# Patient Record
Sex: MALE | Race: WHITE | NOT HISPANIC OR LATINO | Employment: OTHER | URBAN - METROPOLITAN AREA
[De-identification: names, ages, dates, MRNs, and addresses within clinical notes are randomized per-mention and may not be internally consistent; named-entity substitution may affect disease eponyms.]

---

## 2017-01-10 ENCOUNTER — ALLSCRIPTS OFFICE VISIT (OUTPATIENT)
Dept: OTHER | Facility: OTHER | Age: 52
End: 2017-01-10

## 2017-01-25 ENCOUNTER — ALLSCRIPTS OFFICE VISIT (OUTPATIENT)
Dept: OTHER | Facility: OTHER | Age: 52
End: 2017-01-25

## 2017-01-27 ENCOUNTER — APPOINTMENT (OUTPATIENT)
Dept: LAB | Facility: HOSPITAL | Age: 52
End: 2017-01-27
Attending: FAMILY MEDICINE
Payer: MEDICARE

## 2017-01-27 ENCOUNTER — TRANSCRIBE ORDERS (OUTPATIENT)
Dept: ADMINISTRATIVE | Facility: HOSPITAL | Age: 52
End: 2017-01-27

## 2017-01-27 DIAGNOSIS — R51.9 FACIAL PAIN: ICD-10-CM

## 2017-01-27 DIAGNOSIS — R25.1 TREMOR: ICD-10-CM

## 2017-01-27 DIAGNOSIS — E03.9 UNSPECIFIED HYPOTHYROIDISM: ICD-10-CM

## 2017-01-27 DIAGNOSIS — R53.83 OTHER MALAISE AND FATIGUE: Primary | ICD-10-CM

## 2017-01-27 DIAGNOSIS — R53.81 OTHER MALAISE AND FATIGUE: ICD-10-CM

## 2017-01-27 DIAGNOSIS — E03.9 HYPOTHYROIDISM: ICD-10-CM

## 2017-01-27 DIAGNOSIS — E03.9 UNSPECIFIED HYPOTHYROIDISM: Primary | ICD-10-CM

## 2017-01-27 DIAGNOSIS — Q90.9 DOWN'S SYNDROME: ICD-10-CM

## 2017-01-27 DIAGNOSIS — R49.0 DYSPHONIA OF ORGANIC TREMOR: ICD-10-CM

## 2017-01-27 DIAGNOSIS — R53.81 OTHER MALAISE AND FATIGUE: Primary | ICD-10-CM

## 2017-01-27 DIAGNOSIS — R51.9 HEADACHE: ICD-10-CM

## 2017-01-27 DIAGNOSIS — R53.83 OTHER MALAISE AND FATIGUE: ICD-10-CM

## 2017-01-27 DIAGNOSIS — R25.1 DYSPHONIA OF ORGANIC TREMOR: ICD-10-CM

## 2017-01-27 DIAGNOSIS — R53.83 OTHER FATIGUE: ICD-10-CM

## 2017-01-27 LAB
25(OH)D3 SERPL-MCNC: 47 NG/ML (ref 30–100)
ALBUMIN SERPL BCP-MCNC: 2.8 G/DL (ref 3.5–5)
ALP SERPL-CCNC: 55 U/L (ref 46–116)
ALT SERPL W P-5'-P-CCNC: 17 U/L (ref 12–78)
ANION GAP SERPL CALCULATED.3IONS-SCNC: 7 MMOL/L (ref 4–13)
AST SERPL W P-5'-P-CCNC: 17 U/L (ref 5–45)
BACTERIA UR QL AUTO: ABNORMAL /HPF
BASOPHILS # BLD AUTO: 0 THOUSANDS/ΜL (ref 0–0.1)
BASOPHILS NFR BLD AUTO: 1 % (ref 0–1)
BILIRUB SERPL-MCNC: 0.3 MG/DL (ref 0.2–1)
BILIRUB UR QL STRIP: NEGATIVE
BUN SERPL-MCNC: 8 MG/DL (ref 5–25)
CALCIUM SERPL-MCNC: 8.5 MG/DL (ref 8.3–10.1)
CHLORIDE SERPL-SCNC: 98 MMOL/L (ref 100–108)
CHOLEST SERPL-MCNC: 173 MG/DL (ref 50–200)
CLARITY UR: ABNORMAL
CO2 SERPL-SCNC: 31 MMOL/L (ref 21–32)
COLOR UR: ABNORMAL
CREAT SERPL-MCNC: 0.94 MG/DL (ref 0.6–1.3)
EOSINOPHIL # BLD AUTO: 0 THOUSAND/ΜL (ref 0–0.61)
EOSINOPHIL NFR BLD AUTO: 1 % (ref 0–6)
ERYTHROCYTE [DISTWIDTH] IN BLOOD BY AUTOMATED COUNT: 14.6 % (ref 11.6–15.1)
GFR SERPL CREATININE-BSD FRML MDRD: >60 ML/MIN/1.73SQ M
GLUCOSE SERPL-MCNC: 76 MG/DL (ref 65–140)
GLUCOSE UR STRIP-MCNC: NEGATIVE MG/DL
HCT VFR BLD AUTO: 45 % (ref 42–52)
HDLC SERPL-MCNC: 52 MG/DL (ref 40–60)
HGB BLD-MCNC: 14.9 G/DL (ref 14–18)
HGB UR QL STRIP.AUTO: ABNORMAL
KETONES UR STRIP-MCNC: NEGATIVE MG/DL
LDLC SERPL CALC-MCNC: 105 MG/DL (ref 0–100)
LEUKOCYTE ESTERASE UR QL STRIP: ABNORMAL
LYMPHOCYTES # BLD AUTO: 1.2 THOUSANDS/ΜL (ref 0.6–4.47)
LYMPHOCYTES NFR BLD AUTO: 25 % (ref 14–44)
MCH RBC QN AUTO: 33.6 PG (ref 27–31)
MCHC RBC AUTO-ENTMCNC: 33.2 G/DL (ref 31.4–37.4)
MCV RBC AUTO: 101 FL (ref 82–98)
MONOCYTES # BLD AUTO: 0.4 THOUSAND/ΜL (ref 0.17–1.22)
MONOCYTES NFR BLD AUTO: 8 % (ref 4–12)
NEUTROPHILS # BLD AUTO: 3.1 THOUSANDS/ΜL (ref 1.85–7.62)
NEUTS SEG NFR BLD AUTO: 66 % (ref 43–75)
NITRITE UR QL STRIP: NEGATIVE
NON-SQ EPI CELLS URNS QL MICRO: ABNORMAL /HPF
NRBC BLD AUTO-RTO: 0 /100 WBCS
OTHER STN SPEC: ABNORMAL
PH UR STRIP.AUTO: 7 [PH] (ref 5–9)
PLATELET # BLD AUTO: 296 THOUSANDS/UL (ref 130–400)
PMV BLD AUTO: 9.3 FL (ref 8.9–12.7)
POTASSIUM SERPL-SCNC: 3.8 MMOL/L (ref 3.5–5.3)
PROT SERPL-MCNC: 7.2 G/DL (ref 6.4–8.2)
PROT UR STRIP-MCNC: NEGATIVE MG/DL
RBC # BLD AUTO: 4.44 MILLION/UL (ref 4.7–6.1)
RBC #/AREA URNS AUTO: ABNORMAL /HPF
SODIUM SERPL-SCNC: 136 MMOL/L (ref 136–145)
SP GR UR STRIP.AUTO: 1.01 (ref 1–1.03)
T4 FREE SERPL-MCNC: 0.88 NG/DL (ref 0.76–1.46)
TRIGL SERPL-MCNC: 79 MG/DL
TSH SERPL DL<=0.05 MIU/L-ACNC: 3.68 UIU/ML (ref 0.36–3.74)
UROBILINOGEN UR QL STRIP.AUTO: 0.2 E.U./DL
WBC # BLD AUTO: 4.8 THOUSAND/UL (ref 4.8–10.8)
WBC #/AREA URNS AUTO: ABNORMAL /HPF

## 2017-01-27 PROCEDURE — 84443 ASSAY THYROID STIM HORMONE: CPT

## 2017-01-27 PROCEDURE — 80053 COMPREHEN METABOLIC PANEL: CPT

## 2017-01-27 PROCEDURE — 80061 LIPID PANEL: CPT

## 2017-01-27 PROCEDURE — 36415 COLL VENOUS BLD VENIPUNCTURE: CPT

## 2017-01-27 PROCEDURE — 82306 VITAMIN D 25 HYDROXY: CPT

## 2017-01-27 PROCEDURE — 84439 ASSAY OF FREE THYROXINE: CPT

## 2017-01-27 PROCEDURE — 85025 COMPLETE CBC W/AUTO DIFF WBC: CPT

## 2017-01-27 PROCEDURE — 81001 URINALYSIS AUTO W/SCOPE: CPT | Performed by: FAMILY MEDICINE

## 2017-01-30 ENCOUNTER — GENERIC CONVERSION - ENCOUNTER (OUTPATIENT)
Dept: OTHER | Facility: OTHER | Age: 52
End: 2017-01-30

## 2017-02-13 ENCOUNTER — ALLSCRIPTS OFFICE VISIT (OUTPATIENT)
Dept: OTHER | Facility: OTHER | Age: 52
End: 2017-02-13

## 2017-02-14 ENCOUNTER — ALLSCRIPTS OFFICE VISIT (OUTPATIENT)
Dept: OTHER | Facility: OTHER | Age: 52
End: 2017-02-14

## 2017-02-23 ENCOUNTER — ALLSCRIPTS OFFICE VISIT (OUTPATIENT)
Dept: OTHER | Facility: OTHER | Age: 52
End: 2017-02-23

## 2017-02-23 DIAGNOSIS — E29.1 TESTICULAR HYPOFUNCTION: ICD-10-CM

## 2017-02-23 DIAGNOSIS — E03.9 HYPOTHYROIDISM: ICD-10-CM

## 2017-02-23 DIAGNOSIS — R35.0 FREQUENCY OF MICTURITION: ICD-10-CM

## 2017-02-23 LAB
BILIRUB UR QL STRIP: NORMAL
CLARITY UR: NORMAL
COLOR UR: YELLOW
GLUCOSE (HISTORICAL): NORMAL
HGB UR QL STRIP.AUTO: NORMAL
KETONES UR STRIP-MCNC: NORMAL MG/DL
LEUKOCYTE ESTERASE UR QL STRIP: NORMAL
NITRITE UR QL STRIP: NORMAL
PH UR STRIP.AUTO: 7 [PH]
PROT UR STRIP-MCNC: NORMAL MG/DL
SP GR UR STRIP.AUTO: 1
UROBILINOGEN UR QL STRIP.AUTO: NORMAL

## 2017-02-24 ENCOUNTER — APPOINTMENT (OUTPATIENT)
Dept: LAB | Facility: HOSPITAL | Age: 52
End: 2017-02-24
Payer: MEDICARE

## 2017-02-24 DIAGNOSIS — R35.0 FREQUENCY OF MICTURITION: ICD-10-CM

## 2017-02-24 PROCEDURE — 87086 URINE CULTURE/COLONY COUNT: CPT

## 2017-02-25 LAB — BACTERIA UR CULT: NORMAL

## 2017-02-28 ENCOUNTER — HOSPITAL ENCOUNTER (INPATIENT)
Facility: HOSPITAL | Age: 52
LOS: 2 days | Discharge: HOME | DRG: 871 | End: 2017-03-02
Attending: EMERGENCY MEDICINE | Admitting: INTERNAL MEDICINE
Payer: MEDICARE

## 2017-02-28 ENCOUNTER — ALLSCRIPTS OFFICE VISIT (OUTPATIENT)
Dept: OTHER | Facility: OTHER | Age: 52
End: 2017-02-28

## 2017-02-28 ENCOUNTER — APPOINTMENT (EMERGENCY)
Dept: RADIOLOGY | Facility: HOSPITAL | Age: 52
DRG: 871 | End: 2017-02-28
Payer: MEDICARE

## 2017-02-28 DIAGNOSIS — J18.9 MULTIFOCAL PNEUMONIA: Primary | ICD-10-CM

## 2017-02-28 PROBLEM — D64.9 ANEMIA: Status: ACTIVE | Noted: 2017-02-28

## 2017-02-28 PROBLEM — A41.9 SEPSIS (HCC): Status: ACTIVE | Noted: 2017-02-28

## 2017-02-28 LAB
ALBUMIN SERPL BCP-MCNC: 2.6 G/DL (ref 3.5–5)
ALP SERPL-CCNC: 61 U/L (ref 46–116)
ALT SERPL W P-5'-P-CCNC: 31 U/L (ref 12–78)
ANION GAP SERPL CALCULATED.3IONS-SCNC: 7 MMOL/L (ref 4–13)
AST SERPL W P-5'-P-CCNC: 40 U/L (ref 5–45)
BASOPHILS # BLD AUTO: 0 THOUSANDS/ΜL (ref 0–0.1)
BASOPHILS NFR BLD AUTO: 0 % (ref 0–1)
BILIRUB SERPL-MCNC: 0.3 MG/DL (ref 0.2–1)
BUN SERPL-MCNC: 13 MG/DL (ref 5–25)
CALCIUM SERPL-MCNC: 8 MG/DL (ref 8.3–10.1)
CHLORIDE SERPL-SCNC: 105 MMOL/L (ref 100–108)
CO2 SERPL-SCNC: 28 MMOL/L (ref 21–32)
CREAT SERPL-MCNC: 1.17 MG/DL (ref 0.6–1.3)
EOSINOPHIL # BLD AUTO: 0 THOUSAND/ΜL (ref 0–0.61)
EOSINOPHIL NFR BLD AUTO: 0 % (ref 0–6)
ERYTHROCYTE [DISTWIDTH] IN BLOOD BY AUTOMATED COUNT: 15.4 % (ref 11.6–15.1)
FLUAV AG SPEC QL IA: NEGATIVE
GFR SERPL CREATININE-BSD FRML MDRD: >60 ML/MIN/1.73SQ M
GLUCOSE SERPL-MCNC: 78 MG/DL (ref 65–140)
HCT VFR BLD AUTO: 37.5 % (ref 42–52)
HGB BLD-MCNC: 12.6 G/DL (ref 14–18)
INFLUENZA B AG (HISTORICAL): NEGATIVE
LACTATE SERPL-SCNC: 1.7 MMOL/L (ref 0.5–2)
LYMPHOCYTES # BLD AUTO: 0.5 THOUSANDS/ΜL (ref 0.6–4.47)
LYMPHOCYTES NFR BLD AUTO: 7 % (ref 14–44)
MCH RBC QN AUTO: 33.6 PG (ref 27–31)
MCHC RBC AUTO-ENTMCNC: 33.5 G/DL (ref 31.4–37.4)
MCV RBC AUTO: 100 FL (ref 82–98)
MONOCYTES # BLD AUTO: 0.6 THOUSAND/ΜL (ref 0.17–1.22)
MONOCYTES NFR BLD AUTO: 8 % (ref 4–12)
NEUTROPHILS # BLD AUTO: 6.4 THOUSANDS/ΜL (ref 1.85–7.62)
NEUTS SEG NFR BLD AUTO: 85 % (ref 43–75)
NRBC BLD AUTO-RTO: 0 /100 WBCS
NT-PROBNP SERPL-MCNC: 296 PG/ML
PLATELET # BLD AUTO: 219 THOUSANDS/UL (ref 130–400)
PMV BLD AUTO: 8.3 FL (ref 8.9–12.7)
POTASSIUM SERPL-SCNC: 4.4 MMOL/L (ref 3.5–5.3)
PROT SERPL-MCNC: 6.6 G/DL (ref 6.4–8.2)
RBC # BLD AUTO: 3.73 MILLION/UL (ref 4.7–6.1)
SODIUM SERPL-SCNC: 140 MMOL/L (ref 136–145)
WBC # BLD AUTO: 7.5 THOUSAND/UL (ref 4.8–10.8)

## 2017-02-28 PROCEDURE — 85025 COMPLETE CBC W/AUTO DIFF WBC: CPT | Performed by: EMERGENCY MEDICINE

## 2017-02-28 PROCEDURE — 85730 THROMBOPLASTIN TIME PARTIAL: CPT | Performed by: NURSE PRACTITIONER

## 2017-02-28 PROCEDURE — 87798 DETECT AGENT NOS DNA AMP: CPT | Performed by: EMERGENCY MEDICINE

## 2017-02-28 PROCEDURE — 83880 ASSAY OF NATRIURETIC PEPTIDE: CPT | Performed by: NURSE PRACTITIONER

## 2017-02-28 PROCEDURE — 93005 ELECTROCARDIOGRAM TRACING: CPT | Performed by: EMERGENCY MEDICINE

## 2017-02-28 PROCEDURE — 96365 THER/PROPH/DIAG IV INF INIT: CPT

## 2017-02-28 PROCEDURE — 87040 BLOOD CULTURE FOR BACTERIA: CPT | Performed by: EMERGENCY MEDICINE

## 2017-02-28 PROCEDURE — 83605 ASSAY OF LACTIC ACID: CPT | Performed by: EMERGENCY MEDICINE

## 2017-02-28 PROCEDURE — 86140 C-REACTIVE PROTEIN: CPT | Performed by: NURSE PRACTITIONER

## 2017-02-28 PROCEDURE — 96361 HYDRATE IV INFUSION ADD-ON: CPT

## 2017-02-28 PROCEDURE — 85045 AUTOMATED RETICULOCYTE COUNT: CPT | Performed by: NURSE PRACTITIONER

## 2017-02-28 PROCEDURE — 36415 COLL VENOUS BLD VENIPUNCTURE: CPT | Performed by: EMERGENCY MEDICINE

## 2017-02-28 PROCEDURE — 99285 EMERGENCY DEPT VISIT HI MDM: CPT

## 2017-02-28 PROCEDURE — 80053 COMPREHEN METABOLIC PANEL: CPT | Performed by: EMERGENCY MEDICINE

## 2017-02-28 PROCEDURE — 85610 PROTHROMBIN TIME: CPT | Performed by: NURSE PRACTITIONER

## 2017-02-28 PROCEDURE — 71010 HB CHEST X-RAY 1 VIEW FRONTAL (PORTABLE): CPT

## 2017-02-28 RX ORDER — PYRIDOXINE HCL (VITAMIN B6) 100 MG
TABLET ORAL 2 TIMES DAILY
Status: DISCONTINUED | OUTPATIENT
Start: 2017-02-28 | End: 2017-02-28

## 2017-02-28 RX ORDER — PEDIATRIC MULTIPLE VITAMIN LIQ
5 LIQUID ORAL DAILY
Status: DISCONTINUED | OUTPATIENT
Start: 2017-03-01 | End: 2017-03-02 | Stop reason: HOSPADM

## 2017-02-28 RX ORDER — SACCHAROMYCES BOULARDII 250 MG
250 CAPSULE ORAL 2 TIMES DAILY
Status: DISCONTINUED | OUTPATIENT
Start: 2017-02-28 | End: 2017-03-02 | Stop reason: HOSPADM

## 2017-02-28 RX ORDER — OLANZAPINE 2.5 MG/1
2.5 TABLET ORAL
Status: DISCONTINUED | OUTPATIENT
Start: 2017-02-28 | End: 2017-03-02 | Stop reason: HOSPADM

## 2017-02-28 RX ORDER — AZITHROMYCIN 250 MG/1
500 TABLET, FILM COATED ORAL EVERY 24 HOURS
Status: DISCONTINUED | OUTPATIENT
Start: 2017-03-01 | End: 2017-03-02

## 2017-02-28 RX ORDER — MELATONIN
1000 2 TIMES DAILY
Status: DISCONTINUED | OUTPATIENT
Start: 2017-02-28 | End: 2017-03-02 | Stop reason: HOSPADM

## 2017-02-28 RX ORDER — LEVOTHYROXINE SODIUM 0.05 MG/1
50 TABLET ORAL
Status: DISCONTINUED | OUTPATIENT
Start: 2017-03-01 | End: 2017-03-02 | Stop reason: HOSPADM

## 2017-02-28 RX ORDER — SODIUM CHLORIDE 9 MG/ML
100 INJECTION, SOLUTION INTRAVENOUS CONTINUOUS
Status: DISCONTINUED | OUTPATIENT
Start: 2017-02-28 | End: 2017-03-02

## 2017-02-28 RX ORDER — B-COMPLEX WITH VITAMIN C
1 TABLET ORAL 2 TIMES DAILY
Status: DISCONTINUED | OUTPATIENT
Start: 2017-03-01 | End: 2017-03-02 | Stop reason: HOSPADM

## 2017-02-28 RX ORDER — IPRATROPIUM BROMIDE AND ALBUTEROL SULFATE 2.5; .5 MG/3ML; MG/3ML
3 SOLUTION RESPIRATORY (INHALATION) EVERY 4 HOURS PRN
Status: DISCONTINUED | OUTPATIENT
Start: 2017-02-28 | End: 2017-03-02 | Stop reason: HOSPADM

## 2017-02-28 RX ORDER — ATORVASTATIN CALCIUM 20 MG/1
20 TABLET, FILM COATED ORAL
Status: DISCONTINUED | OUTPATIENT
Start: 2017-03-01 | End: 2017-03-02 | Stop reason: HOSPADM

## 2017-02-28 RX ORDER — PANTOPRAZOLE SODIUM 40 MG/1
40 TABLET, DELAYED RELEASE ORAL
Status: DISCONTINUED | OUTPATIENT
Start: 2017-03-01 | End: 2017-03-02 | Stop reason: HOSPADM

## 2017-02-28 RX ORDER — ACETAMINOPHEN 325 MG/1
650 TABLET ORAL EVERY 6 HOURS PRN
Status: DISCONTINUED | OUTPATIENT
Start: 2017-02-28 | End: 2017-03-02 | Stop reason: HOSPADM

## 2017-02-28 RX ORDER — GUAIFENESIN/DEXTROMETHORPHAN 100-10MG/5
10 SYRUP ORAL EVERY 4 HOURS PRN
Status: DISCONTINUED | OUTPATIENT
Start: 2017-02-28 | End: 2017-03-02 | Stop reason: HOSPADM

## 2017-02-28 RX ORDER — MAGNESIUM HYDROXIDE/ALUMINUM HYDROXICE/SIMETHICONE 120; 1200; 1200 MG/30ML; MG/30ML; MG/30ML
30 SUSPENSION ORAL EVERY 6 HOURS PRN
Status: DISCONTINUED | OUTPATIENT
Start: 2017-02-28 | End: 2017-03-02 | Stop reason: HOSPADM

## 2017-02-28 RX ORDER — POLYETHYLENE GLYCOL 3350 17 G/17G
17 POWDER, FOR SOLUTION ORAL DAILY
Status: DISCONTINUED | OUTPATIENT
Start: 2017-03-01 | End: 2017-03-02 | Stop reason: HOSPADM

## 2017-02-28 RX ORDER — ACETAMINOPHEN 325 MG/1
650 TABLET ORAL ONCE
Status: COMPLETED | OUTPATIENT
Start: 2017-02-28 | End: 2017-02-28

## 2017-02-28 RX ORDER — ONDANSETRON 2 MG/ML
4 INJECTION INTRAMUSCULAR; INTRAVENOUS EVERY 6 HOURS PRN
Status: DISCONTINUED | OUTPATIENT
Start: 2017-02-28 | End: 2017-03-02 | Stop reason: HOSPADM

## 2017-02-28 RX ORDER — IPRATROPIUM BROMIDE AND ALBUTEROL SULFATE 2.5; .5 MG/3ML; MG/3ML
3 SOLUTION RESPIRATORY (INHALATION)
Status: DISCONTINUED | OUTPATIENT
Start: 2017-02-28 | End: 2017-03-02 | Stop reason: HOSPADM

## 2017-02-28 RX ORDER — TESTOSTERONE 20.25 MG/1.25G
20.25 GEL TOPICAL DAILY
Status: DISCONTINUED | OUTPATIENT
Start: 2017-03-01 | End: 2017-03-02 | Stop reason: HOSPADM

## 2017-02-28 RX ORDER — TAMSULOSIN HYDROCHLORIDE 0.4 MG/1
0.4 CAPSULE ORAL
Status: DISCONTINUED | OUTPATIENT
Start: 2017-03-01 | End: 2017-03-02 | Stop reason: HOSPADM

## 2017-02-28 RX ORDER — DOCUSATE SODIUM 100 MG/1
100 CAPSULE, LIQUID FILLED ORAL DAILY
Status: DISCONTINUED | OUTPATIENT
Start: 2017-03-01 | End: 2017-03-02 | Stop reason: HOSPADM

## 2017-02-28 RX ORDER — AZITHROMYCIN 250 MG/1
500 TABLET, FILM COATED ORAL ONCE
Status: COMPLETED | OUTPATIENT
Start: 2017-02-28 | End: 2017-02-28

## 2017-02-28 RX ADMIN — VITAMIN D, TAB 1000IU (100/BT) 1000 UNITS: 25 TAB at 23:13

## 2017-02-28 RX ADMIN — SODIUM CHLORIDE 1000 ML: 0.9 INJECTION, SOLUTION INTRAVENOUS at 16:17

## 2017-02-28 RX ADMIN — SODIUM CHLORIDE 100 ML/HR: 0.9 INJECTION, SOLUTION INTRAVENOUS at 21:06

## 2017-02-28 RX ADMIN — Medication 250 MG: at 23:08

## 2017-02-28 RX ADMIN — ACETAMINOPHEN 650 MG: 325 TABLET, FILM COATED ORAL at 16:17

## 2017-02-28 RX ADMIN — OLANZAPINE 2.5 MG: 2.5 TABLET, FILM COATED ORAL at 23:02

## 2017-02-28 RX ADMIN — AZITHROMYCIN 500 MG: 250 TABLET, FILM COATED ORAL at 17:18

## 2017-02-28 RX ADMIN — CEFTRIAXONE 1000 MG: 1 INJECTION, SOLUTION INTRAVENOUS at 17:17

## 2017-03-01 PROBLEM — B33.8 RSV INFECTION: Status: ACTIVE | Noted: 2017-03-01

## 2017-03-01 LAB
ANION GAP SERPL CALCULATED.3IONS-SCNC: 7 MMOL/L (ref 4–13)
APTT PPP: 26 SECONDS (ref 24–36)
ATRIAL RATE: 105 BPM
BASOPHILS # BLD AUTO: 0 THOUSANDS/ΜL (ref 0–0.1)
BASOPHILS NFR BLD AUTO: 1 % (ref 0–1)
BILIRUB UR QL STRIP: NEGATIVE
BUN SERPL-MCNC: 11 MG/DL (ref 5–25)
CALCIUM SERPL-MCNC: 8 MG/DL (ref 8.3–10.1)
CHLORIDE SERPL-SCNC: 104 MMOL/L (ref 100–108)
CLARITY UR: CLEAR
CO2 SERPL-SCNC: 26 MMOL/L (ref 21–32)
COLOR UR: YELLOW
CREAT SERPL-MCNC: 0.91 MG/DL (ref 0.6–1.3)
CRP SERPL QL: 53.7 MG/L
EOSINOPHIL # BLD AUTO: 0 THOUSAND/ΜL (ref 0–0.61)
EOSINOPHIL NFR BLD AUTO: 0 % (ref 0–6)
ERYTHROCYTE [DISTWIDTH] IN BLOOD BY AUTOMATED COUNT: 15.3 % (ref 11.6–15.1)
FERRITIN SERPL-MCNC: 162 NG/ML (ref 8–388)
FLUAV AG SPEC QL: ABNORMAL
FLUBV AG SPEC QL: ABNORMAL
FOLATE SERPL-MCNC: >20 NG/ML (ref 3.1–17.5)
GFR SERPL CREATININE-BSD FRML MDRD: >60 ML/MIN/1.73SQ M
GLUCOSE SERPL-MCNC: 95 MG/DL (ref 65–140)
GLUCOSE UR STRIP-MCNC: NEGATIVE MG/DL
HCT VFR BLD AUTO: 35.7 % (ref 42–52)
HGB BLD-MCNC: 12 G/DL (ref 14–18)
HGB UR QL STRIP.AUTO: NEGATIVE
INR PPP: 1.07 (ref 0.86–1.16)
IRON SATN MFR SERPL: 10 %
IRON SERPL-MCNC: 22 UG/DL (ref 65–175)
KETONES UR STRIP-MCNC: NEGATIVE MG/DL
L PNEUMO1 AG UR QL IA.RAPID: NEGATIVE
LEUKOCYTE ESTERASE UR QL STRIP: NEGATIVE
LYMPHOCYTES # BLD AUTO: 0.9 THOUSANDS/ΜL (ref 0.6–4.47)
LYMPHOCYTES NFR BLD AUTO: 19 % (ref 14–44)
MAGNESIUM SERPL-MCNC: 1.8 MG/DL (ref 1.6–2.6)
MCH RBC QN AUTO: 33.9 PG (ref 27–31)
MCHC RBC AUTO-ENTMCNC: 33.6 G/DL (ref 31.4–37.4)
MCV RBC AUTO: 101 FL (ref 82–98)
MONOCYTES # BLD AUTO: 0.5 THOUSAND/ΜL (ref 0.17–1.22)
MONOCYTES NFR BLD AUTO: 10 % (ref 4–12)
NEUTROPHILS # BLD AUTO: 3.2 THOUSANDS/ΜL (ref 1.85–7.62)
NEUTS SEG NFR BLD AUTO: 70 % (ref 43–75)
NITRITE UR QL STRIP: NEGATIVE
NRBC BLD AUTO-RTO: 0 /100 WBCS
P AXIS: 53 DEGREES
PH UR STRIP.AUTO: 6.5 [PH] (ref 5–9)
PLATELET # BLD AUTO: 213 THOUSANDS/UL (ref 130–400)
PMV BLD AUTO: 8.3 FL (ref 8.9–12.7)
POTASSIUM SERPL-SCNC: 3.7 MMOL/L (ref 3.5–5.3)
PR INTERVAL: 132 MS
PROT UR STRIP-MCNC: NEGATIVE MG/DL
PROTHROMBIN TIME: 11.3 SECONDS (ref 9.4–11.7)
QRS AXIS: -4 DEGREES
QRSD INTERVAL: 84 MS
QT INTERVAL: 320 MS
QTC INTERVAL: 422 MS
RBC # BLD AUTO: 3.54 MILLION/UL (ref 4.7–6.1)
RETICS # AUTO: NORMAL 10*3/UL (ref 14356–105094)
RETICS # CALC: 1.19 % (ref 0.37–1.87)
RSV B RNA SPEC QL NAA+PROBE: DETECTED
S PNEUM AG UR QL: NEGATIVE
SODIUM SERPL-SCNC: 137 MMOL/L (ref 136–145)
SP GR UR STRIP.AUTO: 1.01 (ref 1–1.03)
T WAVE AXIS: 36 DEGREES
TIBC SERPL-MCNC: 215 UG/DL (ref 250–450)
UROBILINOGEN UR QL STRIP.AUTO: 0.2 E.U./DL
VENTRICULAR RATE: 105 BPM
VIT B12 SERPL-MCNC: 464 PG/ML (ref 100–900)
WBC # BLD AUTO: 4.6 THOUSAND/UL (ref 4.8–10.8)

## 2017-03-01 PROCEDURE — 82607 VITAMIN B-12: CPT | Performed by: NURSE PRACTITIONER

## 2017-03-01 PROCEDURE — 94640 AIRWAY INHALATION TREATMENT: CPT

## 2017-03-01 PROCEDURE — 94760 N-INVAS EAR/PLS OXIMETRY 1: CPT

## 2017-03-01 PROCEDURE — 83735 ASSAY OF MAGNESIUM: CPT | Performed by: NURSE PRACTITIONER

## 2017-03-01 PROCEDURE — 94664 DEMO&/EVAL PT USE INHALER: CPT

## 2017-03-01 PROCEDURE — 87449 NOS EACH ORGANISM AG IA: CPT | Performed by: NURSE PRACTITIONER

## 2017-03-01 PROCEDURE — 85025 COMPLETE CBC W/AUTO DIFF WBC: CPT | Performed by: NURSE PRACTITIONER

## 2017-03-01 PROCEDURE — 82728 ASSAY OF FERRITIN: CPT | Performed by: NURSE PRACTITIONER

## 2017-03-01 PROCEDURE — 83540 ASSAY OF IRON: CPT | Performed by: NURSE PRACTITIONER

## 2017-03-01 PROCEDURE — 87081 CULTURE SCREEN ONLY: CPT | Performed by: NURSE PRACTITIONER

## 2017-03-01 PROCEDURE — 81003 URINALYSIS AUTO W/O SCOPE: CPT | Performed by: EMERGENCY MEDICINE

## 2017-03-01 PROCEDURE — 82746 ASSAY OF FOLIC ACID SERUM: CPT | Performed by: NURSE PRACTITIONER

## 2017-03-01 PROCEDURE — 80048 BASIC METABOLIC PNL TOTAL CA: CPT | Performed by: NURSE PRACTITIONER

## 2017-03-01 PROCEDURE — 83550 IRON BINDING TEST: CPT | Performed by: NURSE PRACTITIONER

## 2017-03-01 RX ADMIN — Medication 250 MG: at 17:16

## 2017-03-01 RX ADMIN — IPRATROPIUM BROMIDE AND ALBUTEROL SULFATE 3 ML: .5; 3 SOLUTION RESPIRATORY (INHALATION) at 01:11

## 2017-03-01 RX ADMIN — PANTOPRAZOLE SODIUM 40 MG: 40 TABLET, DELAYED RELEASE ORAL at 06:39

## 2017-03-01 RX ADMIN — AZITHROMYCIN 500 MG: 250 TABLET, FILM COATED ORAL at 17:16

## 2017-03-01 RX ADMIN — IPRATROPIUM BROMIDE AND ALBUTEROL SULFATE 3 ML: .5; 3 SOLUTION RESPIRATORY (INHALATION) at 07:43

## 2017-03-01 RX ADMIN — ENOXAPARIN SODIUM 40 MG: 40 INJECTION SUBCUTANEOUS at 09:09

## 2017-03-01 RX ADMIN — CALCIUM CARBONATE 500 MG (1,250 MG)-VITAMIN D3 200 UNIT TABLET 1 TABLET: at 09:10

## 2017-03-01 RX ADMIN — Medication 5 ML: at 09:09

## 2017-03-01 RX ADMIN — IPRATROPIUM BROMIDE AND ALBUTEROL SULFATE 3 ML: .5; 3 SOLUTION RESPIRATORY (INHALATION) at 13:54

## 2017-03-01 RX ADMIN — IPRATROPIUM BROMIDE AND ALBUTEROL SULFATE 3 ML: .5; 3 SOLUTION RESPIRATORY (INHALATION) at 20:03

## 2017-03-01 RX ADMIN — VITAMIN D, TAB 1000IU (100/BT) 1000 UNITS: 25 TAB at 09:09

## 2017-03-01 RX ADMIN — POLYETHYLENE GLYCOL 3350 17 G: 17 POWDER, FOR SOLUTION ORAL at 09:09

## 2017-03-01 RX ADMIN — DOCUSATE SODIUM 100 MG: 100 CAPSULE, LIQUID FILLED ORAL at 09:10

## 2017-03-01 RX ADMIN — ATORVASTATIN CALCIUM 20 MG: 20 TABLET, FILM COATED ORAL at 15:36

## 2017-03-01 RX ADMIN — LEVOTHYROXINE SODIUM 50 MCG: 50 TABLET ORAL at 06:39

## 2017-03-01 RX ADMIN — OLANZAPINE 2.5 MG: 2.5 TABLET, FILM COATED ORAL at 22:54

## 2017-03-01 RX ADMIN — CALCIUM CARBONATE 500 MG (1,250 MG)-VITAMIN D3 200 UNIT TABLET 1 TABLET: at 17:16

## 2017-03-01 RX ADMIN — TAMSULOSIN HYDROCHLORIDE 0.4 MG: 0.4 CAPSULE ORAL at 15:36

## 2017-03-01 RX ADMIN — VITAMIN D, TAB 1000IU (100/BT) 1000 UNITS: 25 TAB at 17:16

## 2017-03-01 RX ADMIN — Medication 250 MG: at 09:09

## 2017-03-01 RX ADMIN — SODIUM CHLORIDE 100 ML/HR: 0.9 INJECTION, SOLUTION INTRAVENOUS at 06:42

## 2017-03-01 RX ADMIN — CEFTRIAXONE 1000 MG: 1 INJECTION, POWDER, FOR SOLUTION INTRAMUSCULAR; INTRAVENOUS at 17:17

## 2017-03-02 VITALS
TEMPERATURE: 98.4 F | OXYGEN SATURATION: 98 % | HEART RATE: 88 BPM | WEIGHT: 152.38 LBS | DIASTOLIC BLOOD PRESSURE: 50 MMHG | BODY MASS INDEX: 29.92 KG/M2 | RESPIRATION RATE: 16 BRPM | SYSTOLIC BLOOD PRESSURE: 95 MMHG | HEIGHT: 60 IN

## 2017-03-02 PROBLEM — D50.9 IDA (IRON DEFICIENCY ANEMIA): Status: ACTIVE | Noted: 2017-03-02

## 2017-03-02 LAB
ANION GAP SERPL CALCULATED.3IONS-SCNC: 8 MMOL/L (ref 4–13)
BASOPHILS # BLD AUTO: 0 THOUSANDS/ΜL (ref 0–0.1)
BASOPHILS NFR BLD AUTO: 1 % (ref 0–1)
BUN SERPL-MCNC: 9 MG/DL (ref 5–25)
CALCIUM SERPL-MCNC: 7.7 MG/DL (ref 8.3–10.1)
CHLORIDE SERPL-SCNC: 107 MMOL/L (ref 100–108)
CO2 SERPL-SCNC: 26 MMOL/L (ref 21–32)
CREAT SERPL-MCNC: 0.75 MG/DL (ref 0.6–1.3)
EOSINOPHIL # BLD AUTO: 0.1 THOUSAND/ΜL (ref 0–0.61)
EOSINOPHIL NFR BLD AUTO: 2 % (ref 0–6)
ERYTHROCYTE [DISTWIDTH] IN BLOOD BY AUTOMATED COUNT: 15.4 % (ref 11.6–15.1)
GFR SERPL CREATININE-BSD FRML MDRD: >60 ML/MIN/1.73SQ M
GLUCOSE SERPL-MCNC: 101 MG/DL (ref 65–140)
HCT VFR BLD AUTO: 34.9 % (ref 42–52)
HEMOCCULT STL QL: NEGATIVE
HGB BLD-MCNC: 11.7 G/DL (ref 14–18)
LYMPHOCYTES # BLD AUTO: 1.2 THOUSANDS/ΜL (ref 0.6–4.47)
LYMPHOCYTES NFR BLD AUTO: 46 % (ref 14–44)
MCH RBC QN AUTO: 33.7 PG (ref 27–31)
MCHC RBC AUTO-ENTMCNC: 33.5 G/DL (ref 31.4–37.4)
MCV RBC AUTO: 101 FL (ref 82–98)
MONOCYTES # BLD AUTO: 0.3 THOUSAND/ΜL (ref 0.17–1.22)
MONOCYTES NFR BLD AUTO: 12 % (ref 4–12)
MRSA NOSE QL CULT: NORMAL
NEUTROPHILS # BLD AUTO: 1 THOUSANDS/ΜL (ref 1.85–7.62)
NEUTS SEG NFR BLD AUTO: 40 % (ref 43–75)
NRBC BLD AUTO-RTO: 0 /100 WBCS
PLATELET # BLD AUTO: 223 THOUSANDS/UL (ref 130–400)
PMV BLD AUTO: 8.1 FL (ref 8.9–12.7)
POTASSIUM SERPL-SCNC: 3.8 MMOL/L (ref 3.5–5.3)
RBC # BLD AUTO: 3.47 MILLION/UL (ref 4.7–6.1)
SODIUM SERPL-SCNC: 141 MMOL/L (ref 136–145)
WBC # BLD AUTO: 2.6 THOUSAND/UL (ref 4.8–10.8)

## 2017-03-02 PROCEDURE — 82272 OCCULT BLD FECES 1-3 TESTS: CPT | Performed by: INTERNAL MEDICINE

## 2017-03-02 PROCEDURE — 94640 AIRWAY INHALATION TREATMENT: CPT

## 2017-03-02 PROCEDURE — 94760 N-INVAS EAR/PLS OXIMETRY 1: CPT

## 2017-03-02 PROCEDURE — 80048 BASIC METABOLIC PNL TOTAL CA: CPT | Performed by: INTERNAL MEDICINE

## 2017-03-02 PROCEDURE — 85025 COMPLETE CBC W/AUTO DIFF WBC: CPT | Performed by: INTERNAL MEDICINE

## 2017-03-02 RX ORDER — FERROUS SULFATE 325(65) MG
325 TABLET ORAL
Qty: 90 TABLET | Refills: 0 | Status: SHIPPED | OUTPATIENT
Start: 2017-03-02 | End: 2018-01-28

## 2017-03-02 RX ORDER — LEVOFLOXACIN 500 MG/1
500 TABLET, FILM COATED ORAL DAILY
Qty: 4 TABLET | Refills: 0 | Status: SHIPPED | OUTPATIENT
Start: 2017-03-03 | End: 2017-03-07

## 2017-03-02 RX ORDER — ACETAMINOPHEN 325 MG/1
650 TABLET ORAL EVERY 6 HOURS PRN
Qty: 30 TABLET | Refills: 0 | Status: SHIPPED | OUTPATIENT
Start: 2017-03-02 | End: 2017-04-01

## 2017-03-02 RX ORDER — GUAIFENESIN/DEXTROMETHORPHAN 100-10MG/5
10 SYRUP ORAL EVERY 4 HOURS PRN
Qty: 118 ML | Refills: 0 | Status: SHIPPED | OUTPATIENT
Start: 2017-03-02 | End: 2017-04-01

## 2017-03-02 RX ORDER — FERROUS SULFATE 325(65) MG
325 TABLET ORAL
Qty: 30 TABLET | Refills: 0 | Status: SHIPPED | OUTPATIENT
Start: 2017-03-07 | End: 2017-03-02

## 2017-03-02 RX ADMIN — VITAMIN D, TAB 1000IU (100/BT) 1000 UNITS: 25 TAB at 08:01

## 2017-03-02 RX ADMIN — ATORVASTATIN CALCIUM 20 MG: 20 TABLET, FILM COATED ORAL at 15:41

## 2017-03-02 RX ADMIN — Medication 250 MG: at 08:01

## 2017-03-02 RX ADMIN — POLYETHYLENE GLYCOL 3350 17 G: 17 POWDER, FOR SOLUTION ORAL at 08:01

## 2017-03-02 RX ADMIN — SODIUM CHLORIDE 100 ML/HR: 0.9 INJECTION, SOLUTION INTRAVENOUS at 02:56

## 2017-03-02 RX ADMIN — Medication 5 ML: at 08:01

## 2017-03-02 RX ADMIN — IPRATROPIUM BROMIDE AND ALBUTEROL SULFATE 3 ML: .5; 3 SOLUTION RESPIRATORY (INHALATION) at 19:35

## 2017-03-02 RX ADMIN — VITAMIN D, TAB 1000IU (100/BT) 1000 UNITS: 25 TAB at 17:50

## 2017-03-02 RX ADMIN — IPRATROPIUM BROMIDE AND ALBUTEROL SULFATE 3 ML: .5; 3 SOLUTION RESPIRATORY (INHALATION) at 13:24

## 2017-03-02 RX ADMIN — TAMSULOSIN HYDROCHLORIDE 0.4 MG: 0.4 CAPSULE ORAL at 15:42

## 2017-03-02 RX ADMIN — Medication 250 MG: at 17:50

## 2017-03-02 RX ADMIN — DOCUSATE SODIUM 100 MG: 100 CAPSULE, LIQUID FILLED ORAL at 08:01

## 2017-03-02 RX ADMIN — CALCIUM CARBONATE 500 MG (1,250 MG)-VITAMIN D3 200 UNIT TABLET 1 TABLET: at 17:50

## 2017-03-02 RX ADMIN — IPRATROPIUM BROMIDE AND ALBUTEROL SULFATE 3 ML: .5; 3 SOLUTION RESPIRATORY (INHALATION) at 07:33

## 2017-03-02 RX ADMIN — PANTOPRAZOLE SODIUM 40 MG: 40 TABLET, DELAYED RELEASE ORAL at 05:10

## 2017-03-02 RX ADMIN — CEFTRIAXONE 1000 MG: 1 INJECTION, POWDER, FOR SOLUTION INTRAMUSCULAR; INTRAVENOUS at 17:51

## 2017-03-02 RX ADMIN — IPRATROPIUM BROMIDE AND ALBUTEROL SULFATE 3 ML: .5; 3 SOLUTION RESPIRATORY (INHALATION) at 01:38

## 2017-03-02 RX ADMIN — CALCIUM CARBONATE 500 MG (1,250 MG)-VITAMIN D3 200 UNIT TABLET 1 TABLET: at 08:01

## 2017-03-02 RX ADMIN — LEVOTHYROXINE SODIUM 50 MCG: 50 TABLET ORAL at 05:10

## 2017-03-02 RX ADMIN — SODIUM CHLORIDE 500 ML: 0.9 INJECTION, SOLUTION INTRAVENOUS at 14:57

## 2017-03-03 ENCOUNTER — GENERIC CONVERSION - ENCOUNTER (OUTPATIENT)
Dept: OTHER | Facility: OTHER | Age: 52
End: 2017-03-03

## 2017-03-05 LAB
BACTERIA BLD CULT: NORMAL
BACTERIA BLD CULT: NORMAL

## 2017-03-06 ENCOUNTER — ALLSCRIPTS OFFICE VISIT (OUTPATIENT)
Dept: OTHER | Facility: OTHER | Age: 52
End: 2017-03-06

## 2017-03-07 ENCOUNTER — GENERIC CONVERSION - ENCOUNTER (OUTPATIENT)
Dept: OTHER | Facility: OTHER | Age: 52
End: 2017-03-07

## 2017-03-16 ENCOUNTER — ALLSCRIPTS OFFICE VISIT (OUTPATIENT)
Dept: OTHER | Facility: OTHER | Age: 52
End: 2017-03-16

## 2017-03-21 ENCOUNTER — ALLSCRIPTS OFFICE VISIT (OUTPATIENT)
Dept: OTHER | Facility: OTHER | Age: 52
End: 2017-03-21

## 2017-03-27 DIAGNOSIS — J18.9 PNEUMONIA: ICD-10-CM

## 2017-04-05 ENCOUNTER — HOSPITAL ENCOUNTER (OUTPATIENT)
Dept: RADIOLOGY | Facility: HOSPITAL | Age: 52
Discharge: HOME | End: 2017-04-05
Payer: MEDICARE

## 2017-04-05 ENCOUNTER — TRANSCRIBE ORDERS (OUTPATIENT)
Dept: ADMINISTRATIVE | Facility: HOSPITAL | Age: 52
End: 2017-04-05

## 2017-04-05 DIAGNOSIS — J18.9 PNEUMONIA: ICD-10-CM

## 2017-04-05 PROCEDURE — 71020 HB CHEST X-RAY 2VW FRONTAL&LATL: CPT

## 2017-04-14 ENCOUNTER — ALLSCRIPTS OFFICE VISIT (OUTPATIENT)
Dept: OTHER | Facility: OTHER | Age: 52
End: 2017-04-14

## 2017-06-01 ENCOUNTER — ALLSCRIPTS OFFICE VISIT (OUTPATIENT)
Dept: OTHER | Facility: OTHER | Age: 52
End: 2017-06-01

## 2017-07-15 ENCOUNTER — APPOINTMENT (OUTPATIENT)
Dept: LAB | Facility: HOSPITAL | Age: 52
End: 2017-07-15
Attending: FAMILY MEDICINE
Payer: MEDICARE

## 2017-07-15 ENCOUNTER — TRANSCRIBE ORDERS (OUTPATIENT)
Dept: ADMINISTRATIVE | Facility: HOSPITAL | Age: 52
End: 2017-07-15

## 2017-07-15 DIAGNOSIS — E55.9 VITAMIN D DEFICIENCY: ICD-10-CM

## 2017-07-15 DIAGNOSIS — G20 PARKINSON'S DISEASE (HCC): ICD-10-CM

## 2017-07-15 DIAGNOSIS — I95.9 HYPOTENSION: ICD-10-CM

## 2017-07-15 DIAGNOSIS — E29.1 TESTICULAR HYPOFUNCTION: ICD-10-CM

## 2017-07-15 DIAGNOSIS — E78.5 HYPERLIPIDEMIA: ICD-10-CM

## 2017-07-15 DIAGNOSIS — E03.9 HYPOTHYROIDISM: ICD-10-CM

## 2017-07-15 DIAGNOSIS — L85.3 XEROSIS CUTIS: ICD-10-CM

## 2017-07-15 LAB
25(OH)D3 SERPL-MCNC: 50.1 NG/ML (ref 30–100)
ALBUMIN SERPL BCP-MCNC: 3.8 G/DL (ref 3.5–5)
ALP SERPL-CCNC: 72 U/L (ref 46–116)
ALT SERPL W P-5'-P-CCNC: 34 U/L (ref 12–78)
ANION GAP SERPL CALCULATED.3IONS-SCNC: 8 MMOL/L (ref 4–13)
AST SERPL W P-5'-P-CCNC: 23 U/L (ref 5–45)
BASOPHILS # BLD AUTO: 0 THOUSANDS/ΜL (ref 0–0.1)
BASOPHILS NFR BLD AUTO: 0 % (ref 0–1)
BILIRUB SERPL-MCNC: 0.7 MG/DL (ref 0.2–1)
BILIRUB UR QL STRIP: NEGATIVE
BUN SERPL-MCNC: 9 MG/DL (ref 5–25)
CALCIUM SERPL-MCNC: 9 MG/DL (ref 8.3–10.1)
CHLORIDE SERPL-SCNC: 101 MMOL/L (ref 100–108)
CHOLEST SERPL-MCNC: 185 MG/DL (ref 50–200)
CLARITY UR: CLEAR
CO2 SERPL-SCNC: 29 MMOL/L (ref 21–32)
COLOR UR: NORMAL
CREAT SERPL-MCNC: 0.98 MG/DL (ref 0.6–1.3)
EOSINOPHIL # BLD AUTO: 0 THOUSAND/ΜL (ref 0–0.61)
EOSINOPHIL NFR BLD AUTO: 1 % (ref 0–6)
ERYTHROCYTE [DISTWIDTH] IN BLOOD BY AUTOMATED COUNT: 13.9 % (ref 11.6–15.1)
GFR SERPL CREATININE-BSD FRML MDRD: >60 ML/MIN/1.73SQ M
GLUCOSE P FAST SERPL-MCNC: 105 MG/DL (ref 65–99)
GLUCOSE UR STRIP-MCNC: NEGATIVE MG/DL
HCT VFR BLD AUTO: 46.6 % (ref 42–52)
HDLC SERPL-MCNC: 65 MG/DL (ref 40–60)
HGB BLD-MCNC: 15.6 G/DL (ref 14–18)
HGB UR QL STRIP.AUTO: NEGATIVE
KETONES UR STRIP-MCNC: NEGATIVE MG/DL
LDLC SERPL CALC-MCNC: 102 MG/DL (ref 0–100)
LEUKOCYTE ESTERASE UR QL STRIP: NEGATIVE
LYMPHOCYTES # BLD AUTO: 0.8 THOUSANDS/ΜL (ref 0.6–4.47)
LYMPHOCYTES NFR BLD AUTO: 19 % (ref 14–44)
MCH RBC QN AUTO: 32.8 PG (ref 27–31)
MCHC RBC AUTO-ENTMCNC: 33.4 G/DL (ref 31.4–37.4)
MCV RBC AUTO: 98 FL (ref 82–98)
MONOCYTES # BLD AUTO: 0.3 THOUSAND/ΜL (ref 0.17–1.22)
MONOCYTES NFR BLD AUTO: 8 % (ref 4–12)
NEUTROPHILS # BLD AUTO: 2.9 THOUSANDS/ΜL (ref 1.85–7.62)
NEUTS SEG NFR BLD AUTO: 73 % (ref 43–75)
NITRITE UR QL STRIP: NEGATIVE
NRBC BLD AUTO-RTO: 0 /100 WBCS
PH UR STRIP.AUTO: 6.5 [PH] (ref 5–9)
PLATELET # BLD AUTO: 243 THOUSANDS/UL (ref 130–400)
PMV BLD AUTO: 7.9 FL (ref 8.9–12.7)
POTASSIUM SERPL-SCNC: 3.8 MMOL/L (ref 3.5–5.3)
PROT SERPL-MCNC: 7.5 G/DL (ref 6.4–8.2)
PROT UR STRIP-MCNC: NEGATIVE MG/DL
RBC # BLD AUTO: 4.75 MILLION/UL (ref 4.7–6.1)
SODIUM SERPL-SCNC: 138 MMOL/L (ref 136–145)
SP GR UR STRIP.AUTO: 1.01 (ref 1–1.03)
TRIGL SERPL-MCNC: 90 MG/DL
TSH SERPL DL<=0.05 MIU/L-ACNC: 0.97 UIU/ML (ref 0.36–3.74)
UROBILINOGEN UR QL STRIP.AUTO: 0.2 E.U./DL
WBC # BLD AUTO: 4 THOUSAND/UL (ref 4.8–10.8)

## 2017-07-15 PROCEDURE — 84153 ASSAY OF PSA TOTAL: CPT

## 2017-07-15 PROCEDURE — 84154 ASSAY OF PSA FREE: CPT

## 2017-07-15 PROCEDURE — 84443 ASSAY THYROID STIM HORMONE: CPT

## 2017-07-15 PROCEDURE — 82306 VITAMIN D 25 HYDROXY: CPT

## 2017-07-15 PROCEDURE — 80061 LIPID PANEL: CPT

## 2017-07-15 PROCEDURE — 36415 COLL VENOUS BLD VENIPUNCTURE: CPT

## 2017-07-15 PROCEDURE — 81003 URINALYSIS AUTO W/O SCOPE: CPT

## 2017-07-15 PROCEDURE — 85025 COMPLETE CBC W/AUTO DIFF WBC: CPT

## 2017-07-15 PROCEDURE — 80053 COMPREHEN METABOLIC PANEL: CPT

## 2017-07-16 ENCOUNTER — GENERIC CONVERSION - ENCOUNTER (OUTPATIENT)
Dept: OTHER | Facility: OTHER | Age: 52
End: 2017-07-16

## 2017-07-18 LAB
PSA FREE MFR SERPL: NORMAL %
PSA FREE SERPL-MCNC: <0.01 NG/ML
PSA SERPL-MCNC: <0.1 NG/ML (ref 0–4)

## 2017-07-28 ENCOUNTER — APPOINTMENT (EMERGENCY)
Dept: RADIOLOGY | Facility: HOSPITAL | Age: 52
End: 2017-07-28
Payer: MEDICARE

## 2017-07-28 ENCOUNTER — HOSPITAL ENCOUNTER (EMERGENCY)
Facility: HOSPITAL | Age: 52
Discharge: HOME | End: 2017-07-28
Attending: EMERGENCY MEDICINE | Admitting: EMERGENCY MEDICINE
Payer: MEDICARE

## 2017-07-28 VITALS
BODY MASS INDEX: 29.29 KG/M2 | DIASTOLIC BLOOD PRESSURE: 76 MMHG | TEMPERATURE: 98.6 F | OXYGEN SATURATION: 98 % | WEIGHT: 150 LBS | HEART RATE: 84 BPM | SYSTOLIC BLOOD PRESSURE: 130 MMHG | RESPIRATION RATE: 20 BRPM

## 2017-07-28 DIAGNOSIS — R33.9 URINARY RETENTION: Primary | ICD-10-CM

## 2017-07-28 LAB
ALBUMIN SERPL BCP-MCNC: 3.5 G/DL (ref 3.5–5)
ALP SERPL-CCNC: 64 U/L (ref 46–116)
ALT SERPL W P-5'-P-CCNC: 29 U/L (ref 12–78)
ANION GAP SERPL CALCULATED.3IONS-SCNC: 9 MMOL/L (ref 4–13)
AST SERPL W P-5'-P-CCNC: 20 U/L (ref 5–45)
BACTERIA UR QL AUTO: ABNORMAL /HPF
BASOPHILS # BLD AUTO: 0.1 THOUSANDS/ΜL (ref 0–0.1)
BASOPHILS NFR BLD AUTO: 1 % (ref 0–1)
BILIRUB SERPL-MCNC: 0.6 MG/DL (ref 0.2–1)
BILIRUB UR QL STRIP: NEGATIVE
BUN SERPL-MCNC: 8 MG/DL (ref 5–25)
CALCIUM SERPL-MCNC: 9.3 MG/DL (ref 8.3–10.1)
CHLORIDE SERPL-SCNC: 103 MMOL/L (ref 100–108)
CLARITY UR: CLEAR
CO2 SERPL-SCNC: 28 MMOL/L (ref 21–32)
COLOR UR: ABNORMAL
CREAT SERPL-MCNC: 0.95 MG/DL (ref 0.6–1.3)
EOSINOPHIL # BLD AUTO: 0 THOUSAND/ΜL (ref 0–0.61)
EOSINOPHIL NFR BLD AUTO: 0 % (ref 0–6)
ERYTHROCYTE [DISTWIDTH] IN BLOOD BY AUTOMATED COUNT: 14.4 % (ref 11.6–15.1)
GFR SERPL CREATININE-BSD FRML MDRD: 91 ML/MIN/1.73SQ M
GLUCOSE SERPL-MCNC: 101 MG/DL (ref 65–140)
GLUCOSE UR STRIP-MCNC: NEGATIVE MG/DL
HCT VFR BLD AUTO: 44.2 % (ref 42–52)
HGB BLD-MCNC: 15 G/DL (ref 14–18)
HGB UR QL STRIP.AUTO: ABNORMAL
KETONES UR STRIP-MCNC: ABNORMAL MG/DL
LEUKOCYTE ESTERASE UR QL STRIP: NEGATIVE
LIPASE SERPL-CCNC: 125 U/L (ref 73–393)
LYMPHOCYTES # BLD AUTO: 0.9 THOUSANDS/ΜL (ref 0.6–4.47)
LYMPHOCYTES NFR BLD AUTO: 14 % (ref 14–44)
MAGNESIUM SERPL-MCNC: 2 MG/DL (ref 1.6–2.6)
MCH RBC QN AUTO: 33.6 PG (ref 27–31)
MCHC RBC AUTO-ENTMCNC: 34 G/DL (ref 31.4–37.4)
MCV RBC AUTO: 99 FL (ref 82–98)
MONOCYTES # BLD AUTO: 0.4 THOUSAND/ΜL (ref 0.17–1.22)
MONOCYTES NFR BLD AUTO: 6 % (ref 4–12)
NEUTROPHILS # BLD AUTO: 4.9 THOUSANDS/ΜL (ref 1.85–7.62)
NEUTS SEG NFR BLD AUTO: 78 % (ref 43–75)
NITRITE UR QL STRIP: NEGATIVE
NON-SQ EPI CELLS URNS QL MICRO: ABNORMAL /HPF
NRBC BLD AUTO-RTO: 0 /100 WBCS
PH UR STRIP.AUTO: 7.5 [PH] (ref 5–9)
PLATELET # BLD AUTO: 260 THOUSANDS/UL (ref 130–400)
PMV BLD AUTO: 7.4 FL (ref 8.9–12.7)
POTASSIUM SERPL-SCNC: 3.8 MMOL/L (ref 3.5–5.3)
PROT SERPL-MCNC: 7.1 G/DL (ref 6.4–8.2)
PROT UR STRIP-MCNC: NEGATIVE MG/DL
RBC # BLD AUTO: 4.47 MILLION/UL (ref 4.7–6.1)
RBC #/AREA URNS AUTO: ABNORMAL /HPF
SODIUM SERPL-SCNC: 140 MMOL/L (ref 136–145)
SP GR UR STRIP.AUTO: 1.01 (ref 1–1.03)
UROBILINOGEN UR QL STRIP.AUTO: 0.2 E.U./DL
WBC # BLD AUTO: 6.2 THOUSAND/UL (ref 4.8–10.8)
WBC #/AREA URNS AUTO: ABNORMAL /HPF

## 2017-07-28 PROCEDURE — 83735 ASSAY OF MAGNESIUM: CPT | Performed by: EMERGENCY MEDICINE

## 2017-07-28 PROCEDURE — 99284 EMERGENCY DEPT VISIT MOD MDM: CPT

## 2017-07-28 PROCEDURE — 36415 COLL VENOUS BLD VENIPUNCTURE: CPT | Performed by: EMERGENCY MEDICINE

## 2017-07-28 PROCEDURE — 74177 CT ABD & PELVIS W/CONTRAST: CPT

## 2017-07-28 PROCEDURE — 96360 HYDRATION IV INFUSION INIT: CPT

## 2017-07-28 PROCEDURE — 83690 ASSAY OF LIPASE: CPT | Performed by: EMERGENCY MEDICINE

## 2017-07-28 PROCEDURE — 96361 HYDRATE IV INFUSION ADD-ON: CPT

## 2017-07-28 PROCEDURE — 81001 URINALYSIS AUTO W/SCOPE: CPT | Performed by: EMERGENCY MEDICINE

## 2017-07-28 PROCEDURE — 80053 COMPREHEN METABOLIC PANEL: CPT | Performed by: EMERGENCY MEDICINE

## 2017-07-28 PROCEDURE — 85025 COMPLETE CBC W/AUTO DIFF WBC: CPT | Performed by: EMERGENCY MEDICINE

## 2017-07-28 RX ADMIN — IOHEXOL 100 ML: 350 INJECTION, SOLUTION INTRAVENOUS at 18:15

## 2017-07-28 RX ADMIN — SODIUM CHLORIDE 1000 ML: 0.9 INJECTION, SOLUTION INTRAVENOUS at 18:30

## 2017-07-31 ENCOUNTER — GENERIC CONVERSION - ENCOUNTER (OUTPATIENT)
Dept: OTHER | Facility: OTHER | Age: 52
End: 2017-07-31

## 2017-08-10 ENCOUNTER — ALLSCRIPTS OFFICE VISIT (OUTPATIENT)
Dept: OTHER | Facility: OTHER | Age: 52
End: 2017-08-10

## 2017-08-22 ENCOUNTER — ALLSCRIPTS OFFICE VISIT (OUTPATIENT)
Dept: OTHER | Facility: OTHER | Age: 52
End: 2017-08-22

## 2017-08-26 ENCOUNTER — TRANSCRIBE ORDERS (OUTPATIENT)
Dept: ADMINISTRATIVE | Facility: HOSPITAL | Age: 52
End: 2017-08-26

## 2017-08-26 ENCOUNTER — APPOINTMENT (OUTPATIENT)
Dept: LAB | Facility: HOSPITAL | Age: 52
End: 2017-08-26
Payer: MEDICARE

## 2017-08-26 DIAGNOSIS — E29.1 3-OXO-5 ALPHA-STEROID DELTA 4-DEHYDROGENASE DEFICIENCY: ICD-10-CM

## 2017-08-26 DIAGNOSIS — E03.9 UNSPECIFIED HYPOTHYROIDISM: Primary | ICD-10-CM

## 2017-08-26 DIAGNOSIS — E03.9 UNSPECIFIED HYPOTHYROIDISM: ICD-10-CM

## 2017-08-26 LAB
T4 FREE SERPL-MCNC: 1.04 NG/DL (ref 0.76–1.46)
TSH SERPL DL<=0.05 MIU/L-ACNC: 2.36 UIU/ML (ref 0.36–3.74)

## 2017-08-26 PROCEDURE — 36415 COLL VENOUS BLD VENIPUNCTURE: CPT

## 2017-08-26 PROCEDURE — 84439 ASSAY OF FREE THYROXINE: CPT

## 2017-08-26 PROCEDURE — 84402 ASSAY OF FREE TESTOSTERONE: CPT

## 2017-08-26 PROCEDURE — 84443 ASSAY THYROID STIM HORMONE: CPT

## 2017-08-26 PROCEDURE — 84403 ASSAY OF TOTAL TESTOSTERONE: CPT

## 2017-08-28 ENCOUNTER — GENERIC CONVERSION - ENCOUNTER (OUTPATIENT)
Dept: OTHER | Facility: OTHER | Age: 52
End: 2017-08-28

## 2017-08-30 LAB
TESTOST FREE SERPL-MCNC: 16.5 PG/ML (ref 7.2–24)
TESTOST SERPL-MCNC: 1269 NG/DL (ref 264–916)

## 2017-08-31 ENCOUNTER — ALLSCRIPTS OFFICE VISIT (OUTPATIENT)
Dept: OTHER | Facility: OTHER | Age: 52
End: 2017-08-31

## 2017-08-31 ENCOUNTER — GENERIC CONVERSION - ENCOUNTER (OUTPATIENT)
Dept: OTHER | Facility: OTHER | Age: 52
End: 2017-08-31

## 2017-09-13 ENCOUNTER — GENERIC CONVERSION - ENCOUNTER (OUTPATIENT)
Dept: OTHER | Facility: OTHER | Age: 52
End: 2017-09-13

## 2017-09-13 DIAGNOSIS — M81.8 OTHER OSTEOPOROSIS WITHOUT CURRENT PATHOLOGICAL FRACTURE: ICD-10-CM

## 2017-10-02 ENCOUNTER — ALLSCRIPTS OFFICE VISIT (OUTPATIENT)
Dept: OTHER | Facility: OTHER | Age: 52
End: 2017-10-02

## 2017-10-19 DIAGNOSIS — E29.1 TESTICULAR HYPOFUNCTION: ICD-10-CM

## 2017-11-04 ENCOUNTER — APPOINTMENT (OUTPATIENT)
Dept: LAB | Facility: HOSPITAL | Age: 52
End: 2017-11-04
Payer: MEDICARE

## 2017-11-04 ENCOUNTER — TRANSCRIBE ORDERS (OUTPATIENT)
Dept: ADMINISTRATIVE | Facility: HOSPITAL | Age: 52
End: 2017-11-04

## 2017-11-04 DIAGNOSIS — Z79.899 HIGH RISK MEDICATION USE: Primary | ICD-10-CM

## 2017-11-04 DIAGNOSIS — Z79.899 HIGH RISK MEDICATION USE: ICD-10-CM

## 2017-11-04 DIAGNOSIS — E29.1 TESTICULAR HYPOFUNCTION: ICD-10-CM

## 2017-11-04 LAB
ALBUMIN SERPL BCP-MCNC: 3.4 G/DL (ref 3.5–5)
ALP SERPL-CCNC: 57 U/L (ref 46–116)
ALT SERPL W P-5'-P-CCNC: 27 U/L (ref 12–78)
AST SERPL W P-5'-P-CCNC: 22 U/L (ref 5–45)
BASOPHILS # BLD AUTO: 0 THOUSANDS/ΜL (ref 0–0.1)
BASOPHILS NFR BLD AUTO: 1 % (ref 0–1)
BILIRUB DIRECT SERPL-MCNC: 0.1 MG/DL (ref 0–0.2)
BILIRUB SERPL-MCNC: 0.5 MG/DL (ref 0.2–1)
EOSINOPHIL # BLD AUTO: 0 THOUSAND/ΜL (ref 0–0.61)
EOSINOPHIL NFR BLD AUTO: 1 % (ref 0–6)
ERYTHROCYTE [DISTWIDTH] IN BLOOD BY AUTOMATED COUNT: 14.9 % (ref 11.6–15.1)
HCT VFR BLD AUTO: 45.9 % (ref 42–52)
HGB BLD-MCNC: 15.4 G/DL (ref 14–18)
LYMPHOCYTES # BLD AUTO: 0.9 THOUSANDS/ΜL (ref 0.6–4.47)
LYMPHOCYTES NFR BLD AUTO: 25 % (ref 14–44)
MCH RBC QN AUTO: 33.4 PG (ref 27–31)
MCHC RBC AUTO-ENTMCNC: 33.5 G/DL (ref 31.4–37.4)
MCV RBC AUTO: 100 FL (ref 82–98)
MONOCYTES # BLD AUTO: 0.4 THOUSAND/ΜL (ref 0.17–1.22)
MONOCYTES NFR BLD AUTO: 10 % (ref 4–12)
NEUTROPHILS # BLD AUTO: 2.3 THOUSANDS/ΜL (ref 1.85–7.62)
NEUTS SEG NFR BLD AUTO: 64 % (ref 43–75)
NRBC BLD AUTO-RTO: 0 /100 WBCS
PLATELET # BLD AUTO: 223 THOUSANDS/UL (ref 130–400)
PMV BLD AUTO: 7.8 FL (ref 8.9–12.7)
PROT SERPL-MCNC: 7.2 G/DL (ref 6.4–8.2)
RBC # BLD AUTO: 4.61 MILLION/UL (ref 4.7–6.1)
VALPROATE SERPL-MCNC: 33 UG/ML (ref 50–100)
WBC # BLD AUTO: 3.6 THOUSAND/UL (ref 4.8–10.8)

## 2017-11-04 PROCEDURE — 85025 COMPLETE CBC W/AUTO DIFF WBC: CPT | Performed by: PSYCHIATRY & NEUROLOGY

## 2017-11-04 PROCEDURE — 84402 ASSAY OF FREE TESTOSTERONE: CPT

## 2017-11-04 PROCEDURE — 80164 ASSAY DIPROPYLACETIC ACD TOT: CPT

## 2017-11-04 PROCEDURE — 36415 COLL VENOUS BLD VENIPUNCTURE: CPT | Performed by: PSYCHIATRY & NEUROLOGY

## 2017-11-04 PROCEDURE — 80076 HEPATIC FUNCTION PANEL: CPT

## 2017-11-04 PROCEDURE — 84403 ASSAY OF TOTAL TESTOSTERONE: CPT

## 2017-11-07 LAB
TESTOST FREE SERPL-MCNC: 4.8 PG/ML (ref 7.2–24)
TESTOST SERPL-MCNC: 393 NG/DL (ref 264–916)

## 2017-11-08 ENCOUNTER — GENERIC CONVERSION - ENCOUNTER (OUTPATIENT)
Dept: OTHER | Facility: OTHER | Age: 52
End: 2017-11-08

## 2017-11-21 ENCOUNTER — ALLSCRIPTS OFFICE VISIT (OUTPATIENT)
Dept: OTHER | Facility: OTHER | Age: 52
End: 2017-11-21

## 2017-11-22 NOTE — PROGRESS NOTES
Assessment  1  Onychomycosis (110 1) (B35 1)   2  Ingrown nail (703 0) (L60 0)   3  Pain in both feet (729 5) (M79 671,M79 672)    Plan     · Follow-up visit in 2 months Evaluation and Treatment  Follow-up  Status: Hold For -Scheduling  Requested for: 70EYR3979   · You can treat and prevent ingrown toenails ; Status:Complete;   Done: 09Rjm176304:41PM    Follow-up visit in 1 month Evaluation and Treatment  Follow-up  Status: Hold For - Scheduling  Requested for: 14Apr2017 Ordered; For: Acute UTI, Adult BMI 29 0-29 9 kg/sq m, Allergic rhinitis, Anterior tibialis tendinitis of left lower extremity, Arthralgia of left foot, Pain in both feet;  Ordered By: Zachariah Hernandez  Performed:   Due: 97JJU9862   Discussion/Summary    Suggest moisturizer use daily  The patient, patient's caretaker was counseled regarding instructions for management,-- patient and family education,-- importance of compliance with treatment  The treatment plan was reviewed with the patient/guardian  The patient/guardian understands and agrees with the treatment plan      Chief Complaint  Patient gets recurrent ingrown nails bilateral big toes Aids have not noticed any redness or signs of infection      History of Present Illness  HPI: moderate bunion bilateral, rarely painful      Active Problems  1  Abdominal pain (789 00) (R10 9)   2  Adult BMI 29 0-29 9 kg/sq m (V85 25) (Z68 29)   3  Allergic rhinitis (477 9) (J30 9)   4  Anterior tibialis tendinitis of left lower extremity (726 72) (M76 812)   5  Arthralgia of left foot (719 47) (M25 572)   6  Celiac disease (579 0) (K90 0)   7  Change in behavior (312 9) (R46 89)   8  Chronic constipation (564 00) (K59 09)   9  Deformity of ankle and foot, acquired (736 70) (M21 969)   10  Degenerative arthritis of foot (715 97) (M19 079)   11  Depression (311) (F32 9)   12  Difficulty in walking involving ankle and foot joint (719 7) (R26 2)   13  Esophageal reflux (530 81) (K21 9)   14   Extensor tendinitis of foot (727 06) (M77 50)   15  Gout (274 9) (M10 9)   16  Hallux rigidus (735 2) (M20 20)   17  Hyperlipidemia (272 4) (E78 5)   18  Hypogonadism, testicular (257 2) (E29 1)   19  Hypotension (458 9) (I95 9)   20  Hypothyroidism (244 9) (E03 9)   21  Idiopathic osteoporosis (733 02) (M81 8)   22  Idiopathic pericardial effusion (423 9) (I31 3)   23  Ingrown nail (703 0) (L60 0)   24  Insomnia (780 52) (G47 00)   25  Metatarsalgia (726 70) (M77 40)   26  Mixed incontinence (788 33) (N39 46)   27  Need for influenza vaccination (V04 81) (Z23)   28  Onychomycosis (110 1) (B35 1)   29  Pain in both feet (729 5) (M79 671,M79 672)   30  Parkinson's disease (332 0) (G20)   31  Serositis (569 89) (K65 8)   32  Tremor (781 0) (R25 1)   33  Trisomy 21, Down syndrome (758 0) (Q90 9)   34  Urinary retention (788 20) (R33 9)   35  Vitamin D deficiency (268 9) (E55 9)   36   Xerosis cutis (706 8) (L85 3)    Past Medical History     · History of Abdominal pain, epigastric (789 06) (R10 13)   · History of Abdominal pain, lower (789 09) (R10 30)   · History of Abdominal pain, RUQ (789 01) (R10 11)   · History of Abnormal weight gain (783 1) (R63 5)   · History of Acute congestive heart failure, unspecified congestive heart failure type (428 0)(I50 9)   · History of Acute cystitis with hematuria (595 0) (N30 01)   · History of Acute UTI (599 0) (N39 0)   · History of Acute viral pericarditis (420 91) (I30 1)   · History of Adult BMI 29 0-29 9 kg/sq m (V85 25) (Z68 29)   · History of Annual physical exam (V70 0) (Z00 00)   · History of Behavioral Problems (V40 9)   · History of BMI 26 0-26 9,adult (V85 22) (Z68 26)   · History of Callus (700) (L84)   · History of Capsulitis (726 90) (M77 9)   · History of Cough (786 2) (R05)   · History of Counseling For Supervised Exercise Program   · History of Difficulty in walking (719 7) (R26 2)   · History of Dry skin (701 1) (L85 3)   · History of Encounter for tuberculin skin test (V74 1) (Z11 1)   · History of Generalized abdominal pain (789 07) (R10 84)   · History of Head Injury (959 01)   · History of Head Injury (959 01)   · History of abdominal pain (V13 89) (L38 437)   · History of athlete's foot (V12 09) (Z86 19)   · History of diarrhea (V12 79) (Q35 826)   · History of dizziness (V13 89) (F24 096)   · History of fatigue (V13 89) (Q49 205)   · History of fatigue (V13 89) (O15 270)   · History of fever (V13 89) (X82 198)   · History of headache (V13 89) (X92 952)   · History of herpes simplex infection (V12 09) (Z86 19)   · History of ingrown nail (V13 3) (Z87 2)   · History of leukocytosis (V12 3) (Z86 2)   · History of low back pain (V13 59) (Z87 39)   · History of nausea and vomiting (V12 79) (Z87 898)   · History of nausea and vomiting (V12 79) (Z87 898)   · History of Parkinson's disease (V12 49) (Z86 69)   · History of respiratory syncytial virus (RSV) infection (V12 09) (Z86 19)   · History of sinus tachycardia (V12 59) (Z86 79)   · History of tendinitis (V13 59) (Z87 39)   · History of urinary frequency (V13 09) (A64 888)   · History of viral gastroenteritis (V12 09) (Z86 19)   · History of viral infection (V12 09) (Z86 19)   · History of Hyponatremia (276 1) (E87 1)   · History of Hyponatremia (276 1) (E87 1)   · History of Multifocal pneumonia (486) (J18 9)   · History of Muscle weakness (728 87) (M62 81)   · History of Need for influenza vaccination (V04 81) (Z23)   · History of Need for influenza vaccination (V04 81) (Z23)   · History of Need for vaccination with diphtheria, tetanus, pertussis (DTP), andHaemophilus influenzae type B vaccine (V06 8) (Z23)   · History of Nonspecific abnormal results of function study of thyroid (794 5) (R94 6)   · History of Paronychia (681 9)   · History of Pleural effusion, bilateral (511 9) (J90)   · History of Screening for colorectal cancer (V76 51) (Z12 11,Z12 12)   · History of Screening for diabetes mellitus (V77 1) (Z13 1)   · History of Screening for thyroid disorder (V77 0) (Z13 29)   · History of Screening PSA (prostate specific antigen) (V76 44) (Z12 5)   · History of Tuberculosis screening (V74 1) (Z11 1)   · URI, acute (465 9) (J06 9)   · History of Urinary Tract Infection (V13 02)   · History of Vitamin D deficiency (268 9) (E55 9)    The active problems and past medical history were reviewed and updated today  Surgical History   · History of Complete Colonoscopy   · History of Esophagogastroduodenoscopy With Biopsy    Family History  Mother    · Family history unknown (V49 89) (Z73 8)  Father    · Family history unknown (V49 89) (Z78 9)   · Family history of Parkinson disease, symptomatic  Family History    · Family history of No Significant Family History    Social History     · Denied: History of Alcohol Use (History)   · Caffeine use (V49 89) (F15 90)   · Never A Smoker   · No drug use  The social history was reviewed and updated today  Current Meds   1  AndroGel Pump 20 25 MG/ACT (1 62%) Transdermal Gel; 1 squirt daily on left shoulder and 2 squirts daily on right shoulder daily at breakfast time; Therapy: 12MDV8496 to (Evaluate:12Mar2018); Last Rx:25Ekm8907 Ordered   2  Atorvastatin Calcium 20 MG Oral Tablet; TAKE ONE TABLET BY MOUTH EVERY DAY AT 8PM (8PM); Therapy: 95Kzb7480 to (Evaluate:68Qzy1329)  Requested for: 48LKO9611; Last Rx:02Oct2017 Ordered   3  Calcium Carbonate-Vitamin D 600-400 MG-UNIT Oral Tablet; TAKE 1 TABLET at 8 am and 8 pm; Therapy: 61XMZ3697 to (Evaluate:98Hhe4092); Last Rx:90Dnf2031 Ordered   4  Centrum Oral Tablet; TAKE 1 TABLET BY MOUTH DAILY @ 8AM; Last Rx:02Oct2017 Ordered   5  Claritin 10 MG Oral Tablet; TAKE 1 TABLET DAILY AS NEEDED in 24 hour period; Therapy: 98EXT2646 to (Evaluate:60Keh1568)  Requested for: 21RSD6204; Last Rx:02Oct2017 Ordered   6  Colace 100 MG Oral Capsule; TAKE 1 CAPSULE DAILY at breakfast; Therapy: 57Pmy3078 to (Evaluate:66Ryc2298); Last Rx:02Oct2017 Ordered   7   Divalproex Sodium 500 MG Oral Tablet Delayed Release; Therapy: 61Zwm3221 to Recorded   8  Esomeprazole Magnesium 40 MG Oral Capsule Delayed Release; 1 tab po daily at 8 am; Last Rx:02Oct2017 Ordered   9  Eyelid Cleansers LIQD; USE AS DIRECTED  Use on wednesday, friday, and monday; Therapy: (Recorded:96Izf7899) to Recorded   10  Levothyroxine Sodium 50 MCG Oral Tablet; TAKE 1 TABLET BY MOUTH EVERY DAY 5  TIMES A WEEK (MON-FRI) & TAKE 2 TABLETS (100MCG) BY MOUTH TWICE A WEEK  (SAT&SUN) AT 8AM;  Therapy: 21UKP0030 to (Evaluate:35Aac0234)  Requested for: 66WNM9417; Last  RZ:78EIU1567 Ordered   11  OLANZapine 2 5 MG Oral Tablet; Therapy: (Recorded:98Kjt1572) to Recorded   12  Polyethylene Glycol 3350 Oral Powder; TAKE 17 GRAMS IN 8OZ WATER BY MOUTH  EVERY DAY (8PM); Therapy: 75Lnq1939 to (Evaluate:56Bkx1443)  Requested for: 74ZTF3656; Last  Rx:02Oct2017 Ordered   13  Tamsulosin HCl - 0 4 MG Oral Capsule; TAKE ONE CAPSULE BY MOUTH IN THE  EVENING (8PM); Therapy: 20NCN6477 to (Evaluate:70Vhg7989)  Requested for: 05BSI6363; Last  Rx:02Oct2017 Ordered   15  Vitamin D 1000 UNIT Oral Tablet; TAKE ONE TABLET BY MOUTH TWO TIMES A  DAY(8am  & AT 8PM); Therapy: 95Gxs1692 to (Evaluate:36Mkl1886)  Requested for: 03PXI5697; Last  Rx:02Oct2017 Ordered    The medication list was reviewed and updated today  Allergies  1  Advil TABS  2  Gluten    Vitals   Recorded: 58GJW9949 03:22PM   Height 4 ft 11 in   Weight 144 lb    BMI Calculated 29 08   BSA Calculated 1 6       Physical Exam   Constitutional: no acute distress, well appearing and well nourished  Orthopedic/Biomechanical: abnormal foot type,-- hammertoe(s),-- bunion(s),-- abnormal MPJ ROM,-- discolored nails,-- subungual debris-- and-- nail tenderness  Skin: keratoses  Left Foot: Appearance: a deformity  Great toe deformities include a bunion  Evaluation of the great toe nail demonstrates an ingrown nail, but-- no paronychia   Decreased range of motion first MPJ bilateral positive pain on palpation and range of motion right worse than left  Ingrown bilateral big toe  No signs of infection no erythema no exudate  Special Tests: significant bunion bilateral decreased range of motion first MPJ no pain on range of motion,no sign of infection  Right Foot: Appearance: Normal except as noted: a deformity  Great toe deformities include a bunion  Evaluation of the great toe nail demonstrates paronychia-- and-- an ingrown nail  Mild ingrown tibial border  Rigid hammertoes, 2 through 5, bilateral, moderate xerosis, fissures, bilateral heels, midfoot, bilateral, negative erythema  Negative exudate  Negative ulceration  Special Tests: Mild xerosis, no signs of infection  Neurological Exam: Light touch was decreased bilaterally  Vibratory sensation was decreased in both first metatarsophalangeal joints  Response to monofilament test was intact bilaterally  Vascular Exam: performed Dorsalis pedis pulses were 1/4 bilaterally  Posterior tibial pulses were 1/4 bilaterally  Capillary refill time was between 1-3 seconds bilaterally  Toenails: All of the toenails were elongated,-- hypertrophied,-- discolored,-- shown to have subungual debris-- and-- tender  Both first toenails were ingrown  Hyperkeratosis: present on both first sub metatarsals-- and-- present on both fifth sub metatarsals  Procedure  Aseptic debridement and planing of nails Ã10, with nail nipper and nail , no complicationsWedge resection of ingrown border bilateral hallux nail      Future Appointments    Date/Time Provider Specialty Site   03/14/2018 10:10 AM SAMUEL Wells  Endocrinology [facility] ENDOCRINOLOGY   03/07/2018 04:15 PM SAMUEL Thurston  Neurology [address] [place] [address]       Signatures   Electronically signed by :  Leif Ace DPM; Nov 21 2017  3:42PM EST                       (Author)

## 2018-01-10 NOTE — RESULT NOTES
Message   please call caretaker  sodium is mildly low  recommend repeat in 1 week  RL     Verified Results  (1) NT- BNP (PRO BRAIN NATRIURETIC PEPTIDE) 85IEQ6718 10:32AM Lena Rowe     Test Name Result Flag Reference   NT-PRO  pg/mL H <125     (1) CBC/PLT/DIFF 98SKQ6610 09:16AM [facility] Order Number: [id number]     Test Name Result Flag Reference   WBC COUNT 6 10 Thousand/uL  4 80-10 80   WBC ADJUSTED 6 10 Thousand/uL  4 80-10 80   RBC COUNT 3 98 Million/uL L 4 70-6 10   HEMOGLOBIN 13 6 g/dL L 14 0-18 0   HEMATOCRIT 41 3 % L 42 0-52 0    fL H 82-98   MCH 34 2 pg H 27 0-31 0   MCHC 32 9 g/dL  31 4-37 4   RDW 18 0 % H 11 6-15 1   MPV 8 0 fL L 8 9-12 7   PLATELET COUNT 710 Thousands/uL  130-400   nRBC AUTOMATED 0 /100 WBCs     NEUTROPHILS RELATIVE PERCENT 72 %  43-75   LYMPHOCYTES RELATIVE PERCENT 13 % L 14-44   MONOCYTES RELATIVE PERCENT 14 % H 4-12   EOSINOPHILS RELATIVE PERCENT 0 %  0-6   BASOPHILS RELATIVE PERCENT 0 %  0-1   NEUTROPHILS ABSOLUTE COUNT 4 40 Thousands/?L  1 85-7 62   LYMPHOCYTES ABSOLUTE COUNT 0 80 Thousands/?L  0 60-4 47   MONOCYTES ABSOLUTE COUNT 0 90 Thousand/?L  0 17-1 22   EOSINOPHILS ABSOLUTE COUNT 0 00 Thousand/?L  0 00-0 61   BASOPHILS ABSOLUTE COUNT 0 00 Thousands/?L  0 00-0 10     (1) COMPREHENSIVE METABOLIC PANEL 80JYS9654 94:09ND Lena Rowe    Order Number: [id number]  TW Order Number: [id number] Order Number: [id number] Order Number: [id number]     Test Name Result Flag Reference   GLUCOSE,RANDM 72 mg/dL     If the patient is fasting, the ADA then defines impaired fasting glucose as > 100 mg/dL and diabetes as > or equal to 123 mg/dL     SODIUM 132 mmol/L L 136-145   POTASSIUM 4 2 mmol/L  3 5-5 3   CHLORIDE 95 mmol/L L 100-108   CARBON DIOXIDE 33 mmol/L H 21-32   ANION GAP (CALC) 4 mmol/L  4-13   BLOOD UREA NITROGEN 6 mg/dL  5-25   CREATININE 0 70 mg/dL  0 60-1 30   Standardized to IDMS reference method   CALCIUM 8 5 mg/dL  8 3-10 1   BILI, TOTAL 0 70 mg/dL  0 20-1 00   ALK PHOSPHATAS 51 U/L     ALT (SGPT) 21 U/L  12-78   AST(SGOT) 26 U/L  5-45   ALBUMIN 2 7 g/dL L 3 5-5 0   TOTAL PROTEIN 7 2 g/dL  6 4-8 2   eGFR Non-African American      >60 0 ml/min/1 73sq m   [facility] Disease Education Program recommendations are as follows:  GFR calculation is accurate only with a steady state creatinine  Chronic Kidney disease less than 60 ml/min/1 73 sq  meters  Kidney failure less than 15 ml/min/1 73 sq  meters  (1) LIPID PANEL FASTING W DIRECT LDL REFLEX 20JZV8193 09:16AM Neli Fitzgerald    Order Number: [id number]  TW Order Number: [id number] Order Number: [id number] Order Number: [id number]     Test Name Result Flag Reference   CHOLESTEROL 166 mg/dL     LDL CHOLESTEROL CALCULATED 90 mg/dL  0-100   Triglyceride:         Normal              <150 mg/dl       Borderline High    150-199 mg/dl       High               200-499 mg/dl       Very High          >499 mg/dl  Cholesterol:         Desirable        <200 mg/dl      Borderline High  200-239 mg/dl      High             >239 mg/dl  HDL Cholesterol:        High    >59 mg/dL      Low     <41 mg/dL  LDL Cholesterol:        Optimal          <100 mg/dl        Near Optimal     100-129 mg/dl        Above Optimal          Borderline High   130-159 mg/dl          High              160-189 mg/dl          Very High        >189 mg/dl  LDL CALCULATED:    This screening LDL is a calculated result  It does not have the accuracy of the Direct Measured LDL in the monitoring of patients with hyperlipidemia and/or statin therapy  Direct Measure LDL (PPT628) must be ordered separately in these patients  TRIGLYCERIDES 53 mg/dL  <=150   Specimen collection should occur prior to N-Acetylcysteine or Metamizole administration due to the potential for falsely depressed results     HDL,DIRECT 65 mg/dL H 40-60 Specimen collection should occur prior to Metamizole administration due to the potential for falsely depressed results       (1) HEPATIC FUNCTION PANEL 11Jun2016 09:16AM Matthew Prince    Order Number: [id number]  TW Order Number: [id number] Order Number: [id number] Order Number: [id number]     Test Name Result Flag Reference   BILI, DIRECT 0 20 mg/dL  0 00-0 20       Signatures   Electronically signed by : Josafat Monae DO; Jun 13 2016  1:02PM EST                       (Author)

## 2018-01-10 NOTE — RESULT NOTES
Message   thyroid labs normal     Verified Results  (1) TSH 27Jan2017 07:39AM Rhea Luis     Test Name Result Flag Reference   TSH 3 681 uIU/mL  0 358-3 740   - Patient Instructions: This is a fasting blood test  Water, black tea or black coffee only after 9:00pm the night before test Drink 2 glasses of water the morning of test   Patients undergoing fluorescein dye angiography may retain small amounts of fluorescein in the body for 48-72 hours post procedure  Samples containing fluorescein can produce falsely depressed TSH values  If the patient had this procedure,a specimen should be resubmitted post fluorescein clearance       (1) T4, FREE 41JFS8010 07:39AM Rhea Luis     Test Name Result Flag Reference   T4,FREE 0 88 ng/dL  0 76-1 46

## 2018-01-10 NOTE — MISCELLANEOUS
Provider Comments  Provider Comments:   No show for 7/21 appointment  Second no show, letter will be sent        Signatures   Electronically signed by : Rosmery Piña OM; Jul 25 2016 10:12AM EST                       (Author)    Electronically signed by : Toshia Oakley [facility]; Jul 25 2016 10:41AM EST                       (Author)    Electronically signed by : SAMUEL Reed ; Jul 26 2016  9:05AM EST

## 2018-01-11 ENCOUNTER — GENERIC CONVERSION - ENCOUNTER (OUTPATIENT)
Dept: OTHER | Facility: OTHER | Age: 53
End: 2018-01-11

## 2018-01-11 NOTE — RESULT NOTES
Message   please call group home  cxr shows some some effusions b/l that may represent pneumonia  continue antibiotics and daily weights   again, if pt has greater than 2 lbs weight gain or worsening of symptoms-  call office  otherwise, follow up at appointment on 4/25  Verified Results  * XR CHEST PA & LATERAL 18Apr2016 01:24PM Adriana Henriquez Order Number: [id number]     Test Name Result Flag Reference   XR CHEST PA & LATERAL (Report)     CHEST      INDICATION: Cough     COMPARISON: May 16, 2014     VIEWS: Frontal and lateral projections; 2 images     FINDINGS:        There is continued prominence of the cardiac pericardial silhouette  There are new small bilateral pleural effusions with bibasilar regions of airspace opacity which may reflect atelectasis or infiltrate  Pulmonary vascular congestion noted  No    significant interstitial edema     The lungs are clear  No pneumothorax or pleural effusion  Visualized osseous structures appear within normal limits for the patient's age  IMPRESSION:     Bilateral pleural effusions and bibasal regions of airspace opacity may reflect atelectasis or infiltrate  Stable enlargement of the cardiac pericardial silhouette         Workstation performed: [id number]     Signed by:   Osmar Montalvo MD   4/19/16

## 2018-01-11 NOTE — RESULT NOTES
Discussion/Summary   Testosterone level is very elevated  Would decrease testosterone to one squirt every other day and 2 squirts on alternate days  Check total free testosterone level in 6 weeks  Verified Results  (1) TESTOSTERONE, FREE (DIRECT) AND TOTAL 94Afb0154 07:43AM Mirtha Lu     Test Name Result Flag Reference   FREE TESTOSTERONE, DIRECT 16 5 pg/mL  7 2 - 24 0   TESTOSTERONE (TOTAL) 1269 ng/dL H 36 - 65   Adult male reference interval is based on a population of  healthy nonobese males (BMI <30) between 23and 44years old  [address] [place] [address], [address] [place] [address] 1985,247;9285-5707  PMID: [id number]  This is a patient instruction: Fasting preferred  Collections for men not undergoing treatment must be completed between 7am-9am ONLY  Collection time restrictions are not applicable to women or men already undergoing treatment        Performed at:  [address]  [id number]  : Lexi Olvera MD, Phone:  [phone]

## 2018-01-11 NOTE — MISCELLANEOUS
Provider Comments  Provider Comments:   lmtrc      Signatures   Electronically signed by : OLYA Isbell; Mar 16 2017 10:56AM EST                       (Author)

## 2018-01-11 NOTE — PROCEDURES
Procedures by MALA Jimenez at 2016 12:02 PM      Author:  MALA Jimenez Service:  (none) Author Type:  Speech and Language Pathologist     Filed:  2016 12:23 PM Date of Service:  2016 12:02 PM Status:  Signed     :  MALA Jimenez (Speech and Language Pathologist)                                               Video Fluoroscopic Swallow Study      Patient Name: Sandi Almonte's Date: 2016    Past Medical History  Past Medical History     Diagnosis  Date    Anxiety     Dementia     Depression     Down's syndrome     GERD (gastroesophageal reflux disease)     Hernia of abdominal cavity     Hyperlipidemia      Past Surgical History  History reviewed  No pertinent past surgical history  General Information:  Ordering Physician: Dr Manuela Apgar  Radiologist: Dr Rivas Chung  Date of Order: 16  Date of Evaluation: 16  Type of Study: Initial MBS  Diet Prior to this Study: Regular diet and Thin liquids  Additional History: Patient with coughing and regurgitation of foods/ liquids  Unable to tolerate PO diet  Positionin degrees in the lateral plane  Materials Administered: Puree, soft chewables, solid, and thin liquid    Oral Stage:  Within functional limits for all boluses  Pharyngeal Stage:  Puree: Delayed swallow initiation  Mechanical Soft: Pharyngeal residue in valleculae  Regular: Pharyngeal residue in valleculae  Pharyngeal Stage Performance: [facility]  Swallow Mechanics  Swallow Initiation was:  Mildly delayed  Hyolaryngeal Excursion was: Adequate  Epiglottic Inversion was:  Present  Tongue Drive was: Adequate  Pharyngeal Constriction was: Adequate  Cricopharyngeal Opening/ Relaxation was: Adequate  Pharyngeal Stage Summary:  No laryngeal penetration or aspiration of all foods or thin liquids evident during or following all swallows      Esophageal Stage:  Impaired  Delayed Clearance: Cervical esophagus stasis of foods, cleared with a liquid wash  Reflux: Suspected  Concerns: Dismolity present in lower esophagus  Esophageal Stage Summary: Suspect distal esophageal issues    Impressions:  Oral pharyngeal swallow skills are within functional limits for solids and thin/ all liquids  There is no laryngeal penetration or aspiration of all POs during or post all swallows  Esophageal phase dysphagia is present, warranting further objective  assessment by GI  Diagnosis/ Prognosis:  Patient with esophageal phase dysphagia  Prognosis for Safe Diet Advancement: Good  Individuals Consulted:  GI  Results/ Recommendations reviewed with: Patient, caregiver, and RN  Recommendations:  NPO for now  Give medication in puree bolus as tolerated  Suggested Positioning: Upright/ 90 degrees, Reflux precautions  Further Evaluation by: GI - Consider Esophagram study to further assess esophageal function  Patient can resume a regular diet and thin liquids as per GI findings/ recommendations                             Received for:Provider  EPIC   Apr 28 2016 12:23PM [facility] Standard Time

## 2018-01-11 NOTE — MISCELLANEOUS
Provider Comments  Provider Comments:   No show for 7/5 appointment        Signatures   Electronically signed by : Kisha Rivers OM; Jul 11 2016 11:03AM EST                       (Author)    Electronically signed by : Jonny Grubbs [facility]; Jul 11 2016 11:07AM EST                       (Author)    Electronically signed by : SAMUEL Packer ; Jul 11 2016 12:07PM EST

## 2018-01-12 VITALS
DIASTOLIC BLOOD PRESSURE: 62 MMHG | RESPIRATION RATE: 16 BRPM | OXYGEN SATURATION: 97 % | SYSTOLIC BLOOD PRESSURE: 102 MMHG | HEIGHT: 60 IN | HEART RATE: 74 BPM | BODY MASS INDEX: 28 KG/M2 | WEIGHT: 142.6 LBS | TEMPERATURE: 98.3 F

## 2018-01-12 VITALS
HEART RATE: 78 BPM | TEMPERATURE: 98.7 F | BODY MASS INDEX: 25.72 KG/M2 | RESPIRATION RATE: 16 BRPM | DIASTOLIC BLOOD PRESSURE: 80 MMHG | SYSTOLIC BLOOD PRESSURE: 130 MMHG | HEIGHT: 60 IN | WEIGHT: 131 LBS

## 2018-01-12 VITALS
SYSTOLIC BLOOD PRESSURE: 126 MMHG | HEART RATE: 90 BPM | WEIGHT: 146.56 LBS | BODY MASS INDEX: 28.62 KG/M2 | DIASTOLIC BLOOD PRESSURE: 70 MMHG

## 2018-01-12 VITALS
DIASTOLIC BLOOD PRESSURE: 65 MMHG | HEIGHT: 60 IN | WEIGHT: 145 LBS | BODY MASS INDEX: 28.47 KG/M2 | SYSTOLIC BLOOD PRESSURE: 112 MMHG

## 2018-01-12 NOTE — MISCELLANEOUS
Date: 07/22/2016   To whom it may concern: This is to certify that: Kay Enamorado has been under my care for the following diagnosis:      I feel that he is unable to serve on [address] [facility] at this time for the above mentioned medical reasons       Sincerely,   Ld Randolph DO       Electronically signed by : Ld Randolph DO; Jul 22 2016  4:53PM EST                       (Author)

## 2018-01-12 NOTE — RESULT NOTES
Discussion/Summary   Sugar is elevated  Please make sure to eat a heart healthy diet with low white carbs and low refined sugar  Dr Mahnaz Raman, DO      Verified Results  (1) CBC/PLT/DIFF [id number] 08:01AM Mliotaylor Jones   TW Order Number: [id number]     Test Name Result Flag Reference   WBC COUNT 4 00 Thousand/uL L 4 80-10 80   RBC COUNT 4 75 Million/uL  4 70-6 10   HEMOGLOBIN 15 6 g/dL  14 0-18 0   HEMATOCRIT 46 6 %  42 0-52 0   MCV 98 fL  82-98   MCH 32 8 pg H 27 0-31 0   MCHC 33 4 g/dL  31 4-37 4   RDW 13 9 %  11 6-15 1   MPV 7 9 fL L 8 9-12 7   PLATELET COUNT 316 Thousands/uL  130-400   nRBC AUTOMATED 0 /100 WBCs     NEUTROPHILS RELATIVE PERCENT 73 %  43-75   LYMPHOCYTES RELATIVE PERCENT 19 %  14-44   MONOCYTES RELATIVE PERCENT 8 %  4-12   EOSINOPHILS RELATIVE PERCENT 1 %  0-6   BASOPHILS RELATIVE PERCENT 0 %  0-1   NEUTROPHILS ABSOLUTE COUNT 2 90 Thousands/? ??L  1 85-7 62   LYMPHOCYTES ABSOLUTE COUNT 0 80 Thousands/? ??L  0 60-4 47   MONOCYTES ABSOLUTE COUNT 0 30 Thousand/? ??L  0 17-1 22   EOSINOPHILS ABSOLUTE COUNT 0 00 Thousand/? ??L  0 00-0 61   BASOPHILS ABSOLUTE COUNT 0 00 Thousands/? ??L  0 00-0 10     (1) COMPREHENSIVE METABOLIC PANEL [id number] 48:66BK Chivo Jones    Order Number: [id number]     Test Name Result Flag Reference   SODIUM 138 mmol/L  136-145   POTASSIUM 3 8 mmol/L  3 5-5 3   CHLORIDE 101 mmol/L  100-108   CARBON DIOXIDE 29 mmol/L  21-32   ANION GAP (CALC) 8 mmol/L  4-13   BLOOD UREA NITROGEN 9 mg/dL  5-25   CREATININE 0 98 mg/dL  0 60-1 30   Standardized to IDMS reference method   CALCIUM 9 0 mg/dL  8 3-10 1   BILI, TOTAL 0 70 mg/dL  0 20-1 00   ALK PHOSPHATAS 72 U/L     ALT (SGPT) 34 U/L  12-78   AST(SGOT) 23 U/L  5-45   ALBUMIN 3 8 g/dL  3 5-5 0   TOTAL PROTEIN 7 5 g/dL  6 4-8 2   eGFR Non-African American      >60 0 ml/min/1 73sq m   [employer] Disease Education Program recommendations are as follows:  GFR calculation is accurate only with a steady state creatinine  Chronic Kidney disease less than 60 ml/min/1 73 sq  meters  Kidney failure less than 15 ml/min/1 73 sq  meters  GLUCOSE FASTING 105 mg/dL H 65-99     (1) LIPID PANEL FASTING W DIRECT LDL REFLEX [id number] 08:01AM Xavier Castro Order Number: [id number]     Test Name Result Flag Reference   CHOLESTEROL 185 mg/dL     LDL CHOLESTEROL CALCULATED 102 mg/dL H 0-100   Triglyceride:         Normal              <150 mg/dl       Borderline High    150-199 mg/dl       High               200-499 mg/dl       Very High          >499 mg/dl  Cholesterol:         Desirable        <200 mg/dl      Borderline High  200-239 mg/dl      High             >239 mg/dl  HDL Cholesterol:        High    >59 mg/dL      Low     <41 mg/dL  LDL Cholesterol:        Optimal          <100 mg/dl        Near Optimal     100-129 mg/dl        Above Optimal          Borderline High   130-159 mg/dl          High              160-189 mg/dl          Very High        >189 mg/dl  LDL CALCULATED:    This screening LDL is a calculated result  It does not have the accuracy of the Direct Measured LDL in the monitoring of patients with hyperlipidemia and/or statin therapy  Direct Measure LDL (YYT355) must be ordered separately in these patients  TRIGLYCERIDES 90 mg/dL  <=150   Specimen collection should occur prior to N-Acetylcysteine or Metamizole administration due to the potential for falsely depressed results  HDL,DIRECT 65 mg/dL H 40-60   Specimen collection should occur prior to Metamizole administration due to the potential for falsely depressed results  (1) TSH [id number] 08:01AM Sabrina Jiang    Order Number: [id number]     Test Name Result Flag Reference   TSH 0 966 uIU/mL  0 358-3 740   Patients undergoing fluorescein dye angiography may retain small amounts of fluorescein in the body for 48-72 hours post procedure   Samples containing fluorescein can produce falsely depressed TSH values  If the patient had this procedure,a specimen should be resubmitted post fluorescein clearance  (1) URINALYSIS (will reflex a microscopy if leukocytes, occult blood, protein or nitrites are not within normal limits) [id number] 08:01AM [employer] [facility] Order Number: [id number]     Test Name Result Flag Reference   COLOR Light Yellow     CLARITY Clear     SPECIFIC GRAVITY UA 1 010  1 000-1 030   PH UA 6 5  5 0-9 0   LEUKOCYTE ESTERASE UA Negative  Negative   NITRITE UA Negative  Negative   PROTEIN UA Negative mg/dl  Negative   GLUCOSE UA Negative mg/dl  Negative   KETONES UA Negative mg/dl  Negative   UROBILINOGEN UA 0 2 E U /dl  0 2, 1 0 E U /dl   BILIRUBIN UA Negative  Negative   BLOOD UA Negative  Negative     (1) VITAMIN D 25-HYDROXY [id number] 08:01AM [employer] [facility] Order Number: [id number]     Test Name Result Flag Reference   VIT D 25-HYDROX 50 1 ng/mL  30 0-100 0   This assay is a certified procedure of the CDC Vitamin D Standardization Certification Program (VDSCP)     Deficiency <20ng/ml   Insufficiency 20-30ng/ml   Sufficient  ng/ml     *Patients undergoing fluorescein dye angiography may retain small amounts of fluorescein in the body for 48-72 hours post procedure  Samples containing fluorescein can produce falsely elevated Vitamin D values  If the patient had this procedure, a specimen should be resubmitted post fluorescein clearance

## 2018-01-12 NOTE — RESULT NOTES
Message   Thyroid labs improving, but still need slight increase in thyroid medication  Increase Levothyroxine to 1 tab Mon-Friday, 2 tabs on saturday/sunday  Check TSH/Free T4 in 6 weeks  Verified Results  (1) TSH [id number] 09:02AM Kellen Varela     Test Name Result Flag Reference   TSH 4 651 uIU/mL H 0 358-3 740   Patients undergoing fluorescein dye angiography may retain small amounts of fluorescein in the body for 48-72 hours post procedure  Samples containing fluorescein can produce falsely depressed TSH values  If the patient had this procedure,a specimen should be resubmitted post fluorescein clearance       (1) T4, FREE [id number] 09:02AM Kellen Varela     Test Name Result Flag Reference   T4,FREE 0 91 ng/dL  0 76-1 46

## 2018-01-12 NOTE — MISCELLANEOUS
Message   Recorded as Task   Date: 08/29/2016 02:53 PM, Created By: Jordyn Adams   Task Name: Follow Up   Assigned To: Bela Cao   Regarding Patient: Perfecto Wilson, Status: In Progress   Comment:    Nicola Wei - 29 Aug 2016 2:53 PM     TASK CREATED  can you please check with [place] infusion center Number/dates of total reclast infusions this patient has had? thanks   [facility] 30 Aug 2016 11:31 AM     TASK REASSIGNED: Previously Assigned To ctr dm & endo clinical,team   Darryl Villalobos - 31 Aug 2016 10:03 AM     TASK EDITED                 spoke with the infusion center and they are looking into it  Darryl Villalobos - 31 Aug 2016 10:03 AM     TASK IN PROGRESS   Bela Cao - 31 Aug 2016 2:11 PM     TASK EDITED  Artis Alpers from the [facility] infusion center returned call to [employer] and stated that their accessible records only go back to Jan 2016  I returned her call and informed her that he had an infusion in Nov of 2015 and Nov of 2014  She stated that her coworker that would have access to those documents would be in tomorrow  Informed her that [facility] would like the dates of all infusions and the total   States she will call back ASAP with that info  Darryl Villalobos - 31 Aug 2016 2:49 PM     TASK REASSIGNED: Previously Assigned To Tatiana Alvarez - 01 Sep 2016 3:40 PM     TASK REASSIGNED: Previously Assigned To Nicola Wei      haven't heard back-- please confirm reclast dates  Thanks! Bela Cao - 04 Sep 2016 8:58 AM     TASK REASSIGNED: Previously Assigned To ctr dm & endo clinical,team  5/18/11  6/11/12  7/15/13  11/20/14  12/8/15  per Artis Alpers at [address]  Nicola Wei - 06 Sep 2016 9:29 AM     TASK REPLIED TO: Previously Assigned To [employer]!  Can you let the group home know since he has had a total of 5 reclast injections and that his Bone Density is stable we will not need to do any additional osteoporosis medication at this time and will jsut monitor with DEXA Scan but he is not due until 2018 for that  Thank You     **please copy to Sasha Azar - 06 Sep 2016 11:34 AM     TASK EDITED  Lance Barnes at group home notified  Active Problems    1  Abnormal weight gain (783 1) (R63 5)   2  Acute congestive heart failure, unspecified congestive heart failure type (428 0) (I50 9)   3  Acute viral pericarditis (420 91) (I30 1)   4  Allergic rhinitis (477 9) (J30 9)   5  Behavioral Problems (V40 9)   6  BMI 26 0-26 9,adult (V85 22) (Z68 26)   7  Celiac disease (579 0) (K90 0)   8  Chronic constipation (564 00) (K59 09)   9  Deformity of ankle and foot, acquired (736 70) (M21 969)   10  Degenerative arthritis of foot (715 97) (M19 079)   11  Depression (311) (F32 9)   12  Dizziness (780 4) (R42)   13  Esophageal reflux (530 81) (K21 9)   14  Hallux rigidus (735 2) (M20 20)   15  Head Injury (959 01)   16  Hyperlipidemia (272 4) (E78 5)   17  Hypogonadism, testicular (257 2) (E29 1)   18  Hypotension (458 9) (I95 9)   19  Hypothyroidism (244 9) (E03 9)   20  Idiopathic osteoporosis (733 02) (M81 8)   21  Ingrown nail (703 0) (L60 0)   22  Insomnia (780 52) (G47 00)   23  Metatarsalgia (726 70) (M77 40)   24  Muscle weakness (728 87) (M62 81)   25  Onychomycosis (110 1) (B35 1)   26  Pain in both feet (729 5) (M79 671,M79 672)   27  Pleural effusion, bilateral (511 9) (J90)   28  Serositis (569 89) (K65 8)   29  Tinea pedis (110 4) (B35 3)   30  Trisomy 21, Down syndrome (758 0) (Q90 9)   31  Vitamin D deficiency (268 9) (E55 9)   32  Xerosis cutis (706 8) (L85 3)    Current Meds   1  AndroGel Pump 20 25 MG/ACT (1 62%) Transdermal Gel; 2 pumps daily to each   shoulder as directed; Therapy: 44GTZ6292 to (Evaluate:75Ipg2239); Last Rx:09Jun2016 Ordered   2  Ativan 1 MG Oral Tablet (LORazepam); TAKE 1 TABLET DAILY AS NEEDED; Therapy: (Recorded:18Apr2016) to Recorded   3   Atorvastatin Calcium 20 MG Oral Tablet; TAKE  1  TABLET Daily As Directed take at 8   pm;   Therapy: 81PZK9394 to (Evaluate:17Jun2016)  Requested for: 66Xkr6411; Last   Rx:35Lei7901 Ordered   4  Calcium Carbonate-Vitamin D 600-400 MG-UNIT Oral Tablet; Take 1 tablet twice daily   with breakfast and 8pm;   Therapy: 68Vku4714 to (Evaluate:17Sxq6728); Last Rx:85Ygt5467 Ordered   5  Centrum Oral Tablet; Take 1 tablet daily with breakfast; Last Rx:25Yem7289 Ordered   6  Cetaphil External Liquid; Therapy: (Recorded:55Dlc5658) to Recorded   7  Colace 100 MG Oral Capsule; TAKE 1 CAPSULE DAILY at breakfast;   Therapy: 04Fyz9818 to (Evaluate:88Quo7084); Last Rx:26Kdt1855 Ordered   8  Colchicine 0 5 MG TABS; TAKE 1 TABLET DAILY at 9am;   Therapy: (Recorded:34Ssy8786) to Recorded   9  Divalproex Sodium 500 MG Oral Tablet Delayed Release; Take 500mg in the am and   1000mg in the evening; Therapy: (Recorded:87Bcy2368) to Recorded   10  Esomeprazole Magnesium 40 MG Oral Capsule Delayed Release (NexIUM); TAKE ONE    CAPSULE BY MOUTH EVERY DAY WITH BREAKFAST -AVERSA/MYERS; Therapy: 88YRH1444 to (Last Medora Eulalio)  Requested for: 28Jun2016 Ordered   11  Eyelid Cleansers LIQD; USE AS DIRECTED  Use on wednesday, friday, and monday; Therapy: (Kevin Harrell) to Recorded   12  Flomax 0 4 MG Oral Capsule (Tamsulosin HCl); take 1 capsule daily; Therapy: (Recorded:46Plx5622) to Recorded   13  Levothyroxine Sodium 25 MCG Oral Tablet; Take 1 tablet daily at 8AM (30 minutes    eating); Therapy: 80ABT7737 to (Tara Brizuela)  Requested for: 74HUP2348; Last    Rx:41Emo8449 Ordered   14  OLANZapine 5 MG Oral Tablet; TAKE 1 TABLET AT 8PM DAILY; Therapy: (Recorded:53Pgv1228) to Recorded   15  Polyethylene Glycol 3350 Oral Powder; TAKE 17 GM Once At Bedtime mix in 4 oz of    liquid at 8 pm;    Therapy: 36Fnk3065 to (Maxim Mccarthy)  Requested for: 37YST1659; Last    Rx:20Qnv3944 Ordered   16  PredniSONE 10 MG Oral Tablet; TAKE 1 TABLET DAILY;     Therapy: (Recorded:66Bga0556) to Recorded   17  Reclast 5 MG/100ML Intravenous Solution (Zoledronic Acid); USE AS DIRECTED  Once    yearly; Therapy: (Recorded:87Xfa3121) to Recorded   18  Vitamin D 1000 UNIT Oral Tablet; TAKE ONE TABLET BY MOUTH TWO TIMES A DAY(    BREAKFAST & AT 8PM) -AVERSA; Therapy: 43Lyz2546 to (Last Rx:80Yvk0883)  Requested for: 26Tdt7219 Ordered    Allergies    1   Advil TABS    2  Gluten    Signatures   Electronically signed by : Zulema Adams, ; Sep  6 2016 11:36AM EST                       (Author)

## 2018-01-12 NOTE — PROGRESS NOTES
Assessment    1  Encounter for preventive health examination (V70 0) (Z00 00)   2   Adult BMI 29 0-29 9 kg/sq m (V85 25) (Z68 29)    Plan  Allergic rhinitis    · From  Claritin 10 MG Oral Tablet TAKE 1 TABLET DAILY AS NEEDED To  Claritin 10 MG Oral Tablet TAKE 1 TABLET DAILY AS NEEDED in 24 hour period  Chronic constipation    · From  Polyethylene Glycol 3350 Oral Powder TAKE 17 GRAMS IN 8OZ  WATER BY MOUTH EVERY DAY (8PM) -ISSA/YANNA To Polyethylene Glycol 3350  Oral Powder TAKE 17 GRAMS IN 8OZ WATER BY MOUTH EVERY DAY  (8PM)   · Colace 100 MG Oral Capsule; TAKE 1 CAPSULE DAILY at breakfast  Esophageal reflux    · From  NexIUM 40 MG Oral Capsule Delayed Release  To Esomeprazole  Magnesium 40 MG Oral Capsule Delayed Release (NexIUM) 1 tab po daily at 8 am  Health Maintenance    · From  Centrum Oral Tablet Take 1 tablet daily with breakfast To Centrum Oral  Tablet TAKE 1 TABLET BY MOUTH DAILY @ 8AM  Hyperlipidemia    · From  Atorvastatin Calcium 20 MG Oral Tablet TAKE ONE TABLET BY MOUTH  EVERY DAY AT 8PM (8PM) -YANNA To Atorvastatin Calcium 20 MG Oral Tablet TAKE  ONE TABLET BY MOUTH EVERY DAY AT 8PM (8PM)  Mixed incontinence    · From  Tamsulosin HCl - 0 4 MG Oral Capsule TAKE ONE CAPSULE BY  MOUTH IN THE EVENING (8PM) -YANNA To Tamsulosin HCl - 0 4 MG Oral Capsule  (Flomax) TAKE ONE CAPSULE BY MOUTH IN THE EVENING (8PM)  Need for influenza vaccination    · Fluzone Quadrivalent Intramuscular Suspension  PMH: Vitamin D deficiency    · From  Calcium Carbonate-Vitamin D 600-400 MG-UNIT Oral Tablet Take 1  tablet twice daily with breakfast and 8pm To Calcium Carbonate-Vitamin D 600-400  MG-UNIT Oral Tablet TAKE 1 TABLET at 8 am and 8 pm   · From  Vitamin D 1000 UNIT Oral Tablet TAKE ONE TABLET BY MOUTH TWO  TIMES A DAY( BREAKFAST & AT 8PM) -AVERSA To Vitamin D 1000 UNIT Oral Tablet  TAKE ONE TABLET BY MOUTH TWO TIMES A DAY(8am  & AT 8PM)    Discussion/Summary  health maintenance visit Currently, he eats a poor diet and has an inadequate exercise regimen  Patient discussion: discussed with the patient  Paperwork completed and updated scripts given  The patient, patient's caretaker was counseled regarding impressions  The treatment plan was reviewed with the patient/guardian  The patient/guardian understands and agrees with the treatment plan      Chief Complaint  Annual ARC PE  Influenza vaccine today  nil/lpn      History of Present Illness  HM, Adult Male: The patient is being seen for a health maintenance evaluation  The last health maintenance visit was 1 year(s) ago  Social History: Household members include lives in group home  General Health: The patient's health since the last visit is described as fair  He has regular dental visits  Immunizations status: up to date  Lifestyle:  He does not have a healthy diet  He has weight concerns  He does not exercise regularly  He does not use tobacco  He denies alcohol use  He denies drug use  Reproductive health:  the patient is not sexually active  Screening: cancer screening reviewed and updated  metabolic screening reviewed and updated  risk screening reviewed and updated  Additional History:  here with caretaker  recently saw endocrine, cardiology,   sees podiatry  sees psychiatry  sees Dr Rashaun Shelley next week  no issues with urination   pt has not complained of any stomach pain since back on depakote and off abilify  Review of Systems    Constitutional: not feeling poorly and not feeling tired  Eyes: no new symptoms  ENT: no nosebleeds  Cardiovascular: No complaints of slow heart rate, no fast heart rate, no chest pain, no palpitations, no leg claudication, no lower extremity  Respiratory: No complaints of shortness of breath, no wheezing, no cough, no SOB on exertion, no orthopnea or PND  Gastrointestinal: no change in bowel or bladder habits  Musculoskeletal: no arthralgias and no myalgias  Integumentary: no rashes  Neurological: as noted in HPI  Active Problems    1  Abdominal pain (789 00) (R10 9)   2  Allergic rhinitis (477 9) (J30 9)   3  Anterior tibialis tendinitis of left lower extremity (726 72) (M76 812)   4  Arthralgia of left foot (719 47) (M25 572)   5  Celiac disease (579 0) (K90 0)   6  Change in behavior (312 9) (R46 89)   7  Chronic constipation (564 00) (K59 09)   8  Deformity of ankle and foot, acquired (736 70) (M21 969)   9  Degenerative arthritis of foot (715 97) (M19 079)   10  Depression (311) (F32 9)   11  Difficulty in walking involving ankle and foot joint (719 7) (R26 2)   12  Esophageal reflux (530 81) (K21 9)   13  Extensor tendinitis of foot (727 06) (M77 50)   14  Gout (274 9) (M10 9)   15  Hallux rigidus (735 2) (M20 20)   16  Hyperlipidemia (272 4) (E78 5)   17  Hypogonadism, testicular (257 2) (E29 1)   18  Hypotension (458 9) (I95 9)   19  Hypothyroidism (244 9) (E03 9)   20  Idiopathic osteoporosis (733 02) (M81 8)   21  Idiopathic pericardial effusion (423 9) (I31 3)   22  Ingrown nail (703 0) (L60 0)   23  Insomnia (780 52) (G47 00)   24  Metatarsalgia (726 70) (M77 40)   25  Mixed incontinence (788 33) (N39 46)   26  Onychomycosis (110 1) (B35 1)   27  Pain in both feet (729 5) (M79 671,M79 672)   28  Parkinson's disease (332 0) (Kari Wu)   29  Serositis (569 89) (K65 8)   30  Tremor (781 0) (R25 1)   31  Trisomy 21, Down syndrome (758 0) (Q90 9)   32  Urinary retention (788 20) (R33 9)   33  Vitamin D deficiency (268 9) (E55 9)   34   Xerosis cutis (706 8) (L85 3)    Past Medical History    · History of Abdominal pain, epigastric (789 06) (R10 13)   · History of Abdominal pain, lower (789 09) (R10 30)   · History of Abdominal pain, RUQ (789 01) (R10 11)   · History of Abnormal weight gain (783 1) (R63 5)   · History of Acute congestive heart failure, unspecified congestive heart failure type (428 0)  (I50 9)   · History of Acute cystitis with hematuria (595 0) (N30 01)   · History of Acute UTI (599 0) (N39 0)   · History of Acute viral pericarditis (420 91) (I30 1)   · History of Adult BMI 29 0-29 9 kg/sq m (V85 25) (Z68 29)   · History of Annual physical exam (V70 0) (Z00 00)   · History of Behavioral Problems (V40 9)   · History of BMI 26 0-26 9,adult (V85 22) (Z68 26)   · History of Callus (700) (L84)   · History of Capsulitis (726 90) (M77 9)   · History of Cough (786 2) (R05)   · History of Counseling For Supervised Exercise Program   · History of Difficulty in walking (719 7) (R26 2)   · History of Dry skin (701 1) (L85 3)   · History of Encounter for tuberculin skin test (V74 1) (Z11 1)   · History of Generalized abdominal pain (789 07) (R10 84)   · History of Head Injury (959 01)   · History of Head Injury (959 01)   · History of abdominal pain (V13 89) (D47 153)   · History of athlete's foot (V12 09) (Z86 19)   · History of diarrhea (V12 79) (H70 203)   · History of dizziness (V13 89) (M14 299)   · History of fatigue (V13 89) (Z87 898)   · History of fatigue (V13 89) (Z87 898)   · History of fever (V13 89) (R93 738)   · History of headache (V13 89) (Z32 998)   · History of herpes simplex infection (V12 09) (Z86 19)   · History of ingrown nail (V13 3) (Z87 2)   · History of leukocytosis (V12 3) (Z86 2)   · History of low back pain (V13 59) (Z87 39)   · History of nausea and vomiting (V12 79) (Z87 898)   · History of nausea and vomiting (V12 79) (Z87 898)   · History of Parkinson's disease (V12 49) (Z86 69)   · History of respiratory syncytial virus (RSV) infection (V12 09) (Z86 19)   · History of sinus tachycardia (V12 59) (Z86 79)   · History of tendinitis (V13 59) (Z87 39)   · History of urinary frequency (V13 09) (Q50 438)   · History of viral gastroenteritis (V12 09) (Z86 19)   · History of viral infection (V12 09) (Z86 19)   · History of Hyponatremia (276 1) (E87 1)   · History of Hyponatremia (276 1) (E87 1)   · History of Multifocal pneumonia (486) (J18 9)   · History of Muscle weakness (728 87) (M62 81)   · History of Need for influenza vaccination (V04 81) (Z23)   · History of Need for influenza vaccination (V04 81) (Z23)   · History of Need for vaccination with diphtheria, tetanus, pertussis (DTP), and  Haemophilus influenzae type B vaccine (V06 8) (Z23)   · History of Nonspecific abnormal results of function study of thyroid (794 5) (R94 6)   · History of Paronychia (681 9)   · History of Pleural effusion, bilateral (511 9) (J90)   · History of Screening for colorectal cancer (V76 51) (Z12 11,Z12 12)   · History of Screening for diabetes mellitus (V77 1) (Z13 1)   · History of Screening for thyroid disorder (V77 0) (Z13 29)   · History of Screening PSA (prostate specific antigen) (V76 44) (Z12 5)   · History of Tuberculosis screening (V74 1) (Z11 1)   · URI, acute (465 9) (J06 9)   · History of Urinary Tract Infection (V13 02)   · History of Vitamin D deficiency (268 9) (E55 9)    Surgical History    · History of Complete Colonoscopy   · History of Esophagogastroduodenoscopy With Biopsy    Family History  Mother    · Family history unknown (V49 89) (Z73 8)  Father    · Family history unknown (V49 89) (Z78 9)   · Family history of Parkinson disease, symptomatic  Family History    · Family history of No Significant Family History    Social History    · Denied: History of Alcohol Use (History)   · Caffeine use (V49 89) (F15 90)   · Never A Smoker   · No drug use    Current Meds   1  AndroGel Pump 20 25 MG/ACT (1 62%) Transdermal Gel; 1 squirt daily on left shoulder   and 2 squirts daily on right shoulder daily at breakfast time; Therapy: 09NDE3038 to (Evaluate:12Mar2018); Last Rx:04Qvr2084 Ordered   2  Atorvastatin Calcium 20 MG Oral Tablet; TAKE ONE TABLET BY MOUTH EVERY DAY AT   8PM (8PM) -YANNA; Therapy: 85Ioa0744 to (Evaluate:22Jun2018)  Requested for: 27Jun2017; Last   DS:33VHS9467 Ordered   3  Calcium Carbonate-Vitamin D 600-400 MG-UNIT Oral Tablet;  Take 1 tablet twice daily with breakfast and 8pm;   Therapy: 83Tro2242 to (Evaluate:23Sep2017); Last Rx:74Asm8297 Ordered   4  Centrum Oral Tablet; Take 1 tablet daily with breakfast; Last Rx:31Oct2016 Ordered   5  Claritin 10 MG Oral Tablet; TAKE 1 TABLET DAILY AS NEEDED; Therapy: 70XTT6217 to (Evaluate:27Mar2017)  Requested for: 46BJD2177; Last   Rx:88Pce5885 Ordered   6  Colace 100 MG Oral Capsule; TAKE 1 CAPSULE DAILY at breakfast;   Therapy: 81Yhe6942 to (Evaluate:23Sep2017); Last Rx:38Kgj6093 Ordered   7  Divalproex Sodium 500 MG Oral Tablet Delayed Release; Therapy: 33Wjt6446 to Recorded   8  Eyelid Cleansers LIQD; USE AS DIRECTED  Use on wednesday, friday, and monday; Therapy: (Jovana Reach) to Recorded   9  Levothyroxine Sodium 50 MCG Oral Tablet; TAKE 1 TABLET BY MOUTH EVERY DAY 5   TIMES A WEEK (MON-FRI) & TAKE 2 TABLETS (100MCG) BY MOUTH TWICE A WEEK   (SAT&SUN) AT 8AM;   Therapy: 89EEU9560 to (Evaluate:24Nov2017)  Requested for: 50TEW6838; Last   DL:66EDO4495 Ordered   10  NexIUM 40 MG Oral Capsule Delayed Release; Therapy: (Recorded:10Aug2017) to Recorded   11  OLANZapine 2 5 MG Oral Tablet; Therapy: (Recorded:46Cgb9964) to Recorded   12  Polyethylene Glycol 3350 Oral Powder; TAKE 17 GRAMS IN 8OZ WATER BY MOUTH    EVERY DAY (8PM) -ISSA/YANNA; Therapy: 24Apr2014 to (Evaluate:29Jun2017)  Requested for: 75FEB7584; Last    Rx:01Mar2017 Ordered   13  Tamsulosin HCl - 0 4 MG Oral Capsule; TAKE ONE CAPSULE BY MOUTH IN THE    EVENING (8PM) -YANNA; Therapy: 04VNX6662 to (CYJDWJED:85OPL3005)  Requested for: 27Jun2017; Last    HM:33TFI3910; Status: ACTIVE - Renewal Denied Ordered   14  Vitamin D 1000 UNIT Oral Tablet; TAKE ONE TABLET BY MOUTH TWO TIMES A DAY(    BREAKFAST & AT 8PM) -AVERSA; Therapy: 22Apr2015 to (Evaluate:26Dak6535)  Requested for: 73RME8662; Last    Rx:10Eia9678 Ordered    Allergies    1   Advil TABS    2  Gluten    Vitals   Recorded: 08FAY5047 02:50PM   Temperature 98 F   Heart Rate 64 Respiration 16   Systolic 751   Diastolic 70   Height 4 ft 11 in   Weight 144 lb    BMI Calculated 29 08   BSA Calculated 1 6     Physical Exam    Constitutional   General appearance: No acute distress, well appearing and well nourished  Head and Face   Head and face: Normal     Eyes   Conjunctiva and lids: No erythema, swelling or discharge  Pupils and irises: Equal, round, reactive to light  Ears, Nose, Mouth, and Throat   External inspection of ears and nose: Normal     Otoscopic examination: Tympanic membranes translucent with normal light reflex  Canals patent without erythema  Oropharynx: Normal with no erythema, edema, exudate or lesions  Neck   Neck: Supple, symmetric, trachea midline, no masses  Thyroid: Normal, no thyromegaly  Pulmonary   Respiratory effort: No increased work of breathing or signs of respiratory distress  Auscultation of lungs: Clear to auscultation  Cardiovascular   Auscultation of heart: Normal rate and rhythm, normal S1 and S2, no murmurs  Carotid pulses: 2+ bilaterally  Peripheral vascular exam: Normal     Examination of extremities for edema and/or varicosities: Normal     Abdomen   Abdomen: Non-tender, no masses  Liver and spleen: No hepatomegaly or splenomegaly  Lymphatic   Palpation of lymph nodes in neck: No lymphadenopathy  Musculoskeletal   Gait and station: Normal     Psychiatric   Judgment and insight: Normal     Mood and affect: Normal        Health Management  History of Screening for colorectal cancer   COLONOSCOPY; every 5 years; Last 08YLS0786; Next Due: 98PAW3429; Overdue    Future Appointments    Date/Time Provider Specialty Site   11/21/2017 03:30 PM Rohan Zacarias DPM Podiatry [facility]   03/14/2018 10:10 AM SAMUEL Manzo  Endocrinology [facility] ENDOCRINOLOGY   10/11/2017 04:00 PM SAMUEL Cobb   Neurology NEUROLOGY [facility]   Electronically signed by : Franklin Gordon DO; Oct  3 2017  4:44PM EST                       (Author)

## 2018-01-13 VITALS
WEIGHT: 145 LBS | HEIGHT: 60 IN | SYSTOLIC BLOOD PRESSURE: 120 MMHG | BODY MASS INDEX: 28.47 KG/M2 | DIASTOLIC BLOOD PRESSURE: 72 MMHG

## 2018-01-13 VITALS
HEIGHT: 60 IN | SYSTOLIC BLOOD PRESSURE: 118 MMHG | WEIGHT: 135 LBS | OXYGEN SATURATION: 97 % | HEART RATE: 73 BPM | BODY MASS INDEX: 26.5 KG/M2 | DIASTOLIC BLOOD PRESSURE: 76 MMHG

## 2018-01-13 VITALS
DIASTOLIC BLOOD PRESSURE: 80 MMHG | SYSTOLIC BLOOD PRESSURE: 132 MMHG | HEIGHT: 60 IN | WEIGHT: 131 LBS | BODY MASS INDEX: 25.72 KG/M2

## 2018-01-13 VITALS
DIASTOLIC BLOOD PRESSURE: 64 MMHG | SYSTOLIC BLOOD PRESSURE: 99 MMHG | WEIGHT: 148 LBS | BODY MASS INDEX: 29.06 KG/M2 | HEIGHT: 60 IN

## 2018-01-13 VITALS — BODY MASS INDEX: 28.27 KG/M2 | WEIGHT: 144 LBS | HEIGHT: 60 IN

## 2018-01-13 NOTE — RESULT NOTES
Message   DEXA CONTINUES TO show osteoporosis, with slight improvement  Continue calcium, vitamin D, reclast     Verified Results  * DXA BONE DENSITY SPINE HIP AND PELVIS 30BUS3780 03:32PM Enrike Ards     Test Name Result Flag Reference   DXA BONE DENSITY SPINE HIP AND PELVIS (Report)     CENTRAL DXA SCAN     CLINICAL HISTORY:  45year old  male risk factors include history of ulcers or reflux  TECHNIQUE: Bone densitometry was performed using a Seratis bone densitometer  Regions of interest appear properly placed  There are no obvious fractures or other confounding variables which could limit the study  COMPARISON: January 30, 2014     RESULTS:    LUMBAR SPINE: L1-L3:   BMD 1 008 gm/cm2   T-score -1 8   Z-score -1 6     LEFT TOTAL HIP:   BMD 0 812 gm/cm2   T-score -2 0   Z-score -1 7     LEFT FEMORAL NECK:   BMD 0 753 gm/cm2   T-score -2 4   Z-score -1 8     TOTAL RIGHT HIP:        BMD 0 808 gm/sq-cm,      T-score is -2 0      Z-score is -1 7                FEMORAL NECK RIGHT HIP :     BMD 0 751 gm/sq-cm,     T-score is -2 5     Z-score is -1 8           ASSESSMENT:   1  Based on the WHO classification, the T-score of -2 5 in the right hip is consistent with osteoporosis  2  When compared to the prior examination, there has been a 5 % INCREASE in the total bone mineral density of the lumbar spine and a 3 % INCREASE in the total bone mineral density of the left hip  3  According to the [address] [place] [address], prescription therapy is recommended with a T-score of -2 5 or less in the spine or hip after appropriate evaluation to exclude secondary causes  4  A daily intake of at least 1200 mg Calcium and 800 to 1000 IU of Vitamin D, as well as weight bearing and muscle strengthening exercise, fall prevention and avoidance of tobacco and excessive alcohol intake as basic preventive measures are    suggested  5  Repeat DXA scan in 18-24 months as clinically indicated  WHO CLASSIFICATION:   Normal (a T-score of -1 0 or higher)   Low bone mineral density (a T-score of less than -1 0 but higher than -2 5)   Osteoporosis (a T-score of -2 5 or less)   Severe osteoporosis (a T-score of -2 5 or less with a fragility fracture)       Signatures   Electronically signed by : DEXTER Fields; Feb 7 2016 10:11AM EST                       (Author)

## 2018-01-14 VITALS
SYSTOLIC BLOOD PRESSURE: 122 MMHG | OXYGEN SATURATION: 95 % | DIASTOLIC BLOOD PRESSURE: 70 MMHG | RESPIRATION RATE: 16 BRPM | HEART RATE: 120 BPM | TEMPERATURE: 100.9 F | BODY MASS INDEX: 28.27 KG/M2 | WEIGHT: 144 LBS | HEIGHT: 60 IN

## 2018-01-14 VITALS
HEIGHT: 60 IN | BODY MASS INDEX: 29.84 KG/M2 | DIASTOLIC BLOOD PRESSURE: 68 MMHG | SYSTOLIC BLOOD PRESSURE: 124 MMHG | HEART RATE: 71 BPM | WEIGHT: 152 LBS | OXYGEN SATURATION: 99 %

## 2018-01-14 VITALS
HEIGHT: 60 IN | BODY MASS INDEX: 28.27 KG/M2 | HEART RATE: 64 BPM | DIASTOLIC BLOOD PRESSURE: 70 MMHG | TEMPERATURE: 98 F | RESPIRATION RATE: 16 BRPM | WEIGHT: 144 LBS | SYSTOLIC BLOOD PRESSURE: 110 MMHG

## 2018-01-14 VITALS
SYSTOLIC BLOOD PRESSURE: 118 MMHG | DIASTOLIC BLOOD PRESSURE: 72 MMHG | RESPIRATION RATE: 16 BRPM | TEMPERATURE: 97.3 F | HEIGHT: 60 IN | HEART RATE: 70 BPM | BODY MASS INDEX: 28.47 KG/M2 | WEIGHT: 145 LBS

## 2018-01-14 VITALS
HEART RATE: 71 BPM | TEMPERATURE: 98.7 F | BODY MASS INDEX: 29.84 KG/M2 | HEIGHT: 60 IN | DIASTOLIC BLOOD PRESSURE: 68 MMHG | SYSTOLIC BLOOD PRESSURE: 120 MMHG | WEIGHT: 152 LBS | RESPIRATION RATE: 16 BRPM

## 2018-01-14 NOTE — RESULT NOTES
Discussion/Summary   Total Testosterone level normal- continue current dose of Androgel     Verified Results  (1) TESTOSTERONE, FREE (DIRECT) AND TOTAL [id number] 08:27AM Chad Jin Order Number: [id number]     Test Name Result Flag Reference   FREE TESTOSTERONE, DIRECT 4 8 pg/mL L 7 2 - 24 0   TESTOSTERONE (TOTAL) 393 ng/dL  264 - 65   Adult male reference interval is based on a population of  healthy nonobese males (BMI <30) between 23and 44years old  [address] [place] [address], [address] [place] [address] [phone]  PMID: [id number]      Performed at:  [facility] [address]  [id number]  : Nicole Pantoja MD, Phone:  [phone]

## 2018-01-15 NOTE — PROGRESS NOTES
Assessment    1  Encounter for preventive health examination (V70 0) (Z00 00)   2  Trisomy 21, Down syndrome (758 0) (Q90 9)   3  Gout (274 9) (M10 9)   4  Adult BMI 29 0-29 9 kg/sq m (V85 25) (Z68 29)    Plan  Allergic rhinitis    · Claritin 10 MG Oral Tablet; TAKE 1 TABLET DAILY AS NEEDED  Chronic constipation    · Colace 100 MG Oral Capsule; TAKE 1 CAPSULE DAILY at breakfast   · Polyethylene Glycol 3350 Oral Powder; TAKE 17 GM Once At Bedtime mix in 4  oz of liquid at 8 pm  Esophageal reflux    · Esomeprazole Magnesium 40 MG Oral Capsule Delayed Release (NexIUM);  TAKE ONE CAPSULE BY MOUTH EVERY DAY WITH BREAKFAST -AVERSA/MYERS  Gout    · From  Colchicine 0 5 MG TABS TAKE 1 TABLET DAILY at 9am To Colcrys 0 6  MG Oral Tablet TAKE 1 TABLET TWICE DAILY AS DIRECTED   · (1) URIC ACID; Status:Active; Requested for:28Sep2016; Health Maintenance    · Centrum Oral Tablet; Take 1 tablet daily with breakfast   · Follow-up visit in 6 months Evaluation and Treatment  Follow-up  Status: Hold For -  Scheduling  Requested for: 28Sep2016  Hyperlipidemia    · Atorvastatin Calcium 20 MG Oral Tablet; TAKE  1  TABLET Daily As Directed take  at 8 pm  Urinary frequency    · From  Flomax 0 4 MG Oral Capsule take 1 capsule daily To Tamsulosin HCl -  0 4 MG Oral Capsule (Flomax) take 1 capsule daily  Vitamin D deficiency    · Calcium Carbonate-Vitamin D 600-400 MG-UNIT Oral Tablet; Take 1 tablet twice  daily with breakfast and 8pm   · Vitamin D 1000 UNIT Oral Tablet; TAKE ONE TABLET BY MOUTH TWO TIMES A  DAY( BREAKFAST & AT 8PM) -AVERSA    Discussion/Summary  Impression: health maintenance visit  Patient discussion: discussed with the patient  Pt here for [facility] physical  script completed  bw has been ordered by specialists  EKG done within 6 months  instead of daily weights-may start to weigh weekly    having gouty flair so will give colchicine and obtain uric acid level  forms completed     f/u in 6 months    flu shot given today    Possible side effects of new medications were reviewed with the patient/guardian today  The patient, patient's caretaker was counseled regarding diagnostic results, instructions for management, risk factor reductions, prognosis, patient and family education, impressions, risks and benefits of treatment options, importance of compliance with treatment  Chief Complaint  Patient presents for annual PE and influenza vaccination  nil/lpn      History of Present Illness  HM, Adult Male: The patient is being seen for a health maintenance evaluation  General Health: The patient's health since the last visit is described as good  Lifestyle:  He does not have a healthy diet  He does not exercise regularly  He does not use tobacco  He denies alcohol use  He denies drug use  Screening: cancer screening reviewed and current  metabolic screening reviewed and current  risk screening reviewed and current  HPI: Pt seen and examined  Here with caretaker from the [facility]  Sees Dr Keshav Cintron and Dr Keshav Cintron ( cardiology and psych)  Next cardiology appt is in October  Sees Dr Dodie Morse-- has bw ordered through endo  Needs refills on medications  Pt was seen in ED for left handed swelling and pain  xray was done  It did get better but now painful and swollen again  Denied trauma  no numbness / tingling  reviewed ED report  Review of Systems    Constitutional: not feeling poorly and not feeling tired  Eyes: no eyesight problems  ENT: no hearing loss  Cardiovascular: No complaints of slow heart rate, no fast heart rate, no chest pain, no palpitations, no leg claudication, no lower extremity  Respiratory: No complaints of shortness of breath, no wheezing, no cough, no SOB on exertion, no orthopnea or PND  Gastrointestinal: constipation, but no abdominal pain, no nausea, no vomiting and no diarrhea  Genitourinary: no change  Musculoskeletal: as noted in HPI  Integumentary: as noted in HPI  Neurological: no numbness and no tingling  Psychiatric: sees psych but acute issues  Endocrine: no muscle weakness  Hematologic/Lymphatic: no tendency for easy bleeding and no tendency for easy bruising  Active Problems    1  Abnormal weight gain (783 1) (R63 5)   2  Allergic rhinitis (477 9) (J30 9)   3  Behavioral Problems (V40 9)   4  Celiac disease (579 0) (K90 0)   5  Chronic constipation (564 00) (K59 09)   6  Deformity of ankle and foot, acquired (736 70) (M21 969)   7  Degenerative arthritis of foot (715 97) (M19 079)   8  Depression (311) (F32 9)   9  Esophageal reflux (530 81) (K21 9)   10  Hallux rigidus (735 2) (M20 20)   11  Hyperlipidemia (272 4) (E78 5)   12  Hypogonadism, testicular (257 2) (E29 1)   13  Hypotension (458 9) (I95 9)   14  Hypothyroidism (244 9) (E03 9)   15  Idiopathic osteoporosis (733 02) (M81 8)   16  Insomnia (780 52) (G47 00)   17  Metatarsalgia (726 70) (M77 40)   18  Muscle weakness (728 87) (M62 81)   19  Onychomycosis (110 1) (B35 1)   20  Pain in both feet (729 5) (M79 671,M79 672)   21  Serositis (569 89) (K65 8)   22  Tinea pedis (110 4) (B35 3)   23  Trisomy 21, Down syndrome (758 0) (Q90 9)   24  Vitamin D deficiency (268 9) (E55 9)   25   Xerosis cutis (706 8) (L85 3)    Past Medical History    · History of Abdominal pain, epigastric (789 06) (R10 13)   · History of Abdominal pain, lower (789 09) (R10 30)   · History of Abdominal pain, RUQ (789 01) (R10 11)   · History of Acute congestive heart failure, unspecified congestive heart failure type (428 0)  (I50 9)   · History of Acute viral pericarditis (420 91) (I30 1)   · History of Annual physical exam (V70 0) (Z00 00)   · History of Arthralgia of left foot (719 47) (M25 572)   · History of BMI 26 0-26 9,adult (V85 22) (Z68 26)   · History of Callus (700) (L84)   · History of Capsulitis (726 90) (M77 9)   · History of Counseling For Supervised Exercise Program   · History of Difficulty in walking involving ankle and foot joint (719 7) (R26 2)   · History of Dry skin (701 1) (L85 3)   · History of Encounter for tuberculin skin test (V74 1) (Z11 1)   · History of Head Injury (959 01)   · History of Head Injury (959 01)   · History of diarrhea (V12 79) (M17 648)   · History of dizziness (V13 89) (M99 338)   · History of fatigue (V13 89) (Z30 073)   · History of herpes simplex infection (V12 09) (Z86 19)   · History of ingrown nail (V13 3) (Z87 2)   · History of low back pain (V13 59) (Z87 39)   · History of nausea and vomiting (V12 79) (Z87 898)   · History of tendinitis (V13 59) (Z87 39)   · History of viral gastroenteritis (V12 09) (Z86 19)   · History of Hyponatremia (276 1) (E87 1)   · History of Hyponatremia (276 1) (E87 1)   · History of Need for influenza vaccination (V04 81) (Z23)   · History of Nonspecific abnormal results of function study of thyroid (794 5) (R94 6)   · History of Paronychia (681 9)   · History of Pleural effusion, bilateral (511 9) (J90)   · History of Screening for colorectal cancer (V76 51) (Z12 11,Z12 12)   · History of Screening for diabetes mellitus (V77 1) (Z13 1)   · History of Screening for thyroid disorder (V77 0) (Z13 29)   · History of Screening PSA (prostate specific antigen) (V76 44) (Z12 5)   · History of Tuberculosis screening (V74 1) (Z11 1)   · URI, acute (465 9) (J06 9)   · History of Urinary Tract Infection (V13 02)    Surgical History    · History of Complete Colonoscopy   · History of Esophagogastroduodenoscopy With Biopsy    Family History  Mother    · Family history unknown (V49 89) (Z73 8)  Father    · Family history unknown (V49 89) (Z78 9)  Family History    · Family history of No Significant Family History    Social History    · Denied: History of Alcohol Use (History)   · Caffeine use (V49 89) (F15 90)   · Never A Smoker   · No drug use    Current Meds   1  AndroGel Pump 20 25 MG/ACT (1 62%) Transdermal Gel; 2 pumps daily to each   shoulder as directed;    Therapy: 59BLZ8818 to (Evaluate:55Iiz7381); Last Rx:09Jun2016 Ordered   2  Ativan 1 MG Oral Tablet; TAKE 1 TABLET DAILY AS NEEDED; Therapy: (Recorded:81Ktf4753) to Recorded   3  Atorvastatin Calcium 20 MG Oral Tablet; TAKE  1  TABLET Daily As Directed take at 8   pm;   Therapy: 75RPA8121 to (Evaluate:17Jun2016)  Requested for: 08Mck6549; Last   Rx:19Whl0876 Ordered   4  Calcium Carbonate-Vitamin D 600-400 MG-UNIT Oral Tablet; Take 1 tablet twice daily   with breakfast and 8pm;   Therapy: 42Ycx1667 to (Evaluate:47Suo1302); Last Rx:98Dse7288 Ordered   5  Centrum Oral Tablet; Take 1 tablet daily with breakfast; Last Rx:39Hoh8978 Ordered   6  Cetaphil External Liquid; Therapy: (Recorded:60Mbu6350) to Recorded   7  Colace 100 MG Oral Capsule; TAKE 1 CAPSULE DAILY at breakfast;   Therapy: 24Ssw6045 to (Evaluate:22Sxq6186); Last Rx:04Owc9736 Ordered   8  Colchicine 0 5 MG TABS; TAKE 1 TABLET DAILY at 9am;   Therapy: (Recorded:54Tcm4243) to Recorded   9  Divalproex Sodium 500 MG Oral Tablet Delayed Release; Take 500mg in the am and   1000mg in the evening; Therapy: (Recorded:41Muk4188) to Recorded   10  Esomeprazole Magnesium 40 MG Oral Capsule Delayed Release; TAKE ONE    CAPSULE BY MOUTH EVERY DAY WITH BREAKFAST -AVERSA/MYERS; Therapy: 84MYF7146 to (Last Iwona June)  Requested for: 28Jun2016 Ordered   11  Eyelid Cleansers LIQD; USE AS DIRECTED  Use on wednesday, friday, and monday; Therapy: (Yasir Davalos) to Recorded   12  Flomax 0 4 MG Oral Capsule; take 1 capsule daily; Therapy: (Recorded:09Djv6488) to Recorded   13  Levothyroxine Sodium 25 MCG Oral Tablet; Take 1 tablet daily at 8AM (30 minutes    eating); Therapy: 92NNP1925 to (Iwona June)  Requested for: 86TTG5430; Last    Rx:18Ggg4288 Ordered   14  OLANZapine 5 MG Oral Tablet; TAKE 1 TABLET AT 8PM DAILY; Therapy: (Recorded:09Hnu3601) to Recorded   15   Polyethylene Glycol 3350 Oral Powder; TAKE 17 GM Once At Bedtime mix in 4 oz of liquid at 8 pm;    Therapy: 62Pda4561 to (Indigo Dessert)  Requested for: 22XQE3648; Last    Rx:81Ane4771 Ordered   16  PredniSONE 10 MG Oral Tablet; TAKE 1 TABLET DAILY; Therapy: (Recorded:54Gbv0689) to Recorded   17  Reclast 5 MG/100ML Intravenous Solution; USE AS DIRECTED  Once yearly; Therapy: (Recorded:05Tkg3839) to Recorded   18  Vitamin D 1000 UNIT Oral Tablet; TAKE ONE TABLET BY MOUTH TWO TIMES A DAY(    BREAKFAST & AT 8PM) -AVERSA; Therapy: 83Twz0905 to (Last Rx:08Dcb4430)  Requested for: 89Vjc1011 Ordered    Allergies    1  Advil TABS    2  Gluten    Vitals   Recorded: 21XXN1296 72:66SN   Systolic 002   Diastolic 64   Heart Rate 68   Pulse Quality Normal   Respiration Quality Normal   Respiration 16   Temperature 98 1 F   Height 4 ft 11 in   Weight 144 lb    BMI Calculated 29 08   BSA Calculated 1 6     Physical Exam    Constitutional   General appearance: No acute distress, well appearing and well nourished  Head and Face   Head and face: Normal     Eyes   Conjunctiva and lids: No erythema, swelling or discharge  Pupils and irises: Equal, round, reactive to light  Ears, Nose, Mouth, and Throat   External inspection of ears and nose: Normal     Otoscopic examination: Tympanic membranes translucent with normal light reflex  Canals patent without erythema  Nasal mucosa, septum, and turbinates: Normal without edema or erythema  Lips, teeth, and gums: Normal, good dentition  Oropharynx: Normal with no erythema, edema, exudate or lesions  Neck   Neck: Supple, symmetric, trachea midline, no masses  Thyroid: Normal, no thyromegaly  Pulmonary   Respiratory effort: No increased work of breathing or signs of respiratory distress  Auscultation of lungs: Clear to auscultation  Cardiovascular   Auscultation of heart: Normal rate and rhythm, normal S1 and S2, no murmurs  Carotid pulses: 2+ bilaterally      Abdominal aorta: Normal     Peripheral vascular exam: Normal  Examination of extremities for edema and/or varicosities: Normal     Abdomen   Abdomen: Non-tender, no masses  Lymphatic   Palpation of lymph nodes in neck: No lymphadenopathy  Musculoskeletal   Gait and station: Normal   mild swelling of left dorsal aspect of hand  pain with palpation 1st and 2nd MCP  Range of motion: Normal     Stability: Normal     Muscle strength/tone: Normal     Neurologic   Cranial nerves: Cranial nerves 2-12 intact  Sensation: No sensory loss  Psychiatric   Judgment and insight: Normal     Orientation to person, place and time: Normal     Recent and remote memory: Intact  Mood and affect: Normal        Health Management  History of Screening for colorectal cancer   COLONOSCOPY; every 5 years; Last 79PFG0411; Next Due: 15PKN5841; Overdue    Future Appointments    Date/Time Provider Specialty Site   11/29/2016 03:30 PM Gracie Hunter DPM Podiatry [facility]   03/06/2017 10:50 AM SAMUEL Reynaga   Endocrinology [address] [facility] ENDOCRINOLOGY     Signatures   Electronically signed by : Berta Warner DO; Sep 28 2016 10:20AM EST                       (Author)

## 2018-01-15 NOTE — RESULT NOTES
Message   please call pt's caregiver  urine consistent with UTI  recommend treatment with abx and repeat ua 2 weeks after treatment  please verify pharmacy   rl      Verified Results  (1) URINALYSIS (will reflex a microscopy if leukocytes, occult blood, protein or nitrites are not within normal limits) 27Jan2017 01:30PM [facility]     Test Name Result Flag Reference   BACTERIA Moderate /hpf A None Seen, Occasional   EPITHELIAL CELLS None Seen /hpf  None Seen, Occasional   OTHER OBSERVATIONS      Renal Epithelial Cells Present   RBC UA 1-2 /hpf A None Seen   WBC UA Innumerable /hpf A None Seen     (1) URINALYSIS (will reflex a microscopy if leukocytes, occult blood, protein or nitrites are not within normal limits) 01QCG6003 01:30PM [facility]     Test Name Result Flag Reference   COLOR Light Yellow     CLARITY Slightly Cloudy     SPECIFIC GRAVITY UA 1 010  1 000-1 030   PH UA 7 0  5 0-9 0   LEUKOCYTE ESTERASE UA Moderate A Negative   NITRITE UA Negative  Negative   PROTEIN UA Negative mg/dl  Negative   GLUCOSE UA Negative mg/dl  Negative   KETONES UA Negative mg/dl  Negative   UROBILINOGEN UA 0 2 E U /dl  0 2, 1 0 E U /dl   BILIRUBIN UA Negative  Negative   BLOOD UA Trace-Intact A Negative     (1) CBC/PLT/DIFF 27Jan2017 07:39AM [facility]     Test Name Result Flag Reference   WBC COUNT 4 80 Thousand/uL  4 80-10 80   RBC COUNT 4 44 Million/uL L 4 70-6 10   HEMOGLOBIN 14 9 g/dL  14 0-18 0   HEMATOCRIT 45 0 %  42 0-52 0    fL H 82-98   MCH 33 6 pg H 27 0-31 0   MCHC 33 2 g/dL  31 4-37 4   RDW 14 6 %  11 6-15 1   MPV 9 3 fL  8 9-12 7   PLATELET COUNT 933 Thousands/uL  130-400   nRBC AUTOMATED 0 /100 WBCs     NEUTROPHILS RELATIVE PERCENT 66 %  43-75   LYMPHOCYTES RELATIVE PERCENT 25 %  14-44   MONOCYTES RELATIVE PERCENT 8 %  4-12   EOSINOPHILS RELATIVE PERCENT 1 %  0-6   BASOPHILS RELATIVE PERCENT 1 %  0-1   NEUTROPHILS ABSOLUTE COUNT 3 10 Thousands/?L  1 85-7 62   LYMPHOCYTES ABSOLUTE COUNT 1 20 Thousands/?L  0 60-4 47   MONOCYTES ABSOLUTE COUNT 0 40 Thousand/?L  0 17-1 22   EOSINOPHILS ABSOLUTE COUNT 0 00 Thousand/?L  0 00-0 61   BASOPHILS ABSOLUTE COUNT 0 00 Thousands/?L  0 00-0 10   - Patient Instructions: This bloodwork is non-fasting  Please drink two glasses of water morning of bloodwork  (1) COMPREHENSIVE METABOLIC PANEL 56IPO7287 00:84IV [facility]     Test Name Result Flag Reference   GLUCOSE,RANDM 76 mg/dL     If the patient is fasting, the ADA then defines impaired fasting glucose as > 100 mg/dL and diabetes as > or equal to 123 mg/dL  SODIUM 136 mmol/L  136-145   POTASSIUM 3 8 mmol/L  3 5-5 3   CHLORIDE 98 mmol/L L 100-108   CARBON DIOXIDE 31 mmol/L  21-32   ANION GAP (CALC) 7 mmol/L  4-13   BLOOD UREA NITROGEN 8 mg/dL  5-25   CREATININE 0 94 mg/dL  0 60-1 30   Standardized to IDMS reference method   CALCIUM 8 5 mg/dL  8 3-10 1   BILI, TOTAL 0 30 mg/dL  0 20-1 00   ALK PHOSPHATAS 55 U/L     ALT (SGPT) 17 U/L  12-78   AST(SGOT) 17 U/L  5-45   ALBUMIN 2 8 g/dL L 3 5-5 0   TOTAL PROTEIN 7 2 g/dL  6 4-8 2   eGFR Non-African American      >60 0 ml/min/1 73sq m   - Patient Instructions: This is a fasting blood test  Water, black tea or black coffee only after 9:00pm the night before test Drink 2 glasses of water the morning of test   National Kidney Disease Education Program recommendations are as follows:  GFR calculation is accurate only with a steady state creatinine  Chronic Kidney disease less than 60 ml/min/1 73 sq  meters  Kidney failure less than 15 ml/min/1 73 sq  meters  (1) LIPID PANEL FASTING W DIRECT LDL REFLEX [id number] 07:39AM [facility]     Test Name Result Flag Reference   CHOLESTEROL 173 mg/dL     LDL CHOLESTEROL CALCULATED 105 mg/dL H 0-100   - Patient Instructions:  This is a fasting blood test  Water, black tea or black coffee only after 9:00pm the night before test   Drink 2 glasses of water the morning of test     - Patient Instructions: This is a fasting blood test  Water, black tea or black coffee only after 9:00pm the night before test Drink 2 glasses of water the morning of test   Triglyceride:         Normal              <150 mg/dl       Borderline High    150-199 mg/dl       High               200-499 mg/dl       Very High          >499 mg/dl  Cholesterol:         Desirable        <200 mg/dl      Borderline High  200-239 mg/dl      High             >239 mg/dl  HDL Cholesterol:        High    >59 mg/dL      Low     <41 mg/dL  LDL Cholesterol:        Optimal          <100 mg/dl        Near Optimal     100-129 mg/dl        Above Optimal          Borderline High   130-159 mg/dl          High              160-189 mg/dl          Very High        >189 mg/dl  LDL CALCULATED:    This screening LDL is a calculated result  It does not have the accuracy of the Direct Measured LDL in the monitoring of patients with hyperlipidemia and/or statin therapy  Direct Measure LDL (CBD252) must be ordered separately in these patients  TRIGLYCERIDES 79 mg/dL  <=150   Specimen collection should occur prior to N-Acetylcysteine or Metamizole administration due to the potential for falsely depressed results  HDL,DIRECT 52 mg/dL  40-60   Specimen collection should occur prior to Metamizole administration due to the potential for falsely depressed results  (1) VITAMIN D 25-HYDROXY 65Ydh6917 07:39AM Francisco Pedraza     Test Name Result Flag Reference   VIT D 25-HYDROX 47 0 ng/mL  30 0-100 0   This assay is a certified procedure of the CDC Vitamin D Standardization Certification Program (VDSCP)     Deficiency <20ng/ml   Insufficiency 20-30ng/ml   Sufficient  ng/ml     *Patients undergoing fluorescein dye angiography may retain small amounts of fluorescein in the body for 48-72 hours post procedure  Samples containing fluorescein can produce falsely elevated Vitamin D values   If the patient had this procedure, a specimen should be resubmitted post fluorescein clearance

## 2018-01-16 NOTE — PROCEDURES
Procedures by Magdiel Gauthier MD  at 4/29/2016  1:14 PM      Author:  Magdiel Gauthier MD Service:  Cardiology Author Type:  Physician     Filed:  4/29/2016  1:24 PM Date of Service:  4/29/2016  1:14 PM Status:  Signed     :  Magdiel Gauthier MD (Physician)         Pre-procedure Diagnoses:       1  Pericarditis [I31 9]                Post-procedure Diagnoses:       1  Pericarditis [I31 9]                Procedures:       1  PERICARDIOCENTESIS [GYJ3889 (Custom)]                   Pericardiocentesis    Alexander Krish  [id number]  4/29/2016  BJ Davidson    Surgeon: Magdiel Gauthier MD        Procedure(s): Pericardiocentesis    Indications: Large pericardial effusion    Procedure Details  The risks, benefits, complications, treatment options, and expected outcomes were discussed with the patient and his family  The patient and/or family concurred with the proposed plan, giving  informed consent  Patient was prepped and draped in the usual strict sterile fashion  Patient was given adequate amount of local anesthesia   After the antibiotic was completely infused, the patient was given local anesthesia and subconscious sedation  was provided by anesthesia  A 7 cm needle was used to access pericardial cavity from subcostal approach  And a 5 Arabic sheath was placed  Under strict sterile conditions a pigtail catheter was placed in the pericardial cavity over the guidewire  370  cc serosanguineous but bloody effusion was drained  A stat bedside echo at that time showed significant decrease in pericardial effusion and patient's blood pressure improved from 85 to 100  Patient followed the procedure well  As no more effusion was  coming out pigtail catheter was removed over the guidewire and sheath was also removed  No complication patient tolerated procedure well    Complications:  None           Disposition: To his room   And various cultures and other Gram stain and body fluid analysis were sent Condition: Stable    Portions of the record may have been created with voice recognition software   Occasional wrong word or sound a like substitutions may have occurred due to the inherent  limitations of voice recognition software   Read the chart carefully and recognize, using context, where substitutions have occurred             Received for:Provider  EPIC   May  2 2016 12:56PM [facility] Standard Time

## 2018-01-16 NOTE — PROGRESS NOTES
Assessment    1  Multifocal pneumonia (486) (J18 9)   2  RSV (respiratory syncytial virus infection) (079 6) (B97 4)    Plan  Multifocal pneumonia    · * XR CHEST PA & LATERAL; Status:Active; Requested for:27Mar2017;    Perform:[facility]; [phone]; Ordered; For:Multifocal pneumonia; Ordered By:Ronnie Welsh;    Discussion/Summary  Discussion Summary:   Lo Powell is here today for follow up  He was admitted for sepsis and multifocal pneumonia  He had RSV infection and droplet precautions were observed  He had chest Xray done 2/28/17 and multifocal pneumonia was noted  Repeat Chest Xray will be done week of March 28, 2017  Lymphadenopathy was noted on chest Xray; this was likely reactive  He was treated with Zithromax and Rocephin and given levofloxacin at discharge  He completed all his medications  Lo Powell has macrocytic anemia; he will need outpatient follow up for the same  He is stable today  It was difficult for me to auscultate lungs as he has Down's syndrome but there was no obvious wheezing nor objective sx of respiratory distress  Counseling Documentation With Imm: The patient, patient's caretaker was counseled regarding  Goals and Barriers: The patient has the current Goals: Lo Powell will have repeat Chest Xray next week  Patient's Capacity to Self-Care: Patient is able to Self-Care  Active Problems    1  Acute UTI (599 0) (N39 0)   2  Adult BMI 29 0-29 9 kg/sq m (V85 25) (Z68 29)   3  Allergic rhinitis (477 9) (J30 9)   4  Anterior tibialis tendinitis of left lower extremity (726 72) (M76 812)   5  Arthralgia of left foot (719 47) (M25 572)   6  Behavioral Problems (V40 9)   7  Celiac disease (579 0) (K90 0)   8  Chronic constipation (564 00) (K59 09)   9  Cough (786 2) (R05)   10  Deformity of ankle and foot, acquired (736 70) (M21 969)   11  Degenerative arthritis of foot (715 97) (M19 079)   12  Depression (311) (F32 9)   13  Difficulty in walking (719 7) (R26 2)   14  Difficulty in walking involving ankle and foot joint (719 7) (R26 2)   15  Elevated WBCs (288 60) (D72 829)   16  Esophageal reflux (530 81) (K21 9)   17  Extensor tendinitis of foot (727 06) (M77 50)   18  Fatigue (780 79) (R53 83)   19  Fever (780 60) (R50 9)   20  Generalized abdominal pain (789 07) (R10 84)   21  Gout (274 9) (M10 9)   22  Hallux rigidus (735 2) (M20 20)   23  Headache (784 0) (R51)   24  Hyperlipidemia (272 4) (E78 5)   25  Hypogonadism, testicular (257 2) (E29 1)   26  Hypotension (458 9) (I95 9)   27  Hypothyroidism (244 9) (E03 9)   28  Idiopathic osteoporosis (733 02) (M81 8)   29  Idiopathic pericardial effusion (423 9) (I31 3)   30  Insomnia (780 52) (G47 00)   31  Metatarsalgia (726 70) (M77 40)   32  Mixed incontinence (788 33) (N39 46)   33  Muscle weakness (728 87) (M62 81)   34  Nausea and vomiting (787 01) (R11 2)   35  Need for influenza vaccination (V04 81) (Z23)   36  Need for vaccination with diphtheria, tetanus, pertussis (DTP), and Haemophilus    influenzae type B vaccine (V06 8) (Z23)   37  Onychomycosis (110 1) (B35 1)   38  Pain in both feet (729 5) (M79 671,M79 672)   39  Parkinson's disease (332 0) (G20)   36  Serositis (569 89) (K65 8)   41  Sinus tachycardia (427 89) (R00 0)   42  Tendinitis (726 90) (M77 9)   43  Tinea pedis (110 4) (B35 3)   44  Tremor (781 0) (R25 1)   45  Trisomy 21, Down syndrome (758 0) (Q90 9)   46  Urinary frequency (788 41) (R35 0)   47  Viral infection (079 99) (B34 9)   48  Vitamin D deficiency (268 9) (E55 9)   49  Xerosis cutis (706 8) (L85 3)    Chief Complaint  Chief Complaint Free Text Note Form: Patient presents for hospital follow up- Pneumonia  LB,CMA   Chief Complaint Chronic Condition [facility]: Patient is here today for follow up of chronic conditions described in HPI  History of Present Illness  HPI: Severo Castro is a 45year old male with recent hospitalization for sepsis and multifocal pneumonia   He was hospitalized from 2/28 -3/2/17  He had chest Xray on 2/28/17 that showed multifocal infiltrates bilaterally with lymphadenopathy  He was positive for RSV and droplet precautions were ordered  He is here for hospital follow up  Michelle Gzuman was seen in 5/16 for hospital follow up for viral induced pleural effusions  Review of Systems  Complete-Male Pulm:   Respiratory: no shortness of breath and no cough  ROS Reviewed:   ROS reviewed  Past Medical History    1  History of Abdominal pain, epigastric (789 06) (R10 13)   2  History of Abdominal pain, lower (789 09) (R10 30)   3  History of Abdominal pain, RUQ (789 01) (R10 11)   4  History of Abnormal weight gain (783 1) (R63 5)   5  History of Acute congestive heart failure, unspecified congestive heart failure type (428 0)   (I50 9)   6  History of Acute cystitis with hematuria (595 0) (N30 01)   7  History of Acute viral pericarditis (420 91) (I30 1)   8  History of Annual physical exam (V70 0) (Z00 00)   9  History of BMI 26 0-26 9,adult (V85 22) (Z68 26)   10  History of Callus (700) (L84)   11  History of Capsulitis (726 90) (M77 9)   12  History of Counseling For Supervised Exercise Program   13  History of Dry skin (701 1) (L85 3)   14  History of Encounter for tuberculin skin test (V74 1) (Z11 1)   15  History of Head Injury (959 01)   16  History of Head Injury (959 01)   17  History of abdominal pain (V13 89) (Z87 898)   18  History of diarrhea (V12 79) (Z87 898)   19  History of dizziness (V13 89) (Z87 898)   20  History of fatigue (V13 89) (Z87 898)   21  History of herpes simplex infection (V12 09) (Z86 19)   22  History of ingrown nail (V13 3) (Z87 2)   23  History of low back pain (V13 59) (Z87 39)   24  History of nausea and vomiting (V12 79) (Z87 898)   25  History of Parkinson's disease (V12 49) (Z86 69)   26  History of viral gastroenteritis (V12 09) (Z86 19)   27  History of Hyponatremia (276 1) (E87 1)   28  History of Hyponatremia (276 1) (E87 1)   29   History of Need for influenza vaccination (V04 81) (Z23)   30  History of Nonspecific abnormal results of function study of thyroid (794 5) (R94 6)   31  History of Paronychia (681 9)   32  History of Pleural effusion, bilateral (511 9) (J90)   33  History of Screening for colorectal cancer (V76 51) (Z12 11,Z12 12)   34  History of Screening for diabetes mellitus (V77 1) (Z13 1)   35  History of Screening for thyroid disorder (V77 0) (Z13 29)   36  History of Screening PSA (prostate specific antigen) (V76 44) (Z12 5)   37  History of Tuberculosis screening (V74 1) (Z11 1)   38  URI, acute (465 9) (J06 9)   39  History of Urinary Tract Infection (V13 02)    Surgical History    1  History of Complete Colonoscopy   2  History of Esophagogastroduodenoscopy With Biopsy  Surgical History Reviewed: The surgical history was reviewed and updated today  Family History  Mother    1  Family history unknown (V49 89) (Z78 9)  Father    2  Family history unknown (V49 89) (Z78 9)   3  Family history of Parkinson disease, symptomatic  Family History    4  Family history of No Significant Family History  Family History Reviewed: The family history was reviewed and updated today  Social History    · Denied: History of Alcohol Use (History)   · Caffeine use (V49 89) (F15 90)   · Never A Smoker   · No drug use  Social History Reviewed: The social history was reviewed and updated today  Current Meds   1  AndroGel Pump 20 25 MG/ACT (1 62%) Transdermal Gel; 2 pumps daily to each   shoulder as directed; Therapy: 53NLZ4277 to (Evaluate:34Xpc1709); Last Rx:31Qqz3464 Ordered   2  Atorvastatin Calcium 20 MG Oral Tablet; TAKE  1  TABLET Daily As Directed take at 8 pm;   Therapy: 58XQS5969 to (Evaluate:27Mar2017)  Requested for: 17MTU3083; Last   Rx:85Vfh0631 Ordered   3  Calcium Carbonate-Vitamin D 600-400 MG-UNIT Oral Tablet; Take 1 tablet twice daily   with breakfast and 8pm;   Therapy: 32Xbm7663 to (Evaluate:41Lvf7358);  Last Rx: 17VNX0299 Ordered   4  Centrum Oral Tablet; Take 1 tablet daily with breakfast; Last Rx:67Dzi0346 Ordered   5  Claritin 10 MG Oral Tablet; TAKE 1 TABLET DAILY AS NEEDED; Therapy: 00DWF5475 to (Evaluate:27Mar2017)  Requested for: 19KKO6079; Last   Rx:42Jqw1507 Ordered   6  Colace 100 MG Oral Capsule; TAKE 1 CAPSULE DAILY at breakfast;   Therapy: 30Pqj4046 to (Evaluate:23Sep2017); Last Rx:28Sep2016 Ordered   7  Eyelid Cleansers LIQD; USE AS DIRECTED  Use on wednesday, friday, and monday; Therapy: (Eulogio Muhammad) to Recorded   8  Ferrous Sulfate 325 (65 Fe) MG Oral Tablet; Therapy: (Bob Muniz) to Recorded   9  Levaquin 750 MG Oral Tablet; Therapy: (Recorded:03Mar2017) to Recorded   10  Levothyroxine Sodium 50 MCG Oral Tablet; Change to levothyroxine 50mcg  1 tablet    monday -friday  Two tablets Sat/Sun  Daily at 8AM (30 mins before     eating); Therapy: 87CEY7147 to (Evaluate:05Jun2017)  Requested for: 08FNO4037; Last    Rx:48Fyt8909 Ordered   11  OLANZapine 2 5 MG Oral Tablet; Therapy: (Recorded:38Moj7353) to Recorded   12  Polyethylene Glycol 3350 Oral Powder; TAKE 17 GRAMS IN 8OZ WATER BY MOUTH    EVERY DAY (8PM) -ISSA/YANNA; Therapy: 24Apr2014 to (Evaluate:29Jun2017)  Requested for: 92HFM3709; Last    Rx:01Mar2017 Ordered   13  Tamsulosin HCl - 0 4 MG Oral Capsule; take 1 capsule daily  Requested for: 43UAD5255;    Last Rx:01Nov2016 Ordered   14  Vitamin D 1000 UNIT Oral Tablet; TAKE ONE TABLET BY MOUTH TWO TIMES A DAY(    BREAKFAST & AT 8PM) -AVERSA; Therapy: 42INC5041 to (Evaluate:23Sep2017)  Requested for: 57DTV8875; Last    Rx:99Wuf6243 Ordered  Medication List Reviewed: The medication list was reviewed and updated today  Allergies    1   Advil TABS    2  Gluten    Vitals  Vital Signs    Recorded: 21Mar2017 03:58PM   Temperature 98 F   Heart Rate 88   Systolic 98   Diastolic 62   Height 4 ft 11 in   Weight 148 lb    BMI Calculated 29 89   BSA Calculated 1 62 O2 Saturation 99     Physical Exam    Constitutional   General appearance: No acute distress, well appearing and well nourished  Ears, Nose, Mouth, and Throat   Nasal mucosa, septum, and turbinates: Normal without edema or erythema  Lips, teeth, and gums: Normal, good dentition  Oropharynx: Normal with no erythema, edema, exudate or lesions  Mallampati Classification: 3  Neck   Neck: Supple, symmetric, trachea midline, no masses  Jugular veins: Normal     Pulmonary   Chest: Normal     Auscultation of lungs: Clear to auscultation, no rales, no crackles, no wheezing  Cardiovascular   Auscultation of heart: Normal rate and rhythm, normal S1 and S2, no murmurs  Examination of extremities for edema and/or varicosities: Normal     Abdomen   Abdomen: Soft, non-tender  Lymphatic   Palpation of lymph nodes in neck: No lymphadenopathy  Musculoskeletal   Gait and station: Normal     Digits and nails: Normal without clubbing or cyanosis  Neurologic   Mental Status: Normal  Not confused, no evidence of dementia, good comprehension, good concentration  Motor Exam: Gross motor function normal     Skin   Skin and subcutaneous tissue: Limited exam shows no rash  Psychiatric   Orientation to person, place and time: Normal     Mood and affect: Normal        Health Management  History of Screening for colorectal cancer   COLONOSCOPY; every 5 years; Last 37RZA1125; Next Due: 38EWF9594; Overdue    Future Appointments    Date/Time Provider Specialty Site   04/14/2017 03:45 PM Denice Goodwin DPM Podiatry [address] [facility]   04/10/2017 02:30 PM SAMUEL Villalba   Neurology NEUROLOGY [place]   09/06/2017 09:30 AM Jennifer Minor, [facility] Endocrinology [address] [facility] ENDOCRINOLOGY     Signatures   Electronically signed by : OLYA Oviedo; Mar 21 2017  4:53PM EST                       (Author)

## 2018-01-17 NOTE — RESULT NOTES
Message   Labs normal except for the thyroid labs-- need to increase Levothyroxine to 50mcg daily  Check TSH/Free T4 in 6 weeks  Please let group home caretaker know  They may need RX or written orders, i'm not sure  Verified Results  (1) TSH 03Oct2016 07:57AM Eufemia Kosovan     Test Name Result Flag Reference   TSH 7 384 uIU/mL H 0 358-3 740   Patients undergoing fluorescein dye angiography may retain small amounts of fluorescein in the body for 48-72 hours post procedure  Samples containing fluorescein can produce falsely depressed TSH values  If the patient had this procedure,a specimen should be resubmitted post fluorescein clearance  (1) COMPREHENSIVE METABOLIC PANEL 61IYX9130 55:64TB Eufemia Kosovan     Test Name Result Flag Reference   GLUCOSE,RANDM 78 mg/dL     If the patient is fasting, the ADA then defines impaired fasting glucose as > 100 mg/dL and diabetes as > or equal to 123 mg/dL  SODIUM 136 mmol/L  136-145   POTASSIUM 4 2 mmol/L  3 5-5 3   CHLORIDE 99 mmol/L L 100-108   CARBON DIOXIDE 32 mmol/L  21-32   ANION GAP (CALC) 5 mmol/L  4-13   BLOOD UREA NITROGEN 9 mg/dL  5-25   CREATININE 0 77 mg/dL  0 60-1 30   Standardized to IDMS reference method   CALCIUM 8 2 mg/dL L 8 3-10 1   BILI, TOTAL 0 20 mg/dL  0 20-1 00   ALK PHOSPHATAS 55 U/L     ALT (SGPT) 28 U/L  12-78   AST(SGOT) 31 U/L  5-45   ALBUMIN 2 8 g/dL L 3 5-5 0   TOTAL PROTEIN 6 9 g/dL  6 4-8 2   eGFR Non-African American      >60 0 ml/min/1 [facility] [employer] Disease Education Program recommendations are as follows:  GFR calculation is accurate only with a steady state creatinine  Chronic Kidney disease less than 60 ml/min/1 73 sq  meters  Kidney failure less than 15 ml/min/1 73 sq  meters       (1) T4, FREE 03Oct2016 07:57AM Eufemia Kosovan     Test Name Result Flag Reference   T4,FREE 0 78 ng/dL  0 76-1 46     (1) VITAMIN D 25-HYDROXY 03Oct2016 07:57AM Eufemia Kosovan     Test Name Result Flag Reference   VIT D 25-HYDROX 48 5 ng/mL  30 0-100 0   This assay is a certified procedure of the CDC Vitamin D Standardization Certification Program (VDSCP)     Deficiency <20ng/ml   Insufficiency 20-30ng/ml   Sufficient  ng/ml     *Patients undergoing fluorescein dye angiography may retain small amounts of fluorescein in the body for 48-72 hours post procedure  Samples containing fluorescein can produce falsely elevated Vitamin D values  If the patient had this procedure, a specimen should be resubmitted post fluorescein clearance  (1) PSA, DIAGNOSTIC (FOLLOW-UP) 03Oct2016 07:57AM Emmanuel Abed     Test Name Result Flag Reference   PSA <0 1 ng/mL  0 0-4 0     (1) TESTOSTERONE, FREE (DIRECT) AND TOTAL 03Oct2016 07:57AM Emmanuel Abed     Test Name Result Flag Reference   FREE TESTOSTERONE, DIRECT 10 8 pg/mL  7 2 - 24 0   COMMENT Comment     Adult male reference interval is based on a population of lean males  up to 36years old     TESTOSTERONE (TOTAL) 839 ng/dL  348 - 1197   Performed at:  [address] [place] [address]  [id number]  : Giles Macias MD, Phone:  [phone]

## 2018-01-17 NOTE — RESULT NOTES
Discussion/Summary   Normal thyroid testing  Verified Results  (1) TSH 69Dhy6081 07:43AM Mirtha Lu     Test Name Result Flag Reference   TSH 2 360 uIU/mL  0 358-3 740   This is a patient instruction: This test is non-fasting  Please drink two glasses of water morning of bloodwork  Patients undergoing fluorescein dye angiography may retain small amounts of fluorescein in the body for 48-72 hours post procedure  Samples containing fluorescein can produce falsely depressed TSH values  If the patient had this procedure,a specimen should be resubmitted post fluorescein clearance  (1) T4, FREE 57Vep7343 07:43AM Mirtha Lu     Test Name Result Flag Reference   T4,FREE 1 04 ng/dL  0 76-1 46   Specimen collection should occur prior to Sulfasalazine administration due to the potential for falsely elevated results

## 2018-01-17 NOTE — MISCELLANEOUS
History of Present Illness  TCM Communication [facility]: The patient is being contacted for follow-up after hospitalization and 3/3/17 JAMES Barahona LPN  He was hospitalized at [place] [address]  The date of admission: 2/28/17, date of discharge: 3/2/17  Diagnosis: Pneumonia  He was discharged [facility]  Medications reviewed and updated today  He scheduled a follow up appointment  The patient is currently asymptomatic  Counseling was provided to patient's caretaker  Communication performed and completed by JAMES Barahona LPN [birth year]      Active Problems    1  Acute UTI (599 0) (N39 0)   2  Adult BMI 29 0-29 9 kg/sq m (V85 25) (Z68 29)   3  Allergic rhinitis (477 9) (J30 9)   4  Anterior tibialis tendinitis of left lower extremity (726 72) (M76 812)   5  Arthralgia of left foot (719 47) (M25 572)   6  Behavioral Problems (V40 9)   7  Celiac disease (579 0) (K90 0)   8  Chronic constipation (564 00) (K59 09)   9  Cough (786 2) (R05)   10  Deformity of ankle and foot, acquired (736 70) (M21 969)   11  Degenerative arthritis of foot (715 97) (M19 079)   12  Depression (311) (F32 9)   13  Difficulty in walking (719 7) (R26 2)   14  Difficulty in walking involving ankle and foot joint (719 7) (R26 2)   15  Elevated WBCs (288 60) (D72 829)   16  Esophageal reflux (530 81) (K21 9)   17  Extensor tendinitis of foot (727 06) (M77 50)   18  Fatigue (780 79) (R53 83)   19  Fever (780 60) (R50 9)   20  Generalized abdominal pain (789 07) (R10 84)   21  Gout (274 9) (M10 9)   22  Hallux rigidus (735 2) (M20 20)   23  Headache (784 0) (R51)   24  Hyperlipidemia (272 4) (E78 5)   25  Hypogonadism, testicular (257 2) (E29 1)   26  Hypotension (458 9) (I95 9)   27  Hypothyroidism (244 9) (E03 9)   28  Idiopathic osteoporosis (733 02) (M81 8)   29  Idiopathic pericardial effusion (423 9) (I31 3)   30  Insomnia (780 52) (G47 00)   31  Metatarsalgia (726 70) (M77 40)   32  Mixed incontinence (788 33) (N39 46)   33   Muscle weakness (728 87) (M62 81)   34  Nausea and vomiting (787 01) (R11 2)   35  Need for influenza vaccination (V04 81) (Z23)   36  Need for vaccination with diphtheria, tetanus, pertussis (DTP), and Haemophilus    influenzae type B vaccine (V06 8) (Z23)   37  Onychomycosis (110 1) (B35 1)   38  Pain in both feet (729 5) (M79 671,M79 672)   39  Parkinson's disease (332 0) (G20)   36  Serositis (569 89) (K65 8)   41  Sinus tachycardia (427 89) (R00 0)   42  Tendinitis (726 90) (M77 9)   43  Tinea pedis (110 4) (B35 3)   44  Tremor (781 0) (R25 1)   45  Trisomy 21, Down syndrome (758 0) (Q90 9)   46  Urinary frequency (788 41) (R35 0)   47  Viral infection (079 99) (B34 9)   48  Vitamin D deficiency (268 9) (E55 9)   49  Xerosis cutis (706 8) (L85 3)    Past Medical History    1  History of Abdominal pain, epigastric (789 06) (R10 13)   2  History of Abdominal pain, lower (789 09) (R10 30)   3  History of Abdominal pain, RUQ (789 01) (R10 11)   4  History of Abnormal weight gain (783 1) (R63 5)   5  History of Acute congestive heart failure, unspecified congestive heart failure type (428 0)   (I50 9)   6  History of Acute cystitis with hematuria (595 0) (N30 01)   7  History of Acute viral pericarditis (420 91) (I30 1)   8  History of Annual physical exam (V70 0) (Z00 00)   9  History of BMI 26 0-26 9,adult (V85 22) (Z68 26)   10  History of Callus (700) (L84)   11  History of Capsulitis (726 90) (M77 9)   12  History of Counseling For Supervised Exercise Program   13  History of Dry skin (701 1) (L85 3)   14  History of Encounter for tuberculin skin test (V74 1) (Z11 1)   15  History of Head Injury (959 01)   16  History of Head Injury (959 01)   17  History of abdominal pain (V13 89) (Z87 898)   18  History of diarrhea (V12 79) (Z87 898)   19  History of dizziness (V13 89) (Z87 898)   20  History of fatigue (V13 89) (Z87 898)   21  History of herpes simplex infection (V12 09) (Z86 19)   22   History of ingrown nail (V13 3) (Z87 2)   23  History of low back pain (V13 59) (Z87 39)   24  History of nausea and vomiting (V12 79) (Z87 898)   25  History of Parkinson's disease (V12 49) (Z86 69)   26  History of viral gastroenteritis (V12 09) (Z86 19)   27  History of Hyponatremia (276 1) (E87 1)   28  History of Hyponatremia (276 1) (E87 1)   29  History of Need for influenza vaccination (V04 81) (Z23)   30  History of Nonspecific abnormal results of function study of thyroid (794 5) (R94 6)   31  History of Paronychia (681 9)   32  History of Pleural effusion, bilateral (511 9) (J90)   33  History of Screening for colorectal cancer (V76 51) (Z12 11,Z12 12)   34  History of Screening for diabetes mellitus (V77 1) (Z13 1)   35  History of Screening for thyroid disorder (V77 0) (Z13 29)   36  History of Screening PSA (prostate specific antigen) (V76 44) (Z12 5)   37  History of Tuberculosis screening (V74 1) (Z11 1)   38  URI, acute (465 9) (J06 9)   39  History of Urinary Tract Infection (V13 02)    Surgical History    1  History of Complete Colonoscopy   2  History of Esophagogastroduodenoscopy With Biopsy    Family History  Mother    1  Family history unknown (V49 89) (Z78 9)  Father    2  Family history unknown (V49 89) (Z78 9)   3  Family history of Parkinson disease, symptomatic  Family History    4  Family history of No Significant Family History    Social History    · Denied: History of Alcohol Use (History)   · Caffeine use (V49 89) (F15 90)   · Never A Smoker   · No drug use    Current Meds   1  AndroGel Pump 20 25 MG/ACT (1 62%) Transdermal Gel; 2 pumps daily to each   shoulder as directed; Therapy: 60BGB2295 to (Evaluate:93Ewp1813); Last Rx:14Rvc6066 Ordered   2  Ativan 1 MG Oral Tablet; TAKE 1 TABLET DAILY AS NEEDED; Therapy: (Recorded:18Apr2016) to Recorded   3   Atorvastatin Calcium 20 MG Oral Tablet; TAKE  1  TABLET Daily As Directed take at 8 pm;   Therapy: 01YTN3996 to (Evaluate:27Mar2017)  Requested for: 88FJQ6412; Last Rx: 83DUJ2438 Ordered   4  Calcium Carbonate-Vitamin D 600-400 MG-UNIT Oral Tablet; Take 1 tablet twice daily   with breakfast and 8pm;   Therapy: 81Wje5553 to (Evaluate:23Sep2017); Last Rx:30Rqk5265 Ordered   5  Centrum Oral Tablet; Take 1 tablet daily with breakfast; Last Rx:12Gkn6603 Ordered   6  Cetaphil External Liquid; Therapy: (Recorded:06Oct2015) to Recorded   7  Claritin 10 MG Oral Tablet; TAKE 1 TABLET DAILY AS NEEDED; Therapy: 85ZPS2534 to (Evaluate:27Mar2017)  Requested for: 21NYE5640; Last   Rx:71Obx3866 Ordered   8  Colace 100 MG Oral Capsule; TAKE 1 CAPSULE DAILY at breakfast;   Therapy: 38Pil7787 to (Evaluate:23Sep2017); Last Rx:28Sep2016 Ordered   9  Colcrys 0 6 MG Oral Tablet; TAKE 1 TABLET TWICE DAILY AS DIRECTED; Last   Rx:96Irr9097 Ordered   10  Esomeprazole Magnesium 40 MG Oral Capsule Delayed Release; TAKE ONE CAPSULE    BY MOUTH EVERY DAY WITH BREAKFAST -AVERSA/MYERS; Therapy: 58EVB6147 to (Evaluate:23Sep2017)  Requested for: 49QTM6622; Last    Rx:91For6639 Ordered   11  Eyelid Cleansers LIQD; USE AS DIRECTED  Use on wednesday, friday, and monday; Therapy: (Alver Mood) to Recorded   12  Ferrous Sulfate 325 (65 Fe) MG Oral Tablet; Therapy: (Harlan Pew) to Recorded   13  Levaquin 750 MG Oral Tablet; Therapy: (Harlan Pew) to Recorded   14  Levothyroxine Sodium 50 MCG Oral Tablet; Change to levothyroxine 50mcg  1 tablet    monday -friday  Two tablets Sat/Sun  Daily at 8AM (30 mins before     eating); Therapy: 95BPE6639 to (Evaluate:05Jun2017)  Requested for: 74BGR9115; Last    Rx:29Ceh0859 Ordered   15  OLANZapine 2 5 MG Oral Tablet; Therapy: (Recorded:84Ymn9194) to Recorded   16  Polyethylene Glycol 3350 Oral Powder; TAKE 17 GRAMS IN 8OZ WATER BY MOUTH    EVERY DAY (8PM) -AVERSA/YANNA; Therapy: 61Cta5210 to (Evaluate:29Jun2017)  Requested for: 61BVL8229; Last    Rx:01Mar2017 Ordered   17  Reclast 5 MG/100ML Intravenous Solution; USE AS DIRECTED  Once yearly; Therapy: (Recorded:73Xue5507) to Recorded   18  Sulfamethoxazole-Trimethoprim 800-160 MG Oral Tablet; take 1 tablet every twelve    hours; Therapy: 27MPN1455 to (Evaluate:61Yej2440); Last Rx:31Jan2017 Ordered   19  Tamsulosin HCl - 0 4 MG Oral Capsule; take 1 capsule daily  Requested for: 49LZL2804;    Last Rx:01Nov2016 Ordered   20  Vitamin D 1000 UNIT Oral Tablet; TAKE ONE TABLET BY MOUTH TWO TIMES A DAY(    BREAKFAST & AT 8PM) -AVERSA; Therapy: 35Ata7081 to (Evaluate:50Nza8547)  Requested for: 33LVU6207; Last    Rx:93Bbh1802 Ordered    Allergies    1  Advil TABS    2  Gluten    Health Management  History of Screening for colorectal cancer   COLONOSCOPY; every 5 years; Last 11QDO2798; Next Due: 47ADZ0059; Overdue    Future Appointments    Date/Time Provider Specialty Site   04/14/2017 03:45 PM Elidia Erickson DPM Podiatry [facility]   03/06/2017 10:50 AM SAMUEL Wells  Endocrinology [facility]CR[facility]   03/09/2017 09:45 AM Bi Loyola DO Family Medicine [facility] [place]   05/25/2017 03:30 PM Bi Loyola [address] [place] [address]   03/21/2017 04:00 PM OLYA Varela Pulmonary Medicine [facility] [place] [facility]   04/10/2017 02:30 PM SAMUEL Thurston   Neurology NEUROLOGY [facility]   Electronically signed by : Joann Covarrubias DO; Mar  3 2017 10:51AM EST                       (Author)

## 2018-01-22 VITALS
SYSTOLIC BLOOD PRESSURE: 100 MMHG | WEIGHT: 143.03 LBS | BODY MASS INDEX: 28.08 KG/M2 | DIASTOLIC BLOOD PRESSURE: 60 MMHG | HEIGHT: 60 IN | HEART RATE: 76 BPM

## 2018-01-22 VITALS
HEART RATE: 88 BPM | BODY MASS INDEX: 29.06 KG/M2 | TEMPERATURE: 98 F | DIASTOLIC BLOOD PRESSURE: 62 MMHG | HEIGHT: 60 IN | WEIGHT: 148 LBS | SYSTOLIC BLOOD PRESSURE: 98 MMHG | OXYGEN SATURATION: 99 %

## 2018-01-24 VITALS
DIASTOLIC BLOOD PRESSURE: 64 MMHG | HEART RATE: 82 BPM | BODY MASS INDEX: 27.61 KG/M2 | TEMPERATURE: 98.8 F | SYSTOLIC BLOOD PRESSURE: 120 MMHG | OXYGEN SATURATION: 99 % | RESPIRATION RATE: 16 BRPM | HEIGHT: 60 IN | WEIGHT: 140.6 LBS

## 2018-01-28 ENCOUNTER — HOSPITAL ENCOUNTER (EMERGENCY)
Facility: HOSPITAL | Age: 53
Discharge: HOME | End: 2018-01-28
Attending: EMERGENCY MEDICINE
Payer: MEDICARE

## 2018-01-28 ENCOUNTER — APPOINTMENT (EMERGENCY)
Dept: RADIOLOGY | Facility: HOSPITAL | Age: 53
End: 2018-01-28
Payer: MEDICARE

## 2018-01-28 VITALS
SYSTOLIC BLOOD PRESSURE: 121 MMHG | RESPIRATION RATE: 18 BRPM | HEART RATE: 76 BPM | DIASTOLIC BLOOD PRESSURE: 58 MMHG | OXYGEN SATURATION: 100 % | TEMPERATURE: 97.3 F

## 2018-01-28 DIAGNOSIS — R10.9 ABDOMINAL PAIN: ICD-10-CM

## 2018-01-28 DIAGNOSIS — R55 SYNCOPE: Primary | ICD-10-CM

## 2018-01-28 LAB
ALBUMIN SERPL BCP-MCNC: 3.5 G/DL (ref 3.5–5)
ALP SERPL-CCNC: 61 U/L (ref 46–116)
ALT SERPL W P-5'-P-CCNC: 24 U/L (ref 12–78)
ANION GAP SERPL CALCULATED.3IONS-SCNC: 2 MMOL/L (ref 4–13)
AST SERPL W P-5'-P-CCNC: 23 U/L (ref 5–45)
BASOPHILS # BLD AUTO: 0 THOUSANDS/ΜL (ref 0–0.1)
BASOPHILS NFR BLD AUTO: 0 % (ref 0–1)
BILIRUB SERPL-MCNC: 0.5 MG/DL (ref 0.2–1)
BUN SERPL-MCNC: 12 MG/DL (ref 5–25)
CALCIUM SERPL-MCNC: 9.1 MG/DL (ref 8.3–10.1)
CHLORIDE SERPL-SCNC: 96 MMOL/L (ref 100–108)
CO2 SERPL-SCNC: 36 MMOL/L (ref 21–32)
CREAT SERPL-MCNC: 0.92 MG/DL (ref 0.6–1.3)
EOSINOPHIL # BLD AUTO: 0 THOUSAND/ΜL (ref 0–0.61)
EOSINOPHIL NFR BLD AUTO: 0 % (ref 0–6)
ERYTHROCYTE [DISTWIDTH] IN BLOOD BY AUTOMATED COUNT: 14.2 % (ref 11.6–15.1)
GFR SERPL CREATININE-BSD FRML MDRD: 95 ML/MIN/1.73SQ M
GLUCOSE SERPL-MCNC: 102 MG/DL (ref 65–140)
HCT VFR BLD AUTO: 44.9 % (ref 42–52)
HGB BLD-MCNC: 15.1 G/DL (ref 14–18)
LIPASE SERPL-CCNC: 105 U/L (ref 73–393)
LYMPHOCYTES # BLD AUTO: 0.7 THOUSANDS/ΜL (ref 0.6–4.47)
LYMPHOCYTES NFR BLD AUTO: 12 % (ref 14–44)
MCH RBC QN AUTO: 34.6 PG (ref 27–31)
MCHC RBC AUTO-ENTMCNC: 33.7 G/DL (ref 31.4–37.4)
MCV RBC AUTO: 103 FL (ref 82–98)
MONOCYTES # BLD AUTO: 0.4 THOUSAND/ΜL (ref 0.17–1.22)
MONOCYTES NFR BLD AUTO: 6 % (ref 4–12)
NEUTROPHILS # BLD AUTO: 4.6 THOUSANDS/ΜL (ref 1.85–7.62)
NEUTS SEG NFR BLD AUTO: 81 % (ref 43–75)
NRBC BLD AUTO-RTO: 0 /100 WBCS
PLATELET # BLD AUTO: 227 THOUSANDS/UL (ref 130–400)
PMV BLD AUTO: 8.2 FL (ref 8.9–12.7)
POTASSIUM SERPL-SCNC: 4.2 MMOL/L (ref 3.5–5.3)
PROT SERPL-MCNC: 7.5 G/DL (ref 6.4–8.2)
RBC # BLD AUTO: 4.36 MILLION/UL (ref 4.7–6.1)
SODIUM SERPL-SCNC: 134 MMOL/L (ref 136–145)
TROPONIN I SERPL-MCNC: <0.02 NG/ML
WBC # BLD AUTO: 5.7 THOUSAND/UL (ref 4.8–10.8)

## 2018-01-28 PROCEDURE — 99284 EMERGENCY DEPT VISIT MOD MDM: CPT

## 2018-01-28 PROCEDURE — 93005 ELECTROCARDIOGRAM TRACING: CPT

## 2018-01-28 PROCEDURE — 84484 ASSAY OF TROPONIN QUANT: CPT | Performed by: EMERGENCY MEDICINE

## 2018-01-28 PROCEDURE — 74177 CT ABD & PELVIS W/CONTRAST: CPT

## 2018-01-28 PROCEDURE — 36415 COLL VENOUS BLD VENIPUNCTURE: CPT | Performed by: EMERGENCY MEDICINE

## 2018-01-28 PROCEDURE — 83690 ASSAY OF LIPASE: CPT | Performed by: EMERGENCY MEDICINE

## 2018-01-28 PROCEDURE — 96360 HYDRATION IV INFUSION INIT: CPT

## 2018-01-28 PROCEDURE — 80053 COMPREHEN METABOLIC PANEL: CPT | Performed by: EMERGENCY MEDICINE

## 2018-01-28 PROCEDURE — 96361 HYDRATE IV INFUSION ADD-ON: CPT

## 2018-01-28 PROCEDURE — 85025 COMPLETE CBC W/AUTO DIFF WBC: CPT | Performed by: EMERGENCY MEDICINE

## 2018-01-28 RX ORDER — DIVALPROEX SODIUM 500 MG/1
500 TABLET, DELAYED RELEASE ORAL
COMMUNITY
End: 2021-01-01 | Stop reason: SDUPTHER

## 2018-01-28 RX ADMIN — SODIUM CHLORIDE 1000 ML: 0.9 INJECTION, SOLUTION INTRAVENOUS at 11:57

## 2018-01-28 RX ADMIN — IOHEXOL 100 ML: 350 INJECTION, SOLUTION INTRAVENOUS at 13:25

## 2018-01-28 NOTE — ED PROVIDER NOTES
History  Chief Complaint   Patient presents with    Abdominal Pain     from group home, nonspecific complaints  EMS states patient has abdominal pain and a headache  Patient states he has abdominal pain and that he might have to go to the bathroom, but denies a headache  History provided by:  Patient, EMS personnel and caregiver  Abdominal Pain   Pain location:  LLQ  Pain quality: aching and fullness    Pain radiates to:  Does not radiate  Pain severity:  Moderate  Onset quality:  Gradual  Timing:  Constant  Progression:  Worsening  Chronicity:  New  Relieved by:  Nothing  Worsened by:  Nothing  Ineffective treatments:  None tried  Associated symptoms comment:  Patient was a [employer] today and per  Prior to Admission Medications   Prescriptions Last Dose Informant Patient Reported? Taking? Calcium Carb-Cholecalciferol (CALCIUM 600 + D) 600-200 MG-UNIT TABS  Other (Specify) Yes Yes   Sig: Take by mouth 2 (two) times a day     Levothyroxine Sodium 25 MCG CAPS  Other (Specify) Yes Yes   Sig: Take 100 mcg by mouth daily before breakfast     Multiple Vitamins-Minerals (CENTRUM PO)   Yes Yes   Sig: Take by mouth  OLANZapine (ZyPREXA) 5 mg tablet  Other (Specify) Yes Yes   Sig: Take 2 5 mg by mouth daily at bedtime     Vitamin D, Cholecalciferol, 1000 UNITS CAPS   Yes Yes   Sig: Take 1 capsule by mouth 2 (two) times a day     atorvastatin (LIPITOR) 20 mg tablet   Yes Yes   Sig: Take 20 mg by mouth daily  divalproex sodium (DEPAKOTE) 500 mg EC tablet   Yes Yes   Sig: Take 500 mg by mouth daily at bedtime   docusate sodium (COLACE) 100 mg capsule   Yes Yes   Sig: Take 100 mg by mouth daily     esomeprazole (NexIUM) 40 MG capsule   Yes Yes   Sig: Take 40 mg by mouth every morning before breakfast    polyethylene glycol (MIRALAX) 17 g packet   Yes Yes   Sig: Take 17 g by mouth daily  tamsulosin (FLOMAX) 0 4 mg   Yes Yes   Sig: Take 0 4 mg by mouth daily with dinner     testosterone (ANDROGEL) 1 62 %   Yes Yes   Sig: Apply 20 25 mg topically daily 2 pumps on each shoulder at breakfast time and let dry for 15 minutes       Facility-Administered Medications: None       Past Medical History:   Diagnosis Date    Anxiety     Dementia     Depression     Disease of thyroid gland     Down's syndrome     GERD (gastroesophageal reflux disease)     Gluten free diet     Hernia of abdominal cavity     Hyperlipidemia     Hypogonadism in male     Impulse control disorder     OCD (obsessive compulsive disorder)     Parkinson disease ([facility] [address] )        Past Surgical History:   Procedure Laterality Date    PERICARDIOCENTESIS  4/29/2016            History reviewed  No pertinent family history  I have reviewed and agree with the history as documented  Social History   Substance Use Topics    Smoking status: Never Smoker    Smokeless tobacco: Never Used    Alcohol use No        Review of Systems   Unable to perform ROS: Patient nonverbal   Gastrointestinal: Positive for abdominal pain  Physical Exam  ED Triage Vitals [01/28/18 1108]   Temperature Pulse Respirations Blood Pressure SpO2   (!) 97 3 °F (36 3 °C) 76 18 121/58 100 %      Temp Source Heart Rate Source Patient Position - Orthostatic VS BP Location FiO2 (%)   Tympanic Monitor Lying Left arm --      Pain Score       --           Orthostatic Vital Signs  Vitals:    01/28/18 1108   BP: 121/58   Pulse: 76   Patient Position - Orthostatic VS: Lying       Physical Exam   Constitutional: He is oriented to person, place, and time  He appears well-developed and well-nourished  HENT:   Head: Normocephalic and atraumatic  Eyes: EOM are normal  Pupils are equal, round, and reactive to light  Neck: Normal range of motion  Neck supple  Cardiovascular: Normal rate and regular rhythm  Exam reveals no friction rub  No murmur heard  Pulmonary/Chest: Breath sounds normal  No respiratory distress  He has no wheezes  He has no rales  Abdominal: Soft   Bowel sounds are normal  He exhibits no distension  There is tenderness in the left lower quadrant  Musculoskeletal: Normal range of motion  He exhibits no edema or tenderness  Neurological: He is alert and oriented to person, place, and time  Skin: Skin is warm  No rash noted  Psychiatric: He has a normal mood and affect  Nursing note and vitals reviewed  ED Medications  Medications   sodium chloride 0 9 % bolus 1,000 mL (1,000 mL Intravenous New Bag 1/28/18 1157)   iohexol (OMNIPAQUE) 350 MG/ML injection (MULTI-DOSE) 100 mL (100 mL Intravenous Given 1/28/18 1325)       Diagnostic Studies  Results Reviewed     Procedure Component Value Units Date/Time    Troponin I [[id number]]  (Normal) Collected:  01/28/18 1156    Lab Status:  Final result Specimen:  Blood from Arm, Left Updated:  01/28/18 1227     Troponin I <0 02 ng/mL     Narrative:         [employer] Chemistry analyzer 99% cutoff is > 0 04 ng/mL in network labs    o cTnI 99% cutoff is useful only when applied to patients in the clinical setting of myocardial ischemia  o cTnI 99% cutoff should be interpreted in the context of clinical history, ECG findings and possibly cardiac imaging to establish correct diagnosis  o cTnI 99% cutoff may be suggestive but clearly not indicative of a coronary event without the clinical setting of myocardial ischemia      CBC and differential [1964]  (Abnormal) Collected:  01/28/18 1156    Lab Status:  Final result Specimen:  Blood from Arm, Left Updated:  01/28/18 1225     WBC 5 70 Thousand/uL      RBC 4 36 (L) Million/uL      Hemoglobin 15 1 g/dL      Hematocrit 44 9 %       (H) fL      MCH 34 6 (H) pg      MCHC 33 7 g/dL      RDW 14 2 %      MPV 8 2 (L) fL      Platelets 220 Thousands/uL      nRBC 0 /100 WBCs      Neutrophils Relative 81 (H) %      Lymphocytes Relative 12 (L) %      Monocytes Relative 6 %      Eosinophils Relative 0 %      Basophils Relative 0 %      Neutrophils Absolute 4 60 Thousands/µL Lymphocytes Absolute 0 70 Thousands/µL      Monocytes Absolute 0 40 Thousand/µL      Eosinophils Absolute 0 00 Thousand/µL      Basophils Absolute 0 00 Thousands/µL     Comprehensive metabolic panel [[id number]]  (Abnormal) Collected:  01/28/18 1156    Lab Status:  Final result Specimen:  Blood from Arm, Left Updated:  01/28/18 1223     Sodium 134 (L) mmol/L      Potassium 4 2 mmol/L      Chloride 96 (L) mmol/L      CO2 36 (H) mmol/L      Anion Gap 2 (L) mmol/L      BUN 12 mg/dL      Creatinine 0 92 mg/dL      Glucose 102 mg/dL      Calcium 9 1 mg/dL      AST 23 U/L      ALT 24 U/L      Alkaline Phosphatase 61 U/L      Total Protein 7 5 g/dL      Albumin 3 5 g/dL      Total Bilirubin 0 50 mg/dL      eGFR 95 ml/min/1 73sq m     Narrative:         National Kidney Disease Education Program recommendations are as follows:  GFR calculation is accurate only with a steady state creatinine  Chronic Kidney disease less than 60 ml/min/1 73 sq  meters  Kidney failure less than 15 ml/min/1 73 sq  meters  Lipase [[id number]]  (Normal) Collected:  01/28/18 1156    Lab Status:  Final result Specimen:  Blood from Arm, Left Updated:  01/28/18 1223     Lipase 105 u/L                  CT abdomen pelvis with contrast   Final Result by Ehsan Schilling MD (01/28 1349)      1  Marked bladder distention with associated bilateral hydronephrosis and ureterectasis, unchanged from a CT dated 7/28/2017    2   Symmetric distended inguinal canals containing fluid or soft tissue, possibly superior extension of hydroceles or hypermobile testes in the inguinal canals  3   No evidence of acute abnormality in the abdomen or pelvis           Workstation performed: [id number]                    Procedures  Procedures       Phone Contacts  ED Phone Contact    ED Course  ED Course                                MDM  Number of Diagnoses or Management Options  Abdominal pain: new and requires workup  Syncope: new and requires workup  Diagnosis management comments: 76-year-old male with a history of Down syndrome presents emergency department left lower quadrant abdominal pain  At times headache as well he had a moment of unresponsiveness and syncope earlier at [employer] today  Did not strike his head did not have a seizure activity  Workup here in the emergency department unremarkable patient feels much improved at this point type  Plan outpatient management followup given strict instructions when to return back to the emergency department    Pt re-examined and evaluated after testing and treatment  Spoke with the patient and feeling improved and sxs have resolved  Will discharge home with close f/u with pcp and instructed to return to the ED if sxs worsen or continue  Pt agrees with the plan for discharge and feels comfortable to go home with proper f/u  Advised to return for worsening or additional problems  Diagnostic tests were reviewed and questions answered  Diagnosis, care plan and treatment options were discussed  The patient understand instructions and will follow up as directed  Amount and/or Complexity of Data Reviewed  Clinical lab tests: ordered and reviewed  Tests in the radiology section of CPT®: ordered and reviewed  Review and summarize past medical records: yes      CritCare Time    Disposition  Final diagnoses:   Syncope   Abdominal pain     Time reflects when diagnosis was documented in both MDM as applicable and the Disposition within this note     Time User Action Codes Description Comment    1/28/2018  2:07 PM Yoli Valera Add [R55] Syncope     1/28/2018  2:07 PM Yoli Valera Add [R10 9] Abdominal pain       ED Disposition     ED Disposition Condition Comment    Discharge  [place] discharge to home/self care      Condition at discharge: Stable        Follow-up Information     Follow up With Specialties Details Why Contact Guanako Devi, DO Family Medicine Call in 3 days If symptoms worsen [address] [facility] [address]  [phone]          Patient's Medications   Discharge Prescriptions    No medications on file     No discharge procedures on file      ED Provider  Electronically Signed by           Lorene Lynn DO  01/28/18 4535

## 2018-01-28 NOTE — DISCHARGE INSTRUCTIONS

## 2018-01-29 LAB
ATRIAL RATE: 74 BPM
P AXIS: 62 DEGREES
PR INTERVAL: 148 MS
QRS AXIS: -11 DEGREES
QRSD INTERVAL: 96 MS
QT INTERVAL: 412 MS
QTC INTERVAL: 457 MS
T WAVE AXIS: 34 DEGREES
VENTRICULAR RATE: 74 BPM

## 2018-01-29 PROCEDURE — 93010 ELECTROCARDIOGRAM REPORT: CPT | Performed by: INTERNAL MEDICINE

## 2018-01-31 PROBLEM — G20.A1 PARKINSON'S DISEASE: Status: ACTIVE | Noted: 2017-02-23

## 2018-01-31 PROBLEM — G20 PARKINSON'S DISEASE (HCC): Status: ACTIVE | Noted: 2017-02-23

## 2018-01-31 RX ORDER — DOCUSATE SODIUM 100 MG/1
CAPSULE, LIQUID FILLED ORAL
COMMUNITY
Start: 2014-09-18 | End: 2018-02-01

## 2018-01-31 RX ORDER — LORATADINE 10 MG/1
TABLET ORAL
COMMUNITY
Start: 2015-09-23 | End: 2018-10-04 | Stop reason: SDUPTHER

## 2018-01-31 RX ORDER — LEVOTHYROXINE SODIUM 0.05 MG/1
TABLET ORAL
COMMUNITY
Start: 2015-11-17 | End: 2018-03-14

## 2018-01-31 RX ORDER — ESOMEPRAZOLE MAGNESIUM 40 MG/1
CAPSULE, DELAYED RELEASE ORAL
COMMUNITY
Start: 2017-11-27 | End: 2018-02-01

## 2018-01-31 RX ORDER — POLYETHYLENE GLYCOL 3350
GRANULES (GRAM) MISCELLANEOUS
COMMUNITY
Start: 2014-04-24 | End: 2018-02-01

## 2018-02-01 ENCOUNTER — OFFICE VISIT (OUTPATIENT)
Dept: FAMILY MEDICINE CLINIC | Facility: CLINIC | Age: 53
End: 2018-02-01
Payer: MEDICARE

## 2018-02-01 VITALS
HEART RATE: 74 BPM | RESPIRATION RATE: 16 BRPM | BODY MASS INDEX: 27 KG/M2 | WEIGHT: 143 LBS | HEIGHT: 61 IN | SYSTOLIC BLOOD PRESSURE: 120 MMHG | DIASTOLIC BLOOD PRESSURE: 70 MMHG

## 2018-02-01 DIAGNOSIS — N13.30 HYDRONEPHROSIS, UNSPECIFIED HYDRONEPHROSIS TYPE: ICD-10-CM

## 2018-02-01 DIAGNOSIS — E29.1 TESTICULAR HYPOFUNCTION: ICD-10-CM

## 2018-02-01 DIAGNOSIS — N32.89 BLADDER DISTENTION: Primary | ICD-10-CM

## 2018-02-01 DIAGNOSIS — R10.9 ABDOMINAL PAIN, UNSPECIFIED ABDOMINAL LOCATION: ICD-10-CM

## 2018-02-01 DIAGNOSIS — E03.9 HYPOTHYROIDISM: ICD-10-CM

## 2018-02-01 PROBLEM — J18.9 MULTIFOCAL PNEUMONIA: Status: RESOLVED | Noted: 2017-02-28 | Resolved: 2018-02-01

## 2018-02-01 PROBLEM — A41.9 SEPSIS (HCC): Status: RESOLVED | Noted: 2017-02-28 | Resolved: 2018-02-01

## 2018-02-01 PROBLEM — B33.8 RSV INFECTION: Status: RESOLVED | Noted: 2017-03-01 | Resolved: 2018-02-01

## 2018-02-01 PROCEDURE — 99214 OFFICE O/P EST MOD 30 MIN: CPT | Performed by: FAMILY MEDICINE

## 2018-02-01 NOTE — PROGRESS NOTES
Chief Complaint   Patient presents with    Abdominal Pain     ER SL[facility] , patient feeling better     Follow-up        Patient ID: Susan Lombardo is a 47 y o  male  Patient seen and examined  Pt is here for recheck from [facility]  Here with caretaker  Pt was in [place] with his brother and had eyes closed  His brother tried to arouse him and had difficulty  Pt was given water and returned to baseline  His group home was called and told his brother to take him to the ER    At the ER he stated that his stomach hurt so CT was done  IMPRESSION:     1  Marked bladder distention with associated bilateral hydronephrosis and ureterectasis, unchanged from a CT dated 7/28/2017   2   Symmetric distended inguinal canals containing fluid or soft tissue, possibly superior extension of hydroceles or hypermobile testes in the inguinal canals  3   No evidence of acute abnormality in the abdomen or pelvis    IMPRESSION:     1  Marked bladder distention with associated bilateral hydronephrosis and ureterectasis, unchanged from a CT dated 7/28/2017   2   Symmetric distended inguinal canals containing fluid or soft tissue, possibly superior extension of hydroceles or hypermobile testes in the inguinal canals  3   No evidence of acute abnormality in the abdomen or pelvis  Pt was seeing urology but now unable to see current urologist due to insurance  Abdominal pain has resolved            Abdominal Pain   Pertinent negatives include no anorexia, arthralgias, belching, constipation, diarrhea, dysuria, fever, flatus, frequency, headaches, myalgias, nausea, vomiting or weight loss           The following portions of the patient's history were reviewed and updated as appropriate: allergies, current medications, past family history, past medical history, past social history, past surgical history and problem list     Review of Systems   Constitutional: Negative for activity change, appetite change, fatigue, fever and weight loss  Respiratory: Negative for cough and chest tightness  Cardiovascular: Negative for chest pain, palpitations and leg swelling  Gastrointestinal: Positive for abdominal pain  Negative for anorexia, constipation, diarrhea, flatus, nausea and vomiting  Genitourinary: Negative for difficulty urinating, dysuria and frequency  Musculoskeletal: Negative for arthralgias and myalgias  Neurological: Negative for dizziness, weakness and headaches  Hematological: Negative for adenopathy  Psychiatric/Behavioral: Negative for behavioral problems  The patient is not nervous/anxious  Current Outpatient Prescriptions   Medication Sig Dispense Refill    atorvastatin (LIPITOR) 20 mg tablet Take 20 mg by mouth daily   Calcium Carb-Cholecalciferol (CALCIUM 600 + D) 600-200 MG-UNIT TABS Take by mouth 2 (two) times a day        cholecalciferol (VITAMIN D3) 1,000 units tablet Take by mouth      divalproex sodium (DEPAKOTE) 500 mg EC tablet Take 500 mg by mouth daily at bedtime      docusate sodium (COLACE) 100 mg capsule Take 100 mg by mouth daily        esomeprazole (NexIUM) 40 MG capsule Take 40 mg by mouth every morning before breakfast       levothyroxine 50 mcg tablet Take by mouth      loratadine (CLARITIN) 10 mg tablet Take by mouth      Multiple Vitamins-Minerals (CENTRUM ADULTS PO) Take by mouth      OLANZapine (ZyPREXA) 5 mg tablet Take 2 5 mg by mouth daily at bedtime        polyethylene glycol (MIRALAX) 17 g packet Take 17 g by mouth daily   tamsulosin (FLOMAX) 0 4 mg Take 0 4 mg by mouth daily with dinner   testosterone (ANDROGEL) 1 62 % Apply 20 25 mg topically daily 2 pumps on each shoulder at breakfast time and let dry for 15 minutes        No current facility-administered medications for this visit        Facility-Administered Medications Ordered in Other Visits   Medication Dose Route Frequency Provider Last Rate Last Dose    midazolam (VERSED) injection Intravenous PRN Reyes Seitz, MD   2 mg at 04/29/16 1218    propofol (DIPRIVAN) 10 mg/mL bolus injection   Intravenous PRN Reyes Seitz, MD   20 mg at 04/29/16 1240    sodium chloride 0 9 % infusion    Continuous PRN Reyes Seitz, MD           Objective:    /70 (BP Location: Left arm, Patient Position: Sitting, Cuff Size: Adult)   Pulse 74   Resp 16   Ht 5' 1" (1 549 m)   Wt 64 9 kg (143 lb)   BMI 27 02 kg/m²        Physical Exam   Constitutional: He is oriented to person, place, and time  He appears well-developed and well-nourished  Eyes: Conjunctivae are normal    Neck: Normal range of motion  No thyromegaly present  Cardiovascular: Normal rate, regular rhythm, normal heart sounds and intact distal pulses  Pulmonary/Chest: Effort normal and breath sounds normal    Abdominal: Soft  Bowel sounds are normal  There is no tenderness  There is no guarding  Musculoskeletal: He exhibits no edema  Neurological: He is alert and oriented to person, place, and time  Psychiatric: He has a normal mood and affect  Labs in chart were reviewed  Assessment/Plan:        Diagnoses and all orders for this visit:    Bladder distention  Comments:  will find new urologist that takes patient's insurance    Hydronephrosis, unspecified hydronephrosis type  Comments:  again  follows with urology    Abdominal pain, unspecified abdominal location  Comments:  resolved  Other orders  -     Discontinue: Calcium Carbonate-Vitamin D 600-400 MG-UNIT per tablet; Take by mouth  -     Multiple Vitamins-Minerals (CENTRUM ADULTS PO); Take by mouth  -     loratadine (CLARITIN) 10 mg tablet; Take by mouth  -     Discontinue: Multiple Vitamins-Minerals (CEROVITE ADVANCED FORMULA PO); Take by mouth  -     Discontinue: esomeprazole (NexIUM) 40 MG capsule; Take by mouth  -     Discontinue: Polyethylene Glycol 3350 GRAN; Take by mouth  -     Discontinue: docusate sodium (COLACE) 100 mg capsule;  Take by mouth  -     levothyroxine 50 mcg tablet; Take by mouth  -     cholecalciferol (VITAMIN D3) 1,000 units tablet; Take by mouth                  Return if symptoms worsen or fail to improve            Tamela Casas DO

## 2018-02-03 ENCOUNTER — APPOINTMENT (OUTPATIENT)
Dept: LAB | Facility: HOSPITAL | Age: 53
End: 2018-02-03
Payer: MEDICARE

## 2018-02-03 ENCOUNTER — TRANSCRIBE ORDERS (OUTPATIENT)
Dept: ADMINISTRATIVE | Facility: HOSPITAL | Age: 53
End: 2018-02-03

## 2018-02-03 DIAGNOSIS — I51.9 MYXEDEMA HEART DISEASE: ICD-10-CM

## 2018-02-03 DIAGNOSIS — Z79.899 DRUG THERAPY: ICD-10-CM

## 2018-02-03 DIAGNOSIS — E29.1 MALE HYPOGONADISM: ICD-10-CM

## 2018-02-03 DIAGNOSIS — I51.9 MYXEDEMA HEART DISEASE: Primary | ICD-10-CM

## 2018-02-03 DIAGNOSIS — Z79.899 DRUG THERAPY: Primary | ICD-10-CM

## 2018-02-03 DIAGNOSIS — E03.9 MYXEDEMA HEART DISEASE: Primary | ICD-10-CM

## 2018-02-03 DIAGNOSIS — E03.9 MYXEDEMA HEART DISEASE: ICD-10-CM

## 2018-02-03 LAB
ALBUMIN SERPL BCP-MCNC: 3.6 G/DL (ref 3.5–5)
ALP SERPL-CCNC: 63 U/L (ref 46–116)
ALT SERPL W P-5'-P-CCNC: 27 U/L (ref 12–78)
ANION GAP SERPL CALCULATED.3IONS-SCNC: 1 MMOL/L (ref 4–13)
AST SERPL W P-5'-P-CCNC: 23 U/L (ref 5–45)
BASOPHILS # BLD AUTO: 0.1 THOUSANDS/ΜL (ref 0–0.1)
BASOPHILS NFR BLD AUTO: 2 % (ref 0–1)
BILIRUB SERPL-MCNC: 0.6 MG/DL (ref 0.2–1)
BUN SERPL-MCNC: 8 MG/DL (ref 5–25)
CALCIUM SERPL-MCNC: 9.5 MG/DL (ref 8.3–10.1)
CHLORIDE SERPL-SCNC: 100 MMOL/L (ref 100–108)
CO2 SERPL-SCNC: 37 MMOL/L (ref 21–32)
CREAT SERPL-MCNC: 0.86 MG/DL (ref 0.6–1.3)
EOSINOPHIL # BLD AUTO: 0 THOUSAND/ΜL (ref 0–0.61)
EOSINOPHIL NFR BLD AUTO: 1 % (ref 0–6)
ERYTHROCYTE [DISTWIDTH] IN BLOOD BY AUTOMATED COUNT: 13.6 % (ref 11.6–15.1)
GFR SERPL CREATININE-BSD FRML MDRD: 99 ML/MIN/1.73SQ M
GLUCOSE P FAST SERPL-MCNC: 87 MG/DL (ref 65–99)
HCT VFR BLD AUTO: 47.6 % (ref 42–52)
HGB BLD-MCNC: 15.8 G/DL (ref 14–18)
LYMPHOCYTES # BLD AUTO: 0.9 THOUSANDS/ΜL (ref 0.6–4.47)
LYMPHOCYTES NFR BLD AUTO: 34 % (ref 14–44)
MCH RBC QN AUTO: 33.4 PG (ref 27–31)
MCHC RBC AUTO-ENTMCNC: 33.1 G/DL (ref 31.4–37.4)
MCV RBC AUTO: 101 FL (ref 82–98)
MONOCYTES # BLD AUTO: 0.3 THOUSAND/ΜL (ref 0.17–1.22)
MONOCYTES NFR BLD AUTO: 10 % (ref 4–12)
NEUTROPHILS # BLD AUTO: 1.4 THOUSANDS/ΜL (ref 1.85–7.62)
NEUTS SEG NFR BLD AUTO: 53 % (ref 43–75)
PLATELET # BLD AUTO: 292 THOUSANDS/UL (ref 130–400)
PMV BLD AUTO: 8.5 FL (ref 8.9–12.7)
POTASSIUM SERPL-SCNC: 4 MMOL/L (ref 3.5–5.3)
PROT SERPL-MCNC: 7.5 G/DL (ref 6.4–8.2)
RBC # BLD AUTO: 4.72 MILLION/UL (ref 4.7–6.1)
SODIUM SERPL-SCNC: 138 MMOL/L (ref 136–145)
T4 FREE SERPL-MCNC: 0.86 NG/DL (ref 0.76–1.46)
TSH SERPL DL<=0.05 MIU/L-ACNC: 4.02 UIU/ML (ref 0.36–3.74)
VALPROATE SERPL-MCNC: 42 UG/ML (ref 50–100)
WBC # BLD AUTO: 2.7 THOUSAND/UL (ref 4.8–10.8)

## 2018-02-03 PROCEDURE — 84403 ASSAY OF TOTAL TESTOSTERONE: CPT

## 2018-02-03 PROCEDURE — 80053 COMPREHEN METABOLIC PANEL: CPT

## 2018-02-03 PROCEDURE — 84443 ASSAY THYROID STIM HORMONE: CPT

## 2018-02-03 PROCEDURE — 84402 ASSAY OF FREE TESTOSTERONE: CPT

## 2018-02-03 PROCEDURE — 36415 COLL VENOUS BLD VENIPUNCTURE: CPT

## 2018-02-03 PROCEDURE — 80164 ASSAY DIPROPYLACETIC ACD TOT: CPT

## 2018-02-03 PROCEDURE — 84439 ASSAY OF FREE THYROXINE: CPT

## 2018-02-03 PROCEDURE — 85025 COMPLETE CBC W/AUTO DIFF WBC: CPT

## 2018-02-06 LAB
TESTOST FREE SERPL-MCNC: 4.3 PG/ML (ref 7.2–24)
TESTOST SERPL-MCNC: 430 NG/DL (ref 264–916)

## 2018-02-08 ENCOUNTER — HOSPITAL ENCOUNTER (OUTPATIENT)
Dept: RADIOLOGY | Facility: HOSPITAL | Age: 53
Discharge: HOME | End: 2018-02-08
Payer: MEDICARE

## 2018-02-08 ENCOUNTER — OFFICE VISIT (OUTPATIENT)
Dept: CARDIOLOGY CLINIC | Facility: CLINIC | Age: 53
End: 2018-02-08
Payer: MEDICARE

## 2018-02-08 VITALS
BODY MASS INDEX: 28.16 KG/M2 | SYSTOLIC BLOOD PRESSURE: 118 MMHG | HEIGHT: 60 IN | DIASTOLIC BLOOD PRESSURE: 80 MMHG | WEIGHT: 143.4 LBS | HEART RATE: 61 BPM | OXYGEN SATURATION: 99 %

## 2018-02-08 DIAGNOSIS — E03.4 HYPOTHYROIDISM DUE TO ACQUIRED ATROPHY OF THYROID: ICD-10-CM

## 2018-02-08 DIAGNOSIS — Q90.9 DOWN'S SYNDROME: ICD-10-CM

## 2018-02-08 DIAGNOSIS — M81.8 OTHER OSTEOPOROSIS WITHOUT CURRENT PATHOLOGICAL FRACTURE: ICD-10-CM

## 2018-02-08 DIAGNOSIS — E78.5 HYPERLIPIDEMIA, UNSPECIFIED HYPERLIPIDEMIA TYPE: Primary | ICD-10-CM

## 2018-02-08 DIAGNOSIS — K21.9 GASTROESOPHAGEAL REFLUX DISEASE WITHOUT ESOPHAGITIS: ICD-10-CM

## 2018-02-08 DIAGNOSIS — I10 ESSENTIAL HYPERTENSION: ICD-10-CM

## 2018-02-08 DIAGNOSIS — I31.3 IDIOPATHIC PERICARDIAL EFFUSION: ICD-10-CM

## 2018-02-08 DIAGNOSIS — R01.1 CARDIAC MURMUR: ICD-10-CM

## 2018-02-08 PROBLEM — I31.39 IDIOPATHIC PERICARDIAL EFFUSION: Status: ACTIVE | Noted: 2018-02-08

## 2018-02-08 PROCEDURE — 99214 OFFICE O/P EST MOD 30 MIN: CPT | Performed by: INTERNAL MEDICINE

## 2018-02-08 PROCEDURE — 93000 ELECTROCARDIOGRAM COMPLETE: CPT | Performed by: INTERNAL MEDICINE

## 2018-02-08 PROCEDURE — 77080 DXA BONE DENSITY AXIAL: CPT

## 2018-02-08 NOTE — PROGRESS NOTES
Progress Note - Cardiology Office  Katalina Suggs 47 y o  male MRN: [id number]   Encounter: [id number]    Assessment/Plan     1  Idiopathic pericardial effusion  Patient status post drainage and has done well since then he was treated with anti-inflammatory and colchicine  Clinically there is no evidence of any deep cannulation  2  Down syndrome with mild intellectual disability and behavior problems  Patient lives in a group home  3  Dyslipidemia  Continue statins  Tolerating well  Cholesterol reviewed and acceptable  4  Hypothyroidism on levothyroxine   5  Tremors  Patient diagnosed to have Parkinson disease  Management as per neurology  6  Cardiac murmur  Patient was recently went to ED found to have elevated BNP  He does have a cardiac murmur  He had history of down syndrome previously known to have mild AI, mild MR and large idiopathic pericardial effusion which was drained  Will repeat echo Doppler  Patient's conditions, other symptoms were reviewed with patient's caretaker at length  HPI :     Katalina Suggs is a 47y o  year old male who came for follow up  Patient has past medical history significant for Down syndrome, recently diagnosed Parkinson disease, dyslipidemia, behavior problems, hypothyroidism, idiopathic pulmonary had who came for follow-up  Patient was admitted to [facility] about year ago  Large pericardial effusion  Etiology of pericardial effusion was never known and it was drained and he was close to tamponade  Since then 2 echoes were done and no significant pericardial effusion is seen  He is doing well he did gain weight of about 10-15 pounds  He came with his caretaker  Denies any chest pain or any shortness of breath  He has no fever no chills no nausea no vomiting no other complaint  02/08/2018     Patient came with caretaker  He was recently in hospital for abdominal pain  Found to have hydronephrosis and follows up with urology  Denies any chest pain    He has gained some weight  Some exertional fatigue and shortness of breath but not much changed  No fever no chills no nausea no vomiting  Patient has history of pericardial effusion which was drained and had last echo in 2016  Review of Systems   Constitutional: Negative for activity change, chills, diaphoresis, fever and unexpected weight change  HENT: Negative for congestion  Eyes: Negative for discharge and redness  Respiratory: Positive for shortness of breath  Negative for cough, chest tightness and wheezing  With some exertion   Cardiovascular: Negative  Negative for chest pain, palpitations and leg swelling  Gastrointestinal: Negative for abdominal pain, diarrhea and nausea  Endocrine: Negative  Genitourinary: Negative for decreased urine volume and urgency  Musculoskeletal: Negative  Negative for arthralgias, back pain and gait problem  Skin: Negative for rash and wound  Allergic/Immunologic: Negative  Neurological: Negative for dizziness, seizures, syncope, weakness, light-headedness and headaches  Hematological: Negative  Psychiatric/Behavioral: Negative for agitation and confusion  The patient is not nervous/anxious          Historical Information   Past Medical History:   Diagnosis Date    Acute CHF (congestive heart failure) ([address] )     Anxiety     Dementia     Depression     Disease of thyroid gland     Down's syndrome     GERD (gastroesophageal reflux disease)     Gluten free diet     Hernia of abdominal cavity     Hyperlipidemia     Hypogonadism in male     Impulse control disorder     OCD (obsessive compulsive disorder)     Parkinson disease (HCC)     Pleural effusion     Sinus tachycardia      Past Surgical History:   Procedure Laterality Date    COLONOSCOPY      ESOPHAGOGASTRODUODENOSCOPY      PERICARDIOCENTESIS  4/29/2016          History   Alcohol Use No     History   Drug Use No     History   Smoking Status    Never Smoker   Smokeless Tobacco    Never Used     Family History: History reviewed  No pertinent family history  Meds/Allergies     Allergies   Allergen Reactions    Advil [Ibuprofen]     Gluten Meal        Current Outpatient Prescriptions:     atorvastatin (LIPITOR) 20 mg tablet, Take 20 mg by mouth daily  , Disp: , Rfl:     Calcium Carb-Cholecalciferol (CALCIUM 600 + D) 600-200 MG-UNIT TABS, Take by mouth 2 (two) times a day  , Disp: , Rfl:     cholecalciferol (VITAMIN D3) 1,000 units tablet, Take by mouth, Disp: , Rfl:     divalproex sodium (DEPAKOTE) 500 mg EC tablet, Take 600 mg by mouth daily at bedtime  , Disp: , Rfl:     docusate sodium (COLACE) 100 mg capsule, Take 100 mg by mouth daily  , Disp: , Rfl:     esomeprazole (NexIUM) 40 MG capsule, Take 40 mg by mouth every morning before breakfast , Disp: , Rfl:     levothyroxine 50 mcg tablet, Take by mouth, Disp: , Rfl:     loratadine (CLARITIN) 10 mg tablet, Take by mouth, Disp: , Rfl:     Multiple Vitamins-Minerals (CENTRUM ADULTS PO), Take by mouth, Disp: , Rfl:     OLANZapine (ZyPREXA) 5 mg tablet, Take 2 5 mg by mouth daily at bedtime  , Disp: , Rfl:     polyethylene glycol (MIRALAX) 17 g packet, Take 17 g by mouth daily  , Disp: , Rfl:     tamsulosin (FLOMAX) 0 4 mg, Take 0 4 mg by mouth daily with dinner , Disp: , Rfl:     testosterone (ANDROGEL) 1 62 %, Apply 20 25 mg topically daily 2 pumps on each shoulder at breakfast time and let dry for 15 minutes , Disp: , Rfl:   No current facility-administered medications for this visit       Facility-Administered Medications Ordered in Other Visits:     midazolam (VERSED) injection, , Intravenous, PRN, Elenita Mccartney MD, 2 mg at 04/29/16 1218    propofol (DIPRIVAN) 10 mg/mL bolus injection, , Intravenous, PRN, Elenita Mccartney MD, 20 mg at 04/29/16 1240    sodium chloride 0 9 % infusion, , , Continuous PRN, Elenita Mccartney MD    Vitals: Blood pressure 118/80, pulse 61, height 5' (1 524 m), weight 65 kg (143 lb 6 4 oz), SpO2 99 %  Body mass index is 28 01 kg/m²  BP Readings from Last 3 Encounters:   18 118/80   18 120/70   18 121/58       Physical Exam:  Physical Exam    Neurologic:  Alert & oriented x 3, no new focal deficits, Not in any acute distress,  Constitutional:  Well developed, well nourished, non-toxic appearance   Eyes:  Pupil equal and reacting to light, conjunctiva normal, No JVP, No LNP   HENT:  Atraumatic, oropharynx moist, Neck- normal range of motion, no tenderness,  Neck supple   Respiratory:  Bilateral air entry, mostly clear to auscultation  Cardiovascular: S1-S2 regular with a 3/6 ejection systolic murmur   GI:  Soft, nondistended, normal bowel sounds, nontender, no hepatosplenomegaly appreciated  Musculoskeletal:  No edema, no tenderness, no deformities  Skin:  Well hydrated, no rash   Lymphatic:  No lymphadenopathy noted   Extremities:  No edema and distal pulses are present      Patient Active Problem List    Diagnosis Date Noted    Idiopathic pericardial effusion 2018    Cardiac murmur 2018    Bladder distention 2018    Hydronephrosis 2018    Abdominal pain 2018    CHELSIE (iron deficiency anemia) 2017    Anemia 2017    Parkinson's disease ([facility] [address] ) 2017    Mixed incontinence 10/05/2016    Hypotension 2016    Serositis ([facility] [address] ) 2016    Acute exacerbation of CHF (congestive heart failure) ([facility] ) 2016    Essential hypertension 2016    Hyperlipidemia 2016    Hypothyroidism 2016    GERD (gastroesophageal reflux disease) 2016    Down's syndrome 2016    Hypogonadism, testicular 2014    Chronic constipation 2014    Idiopathic osteoporosis 2013       EK2018  Twelve lead EKG shows normal sinus rhythm heart rate 57 beats per minute  Baseline artifact  No other significant changes      Diagnostic Studies Review Cardio:    Stress Test: Nuclear stress test on 10/21/2016 shows no evidence of ischemia  Echocardiogram/PRADEEP: His echo Doppler done in May 2016 and 2016 shows no evidence of any pericardial effusion  EF 65-70%, mild aortic regurgitation, mild MR, mild TR PA pressure 30 mmHg  ECG Report:   Comparison to prior ECGs: no interval change  EKG done 2017 shows normal sinus, heart rate 73 bpm  No significant ST changes  Some baseline artifact noted to changed from old EKG     Cardiac testing:   Results for orders placed during the hospital encounter of 16   Echo complete with contrast if indicated    [address]  [address] [facility]  [phone]    Transthoracic Echocardiogram  2D, M-mode, and Doppler    Study date:  2016    Patient: Candie Armstrong  MR number: [id number]  Account number: [de-identified]  : 1965  Age: 45 years  Gender: Male  Status: Inpatient  Location: Echo lab  Height: 60 in  Weight: 145 lb  BP: 120/ 63 mmHg    Indications: Pericardial effusion    Diagnoses: 420 99 - ACUTE PERICARDITIS NEC    Sonographer:  ADIEL Tipton  Primary Physician:  Kamila Ortega  Referring Physician:  Gillian Abebe MD  Group:  [facility]  Interpreting Physician:  Gillian Abebe MD    SUMMARY    LEFT VENTRICLE:  Systolic function was normal by visual assessment  Ejection fraction was  estimated in the range of 55 % to 65 %  There were no regional wall motion abnormalities  Doppler parameters were consistent with abnormal left ventricular relaxation  (grade 1 diastolic dysfunction)  AORTIC VALVE:  There was mild regurgitation  TRICUSPID VALVE:  There was mild regurgitation  PERICARDIUM:  A moderate to large pericardial effusion was identified circumferential to the  heart  The fluid exhibited a fibrinous appearance seems to be same quantity  PROCEDURE: The procedure was performed in the echo lab  This was a routine  study  The transthoracic approach was used  The study included complete 2D  imaging, M-mode, and complete spectral Doppler  The heart rate was 100 bpm, at  the start of the study  Echocardiographic views were limited due to poor  patient compliance  Image quality was adequate  LEFT VENTRICLE: Size was normal  Systolic function was normal by visual  assessment  Ejection fraction was estimated in the range of 55 % to 65 %  There  were no regional wall motion abnormalities  Wall thickness was normal  DOPPLER:  Doppler parameters were consistent with abnormal left ventricular relaxation  (grade 1 diastolic dysfunction)  RIGHT VENTRICLE: The size was normal  Systolic function was normal  There was  no evidence of a mass  LEFT ATRIUM: Size was normal     RIGHT ATRIUM: Size was normal     AORTIC VALVE: The valve was trileaflet  DOPPLER: There was mild regurgitation  TRICUSPID VALVE: DOPPLER: There was mild regurgitation  PULMONIC VALVE: Not well visualized  PERICARDIUM: A moderate to large pericardial effusion was identified  circumferential to the heart  The fluid exhibited a fibrinous appearance seems  to be same quantity  Features were not consistent with tamponade physiology  AORTA: The root exhibited normal size  SYSTEMIC VEINS: IVC: The inferior vena cava was normal in size  Respirophasic  changes were normal     PULMONARY VEINS: Not well visualized      [address] Accredited Echocardiography [facility]    Prepared and electronically signed by    Arianna Gutierrez MD  Signed 29-Apr-2016 10:29:17       Lab Review   Lab Results   Component Value Date    WBC 2 70 (L) 02/03/2018    HGB 15 8 02/03/2018    HCT 47 6 02/03/2018     (H) 02/03/2018     02/03/2018     Lab Results   Component Value Date     02/03/2018    K 4 0 02/03/2018     02/03/2018    CO2 37 (H) 02/03/2018    ANIONGAP 1 (L) 02/03/2018    BUN 8 02/03/2018    CREATININE 0 86 02/03/2018    GLUCOSE 102 01/28/2018    GLUF 87 02/03/2018    CALCIUM 9 5 02/03/2018    AST 23 02/03/2018    ALT 27 02/03/2018    ALKPHOS 63 02/03/2018    PROT 7 5 02/03/2018    BILITOT 0 60 02/03/2018    EGFR 99 02/03/2018     Lab Results   Component Value Date    GLUCOSE 102 01/28/2018    CALCIUM 9 5 02/03/2018     02/03/2018    K 4 0 02/03/2018    CO2 37 (H) 02/03/2018     02/03/2018    BUN 8 02/03/2018    CREATININE 0 86 02/03/2018     Lipid Profile:   Lab Results   Component Value Date    CHOL 185 07/15/2017    HDL 65 (H) 07/15/2017    LDLCALC 102 (H) 07/15/2017    TRIG 90 07/15/2017     Lab Results   Component Value Date    CHOL 185 07/15/2017    CHOL 173 01/27/2017     Lab Results   Component Value Date    TROPONINI <0 02 01/28/2018       Dr Carlos Haney MD [facility]      "This note has been constructed using a voice recognition system  Therefore there may be syntax, spelling, and/or grammatical errors   Please call if you have any questions  "

## 2018-02-20 ENCOUNTER — OFFICE VISIT (OUTPATIENT)
Dept: PODIATRY | Facility: CLINIC | Age: 53
End: 2018-02-20
Payer: MEDICARE

## 2018-02-20 VITALS — WEIGHT: 143 LBS | HEIGHT: 61 IN | BODY MASS INDEX: 27 KG/M2

## 2018-02-20 DIAGNOSIS — L60.0 INGROWING NAIL: Primary | ICD-10-CM

## 2018-02-20 DIAGNOSIS — M21.969 ACQUIRED DEFORMITY OF ANKLE AND FOOT, UNSPECIFIED LATERALITY: ICD-10-CM

## 2018-02-20 DIAGNOSIS — B35.1 ONYCHOMYCOSIS: ICD-10-CM

## 2018-02-20 PROCEDURE — 99213 OFFICE O/P EST LOW 20 MIN: CPT | Performed by: PODIATRIST

## 2018-02-20 NOTE — PROGRESS NOTES
Assessment/Plan:  Aseptic debridement and planning of nails x10 and manually and mechanically  Discussed possible for permanent removal of ingrown  Daily foot checks monitor for signs of infection  Suggest moisturizer use daily  Follow-up 3 months       Diagnoses and all orders for this visit:    Ingrowing nail    Onychomycosis    Acquired deformity of ankle and foot, unspecified laterality        Subjective:      Patient ID: Sara Roy is a 47 y o  male  Patient gets recurrent nails bilateral great toes  Is thick painful nails and painful calluses that her shoes  Aids have not noticed any redness or signs of infection  The following portions of the patient's history were reviewed and updated as appropriate: allergies, current medications, past family history, past social history, past surgical history and problem list     Review of Systems   Constitutional: Negative  HENT: Negative  Eyes: Negative  Respiratory: Negative  Cardiovascular: Negative  Gastrointestinal: Negative  Endocrine: Negative  Genitourinary: Negative  Musculoskeletal: Negative  Skin: Negative  Allergic/Immunologic: Negative  Neurological: Negative  Hematological: Negative  Psychiatric/Behavioral: Negative  Objective:     Physical ExamFoot Exam      Constitutional: no acute distress, well appearing and well nourished  Orthopedic/Biomechanical: abnormal foot type,-- hammertoe(s),-- bunion(s),-- abnormal MPJ ROM,-- discolored nails,-- subungual debris-- and-- nail tenderness  Skin: keratoses  Left Foot: Appearance: a deformity  Great toe deformities include a bunion  Evaluation of the great toe nail demonstrates an ingrown nail, but-- no paronychia  Decreased range of motion first MPJ bilateral positive pain on palpation and range of motion right worse than left  Ingrown bilateral big toe  No signs of infection no erythema no exudate   Special Tests: significant bunion bilateral decreased range of motion first MPJ no pain on range of motion,no sign of infection  Right Foot: Appearance: Normal except as noted: a deformity  Great toe deformities include a bunion  Evaluation of the great toe nail demonstrates paronychia-- and-- an ingrown nail  Mild ingrown tibial border  Rigid hammertoes, 2 through 5, bilateral, moderate xerosis, fissures, bilateral heels, midfoot, bilateral, negative erythema  Negative exudate  Negative ulceration  Special Tests: Mild xerosis, no signs of infection  Neurological Exam: Light touch was decreased bilaterally  Vibratory sensation was decreased in both first metatarsophalangeal joints  Response to monofilament test was intact bilaterally  Vascular Exam: performed Dorsalis pedis pulses were 1/4 bilaterally  Posterior tibial pulses were 1/4 bilaterally  Capillary refill time was between 1-3 seconds bilaterally  Toenails: All of the toenails were elongated,-- hypertrophied,-- discolored,-- shown to have subungual debris-- and-- tender  Both first toenails were ingrown  Hyperkeratosis: present on both first sub metatarsals-- and-- present on both fifth sub metatarsals  Ingrown bilateral great toes  No signs of infection    Moderate bunion bilateral

## 2018-02-21 ENCOUNTER — HOSPITAL ENCOUNTER (OUTPATIENT)
Dept: NON INVASIVE DIAGNOSTICS | Facility: HOSPITAL | Age: 53
Discharge: HOME | End: 2018-02-21
Attending: INTERNAL MEDICINE
Payer: MEDICARE

## 2018-02-21 DIAGNOSIS — Q90.9 DOWN'S SYNDROME: ICD-10-CM

## 2018-02-21 DIAGNOSIS — I31.3 IDIOPATHIC PERICARDIAL EFFUSION: ICD-10-CM

## 2018-02-21 DIAGNOSIS — R01.1 CARDIAC MURMUR: ICD-10-CM

## 2018-02-21 PROCEDURE — 93306 TTE W/DOPPLER COMPLETE: CPT

## 2018-02-22 PROCEDURE — 93306 TTE W/DOPPLER COMPLETE: CPT | Performed by: INTERNAL MEDICINE

## 2018-03-06 ENCOUNTER — OFFICE VISIT (OUTPATIENT)
Dept: NEUROLOGY | Facility: CLINIC | Age: 53
End: 2018-03-06
Payer: MEDICARE

## 2018-03-06 VITALS
SYSTOLIC BLOOD PRESSURE: 128 MMHG | HEIGHT: 61 IN | WEIGHT: 139 LBS | DIASTOLIC BLOOD PRESSURE: 75 MMHG | BODY MASS INDEX: 26.24 KG/M2 | HEART RATE: 85 BPM

## 2018-03-06 DIAGNOSIS — G20 PARKINSON'S DISEASE (HCC): Primary | ICD-10-CM

## 2018-03-06 PROCEDURE — 99214 OFFICE O/P EST MOD 30 MIN: CPT | Performed by: PSYCHIATRY & NEUROLOGY

## 2018-03-06 RX ORDER — OLANZAPINE 2.5 MG/1
1 TABLET ORAL DAILY
COMMUNITY
Start: 2018-02-28 | End: 2021-01-01 | Stop reason: SDUPTHER

## 2018-03-06 RX ORDER — TESTOSTERONE 16.2 MG/G
1 GEL TRANSDERMAL DAILY
COMMUNITY
Start: 2017-11-27 | End: 2018-05-03 | Stop reason: SDUPTHER

## 2018-03-06 RX ORDER — POLYETHYLENE GLYCOL 3350 17 G/17G
17 POWDER, FOR SOLUTION ORAL DAILY
COMMUNITY
Start: 2018-01-26 | End: 2018-03-06 | Stop reason: CLARIF

## 2018-03-06 NOTE — PROGRESS NOTES
Patient ID: Fatemeh Pemberton is a 47 y o  male  Assessment/Plan:    Mild early Parkinson's symptoms mainly hand tremor and slowness in walking  Plan at the present time patient does not need any medication  Return to office visit in 5 months  Subjective:    74-year-old male with mild Parkinson disease  As per Emily Davies in the group home patient does ever tremor only when he is very nervous  He denies patient having any tremor at the rest at the group home  Patient is still able to carry out his daily activity by himself such as showering, dressing going to the bathroom  Patient is still able to do is laundry and cleaning his room  He is not incontinent of the bladder and bowels  I was told by did do said that he walks very slow compared to 6 months ago  His daily activity he still able to finish in timely fashion  Patient denies any stiffness in the arms or heaviness in the legs while he is walking  Pt denies double vision, blurred vision, transient monocular blindness, vertigo, tinnitus, hearing loss, slurred speech, difficulty expressing and understanding, dysphasia, and focal weakness, numbness, imbalance and incoordination  Pt denies bladder and bowel urgency, frequency and incontinence  Pt denies double vision, blurred vision, transient monocular blindness, vertigo, tinnitus, hearing loss, slurred speech, difficulty expressing and understanding, dysphasia, and focal weakness, numbness, imbalance and incoordination  Pt denies bladder and bowel urgency, frequency and incontinence         Past Medical History:   Diagnosis Date    Acute CHF (congestive heart failure) (HCC)     Anxiety     Dementia     Depression     Disease of thyroid gland     Down's syndrome     GERD (gastroesophageal reflux disease)     Gluten free diet     Hernia of abdominal cavity     Hyperlipidemia     Hypogonadism in male     Impulse control disorder     OCD (obsessive compulsive disorder)     Parkinson disease ([facility] [address] )     Pleural effusion     Sinus tachycardia        Family History   Problem Relation Age of Onset    Family history unknown: Yes   ,    Social History     Social History    Marital status: Single     Spouse name: N/A    Number of children: N/A    Years of education: N/A     Occupational History    Not on file  Social History Main Topics    Smoking status: Never Smoker    Smokeless tobacco: Never Used    Alcohol use No    Drug use: No    Sexual activity: Not Currently     Other Topics Concern    Not on file     Social History Narrative    No narrative on file       Current Outpatient Prescriptions on File Prior to Visit   Medication Sig Dispense Refill    atorvastatin (LIPITOR) 20 mg tablet Take 20 mg by mouth daily   Calcium Carb-Cholecalciferol (CALCIUM 600 + D) 600-200 MG-UNIT TABS Take by mouth 2 (two) times a day        cholecalciferol (VITAMIN D3) 1,000 units tablet Take by mouth      divalproex sodium (DEPAKOTE) 500 mg EC tablet Take 600 mg by mouth daily at bedtime        docusate sodium (COLACE) 100 mg capsule Take 100 mg by mouth daily        esomeprazole (NexIUM) 40 MG capsule Take 40 mg by mouth every morning before breakfast       levothyroxine 50 mcg tablet Take by mouth      loratadine (CLARITIN) 10 mg tablet Take by mouth      Multiple Vitamins-Minerals (CENTRUM ADULTS PO) Take by mouth      polyethylene glycol (MIRALAX) 17 g packet Take 17 g by mouth daily   tamsulosin (FLOMAX) 0 4 mg Take 0 4 mg by mouth daily with dinner        testosterone (ANDROGEL) 1 62 % Apply 20 25 mg topically daily 2 pumps on each shoulder at breakfast time and let dry for 15 minutes       [DISCONTINUED] OLANZapine (ZyPREXA) 5 mg tablet Take 2 5 mg by mouth daily at bedtime         Current Facility-Administered Medications on File Prior to Visit   Medication Dose Route Frequency Provider Last Rate Last Dose    midazolam (VERSED) injection   Intravenous PRN Anne Bahena Jus Mccartney MD   2 mg at 04/29/16 1218    propofol (DIPRIVAN) 10 mg/mL bolus injection   Intravenous PRN Jenna Oviedo MD   20 mg at 04/29/16 1240    sodium chloride 0 9 % infusion    Continuous PRN Jenna Oviedo MD                      Objective:    Blood pressure 128/75, pulse 85, height 5' 1" (1 549 m), weight 63 kg (139 lb)  Physical Exam   Eyes: EOM are normal  Pupils are equal, round, and reactive to light  Neck: Normal carotid pulses present  Carotid bruit is not present  Neurological: He has normal reflexes  Neurological Exam    Mental Status  He has dysarthria of mild severity  He is unable to read, repeat and write  He is able to name object  He follows one step commands  Patient is oriented to his name age date of birth month and the year  Patient knows that he is at [address] [place] [address] office  Patient is also oriented to present month date day and the year  Patient can follow only 2 step commands  Attention span is pretty good  Cranial Nerves    CN II: The patient's visual acuity and visual fields are normal   CN III, IV, VI: The patient's pupils are equally round and reactive to light and ocular movements are normal   CN V: The patient has normal facial sensation  CN VII:  The patient has symmetric facial movement  CN VIII:  The patient's hearing is normal   CN IX, X: The patient has symmetric palate movement and normal gag reflex  CN XI: The patient's shoulder shrug strength is normal   CN XII: The patient's tongue is midline without atrophy or fasciculations  Motor  The patient has normal muscle bulk throughout  His overall muscle tone is normal throughout  His strength is 5/5 in all four extremities except as noted  No resting tremor seen in the office  Rapid movement and rapid alternating movement is slow in both hands     Sensory  The patient's sensation is normal in all four extremities to light touch, pinprick and proprioception   He has normal cortical sensation    Reflexes  Deep tendon reflexes are 2+ and symmetric in all four extremities with downgoing toes bilaterally  Gait and Coordination   He has a wide stance  Patient gait is a wide-based and he does not swing his arms while walking  ROS:    Review of Systems   Constitutional: Negative  HENT: Negative  Eyes: Negative  Respiratory: Negative  Cardiovascular: Negative  Gastrointestinal: Negative  Endocrine: Negative  Genitourinary: Negative  Musculoskeletal: Negative  Skin: Negative  Allergic/Immunologic: Negative  Neurological: Negative  Hematological: Negative  Psychiatric/Behavioral: Negative

## 2018-03-07 NOTE — PROGRESS NOTES
Dear Pravin Mares,    My name is Julia and  I am a Registered Nurse and Care Coordinator for [address]  I am contacting you because you were recently in the hospital   If you are interested, I am here to assist you as part of a Medicare program called [address]  I have enclosed information about this program for your review and my contact information  One of the benefits of the program is that you can have a nurse contact you regularly to answer any questions or concerns  you might have  This is free of charge to you, as part of the program   Please feel free to call me at the number listed below      Sincerely,    Yuliet Bonner RN  Care Coordinator   [address]  [phone]        Electronically signed by:Audrey Resendiz RN  Mar  7 2017 10:20AM EST

## 2018-03-14 ENCOUNTER — OFFICE VISIT (OUTPATIENT)
Dept: ENDOCRINOLOGY | Facility: CLINIC | Age: 53
End: 2018-03-14
Payer: MEDICARE

## 2018-03-14 VITALS
DIASTOLIC BLOOD PRESSURE: 60 MMHG | HEIGHT: 61 IN | HEART RATE: 82 BPM | WEIGHT: 146.2 LBS | BODY MASS INDEX: 27.6 KG/M2 | SYSTOLIC BLOOD PRESSURE: 122 MMHG

## 2018-03-14 DIAGNOSIS — M81.8 IDIOPATHIC OSTEOPOROSIS: ICD-10-CM

## 2018-03-14 DIAGNOSIS — E29.1 HYPOGONADISM, TESTICULAR: ICD-10-CM

## 2018-03-14 DIAGNOSIS — E78.5 HYPERLIPIDEMIA, UNSPECIFIED HYPERLIPIDEMIA TYPE: ICD-10-CM

## 2018-03-14 DIAGNOSIS — I10 ESSENTIAL HYPERTENSION: ICD-10-CM

## 2018-03-14 DIAGNOSIS — E03.4 HYPOTHYROIDISM DUE TO ACQUIRED ATROPHY OF THYROID: Primary | ICD-10-CM

## 2018-03-14 PROCEDURE — 99214 OFFICE O/P EST MOD 30 MIN: CPT | Performed by: INTERNAL MEDICINE

## 2018-03-14 RX ORDER — LEVOTHYROXINE SODIUM 0.07 MG/1
75 TABLET ORAL DAILY
Qty: 30 TABLET | Refills: 11 | Status: SHIPPED | OUTPATIENT
Start: 2018-03-14 | End: 2018-09-14 | Stop reason: SDUPTHER

## 2018-03-14 NOTE — PROGRESS NOTES
Shena Mood 47 y o  male MRN: [id number]    Encounter: [id number]      Assessment/Plan     Assessment: This is a 47y o -year-old male with hypothyroidism, hypogonadism and osteoporosis  Plan:  1  For the hypothyroidism, I have increased his levothyroxine to 75 mcg per day  Check TSH and free T4 in six weeks  2   Hypogonadism-continue current replacement  3   Osteoporosis is stable based on DEXA scan  CC:   Hypothyroidism    History of Present Illness     HPI:  50-year-old male with Down syndrome presents for follow-up of hypothyroidism, hypogonadism and osteoporosis  His last laboratory testing shows that the TSH is slightly elevated in the free T4 is low normal   He denies any symptoms of hypo or hyperthyroidism  For the hypogonadism, he is on testosterone replacement  He denies any difficulty with urination  He did have a DEXA scan for osteoporosis which is stable  Review of Systems   Constitutional: Negative for chills and fever  Respiratory: Negative for shortness of breath  Cardiovascular: Negative for chest pain  Gastrointestinal: Negative for constipation, diarrhea, nausea and vomiting  All other systems reviewed and are negative        Historical Information   Past Medical History:   Diagnosis Date    Acute CHF ([facility]estive heart failure) ([facility] [address] )     Anxiety     Dementia     Depression     Disease of thyroid gland     Down's syndrome     GERD (gastroesophageal reflux disease)     Gluten free diet     Hernia of abdominal cavity     Hyperlipidemia     Hypogonadism in male     Impulse control disorder     OCD (obsessive compulsive disorder)     Parkinson disease (HCC)     Pleural effusion     Sinus tachycardia      Past Surgical History:   Procedure Laterality Date    COLONOSCOPY      ESOPHAGOGASTRODUODENOSCOPY      PERICARDIOCENTESIS  4/29/2016          Social History   History   Alcohol Use No     History   Drug Use No     History   Smoking Status    Never Smoker   Smokeless Tobacco    Never Used     Family History:   Family History   Problem Relation Age of Onset    Family history unknown: Yes       Meds/Allergies   Current Outpatient Prescriptions   Medication Sig Dispense Refill    ANDROGEL PUMP 20 25 MG/ACT (1 62%) TD gel pump Apply 1 Squirt topically daily      atorvastatin (LIPITOR) 20 mg tablet Take 20 mg by mouth daily   Calcium Carb-Cholecalciferol (CALCIUM 600 + D) 600-200 MG-UNIT TABS Take by mouth 2 (two) times a day        cholecalciferol (VITAMIN D3) 1,000 units tablet Take by mouth      divalproex sodium (DEPAKOTE) 500 mg EC tablet Take 600 mg by mouth daily at bedtime        docusate sodium (COLACE) 100 mg capsule Take 100 mg by mouth daily        esomeprazole (NexIUM) 40 MG capsule Take 40 mg by mouth every morning before breakfast       loratadine (CLARITIN) 10 mg tablet Take by mouth      Multiple Vitamins-Minerals (CENTRUM ADULTS PO) Take by mouth      OLANZapine (ZyPREXA) 2 5 mg tablet Take 1 tablet by mouth daily      polyethylene glycol (MIRALAX) 17 g packet Take 17 g by mouth daily   tamsulosin (FLOMAX) 0 4 mg Take 0 4 mg by mouth daily with dinner   testosterone (ANDROGEL) 1 62 % Apply 20 25 mg topically daily 2 pumps on each shoulder at breakfast time and let dry for 15 minutes       levothyroxine 75 mcg tablet Take 1 tablet (75 mcg total) by mouth daily 30 tablet 11     No current facility-administered medications for this visit        Facility-Administered Medications Ordered in Other Visits   Medication Dose Route Frequency Provider Last Rate Last Dose    midazolam (VERSED) injection   Intravenous PRN Claude Mallow, MD   2 mg at 04/29/16 1218    propofol (DIPRIVAN) 10 mg/mL bolus injection   Intravenous PRN Claude Mallow, MD   20 mg at 04/29/16 1240    sodium chloride 0 9 % infusion    Continuous PRN Claude Mallow, MD         Allergies   Allergen Reactions    Advil [Ibuprofen]     Gluten Meal Objective   Vitals: Blood pressure 122/60, pulse 82, height 5' 1" (1 549 m), weight 66 3 kg (146 lb 3 2 oz)  Physical Exam   Constitutional: He is oriented to person, place, and time  He appears well-developed and well-nourished  No distress  HENT:   Head: Normocephalic and atraumatic  Mouth/Throat: Oropharynx is clear and moist and mucous membranes are normal  No oropharyngeal exudate  Eyes: Conjunctivae, EOM and lids are normal  Right eye exhibits no discharge  Left eye exhibits no discharge  No scleral icterus  Neck: Neck supple  No thyromegaly present  Cardiovascular: Normal rate, regular rhythm and normal heart sounds  Exam reveals no gallop and no friction rub  No murmur heard  Pulmonary/Chest: Effort normal and breath sounds normal  No respiratory distress  He has no wheezes  Abdominal: Soft  Bowel sounds are normal  He exhibits no distension  There is no tenderness  Musculoskeletal: Normal range of motion  He exhibits no edema, tenderness or deformity  Lymphadenopathy:        Head (right side): No occipital adenopathy present  Head (left side): No occipital adenopathy present  Right: No supraclavicular adenopathy present  Left: No supraclavicular adenopathy present  Neurological: He is alert and oriented to person, place, and time  No cranial nerve deficit  Coordination normal    Skin: Skin is warm and intact  No rash noted  He is not diaphoretic  No erythema  Psychiatric: He has a normal mood and affect  Vitals reviewed  The history was obtained from the review of the chart, patient and caretaker  Lab Results:   Lab Results   Component Value Date/Time    TSH 3RD GENERATON 4 019 (H) 02/03/2018 08:02 AM    TSH 3RD GENERATON 2 360 08/26/2017 07:43 AM    TSH 3RD GENERATON 0 966 07/15/2017 08:01 AM    Free T4 0 86 02/03/2018 08:02 AM    Free T4 1 04 08/26/2017 07:43 AM       Imaging Studies:      DEXA scan showed stable bone density      I have personally reviewed pertinent reports  Portions of the record may have been created with voice recognition software  Occasional wrong word or "sound a like" substitutions may have occurred due to the inherent limitations of voice recognition software  Read the chart carefully and recognize, using context, where substitutions have occurred

## 2018-03-15 ENCOUNTER — TELEPHONE (OUTPATIENT)
Dept: ENDOCRINOLOGY | Facility: CLINIC | Age: 53
End: 2018-03-15

## 2018-03-19 ENCOUNTER — TELEPHONE (OUTPATIENT)
Dept: ENDOCRINOLOGY | Facility: CLINIC | Age: 53
End: 2018-03-19

## 2018-03-19 NOTE — TELEPHONE ENCOUNTER
----- Message from Alonzo Bo PA-C sent at 2/5/2018 11:32 AM EST -----  Please confirm thyroid medication dose and how he is taking may need slight med increase

## 2018-04-27 ENCOUNTER — TRANSCRIBE ORDERS (OUTPATIENT)
Dept: ADMINISTRATIVE | Facility: HOSPITAL | Age: 53
End: 2018-04-27

## 2018-04-27 ENCOUNTER — LAB (OUTPATIENT)
Dept: LAB | Facility: HOSPITAL | Age: 53
End: 2018-04-27
Payer: MEDICARE

## 2018-04-27 DIAGNOSIS — Z79.899 ENCOUNTER FOR LONG-TERM (CURRENT) USE OF HIGH-RISK MEDICATION: Primary | ICD-10-CM

## 2018-04-27 DIAGNOSIS — E03.4 HYPOTHYROIDISM DUE TO ACQUIRED ATROPHY OF THYROID: ICD-10-CM

## 2018-04-27 DIAGNOSIS — Z79.899 ENCOUNTER FOR LONG-TERM (CURRENT) USE OF HIGH-RISK MEDICATION: ICD-10-CM

## 2018-04-27 LAB
BASOPHILS # BLD AUTO: 0 THOUSANDS/ΜL (ref 0–0.1)
BASOPHILS NFR BLD AUTO: 1 % (ref 0–1)
EOSINOPHIL # BLD AUTO: 0 THOUSAND/ΜL (ref 0–0.61)
EOSINOPHIL NFR BLD AUTO: 1 % (ref 0–6)
ERYTHROCYTE [DISTWIDTH] IN BLOOD BY AUTOMATED COUNT: 14.6 % (ref 11.6–15.1)
HCT VFR BLD AUTO: 43.9 % (ref 42–52)
HGB BLD-MCNC: 14.9 G/DL (ref 14–18)
LYMPHOCYTES # BLD AUTO: 1 THOUSANDS/ΜL (ref 0.6–4.47)
LYMPHOCYTES NFR BLD AUTO: 27 % (ref 14–44)
MCH RBC QN AUTO: 34.2 PG (ref 27–31)
MCHC RBC AUTO-ENTMCNC: 34 G/DL (ref 31.4–37.4)
MCV RBC AUTO: 101 FL (ref 82–98)
MONOCYTES # BLD AUTO: 0.4 THOUSAND/ΜL (ref 0.17–1.22)
MONOCYTES NFR BLD AUTO: 10 % (ref 4–12)
NEUTROPHILS # BLD AUTO: 2.2 THOUSANDS/ΜL (ref 1.85–7.62)
NEUTS SEG NFR BLD AUTO: 61 % (ref 43–75)
NRBC BLD AUTO-RTO: 0 /100 WBCS
PLATELET # BLD AUTO: 245 THOUSANDS/UL (ref 130–400)
PMV BLD AUTO: 7.6 FL (ref 8.9–12.7)
RBC # BLD AUTO: 4.37 MILLION/UL (ref 4.7–6.1)
T4 FREE SERPL-MCNC: 0.89 NG/DL (ref 0.76–1.46)
TSH SERPL DL<=0.05 MIU/L-ACNC: 2.72 UIU/ML (ref 0.36–3.74)
VALPROATE SERPL-MCNC: 37 UG/ML (ref 50–100)
WBC # BLD AUTO: 3.7 THOUSAND/UL (ref 4.8–10.8)

## 2018-04-27 PROCEDURE — 80164 ASSAY DIPROPYLACETIC ACD TOT: CPT

## 2018-04-27 PROCEDURE — 84443 ASSAY THYROID STIM HORMONE: CPT

## 2018-04-27 PROCEDURE — 84439 ASSAY OF FREE THYROXINE: CPT

## 2018-04-27 PROCEDURE — 85025 COMPLETE CBC W/AUTO DIFF WBC: CPT | Performed by: PSYCHIATRY & NEUROLOGY

## 2018-04-27 PROCEDURE — 36415 COLL VENOUS BLD VENIPUNCTURE: CPT

## 2018-05-01 ENCOUNTER — TELEPHONE (OUTPATIENT)
Dept: ENDOCRINOLOGY | Facility: CLINIC | Age: 53
End: 2018-05-01

## 2018-05-03 ENCOUNTER — TELEPHONE (OUTPATIENT)
Dept: ENDOCRINOLOGY | Facility: CLINIC | Age: 53
End: 2018-05-03

## 2018-05-03 DIAGNOSIS — E29.1 HYPOGONADISM, TESTICULAR: Primary | ICD-10-CM

## 2018-05-03 RX ORDER — TESTOSTERONE 16.2 MG/G
GEL TRANSDERMAL
Refills: 0
Start: 2018-05-03 | End: 2018-09-14 | Stop reason: SDUPTHER

## 2018-05-17 ENCOUNTER — TELEPHONE (OUTPATIENT)
Dept: FAMILY MEDICINE CLINIC | Facility: CLINIC | Age: 53
End: 2018-05-17

## 2018-05-17 NOTE — TELEPHONE ENCOUNTER
Dr Bazan Rape,     Patient has had increased incontinence so Eddi Lantigua just wants to know if they can come  a cup for him to do a urine sample at home in and then they can bring it back in? Please call to let them know if this is okay to do  The number to reach them at is [phone]  Thanks

## 2018-05-17 NOTE — TELEPHONE ENCOUNTER
Called back and the woman who answered said that he will not do it here  He's has had to do it before and it doesn't work out so she wanted him to do it at home and then be seen for an appointment if necessary

## 2018-05-18 ENCOUNTER — OFFICE VISIT (OUTPATIENT)
Dept: FAMILY MEDICINE CLINIC | Facility: CLINIC | Age: 53
End: 2018-05-18
Payer: MEDICARE

## 2018-05-18 VITALS
SYSTOLIC BLOOD PRESSURE: 100 MMHG | RESPIRATION RATE: 14 BRPM | WEIGHT: 142 LBS | HEART RATE: 66 BPM | DIASTOLIC BLOOD PRESSURE: 60 MMHG | TEMPERATURE: 97.9 F | BODY MASS INDEX: 26.83 KG/M2

## 2018-05-18 DIAGNOSIS — R35.0 FREQUENT URINATION: Primary | ICD-10-CM

## 2018-05-18 DIAGNOSIS — N39.0 URINARY TRACT INFECTION WITH HEMATURIA, SITE UNSPECIFIED: ICD-10-CM

## 2018-05-18 DIAGNOSIS — Z87.898 HISTORY OF URINARY RETENTION: ICD-10-CM

## 2018-05-18 DIAGNOSIS — R31.9 URINARY TRACT INFECTION WITH HEMATURIA, SITE UNSPECIFIED: ICD-10-CM

## 2018-05-18 LAB
SL AMB  POCT GLUCOSE, UA: ABNORMAL
SL AMB LEUKOCYTE ESTERASE,UA: 500
SL AMB POCT BILIRUBIN,UA: ABNORMAL
SL AMB POCT BLOOD,UA: 250
SL AMB POCT CLARITY,UA: ABNORMAL
SL AMB POCT COLOR,UA: YELLOW
SL AMB POCT KETONES,UA: ABNORMAL
SL AMB POCT NITRITE,UA: ABNORMAL
SL AMB POCT PH,UA: 7
SL AMB POCT SPECIFIC GRAVITY,UA: 1
SL AMB POCT URINE PROTEIN: 30
SL AMB POCT UROBILINOGEN: ABNORMAL

## 2018-05-18 PROCEDURE — 81003 URINALYSIS AUTO W/O SCOPE: CPT | Performed by: NURSE PRACTITIONER

## 2018-05-18 PROCEDURE — 99213 OFFICE O/P EST LOW 20 MIN: CPT | Performed by: NURSE PRACTITIONER

## 2018-05-18 RX ORDER — LEVOFLOXACIN 500 MG/1
TABLET, FILM COATED ORAL
Qty: 7 TABLET | Refills: 0 | Status: SHIPPED | OUTPATIENT
Start: 2018-05-18 | End: 2018-05-24

## 2018-05-18 RX ORDER — POLYETHYLENE GLYCOL 3350 17 G/17G
17 POWDER, FOR SOLUTION ORAL DAILY
COMMUNITY
Start: 2018-04-24 | End: 2018-10-04 | Stop reason: SDUPTHER

## 2018-05-18 NOTE — PROGRESS NOTES
Assessment/Plan  2  Urinary tract infection with hematuria, site unspecified  - POCT urine dip auto non-scope  - Urine culture  - levofloxacin (LEVAQUIN) 500 mg tablet; One tablet daily at 8pm for 7 days  Dispense: 7 tablet; Refill: 0  Push fluids  Return to office if symptoms worsen or persist    3  History of urinary retention       Subjective:      Patient ID: Baron Aceves is a 47 y o  male who presents for possible uti    From group home  Accompanied by caregiver  Pt with hx of urinary retention  Intermittent urinary incontinence last week which is unusual as per caregiver  Last 2 days, could not hold urine and urinated on floor  Denies abdominal pain  Denies burning with urination  No back pain  No fever/chills  No n/v/d  The following portions of the patient's history were reviewed and updated as appropriate: allergies, current medications, past family history, past medical history, past social history, past surgical history and problem list     Review of Systems   Constitutional: Negative for chills and fever  Gastrointestinal: Negative for abdominal distention, abdominal pain, diarrhea, nausea and vomiting  Genitourinary: Negative for difficulty urinating, dysuria, frequency, hematuria and urgency  Incontinence last 2 days, unusual as per caregiver   Musculoskeletal: Negative for back pain  Skin: Negative for rash  Hematological: Negative for adenopathy  Objective:    UA (+) leuk, (+) blood, (+) nitrites    /60 (BP Location: Left arm, Patient Position: Sitting, Cuff Size: Standard)   Pulse 66   Temp 97 9 °F (36 6 °C) (Temporal)   Resp 14   Wt 64 4 kg (142 lb)   BMI 26 83 kg/m²          Physical Exam   Constitutional: He appears well-developed and well-nourished  No distress  Cardiovascular: Normal rate and regular rhythm  Pulmonary/Chest: Effort normal and breath sounds normal  No respiratory distress  Abdominal: Soft   Bowel sounds are normal  He exhibits no distension  There is no tenderness  No suprapubic pain with palpation  No cva tenderness   Neurological: He is alert  Skin: Skin is warm and dry  Psychiatric: He has a normal mood and affect

## 2018-05-18 NOTE — PATIENT INSTRUCTIONS
Levaquin 500mg daily for 7 days  Encourage fluids  Return to office if symptoms persist or worsen  Urinary Tract Infection in Men   WHAT YOU NEED TO KNOW:   A urinary tract infection (UTI) is caused by bacteria that get inside your urinary tract  Most bacteria that enter your urinary tract come out when you urinate  If the bacteria stay in your urinary tract, you may get an infection  Your urinary tract includes your kidneys, ureters, bladder, and urethra  Urine is made in your kidneys, and it flows from the ureters to the bladder  Urine leaves the bladder through the urethra  A UTI is more common in your lower urinary tract, which includes your bladder and urethra  DISCHARGE INSTRUCTIONS:   Return to the emergency department if:   · You are urinating very little or not at all  · You have a high fever with shaking chills  · You have side or back pain that gets worse  Contact your healthcare provider if:   · You have a mild fever  · You do not feel better after 2 days of taking antibiotics  · You are vomiting  · You have new symptoms, such as blood or pus in your urine  · You have questions or concerns about your condition or care  Medicines:   · Antibiotics  help fight a bacterial infection  · Medicines  may be given to decrease pain and burning when you urinate  They will also help decrease the feeling that you need to urinate often  These medicines will make your urine orange or red  · Take your medicine as directed  Contact your healthcare provider if you think your medicine is not helping or if you have side effects  Tell him of her if you are allergic to any medicine  Keep a list of the medicines, vitamins, and herbs you take  Include the amounts, and when and why you take them  Bring the list or the pill bottles to follow-up visits  Carry your medicine list with you in case of an emergency  Prevent another UTI:   · Empty your bladder often    Urinate and empty your bladder as soon as you feel the need  Do not hold your urine for long periods of time  · Drink liquids as directed  Ask how much liquid to drink each day and which liquids are best for you  You may need to drink more liquids than usual to help flush out the bacteria  Do not drink alcohol, caffeine, or citrus juices  These can irritate your bladder and increase your symptoms  Your healthcare provider may recommend cranberry juice to help prevent a UTI  · Urinate after you have sex  This can help flush out bacteria passed during sex  · Do pelvic muscle exercises often  Pelvic muscle exercises may help you start and stop urinating  Strong pelvic muscles may help you empty your bladder easier  Squeeze these muscles tightly for 5 seconds like you are trying to hold back urine  Then relax for 5 seconds  Gradually work up to squeezing for 10 seconds  Do 3 sets of 15 repetitions a day, or as directed  Follow up with your healthcare provider as directed:  Write down your questions so you remember to ask them during your visits  © 2017 [address]  Information is for End User's use only and may not be sold, redistributed or otherwise used for commercial purposes  All illustrations and images included in CareNotes® are the copyrighted property of [employer]  or [employer]  The above information is an  only  It is not intended as medical advice for individual conditions or treatments  Talk to your doctor, nurse or pharmacist before following any medical regimen to see if it is safe and effective for you

## 2018-05-22 ENCOUNTER — OFFICE VISIT (OUTPATIENT)
Dept: PODIATRY | Facility: CLINIC | Age: 53
End: 2018-05-22
Payer: MEDICARE

## 2018-05-22 VITALS — HEIGHT: 61 IN | WEIGHT: 142 LBS | BODY MASS INDEX: 26.81 KG/M2

## 2018-05-22 DIAGNOSIS — B35.1 ONYCHOMYCOSIS: ICD-10-CM

## 2018-05-22 DIAGNOSIS — M21.969 ACQUIRED DEFORMITY OF ANKLE AND FOOT, UNSPECIFIED LATERALITY: ICD-10-CM

## 2018-05-22 DIAGNOSIS — M20.12 VALGUS DEFORMITY OF BOTH GREAT TOES: ICD-10-CM

## 2018-05-22 DIAGNOSIS — L60.0 INGROWING NAIL: Primary | ICD-10-CM

## 2018-05-22 DIAGNOSIS — M20.11 VALGUS DEFORMITY OF BOTH GREAT TOES: ICD-10-CM

## 2018-05-22 PROCEDURE — 99213 OFFICE O/P EST LOW 20 MIN: CPT | Performed by: PODIATRIST

## 2018-05-22 NOTE — PROGRESS NOTES
Assessment/Plan:  Aseptic debridement and planning of nails x10 and manually and mechanically  Discussed likely need for permanent removal of ingrown  Debrided hyperkeratotic lesions bilateral  Discussed orthotics and proper shoe fit  Follow-up 2 months       Diagnoses and all orders for this visit:    Ingrowing nail    Onychomycosis    Acquired deformity of ankle and foot, unspecified laterality          Subjective:      Patient ID: Chaz Edward is a 47 y o  male  Patient gets recurrent ingrown nails bilateral great toes  Has painful calluses  Has significant bunion hammertoes bilateral   Significant flatfoot some pain when walking and standing  Past Medical History:   Diagnosis Date    [facility]congest[facility]) [facility]     Anxiety     Dementia     Depression     Disease of thyroid gland     Down's syndrome     GERD (gastroesophageal reflux disease)     Gluten free diet     Hernia of abdominal cavity     Hyperlipidemia     Hypogonadism in male     Impulse control disorder     OCD (obsessive compulsive disorder)     Parkinson disease (HCC)     Pleural effusion     Sinus tachycardia          Current Outpatient Prescriptions:     ANDROGEL PUMP 20 25 MG/ACT (1 62%) TD gel pump, 3 pumps daily total , Disp: , Rfl: 0    atorvastatin (LIPITOR) 20 mg tablet, Take 20 mg by mouth daily  , Disp: , Rfl:     Calcium Carb-Cholecalciferol (CALCIUM 600 + D) 600-200 MG-UNIT TABS, Take by mouth 2 (two) times a day  , Disp: , Rfl:     cholecalciferol (VITAMIN D3) 1,000 units tablet, Take by mouth, Disp: , Rfl:     divalproex sodium (DEPAKOTE) 500 mg EC tablet, Take 500 mg by mouth daily at bedtime  , Disp: , Rfl:     docusate sodium (COLACE) 100 mg capsule, Take 100 mg by mouth daily  , Disp: , Rfl:     esomeprazole (NexIUM) 40 MG capsule, Take 40 mg by mouth every morning before breakfast , Disp: , Rfl:     levofloxacin (LEVAQUIN) 500 mg tablet, One tablet daily at 8pm for 7 days  , Disp: 7 tablet, Rfl: 0    levothyroxine 75 mcg tablet, Take 1 tablet (75 mcg total) by mouth daily, Disp: 30 tablet, Rfl: 11    loratadine (CLARITIN) 10 mg tablet, Take by mouth, Disp: , Rfl:     Multiple Vitamins-Minerals (CENTRUM ADULTS PO), Take by mouth, Disp: , Rfl:     OLANZapine (ZyPREXA) 2 5 mg tablet, Take 1 tablet by mouth daily, Disp: , Rfl:     polyethylene glycol (GLYCOLAX) powder, , Disp: , Rfl:     polyethylene glycol (MIRALAX) 17 g packet, Take 17 g by mouth daily  , Disp: , Rfl:     tamsulosin (FLOMAX) 0 4 mg, Take 0 4 mg by mouth daily with dinner , Disp: , Rfl:   No current facility-administered medications for this visit  Facility-Administered Medications Ordered in Other Visits:     midazolam (VERSED) injection, , Intravenous, PRN, Yasmin Essex, MD, 2 mg at 04/29/16 1218    propofol (DIPRIVAN) 10 mg/mL bolus injection, , Intravenous, PRN, Yasmin Essex, MD, 20 mg at 04/29/16 1240    sodium chloride 0 9 % infusion, , , Continuous PRN, Yasmin Essex, MD    Past Surgical History:   Procedure Laterality Date    COLONOSCOPY      ESOPHAGOGASTRODUODENOSCOPY      PERICARDIOCENTESIS  4/29/2016            Allergies   Allergen Reactions    Advil [Ibuprofen]     Gluten Meal        Patient Active Problem List   Diagnosis    Acute exacerbation of CHF (congestive heart failure) ([facility] [place] [address] [facility]    Essential hypertension    Hyperlipidemia    Hypothyroidism    GERD (gastroesophageal reflux disease)    Down's syndrome    Anemia    CHELSIE (iron deficiency anemia)    Chronic constipation    Hypogonadism, testicular    Hypotension    Idiopathic osteoporosis    Mixed incontinence    Parkinson's disease (HCC)    Serositis ([address] )    Bladder distention    Hydronephrosis    Abdominal pain    Idiopathic pericardial effusion    Cardiac murmur       Review of Systems   Constitutional: Negative  HENT: Negative  Eyes: Negative  Respiratory: Negative  Cardiovascular: Negative  Gastrointestinal: Negative  Endocrine: Negative  Genitourinary: Negative  Musculoskeletal: Negative  Skin: Negative  Allergic/Immunologic: Negative  Neurological: Negative  Hematological: Negative  Psychiatric/Behavioral: Negative  Objective:      Ht 5' 1" (1 549 m)   Wt 64 4 kg (142 lb)   BMI 26 83 kg/m²          Physical Exam    Constitutional: no acute distress, well appearing and well nourished   Orthopedic/Biomechanical: abnormal foot type,-- hammertoe(s),-- bunion(s),-- abnormal MPJ ROM,-- discolored nails,-- subungual debris-- and-- nail tenderness   Skin: keratoses  Left Foot: Appearance: a deformity  Great toe deformities include a bunion  Evaluation of the great toe nail demonstrates an ingrown nail, but-- no paronychia  Decreased range of motion first MPJ bilateral positive pain on palpation and range of motion right worse than left  Ingrown bilateral big toe  No signs of infection no erythema no exudate  Special Tests: significant bunion bilateral decreased range of motion first MPJ no pain on range of motion,no sign of infection  Right Foot: Appearance: Normal except as noted: a deformity  Great toe deformities include a bunion  Evaluation of the great toe nail demonstrates paronychia-- and-- an ingrown nail  Mild ingrown tibial border  Rigid hammertoes, 2 through 5, bilateral, moderate xerosis, fissures, bilateral heels, midfoot, bilateral, negative erythema  Negative exudate  Negative ulceration  Special Tests: Mild xerosis, no signs of infection  Neurological Exam: Light touch was decreased bilaterally  Vibratory sensation was decreased in both first metatarsophalangeal joints  Response to monofilament test was intact bilaterally  Vascular Exam: performed Dorsalis pedis pulses were 1/4 bilaterally  Posterior tibial pulses were 1/4 bilaterally  Capillary refill time was between 1-3 seconds bilaterally  Toenails:  All of the toenails were elongated,-- hypertrophied,-- discolored,-- shown to have subungual debris-- and-- tender  Both first toenails were ingrown  Hyperkeratosis: present on both first sub metatarsals-- and-- present on both fifth sub metatarsals  Ingrown bilateral great toes  No signs of infection  Moderate bunion bilateral   Rigid hammertoes 2 through 5 bilateral   Significant equinus bilateral   Negative erythema no signs of infection

## 2018-05-24 ENCOUNTER — TELEPHONE (OUTPATIENT)
Dept: FAMILY MEDICINE CLINIC | Facility: CLINIC | Age: 53
End: 2018-05-24

## 2018-05-24 NOTE — TELEPHONE ENCOUNTER
[facility],     Patient was seen by you last week for a UTI and she said that you prescribed him levofloxacin 500 MG for 7 days and she said that today is the 7th day but there are no pills left  She said that she doesn't know if the pharmacy messed up and only gave them 6 or if he accidentally took 2 one day? She didn't count them when she picked them up from the pharmacy  She was wondering if you can discontinue the medication from yesterday? Please call Rosmery Mondragon back to advise about the situation  Thank you!

## 2018-06-08 ENCOUNTER — TRANSCRIBE ORDERS (OUTPATIENT)
Dept: ADMINISTRATIVE | Facility: HOSPITAL | Age: 53
End: 2018-06-08

## 2018-06-08 ENCOUNTER — APPOINTMENT (OUTPATIENT)
Dept: LAB | Facility: HOSPITAL | Age: 53
End: 2018-06-08
Payer: MEDICARE

## 2018-06-08 DIAGNOSIS — Z12.5 SPECIAL SCREENING FOR MALIGNANT NEOPLASM OF PROSTATE: Primary | ICD-10-CM

## 2018-06-08 PROCEDURE — G0103 PSA SCREENING: HCPCS

## 2018-06-08 PROCEDURE — 36415 COLL VENOUS BLD VENIPUNCTURE: CPT | Performed by: DENTIST

## 2018-06-08 PROCEDURE — 84154 ASSAY OF PSA FREE: CPT | Performed by: DENTIST

## 2018-06-09 LAB
PSA FREE MFR SERPL: NORMAL %
PSA FREE SERPL-MCNC: <0.01 NG/ML
PSA SERPL-MCNC: <0.1 NG/ML (ref 0–4)

## 2018-08-09 ENCOUNTER — OFFICE VISIT (OUTPATIENT)
Dept: NEUROLOGY | Facility: CLINIC | Age: 53
End: 2018-08-09
Payer: MEDICARE

## 2018-08-09 VITALS
DIASTOLIC BLOOD PRESSURE: 66 MMHG | SYSTOLIC BLOOD PRESSURE: 104 MMHG | WEIGHT: 150 LBS | BODY MASS INDEX: 28.32 KG/M2 | HEIGHT: 61 IN

## 2018-08-09 DIAGNOSIS — G20 PARKINSON'S DISEASE (HCC): Primary | ICD-10-CM

## 2018-08-09 PROCEDURE — 99214 OFFICE O/P EST MOD 30 MIN: CPT | Performed by: PSYCHIATRY & NEUROLOGY

## 2018-08-09 NOTE — PROGRESS NOTES
Patient ID: Wanda Younger is a 46 y o  male  Assessment/Plan:  Mild Parkinson disease    Plan as per clinical symptoms and very minimal objective signs in my medical judgment I do not be you patient need any antiparkinson medication at this time    I will re-evaluate the patient in the 6 months  Subjective:    34-year-old male followed in the office for early symptoms of Parkinson disease  Patient does have very minor resting tremor as per caretaker  Patient is able to care for himself and does is daily activity of living independently such as a showering dressing and walking independently  Patient denies any tremors while eating, drinking or when he is doing activity  Upon asking caretaker denies any slowness in daily activity or in the walking compared to few years ago  Patient has not regressed mentally from his daily activity  Patient uses bathroom facility independently and has no incontinence of bladder or bowels  To patient based epilepsy he has no other neurological symptoms such as headache, visual disturbances hearing loss, dizziness trouble chewing and swallowing or trouble understanding and speaking  Patient is a no paralysis type of weakness  Patient walks without any unsteady gait and there is no history of falls  Pt denies double vision, blurred vision, transient monocular blindness, vertigo, tinnitus, hearing loss, slurred speech, difficulty expressing and understanding, dysphasia, and focal weakness, numbness, imbalance and incoordination  Pt denies bladder and bowel urgency, frequency and incontinence         Past Medical History:   Diagnosis Date    Abnormal weight gain     last assessed 4/18/16; resolved 1/25/17    Acute CHF [facility]congestive heart failure) ([facility] [address]     Anxiety     Dementia     Depression     Disease of thyroid gland     Down's syndrome     Fatigue     last assessed 2/23/17; resolved 6/1/17    GERD (gastroesophageal reflux disease)     Gluten free diet     Hernia of abdominal cavity     Hyperlipidemia     Hypogonadism in male     Impulse control disorder     OCD (obsessive compulsive disorder)     [facility]     Parkinson disease ([facility] [address] [facility]     resolved 2/23/17    Pleural effusion     Sinus tachycardia     Vitamin D deficiency     last assessed 9/1/16; resolved 6/1/17       Family History   Problem Relation Age of Onset    No Known Problems Mother     Parkinsonism Father         symtomatic   ,    Social History     Social History    Marital status: Single     Spouse name: N/A    Number of children: N/A    Years of education: N/A     Occupational History    Not on file  Social History Main Topics    Smoking status: Never Smoker    Smokeless tobacco: Never Used    Alcohol use No    Drug use: No    Sexual activity: Not Currently     Other Topics Concern    Not on file     Social History Narrative    Caffeine use        Current Outpatient Prescriptions on File Prior to Visit   Medication Sig Dispense Refill    ANDROGEL PUMP 20 25 MG/ACT (1 62%) TD gel pump 3 pumps daily total   0    atorvastatin (LIPITOR) 20 mg tablet Take 20 mg by mouth daily   Calcium Carb-Cholecalciferol (CALCIUM 600 + D) 600-200 MG-UNIT TABS Take by mouth 2 (two) times a day        cholecalciferol (VITAMIN D3) 1,000 units tablet Take by mouth      divalproex sodium (DEPAKOTE) 500 mg EC tablet Take 500 mg by mouth daily at bedtime        docusate sodium (COLACE) 100 mg capsule Take 100 mg by mouth daily        esomeprazole (NexIUM) 40 MG capsule Take 40 mg by mouth every morning before breakfast       levothyroxine 75 mcg tablet Take 1 tablet (75 mcg total) by mouth daily 30 tablet 11    loratadine (CLARITIN) 10 mg tablet Take by mouth      Multiple Vitamins-Minerals (CENTRUM ADULTS PO) Take by mouth      OLANZapine (ZyPREXA) 2 5 mg tablet Take 1 tablet by mouth daily      polyethylene glycol (MIRALAX) 17 g packet Take 17 g by mouth daily   tamsulosin (FLOMAX) 0 4 mg Take 0 4 mg by mouth daily with dinner   polyethylene glycol (GLYCOLAX) powder        Current Facility-Administered Medications on File Prior to Visit   Medication Dose Route Frequency Provider Last Rate Last Dose    midazolam (VERSED) injection   Intravenous PRN Slava Rodriguez MD   2 mg at 04/29/16 1218    propofol (DIPRIVAN) 10 mg/mL bolus injection   Intravenous PRN Slava Rodriguez MD   20 mg at 04/29/16 1240    sodium chloride 0 9 % infusion    Continuous PRN Slava Rodriguez MD                    Objective:    Blood pressure 104/66, height 5' 1" (1 549 m), weight 68 kg (150 lb)  Physical Exam   Eyes: EOM are normal  Pupils are equal, round, and reactive to light  Neck: Normal carotid pulses present  Carotid bruit is not present  Neurological: He has normal reflexes  Gait normal    Psychiatric: His speech is normal        Neurological Exam    Mental Status  The patient is and oriented to person, place, time, and situation  His recent and remote memory are normal  He has no visuospatial neglect  His speech is normal  His language is fluent with no aphasia  He follows multi-step commands  He has a normal fund of knowledge  Cranial Nerves    CN II: The patient's visual acuity and visual fields are normal   CN III, IV, VI: The patient's pupils are equally round and reactive to light and ocular movements are normal   CN V: The patient has normal facial sensation  CN VII:  The patient has symmetric facial movement  CN VIII:  The patient's hearing is normal   CN IX, X: The patient has symmetric palate movement and normal gag reflex  CN XI: The patient's shoulder shrug strength is normal   CN XII: The patient's tongue is midline without atrophy or fasciculations  Motor    Patient has mild masklike faces  He has a mild resting tremor rapid movement is slow there is a cogwheel rigidity on the left upper extremity but not on the right upper extremity    Strength is normal in both upper and lower extremities  Sensory  The patient's sensation is normal in all four extremities to light touch, pinprick and proprioception  He has normal cortical sensation    Reflexes  Deep tendon reflexes are 2+ and symmetric in all four extremities with downgoing toes bilaterally  Gait and Coordination  The patient has normal gait and station and normal casual, toe, heel, and tandem gait  He has normal right heel to shin and normal left heel to shin coordination  He has normal right finger to nose and normal left finger to nose coordination  ROS:    Review of Systems   Constitutional: Negative  HENT: Negative  Eyes: Negative  Respiratory: Negative  Cardiovascular: Negative  Gastrointestinal: Negative  Endocrine: Negative  Genitourinary: Negative  Musculoskeletal: Negative  Skin: Negative  Allergic/Immunologic: Negative  Neurological: Negative  Hematological: Negative  Psychiatric/Behavioral: Negative

## 2018-08-13 NOTE — PROGRESS NOTES
Progress Note - Cardiology Office  Deanne Traore 46 y o  male MRN: [id number]   Encounter: [id number]   1  Idiopathic pericardial effusion    2  Essential hypertension    3  Parkinson's disease ([address] )    4  Down's syndrome    5  Cardiac murmur    6  Hyperlipidemia, unspecified hyperlipidemia type    7  Hypothyroidism due to acquired atrophy of thyroid        Assessment/Plan     1  Idiopathic pericardial effusion  Patient status post drainage and has done well since then he was treated with anti-inflammatory and colchicine  Repeat echo Doppler shows no effusion  Most likely related to wire pericarditis and since then has resolved  2  Down syndrome with mild intellectual disability and behavior problems  Patient lives in a group home   He is stable from cardiac point of view    3  Dyslipidemia  Continue statins  Tolerating well  Cholesterol reviewed and acceptable  Patient on Lipitor 20 mg    4  Hypothyroidism on levothyroxine  Monitor TSH if not done     5  Tremors  Patient diagnosed to have Parkinson disease  Management as per neurology    6  Cardiac murmur  Patient's echo reviewed  Most likely etiology is thickened and calcified aortic valve without any stenosis  There is a trace AI  He has normal LV systolic function  Will monitor at this time  7  Multiple other problems like history of hydronephrosis, hypogonadism, hypothyroidism and chronic allergies  Management as per medical team     Follow-up in 6 months  Patient's conditions, other symptoms were reviewed with patient's caretaker at length  HPI :     Deanne Traore is a 46y o  year old male who came for follow up  Patient has past medical history significant for Down syndrome, recently diagnosed Parkinson disease, dyslipidemia, behavior problems, hypothyroidism, idiopathic pulmonary had who came for follow-up  Patient was admitted to [facility] about year ago  Large pericardial effusion   Etiology of pericardial effusion was never known and it was drained and he was close to tamponade  Since then 2 echoes were done and no significant pericardial effusion is seen  He is doing well he did gain weight of about 10-15 pounds  He came with his caretaker  Denies any chest pain or any shortness of breath  He has no fever no chills no nausea no vomiting no other complaint  02/08/2018     Patient came with caretaker  He was recently in hospital for abdominal pain  Found to have hydronephrosis and follows up with urology  Denies any chest pain  He has gained some weight  Some exertional fatigue and shortness of breath but not much changed  No fever no chills no nausea no vomiting  Patient has history of pericardial effusion which was drained and had last echo in 2016     08/15/2018  ABOVE REVIEWED  Patient came with caretaker  He is doing well from cardiac point of view  No more accumulation of pericardial effusion  No chest pain  He regularly follows up with urology  He has recently diagnosed to have mild Parkinson's disease  He he regularly follows up with them  No nausea no vomiting no chest pain no other significant cardiovascular complaint  Review of Systems   Constitutional: Negative for activity change, chills, diaphoresis, fever and unexpected weight change  HENT: Positive for hearing loss  Negative for congestion  He is using now hearing aids   Eyes: Negative for discharge and redness  Respiratory: Negative for cough, chest tightness and wheezing  Cardiovascular: Negative  Negative for chest pain, palpitations and leg swelling  Gastrointestinal: Negative for abdominal pain, diarrhea and nausea  Endocrine: Negative  Genitourinary: Positive for difficulty urinating  Negative for decreased urine volume and urgency  Musculoskeletal: Negative  Negative for arthralgias, back pain and gait problem  Skin: Negative for rash and wound  Allergic/Immunologic: Negative      Neurological: Negative for dizziness, seizures, syncope, weakness, light-headedness and headaches  Recently diagnosed Parkinson's disease   Hematological: Negative  Psychiatric/Behavioral: Negative for agitation and confusion  The patient is not nervous/anxious  Historical Information   Past Medical History:   Diagnosis Date    Abnormal weight gain     last assessed 4/18/16; resolved 1/25/17    [facility]) [facility]     Anxiety     Dementia     Depression     Disease of thyroid gland     Down's syndrome     Fatigue     last assessed 2/23/17; resolved 6/1/17    GERD (gastroesophageal reflux disease)     Gluten free diet     Hernia of abdominal cavity     Hyperlipidemia     Hypogonadism in male     Impulse control disorder     OCD (obsessive compulsive disorder)     [facility] [facility]     Parkinson disease ([facility] )     resolved 2/23/17    Pleural effusion     Sinus tachycardia     Vitamin D deficiency     last assessed 9/1/16; resolved 6/1/17     Past Surgical History:   Procedure Laterality Date    COLONOSCOPY      ESOPHAGOGASTRODUODENOSCOPY      with biopsy    PERICARDIOCENTESIS  4/29/2016          History   Alcohol Use No     History   Drug Use No     History   Smoking Status    Never Smoker   Smokeless Tobacco    Never Used     Family History:   Family History   Problem Relation Age of Onset    No Known Problems Mother     Parkinsonism Father         symtomatic       Meds/Allergies     Allergies   Allergen Reactions    Advil [Ibuprofen]     Gluten Meal        Current Outpatient Prescriptions:     ANDROGEL PUMP 20 25 MG/ACT (1 62%) TD gel pump, 3 pumps daily total , Disp: , Rfl: 0    atorvastatin (LIPITOR) 20 mg tablet, Take 20 mg by mouth daily  , Disp: , Rfl:     Calcium Carb-Cholecalciferol (CALCIUM 600 + D) 600-200 MG-UNIT TABS, Take by mouth 2 (two) times a day  , Disp: , Rfl:     cholecalciferol (VITAMIN D3) 1,000 units tablet, Take by mouth, Disp: , Rfl:    divalproex sodium (DEPAKOTE) 500 mg EC tablet, Take 500 mg by mouth daily at bedtime  , Disp: , Rfl:     docusate sodium (COLACE) 100 mg capsule, Take 100 mg by mouth daily  , Disp: , Rfl:     esomeprazole (NexIUM) 40 MG capsule, Take 40 mg by mouth every morning before breakfast , Disp: , Rfl:     levothyroxine 75 mcg tablet, Take 1 tablet (75 mcg total) by mouth daily, Disp: 30 tablet, Rfl: 11    Multiple Vitamins-Minerals (CENTRUM ADULTS PO), Take by mouth, Disp: , Rfl:     OLANZapine (ZyPREXA) 2 5 mg tablet, Take 1 tablet by mouth daily, Disp: , Rfl:     polyethylene glycol (GLYCOLAX) powder, , Disp: , Rfl:     tamsulosin (FLOMAX) 0 4 mg, Take 0 4 mg by mouth daily with dinner , Disp: , Rfl:     loratadine (CLARITIN) 10 mg tablet, Take by mouth, Disp: , Rfl:     polyethylene glycol (MIRALAX) 17 g packet, Take 17 g by mouth daily  , Disp: , Rfl:   No current facility-administered medications for this visit  Facility-Administered Medications Ordered in Other Visits:     midazolam (VERSED) injection, , Intravenous, PRN, Zack Connell MD, 2 mg at 04/29/16 1218    propofol (DIPRIVAN) 10 mg/mL bolus injection, , Intravenous, PRN, Zack Connell MD, 20 mg at 04/29/16 1240    sodium chloride 0 9 % infusion, , , Continuous PRN, Zack Connell MD    Vitals: Blood pressure 110/74, pulse 82, weight 66 3 kg (146 lb 3 2 oz)  Body mass index is 27 62 kg/m²  BP Readings from Last 3 Encounters:   08/15/18 110/74   08/09/18 104/66   05/18/18 100/60       Physical Exam:  Physical Exam   Constitutional: He is oriented to person, place, and time  He appears well-developed  No distress  HENT:   Head: Normocephalic and atraumatic  Eyes: Pupils are equal, round, and reactive to light  Neck: Normal range of motion  Neck supple  No JVD present  No thyromegaly present  Cardiovascular: Normal rate  S1-S2 regular with 2/6 ejection systolic murmur, S4 present     Pulmonary/Chest: No respiratory distress  He has no wheezes  He has no rales  Bilateral air entry clear to auscultation   Abdominal: Soft  Bowel sounds are normal  He exhibits no distension  There is no tenderness  There is no rebound  Musculoskeletal: Normal range of motion  He exhibits no edema or tenderness  Neurological: He is alert and oriented to person, place, and time  Skin: Skin is warm and dry  He is not diaphoretic  Psychiatric: He has a normal mood and affect  His behavior is normal    He has history of down syndrome       [id number]  Twelve lead EKG shows normal sinus rhythm heart rate 57 beats per minute  Baseline artifact  No other significant changes  Diagnostic Studies Review Cardio:    Stress Test: Nuclear stress test on 10/21/2016 shows no evidence of ischemia  Echocardiogram/PRADEEP: His echo Doppler done in May 2016 and 2016 shows no evidence of any pericardial effusion  EF 65-70%, mild aortic regurgitation, mild MR, mild TR PA pressure 30 mmHg  Repeat echo Doppler done in 2018 shows normal LV systolic function EF 55 75%, left atrium mildly dilated, aortic valve was mildly thickened with mild calcification but no significant stenosis trace AI, no significant pericardial effusion was noted  ECG Report:   Comparison to prior ECGs: no interval change  EKG done 2017 shows normal sinus, heart rate 73 bpm  No significant ST changes   Some baseline artifact noted to changed from old EKG     Cardiac testing:   Results for orders placed during the hospital encounter of 16   Echo complete with contrast if indicated    [address]  [address] [address]  [phone]    Transthoracic Echocardiogram  2D, M-mode, and Doppler    Study date:  2016    Patient: Hari Pham  MR number: [id number]  Account number: [de-identified]  : 1965  Age: 45 years  Gender: Male  Status: Inpatient  Location: Echo lab  Height: 60 in  Weight: 145 lb  BP: 120/ 63 mmHg    Indications: Pericardial effusion    Diagnoses: 420 99 - ACUTE PERICARDITIS NEC    Sonographer:  ADIEL Kim  Primary Physician:  Ciarra Olvera  Referring Physician:  Javid Fierro MD  Group:  [facility]  Interpreting Physician:  Javid Fierro MD    SUMMARY    LEFT VENTRICLE:  Systolic function was normal by visual assessment  Ejection fraction was  estimated in the range of 55 % to 65 %  There were no regional wall motion abnormalities  Doppler parameters were consistent with abnormal left ventricular relaxation  (grade 1 diastolic dysfunction)  AORTIC VALVE:  There was mild regurgitation  TRICUSPID VALVE:  There was mild regurgitation  PERICARDIUM:  A moderate to large pericardial effusion was identified circumferential to the  heart  The fluid exhibited a fibrinous appearance seems to be same quantity  PROCEDURE: The procedure was performed in the echo lab  This was a routine  study  The transthoracic approach was used  The study included complete 2D  imaging, M-mode, and complete spectral Doppler  The heart rate was 100 bpm, at  the start of the study  Echocardiographic views were limited due to poor  patient compliance  Image quality was adequate  LEFT VENTRICLE: Size was normal  Systolic function was normal by visual  assessment  Ejection fraction was estimated in the range of 55 % to 65 %  There  were no regional wall motion abnormalities  Wall thickness was normal  DOPPLER:  Doppler parameters were consistent with abnormal left ventricular relaxation  (grade 1 diastolic dysfunction)  RIGHT VENTRICLE: The size was normal  Systolic function was normal  There was  no evidence of a mass  LEFT ATRIUM: Size was normal     RIGHT ATRIUM: Size was normal     AORTIC VALVE: The valve was trileaflet  DOPPLER: There was mild regurgitation  TRICUSPID VALVE: DOPPLER: There was mild regurgitation      PULMONIC VALVE: Not well visualized  PERICARDIUM: A moderate to large pericardial effusion was identified  circumferential to the heart  The fluid exhibited a fibrinous appearance seems  to be same quantity  Features were not consistent with tamponade physiology  AORTA: The root exhibited normal size  SYSTEMIC VEINS: IVC: The inferior vena cava was normal in size  Respirophasic  changes were normal     PULMONARY VEINS: Not well visualized  [address] Accredited Echocardiography Laboratory    Prepared and electronically signed by    Saskia Oconnor MD  Signed 29-Apr-2016 10:29:17       Lab Review   Lab Results   Component Value Date    WBC 3 70 (L) 04/27/2018    HGB 14 9 04/27/2018    HCT 43 9 04/27/2018     (H) 04/27/2018     04/27/2018     Lab Results   Component Value Date     02/03/2018    K 4 0 02/03/2018     02/03/2018    CO2 37 (H) 02/03/2018    ANIONGAP 1 (L) 02/03/2018    BUN 8 02/03/2018    CREATININE 0 86 02/03/2018    GLUCOSE 102 01/28/2018    GLUF 87 02/03/2018    CALCIUM 9 5 02/03/2018    AST 23 02/03/2018    ALT 27 02/03/2018    ALKPHOS 63 02/03/2018    PROT 7 5 02/03/2018    BILITOT 0 60 02/03/2018    EGFR 99 02/03/2018     Lab Results   Component Value Date    GLUCOSE 102 01/28/2018    CALCIUM 9 5 02/03/2018     02/03/2018    K 4 0 02/03/2018    CO2 37 (H) 02/03/2018     02/03/2018    BUN 8 02/03/2018    CREATININE 0 86 02/03/2018     Lipid Profile:   Lab Results   Component Value Date    CHOL 185 07/15/2017    HDL 65 (H) 07/15/2017    LDLCALC 102 (H) 07/15/2017    TRIG 90 07/15/2017     Lab Results   Component Value Date    CHOL 185 07/15/2017    CHOL 173 01/27/2017     Lab Results   Component Value Date    TROPONINI <0 02 01/28/2018       Dr Saskia Oconnor MD [facility]      "This note has been constructed using a voice recognition system  Therefore there may be syntax, spelling, and/or grammatical errors   Please call if you have any questions  "

## 2018-08-15 ENCOUNTER — OFFICE VISIT (OUTPATIENT)
Dept: CARDIOLOGY CLINIC | Facility: CLINIC | Age: 53
End: 2018-08-15
Payer: MEDICARE

## 2018-08-15 VITALS
BODY MASS INDEX: 27.62 KG/M2 | DIASTOLIC BLOOD PRESSURE: 74 MMHG | HEART RATE: 82 BPM | WEIGHT: 146.2 LBS | SYSTOLIC BLOOD PRESSURE: 110 MMHG

## 2018-08-15 DIAGNOSIS — R01.1 CARDIAC MURMUR: ICD-10-CM

## 2018-08-15 DIAGNOSIS — E03.4 HYPOTHYROIDISM DUE TO ACQUIRED ATROPHY OF THYROID: ICD-10-CM

## 2018-08-15 DIAGNOSIS — E78.5 HYPERLIPIDEMIA, UNSPECIFIED HYPERLIPIDEMIA TYPE: ICD-10-CM

## 2018-08-15 DIAGNOSIS — I10 ESSENTIAL HYPERTENSION: ICD-10-CM

## 2018-08-15 DIAGNOSIS — I31.3 IDIOPATHIC PERICARDIAL EFFUSION: ICD-10-CM

## 2018-08-15 DIAGNOSIS — G20 PARKINSON'S DISEASE (HCC): ICD-10-CM

## 2018-08-15 DIAGNOSIS — Q90.9 DOWN'S SYNDROME: ICD-10-CM

## 2018-08-15 PROCEDURE — 99214 OFFICE O/P EST MOD 30 MIN: CPT | Performed by: INTERNAL MEDICINE

## 2018-08-21 ENCOUNTER — OFFICE VISIT (OUTPATIENT)
Dept: PODIATRY | Facility: CLINIC | Age: 53
End: 2018-08-21
Payer: MEDICARE

## 2018-08-21 VITALS
WEIGHT: 146 LBS | HEIGHT: 61 IN | BODY MASS INDEX: 27.56 KG/M2 | DIASTOLIC BLOOD PRESSURE: 74 MMHG | SYSTOLIC BLOOD PRESSURE: 112 MMHG

## 2018-08-21 DIAGNOSIS — L60.0 INGROWING NAIL: Primary | ICD-10-CM

## 2018-08-21 DIAGNOSIS — M20.11 VALGUS DEFORMITY OF BOTH GREAT TOES: ICD-10-CM

## 2018-08-21 DIAGNOSIS — M20.12 VALGUS DEFORMITY OF BOTH GREAT TOES: ICD-10-CM

## 2018-08-21 DIAGNOSIS — M21.962 DEFORMITY OF BOTH FEET: ICD-10-CM

## 2018-08-21 DIAGNOSIS — M21.961 DEFORMITY OF BOTH FEET: ICD-10-CM

## 2018-08-21 DIAGNOSIS — M25.571 JOINT PAIN OF ANKLE AND FOOT, RIGHT: ICD-10-CM

## 2018-08-21 PROCEDURE — 99213 OFFICE O/P EST LOW 20 MIN: CPT | Performed by: PODIATRIST

## 2018-08-21 NOTE — PROGRESS NOTES
Assessment/Plan:  Aseptic debridement and planning of nails x10 and manually and mechanically    Discussed wider shoes discussed orthotics  Discussed injection if pain not resolving  Follow-up 2 months     Diagnoses and all orders for this visit:    Ingrowing nail    Valgus deformity of both great toes    Joint pain of ankle and foot, right    Deformity of both feet          Subjective:      Patient ID: Amy Mcallister is a 46 y o  male  Patient patient has recurrent ingrown nails bilateral great toes  Patient does have bunion bilateral right worse than left  Has been getting some pain in right 1st MPJ on off  Rates pain 4/10 pain scale  There is no redness or signs of infection  Past Medical History:   Diagnosis Date    Abnormal weight gain     last assessed 4/18/16; resolved 1/25/17    Acute [facility]estive [facility]) ([facility] [address] [facility]     Anxiety     Dementia     Depression     Disease of thyroid gland     Down's syndrome     Fatigue     last assessed 2/23/17; resolved 6/1/17    GERD (gastroesophageal reflux disease)     Gluten free diet     Hernia of abdominal cavity     Hyperlipidemia     Hypogonadism in male     Impulse control disorder     OCD (obsessive compulsive disorder)     Parkinson disease ([facility]     Parkinson disease ([facility] )     resolved 2/23/17    Pleural effusion     Sinus tachycardia     Vitamin D deficiency     last assessed 9/1/16; resolved 6/1/17         Current Outpatient Prescriptions:     ANDROGEL PUMP 20 25 MG/ACT (1 62%) TD gel pump, 3 pumps daily total , Disp: , Rfl: 0    atorvastatin (LIPITOR) 20 mg tablet, Take 20 mg by mouth daily  , Disp: , Rfl:     Calcium Carb-Cholecalciferol (CALCIUM 600 + D) 600-200 MG-UNIT TABS, Take by mouth 2 (two) times a day  , Disp: , Rfl:     cholecalciferol (VITAMIN D3) 1,000 units tablet, Take by mouth, Disp: , Rfl:     divalproex sodium (DEPAKOTE) 500 mg EC tablet, Take 500 mg by mouth daily at bedtime  , Disp: , Rfl:    docusate sodium (COLACE) 100 mg capsule, Take 100 mg by mouth daily  , Disp: , Rfl:     esomeprazole (NexIUM) 40 MG capsule, Take 40 mg by mouth every morning before breakfast , Disp: , Rfl:     levothyroxine 75 mcg tablet, Take 1 tablet (75 mcg total) by mouth daily, Disp: 30 tablet, Rfl: 11    loratadine (CLARITIN) 10 mg tablet, Take by mouth, Disp: , Rfl:     Multiple Vitamins-Minerals (CENTRUM ADULTS PO), Take by mouth, Disp: , Rfl:     OLANZapine (ZyPREXA) 2 5 mg tablet, Take 1 tablet by mouth daily, Disp: , Rfl:     polyethylene glycol (GLYCOLAX) powder, , Disp: , Rfl:     polyethylene glycol (MIRALAX) 17 g packet, Take 17 g by mouth daily  , Disp: , Rfl:     tamsulosin (FLOMAX) 0 4 mg, Take 0 4 mg by mouth daily with dinner , Disp: , Rfl:   No current facility-administered medications for this visit       Facility-Administered Medications Ordered in Other Visits:     midazolam (VERSED) injection, , Intravenous, PRN, Akil Mi MD, 2 mg at 04/29/16 1218    propofol (DIPRIVAN) 10 mg/mL bolus injection, , Intravenous, PRN, Akil Mi MD, 20 mg at 04/29/16 1240    sodium chloride 0 9 % infusion, , , Continuous PRN, Akil Mi MD    Past Surgical History:   Procedure Laterality Date    COLONOSCOPY      ESOPHAGOGASTRODUODENOSCOPY      with biopsy    PERICARDIOCENTESIS  4/29/2016            Allergies   Allergen Reactions    Advil [Ibuprofen]     Gluten Meal        Patient Active Problem List   Diagnosis    Acute exacerbation of [facility]F (congestive heart failure) ([facility] [address] [facility]    Essential hypertension    Hyperlipidemia    Hypothyroidism    GERD (gastroesophageal reflux disease)    Down's syndrome    Anemia    CHELSIE (iron deficiency anemia)    Chronic constipation    Hypogonadism, testicular    Hypotension    Idiopathic osteoporosis    Mixed incontinence    Parkinson's disease ([facility]    Serositis ([facility] [facility]    Bladder distention    Hydronephrosis    Abdominal pain    Idiopathic pericardial effusion    Cardiac murmur       Review of Systems   Constitutional: Negative  HENT: Negative  Eyes: Negative  Respiratory: Negative  Cardiovascular: Negative  Gastrointestinal: Negative  Endocrine: Negative  Genitourinary: Negative  Musculoskeletal: Negative  Skin: Negative  Allergic/Immunologic: Negative  Neurological: Negative  Hematological: Negative  Psychiatric/Behavioral: Negative  Objective:      /74   Ht 5' 1" (1 549 m)   Wt 66 2 kg (146 lb)   BMI 27 59 kg/m²          Physical Exam    Constitutional: no acute distress, well appearing and well nourished   Orthopedic/Biomechanical: abnormal foot type,-- hammertoe(s),-- bunion(s),-- abnormal MPJ ROM,-- discolored nails,-- subungual debris-- and-- nail tenderness   Skin: keratoses  Left Foot: Appearance: a deformity  Great toe deformities include a bunion  Evaluation of the great toe nail demonstrates an ingrown nail, but-- no paronychia  Decreased range of motion first MPJ bilateral positive pain on palpation and range of motion right worse than left  Ingrown bilateral big toe  No signs of infection no erythema no exudate  Special Tests: significant bunion bilateral decreased range of motion first MPJ no pain on range of motion,no sign of infection  Right Foot: Appearance: Normal except as noted: a deformity  Great toe deformities include a bunion  Evaluation of the great toe nail demonstrates paronychia-- and-- an ingrown nail  Mild ingrown tibial border  Rigid hammertoes, 2 through 5, bilateral, moderate xerosis, fissures, bilateral heels, midfoot, bilateral, negative erythema  Negative exudate  Negative ulceration  Special Tests: Mild xerosis, no signs of infection  Neurological Exam: Light touch was decreased bilaterally  Vibratory sensation was decreased in both first metatarsophalangeal joints  Response to monofilament test was intact bilaterally     Vascular Exam: performed Dorsalis pedis pulses were 1/4 bilaterally  Posterior tibial pulses were 1/4 bilaterally  Capillary refill time was between 1-3 seconds bilaterally  Toenails: All of the toenails were elongated,-- hypertrophied,-- discolored,-- shown to have subungual debris-- and-- tender  Both first toenails were ingrown  Hyperkeratosis: present on both first sub metatarsals-- and-- present on both fifth sub metatarsals  Ingrown bilateral great toes  No signs of infection  Moderate bunion bilateral   Rigid hammertoes 2 through 5 bilateral   Significant equinus bilateral   Negative erythema no signs of infection  Large bunion right worse than left  Decreased range of motion 1st MPJ  Right 1st MPJ positive pain on end range of motion no crepitus no hypermobility of the 1st ray

## 2018-09-14 ENCOUNTER — OFFICE VISIT (OUTPATIENT)
Dept: ENDOCRINOLOGY | Facility: CLINIC | Age: 53
End: 2018-09-14
Payer: MEDICARE

## 2018-09-14 ENCOUNTER — OFFICE VISIT (OUTPATIENT)
Dept: FAMILY MEDICINE CLINIC | Facility: CLINIC | Age: 53
End: 2018-09-14
Payer: MEDICARE

## 2018-09-14 VITALS
HEART RATE: 72 BPM | BODY MASS INDEX: 27.73 KG/M2 | HEIGHT: 61 IN | DIASTOLIC BLOOD PRESSURE: 72 MMHG | SYSTOLIC BLOOD PRESSURE: 120 MMHG | WEIGHT: 146.9 LBS

## 2018-09-14 VITALS
WEIGHT: 147 LBS | DIASTOLIC BLOOD PRESSURE: 60 MMHG | TEMPERATURE: 98 F | SYSTOLIC BLOOD PRESSURE: 110 MMHG | HEART RATE: 80 BPM | RESPIRATION RATE: 14 BRPM | BODY MASS INDEX: 27.75 KG/M2 | HEIGHT: 61 IN

## 2018-09-14 DIAGNOSIS — Z00.00 HEALTHCARE MAINTENANCE: ICD-10-CM

## 2018-09-14 DIAGNOSIS — M81.8 IDIOPATHIC OSTEOPOROSIS: ICD-10-CM

## 2018-09-14 DIAGNOSIS — Z12.5 ENCOUNTER FOR SCREENING FOR MALIGNANT NEOPLASM OF PROSTATE: ICD-10-CM

## 2018-09-14 DIAGNOSIS — E29.1 HYPOGONADISM, TESTICULAR: ICD-10-CM

## 2018-09-14 DIAGNOSIS — I10 ESSENTIAL HYPERTENSION: ICD-10-CM

## 2018-09-14 DIAGNOSIS — E78.5 HYPERLIPIDEMIA, UNSPECIFIED HYPERLIPIDEMIA TYPE: ICD-10-CM

## 2018-09-14 DIAGNOSIS — H10.31 ACUTE BACTERIAL CONJUNCTIVITIS OF RIGHT EYE: Primary | ICD-10-CM

## 2018-09-14 DIAGNOSIS — E03.4 HYPOTHYROIDISM DUE TO ACQUIRED ATROPHY OF THYROID: Primary | ICD-10-CM

## 2018-09-14 PROCEDURE — 99214 OFFICE O/P EST MOD 30 MIN: CPT | Performed by: INTERNAL MEDICINE

## 2018-09-14 PROCEDURE — 99213 OFFICE O/P EST LOW 20 MIN: CPT | Performed by: FAMILY MEDICINE

## 2018-09-14 RX ORDER — LEVOTHYROXINE SODIUM 0.07 MG/1
75 TABLET ORAL DAILY
Qty: 30 TABLET | Refills: 0 | Status: SHIPPED | OUTPATIENT
Start: 2018-09-14 | End: 2018-09-14 | Stop reason: SDUPTHER

## 2018-09-14 RX ORDER — TOBRAMYCIN 3 MG/ML
SOLUTION/ DROPS OPHTHALMIC
Qty: 5 ML | Refills: 1 | Status: SHIPPED | OUTPATIENT
Start: 2018-09-14 | End: 2019-02-25

## 2018-09-14 RX ORDER — LEVOTHYROXINE SODIUM 0.07 MG/1
75 TABLET ORAL DAILY
Qty: 30 TABLET | Refills: 11 | Status: SHIPPED | OUTPATIENT
Start: 2018-09-14 | End: 2018-09-14 | Stop reason: SDUPTHER

## 2018-09-14 RX ORDER — TESTOSTERONE 16.2 MG/G
GEL TRANSDERMAL
Qty: 75 G | Refills: 5
Start: 2018-09-14 | End: 2018-09-14 | Stop reason: SDUPTHER

## 2018-09-14 RX ORDER — TESTOSTERONE 16.2 MG/G
GEL TRANSDERMAL
Qty: 75 G | Refills: 5 | Status: SHIPPED | OUTPATIENT
Start: 2018-09-14 | End: 2019-01-15 | Stop reason: SDUPTHER

## 2018-09-14 RX ORDER — LEVOTHYROXINE SODIUM 0.07 MG/1
75 TABLET ORAL DAILY
Qty: 30 TABLET | Refills: 11 | Status: SHIPPED | OUTPATIENT
Start: 2018-09-14 | End: 2019-03-26 | Stop reason: SDUPTHER

## 2018-09-14 RX ORDER — TESTOSTERONE 16.2 MG/G
GEL TRANSDERMAL
Qty: 75 G | Refills: 0
Start: 2018-09-14 | End: 2018-09-14 | Stop reason: SDUPTHER

## 2018-09-14 RX ORDER — PYRIDOXINE HCL (VITAMIN B6) 100 MG
TABLET ORAL
Qty: 60 EACH | Refills: 11 | Status: SHIPPED | OUTPATIENT
Start: 2018-09-14 | End: 2018-12-06 | Stop reason: SDUPTHER

## 2018-09-14 NOTE — PROGRESS NOTES
Jacquelyn Groves 46 y o  male MRN: [id number]    Encounter: [id number]      Assessment/Plan     Assessment: This is a 46y o -year-old male with hypothyroidism, hypogonadism and idiopathic osteoporosis, hypertension and hyperlipidemia  He also has irritation of the right eye  Plan:  1  Hypothyroidism-we changed the administration time to 8:00 p m  which is not close to his calcium intake  Hopefully this improves his free T4  Have asked him to have laboratory testing done six weeks after the changes made  2   Hypogonadism-continue testosterone replacement  Check H&H, PSA, total and free testosterone  3   Idiopathic osteoporosis-he will be due for a DEXA scan in 20/20     4  Hyperlipidemia-continue current regimen  5   Hypertension-the blood pressure is well controlled  6   Irritation of the right eye-I have asked him to see his primary care physician  CC:   Hypothyroidism and hypogonadism    History of Present Illness     HPI:  28-year-old male with hypothyroidism for which she is on levothyroxine  He denies any symptoms of hypo or hyperthyroidism  He is taking thyroid hormone replacement with calcium  This decreases the absorption of levothyroxine  For the hypogonadism, he is on testosterone replacement  He denies any difficulty with urination  Hypertension is being managed by lifestyle modification  For the hyperlipidemia, he is on a statin  He denies any myalgia  He had a DEXA scan for osteoporosis in February of this year  Review of Systems   Constitutional: Negative for chills and fever  Respiratory: Negative for shortness of breath  Cardiovascular: Negative for chest pain  Gastrointestinal: Negative for constipation, diarrhea, nausea and vomiting  All other systems reviewed and are negative        Historical Information   Past Medical History:   Diagnosis Date    Abnormal weight gain     last assessed 4/18/16; resolved 1/25/17    Acute CHF [facility]congestive heart failure) ([facility] )     Anxiety     Dementia     Depression     Disease of thyroid gland     Down's syndrome     Fatigue     last assessed 2/23/17; resolved 6/1/17    GERD (gastroesophageal reflux disease)     Gluten free diet     Hernia of abdominal cavity     Hyperlipidemia     Hypogonadism in male     Impulse control disorder     OCD (obsessive compulsive disorder)     Parkinson disease ([facility] )     Parkinson disease ([facility] )     resolved 2/23/17    Pleural effusion     Sinus tachycardia     Vitamin D deficiency     last assessed 9/1/16; resolved 6/1/17     Past Surgical History:   Procedure Laterality Date    COLONOSCOPY      ESOPHAGOGASTRODUODENOSCOPY      with biopsy    PERICARDIOCENTESIS  4/29/2016          Social History   History   Alcohol Use No     History   Drug Use No     History   Smoking Status    Never Smoker   Smokeless Tobacco    Never Used     Family History:   Family History   Problem Relation Age of Onset    No Known Problems Mother     Parkinsonism Father         symtomatic       Meds/Allergies   Current Outpatient Prescriptions   Medication Sig Dispense Refill    ANDROGEL PUMP 20 25 MG/ACT (1 62%) TD gel pump 3 pumps daily total   0    atorvastatin (LIPITOR) 20 mg tablet Take 20 mg by mouth daily        Calcium Carb-Cholecalciferol (CALCIUM 600 + D) 600-200 MG-UNIT TABS Take by mouth 2 (two) times a day        cholecalciferol (VITAMIN D3) 1,000 units tablet Take by mouth      divalproex sodium (DEPAKOTE) 500 mg EC tablet Take 500 mg by mouth daily at bedtime        docusate sodium (COLACE) 100 mg capsule Take 100 mg by mouth daily        esomeprazole (NexIUM) 40 MG capsule Take 40 mg by mouth every morning before breakfast       levothyroxine 75 mcg tablet Take 1 tablet (75 mcg total) by mouth daily 30 tablet 11    loratadine (CLARITIN) 10 mg tablet Take by mouth      Multiple Vitamins-Minerals (CENTRUM ADULTS PO) Take by mouth      OLANZapine (ZyPREXA) 2 5 mg tablet Take 1 tablet by mouth daily      polyethylene glycol (GLYCOLAX) powder       polyethylene glycol (MIRALAX) 17 g packet Take 17 g by mouth daily   tamsulosin (FLOMAX) 0 4 mg Take 0 4 mg by mouth daily with dinner  No current facility-administered medications for this visit  Facility-Administered Medications Ordered in Other Visits   Medication Dose Route Frequency Provider Last Rate Last Dose    midazolam (VERSED) injection   Intravenous PRN Angelica Carmichael MD   2 mg at 04/29/16 1218    propofol (DIPRIVAN) 10 mg/mL bolus injection   Intravenous PRN Angelica Carmichael MD   20 mg at 04/29/16 1240    sodium chloride 0 9 % infusion    Continuous PRN Angelica Carmichael MD         Allergies   Allergen Reactions    Advil [Ibuprofen]     Gluten Meal        Objective   Vitals: Blood pressure 120/72, pulse 72, height 5' 1" (1 549 m), weight 66 6 kg (146 lb 14 4 oz)  Physical Exam   Constitutional: He is oriented to person, place, and time  He appears well-developed and well-nourished  No distress  HENT:   Head: Normocephalic and atraumatic  Mouth/Throat: Oropharynx is clear and moist and mucous membranes are normal  No oropharyngeal exudate  Eyes: Conjunctivae, EOM and lids are normal  Right eye exhibits no discharge  Left eye exhibits no discharge  No scleral icterus  He has irritation and erythema around the right eye  Neck: Neck supple  No thyromegaly present  Cardiovascular: Normal rate, regular rhythm and normal heart sounds  Exam reveals no gallop and no friction rub  No murmur heard  Pulmonary/Chest: Effort normal and breath sounds normal  No respiratory distress  He has no wheezes  Abdominal: Soft  Bowel sounds are normal  He exhibits no distension  There is no tenderness  Musculoskeletal: Normal range of motion  He exhibits no edema, tenderness or deformity  Lymphadenopathy:        Head (right side): No occipital adenopathy present  Head (left side):  No occipital adenopathy present  Right: No supraclavicular adenopathy present  Left: No supraclavicular adenopathy present  Neurological: He is alert and oriented to person, place, and time  No cranial nerve deficit  Coordination normal    Skin: Skin is warm and intact  No rash noted  He is not diaphoretic  No erythema  Psychiatric: He has a normal mood and affect  Vitals reviewed  The history was obtained from the review of the chart, patient and caregiver  Lab Results:   Lab Results   Component Value Date/Time    TSH 3RD GENERATON 2 721 04/27/2018 07:35 AM    TSH 3RD GENERATON 4 019 (H) 02/03/2018 08:02 AM    Free T4 0 89 04/27/2018 07:35 AM    Free T4 0 86 02/03/2018 08:02 AM       I have personally reviewed pertinent reports  Portions of the record may have been created with voice recognition software  Occasional wrong word or "sound a like" substitutions may have occurred due to the inherent limitations of voice recognition software  Read the chart carefully and recognize, using context, where substitutions have occurred

## 2018-09-14 NOTE — PATIENT INSTRUCTIONS
Conjunctivitis   AMBULATORY CARE:   Conjunctivitis,  or pink eye, is inflammation of your conjunctiva  The conjunctiva is a thin tissue that covers the front of your eye and the back of your eyelids  The conjunctiva helps protect your eye and keep it moist  Conjunctivitis may be caused by bacteria, allergies, or a virus  If your conjunctivitis is caused by bacteria, it may get better on its own in about 7 days  Viral conjunctivitis can last up to 3 weeks  Common symptoms may include any of the following: You will usually have symptoms in both eyes if your conjunctivitis is caused by allergies  You may also have other allergic symptoms, such as a rash or runny nose  Symptoms will usually start in 1 eye if your conjunctivitis is caused by a virus or bacteria  · Redness in the whites of your eye    · Itching in your eye or around your eye    · Feeling like there is something in your eye    · Watery or thick, sticky discharge    · Crusty eyelids when you wake up in the morning    · Burning, stinging, or swelling in your eye    · Pain when you see bright light  Seek care immediately if:   · You have worsening eye pain  · The swelling in your eye gets worse, even after treatment  · Your vision suddenly becomes worse or you cannot see at all  Contact your healthcare provider if:   · You develop a fever and ear pain  · You have tiny bumps or spots of blood on your eye  · You have questions or concerns about your condition or care  Treatment  will depend on the cause of your conjunctivitis  You may need antibiotics or allergy medicine as a pill, eye drop, or eye ointment  Manage your symptoms:   · Apply a cool compress  Wet a washcloth with cold water and place it on your eye  This will help decrease itching and irritation  · Do not wear contact lenses  They can irritate your eye  Throw away the pair you are using and ask when you can wear them again   Use a new pair of lenses when your healthcare provider says it is okay  · Avoid irritants  Stay away from smoke filled areas  Shield your eyes from wind and sun  · Flush your eye  You may need to flush your eye with saline to help decrease your symptoms  Ask for more information on how to flush your eye  Medicines:  Treatment depends on what is causing your conjunctivitis  You may be given any of the following:  · Allergy medicine  helps decrease itchy, red, swollen eyes caused by allergies  It may be given as a pill, eye drops, or nasal spray  · Antibiotics  may be needed if your conjunctivitis is caused by bacteria  This medicine may be given as a pill, eye drops, or eye ointment  · Take your medicine as directed  Contact your healthcare provider if you think your medicine is not helping or if you have side effects  Tell him or her if you are allergic to any medicine  Keep a list of the medicines, vitamins, and herbs you take  Include the amounts, and when and why you take them  Bring the list or the pill bottles to follow-up visits  Carry your medicine list with you in case of an emergency  Prevent the spread of conjunctivitis:   · Wash your hands with soap and water often  Wash your hands before and after you touch your eyes  Also wash your hands before you prepare or eat food and after you use the bathroom or change a diaper  · Avoid allergens  Try to avoid the things that cause your allergies, such as pets, dust, or grass  · Avoid contact with others  Do not share towels or washcloths  Try to stay away from others as much as possible  Ask when you can return to work or school  · Throw away eye makeup  The bacteria that caused your conjunctivitis can stay in eye makeup  Throw away mascara and other eye makeup  © 2017 [address]  Information is for End User's use only and may not be sold, redistributed or otherwise used for commercial purposes   All illustrations and images included in [facility] are the copyrighted property of [employer]  or Jose Lewis  The above information is an  only  It is not intended as medical advice for individual conditions or treatments  Talk to your doctor, nurse or pharmacist before following any medical regimen to see if it is safe and effective for you

## 2018-09-19 NOTE — PROGRESS NOTES
Assessment/Plan:     Diagnoses and all orders for this visit:    Acute bacterial conjunctivitis of right eye  Comments:  rx Tobramycin, wash used linens  Orders:  -     tobramycin (TOBREX) 0 3 % SOLN; 2 drops in right eye at 8am, 4pm and 8pm x 5 days    Healthcare maintenance  Comments:  new rx given for calcium  Orders:  -     Calcium Carb-Cholecalciferol (CALCIUM 600 + D) 600-200 MG-UNIT TABS; Take one tablet at 8am and 5pm            Subjective:      Patient ID: Jesús Enrique is a 46 y o  male  Chief Complaint   Patient presents with    Conjunctivitis    Medication Problem     change calcium evening dose, need new script       47 yo Down's syndrome pt in with aide  Main c/o right conjunctivitis x 1 d  No fever, no trauma to eye, no recent uri   +crusting along r lids, +watery d/c  Also needs rx for calcium with new dosing time  Conjunctivitis    The current episode started yesterday  The onset is undetermined  The problem has been gradually worsening  Associated symptoms include eye itching, eye discharge and eye redness  Pertinent negatives include no fever, no diarrhea, no vomiting, no neck stiffness, no cough and no rash  The right eye is affected  The eyelid exhibits redness  The following portions of the patient's history were reviewed and updated as appropriate: allergies, current medications, past family history, past medical history, past social history, past surgical history and problem list      Review of Systems   Constitutional: Negative for fever  Eyes: Positive for discharge, redness and itching  Respiratory: Negative for cough  Gastrointestinal: Negative for diarrhea and vomiting  Skin: Negative for rash  Objective:    /60 (BP Location: Left arm, Patient Position: Sitting, Cuff Size: Standard)   Pulse 80   Temp 98 °F (36 7 °C)   Resp 14   Ht 5' 1" (1 549 m)   Wt 66 7 kg (147 lb)   BMI 27 78 kg/m²        Physical Exam   Constitutional: No distress  HENT:   Mouth/Throat: No oropharyngeal exudate  Nares patent   Eyes: EOM are normal  Pupils are equal, round, and reactive to light  Right eye exhibits discharge (right conj injected/redness/watery d/c)  Neck: Neck supple  Cardiovascular: Normal rate and regular rhythm  Pulmonary/Chest: Effort normal and breath sounds normal  No respiratory distress  Abdominal: Soft  Bowel sounds are normal  There is no tenderness  Skin: No rash noted  Nursing note and vitals reviewed       Abrahan Munoz MD

## 2018-10-04 ENCOUNTER — OFFICE VISIT (OUTPATIENT)
Dept: FAMILY MEDICINE CLINIC | Facility: CLINIC | Age: 53
End: 2018-10-04
Payer: MEDICARE

## 2018-10-04 ENCOUNTER — TELEPHONE (OUTPATIENT)
Dept: FAMILY MEDICINE CLINIC | Facility: CLINIC | Age: 53
End: 2018-10-04

## 2018-10-04 VITALS
HEIGHT: 60 IN | DIASTOLIC BLOOD PRESSURE: 62 MMHG | BODY MASS INDEX: 29.25 KG/M2 | RESPIRATION RATE: 14 BRPM | WEIGHT: 149 LBS | TEMPERATURE: 97.1 F | SYSTOLIC BLOOD PRESSURE: 110 MMHG | HEART RATE: 72 BPM

## 2018-10-04 DIAGNOSIS — K21.9 GASTROESOPHAGEAL REFLUX DISEASE WITHOUT ESOPHAGITIS: ICD-10-CM

## 2018-10-04 DIAGNOSIS — Z23 NEED FOR INFLUENZA VACCINATION: ICD-10-CM

## 2018-10-04 DIAGNOSIS — Z00.00 HEALTH CARE MAINTENANCE: Primary | ICD-10-CM

## 2018-10-04 DIAGNOSIS — Q90.9 DOWN'S SYNDROME: ICD-10-CM

## 2018-10-04 DIAGNOSIS — J30.1 SEASONAL ALLERGIC RHINITIS DUE TO POLLEN: ICD-10-CM

## 2018-10-04 DIAGNOSIS — D63.8 ANEMIA IN OTHER CHRONIC DISEASES CLASSIFIED ELSEWHERE: ICD-10-CM

## 2018-10-04 DIAGNOSIS — Z13.1 DIABETES MELLITUS SCREENING: ICD-10-CM

## 2018-10-04 DIAGNOSIS — E03.4 HYPOTHYROIDISM DUE TO ACQUIRED ATROPHY OF THYROID: ICD-10-CM

## 2018-10-04 DIAGNOSIS — Q80.9 XERODERMIA: ICD-10-CM

## 2018-10-04 DIAGNOSIS — E78.5 HYPERLIPIDEMIA, UNSPECIFIED HYPERLIPIDEMIA TYPE: ICD-10-CM

## 2018-10-04 DIAGNOSIS — K59.09 CHRONIC CONSTIPATION: ICD-10-CM

## 2018-10-04 DIAGNOSIS — I10 ESSENTIAL HYPERTENSION: ICD-10-CM

## 2018-10-04 DIAGNOSIS — N32.89 BLADDER DISTENTION: ICD-10-CM

## 2018-10-04 DIAGNOSIS — Z12.5 PROSTATE CANCER SCREENING: ICD-10-CM

## 2018-10-04 DIAGNOSIS — E55.9 VITAMIN D DEFICIENCY: ICD-10-CM

## 2018-10-04 DIAGNOSIS — D50.8 OTHER IRON DEFICIENCY ANEMIA: ICD-10-CM

## 2018-10-04 DIAGNOSIS — Z79.899 DRUG THERAPY: ICD-10-CM

## 2018-10-04 PROCEDURE — 99396 PREV VISIT EST AGE 40-64: CPT | Performed by: NURSE PRACTITIONER

## 2018-10-04 PROCEDURE — 90682 RIV4 VACC RECOMBINANT DNA IM: CPT

## 2018-10-04 PROCEDURE — G0008 ADMIN INFLUENZA VIRUS VAC: HCPCS

## 2018-10-04 RX ORDER — PETROLATUM,WHITE 41 %
OINTMENT (GRAM) TOPICAL AS NEEDED
Qty: 1 BOTTLE | Refills: 11 | Status: SHIPPED | OUTPATIENT
Start: 2018-10-04 | End: 2018-10-04 | Stop reason: SDUPTHER

## 2018-10-04 RX ORDER — DOCUSATE SODIUM 100 MG/1
CAPSULE, LIQUID FILLED ORAL
Qty: 30 CAPSULE | Refills: 11 | Status: SHIPPED | OUTPATIENT
Start: 2018-10-04 | End: 2018-10-04 | Stop reason: SDUPTHER

## 2018-10-04 RX ORDER — ATORVASTATIN CALCIUM 20 MG/1
20 TABLET, FILM COATED ORAL DAILY
Qty: 30 TABLET | Refills: 11 | Status: SHIPPED | OUTPATIENT
Start: 2018-10-04 | End: 2018-10-04 | Stop reason: SDUPTHER

## 2018-10-04 RX ORDER — ESOMEPRAZOLE MAGNESIUM 40 MG/1
CAPSULE, DELAYED RELEASE ORAL
Qty: 30 CAPSULE | Refills: 11 | Status: SHIPPED | OUTPATIENT
Start: 2018-10-04 | End: 2019-07-31 | Stop reason: SDUPTHER

## 2018-10-04 RX ORDER — ATORVASTATIN CALCIUM 20 MG/1
20 TABLET, FILM COATED ORAL DAILY
Qty: 30 TABLET | Refills: 11 | Status: SHIPPED | OUTPATIENT
Start: 2018-10-04 | End: 2019-10-08 | Stop reason: SDUPTHER

## 2018-10-04 RX ORDER — ESOMEPRAZOLE MAGNESIUM 40 MG/1
CAPSULE, DELAYED RELEASE ORAL
Qty: 30 CAPSULE | Refills: 11 | Status: SHIPPED | OUTPATIENT
Start: 2018-10-04 | End: 2018-10-04 | Stop reason: SDUPTHER

## 2018-10-04 RX ORDER — POLYETHYLENE GLYCOL 3350 17 G/17G
POWDER, FOR SOLUTION ORAL
Qty: 527 G | Refills: 11 | Status: SHIPPED | OUTPATIENT
Start: 2018-10-04 | End: 2019-10-08 | Stop reason: SDUPTHER

## 2018-10-04 RX ORDER — MULTIVITAMIN/IRON/FOLIC ACID 18MG-0.4MG
1 TABLET ORAL DAILY
Qty: 30 TABLET | Refills: 11 | Status: SHIPPED | OUTPATIENT
Start: 2018-10-04 | End: 2019-10-08 | Stop reason: SDUPTHER

## 2018-10-04 RX ORDER — MULTIVITAMIN/IRON/FOLIC ACID 18MG-0.4MG
1 TABLET ORAL DAILY
Qty: 30 TABLET | Refills: 11 | Status: SHIPPED | OUTPATIENT
Start: 2018-10-04 | End: 2018-10-04 | Stop reason: SDUPTHER

## 2018-10-04 RX ORDER — LORATADINE 10 MG/1
10 TABLET ORAL DAILY
Qty: 30 TABLET | Refills: 11 | Status: SHIPPED | OUTPATIENT
Start: 2018-10-04 | End: 2019-10-08 | Stop reason: SDUPTHER

## 2018-10-04 RX ORDER — DOCUSATE SODIUM 100 MG/1
CAPSULE, LIQUID FILLED ORAL
Qty: 30 CAPSULE | Refills: 11 | Status: SHIPPED | OUTPATIENT
Start: 2018-10-04 | End: 2019-07-31 | Stop reason: SDUPTHER

## 2018-10-04 RX ORDER — LORATADINE 10 MG/1
10 TABLET ORAL DAILY
Qty: 30 TABLET | Refills: 11 | Status: SHIPPED | OUTPATIENT
Start: 2018-10-04 | End: 2018-10-04 | Stop reason: SDUPTHER

## 2018-10-04 RX ORDER — POLYETHYLENE GLYCOL 3350 17 G/17G
POWDER, FOR SOLUTION ORAL
Qty: 527 G | Refills: 11 | Status: SHIPPED | OUTPATIENT
Start: 2018-10-04 | End: 2018-10-04 | Stop reason: SDUPTHER

## 2018-10-04 RX ORDER — TAMSULOSIN HYDROCHLORIDE 0.4 MG/1
0.4 CAPSULE ORAL
Qty: 30 CAPSULE | Refills: 11 | Status: SHIPPED | OUTPATIENT
Start: 2018-10-04 | End: 2018-10-04 | Stop reason: SDUPTHER

## 2018-10-04 RX ORDER — PETROLATUM,WHITE 41 %
OINTMENT (GRAM) TOPICAL AS NEEDED
Qty: 1 BOTTLE | Refills: 11 | Status: SHIPPED | OUTPATIENT
Start: 2018-10-04 | End: 2019-07-03 | Stop reason: SDUPTHER

## 2018-10-04 RX ORDER — TAMSULOSIN HYDROCHLORIDE 0.4 MG/1
0.4 CAPSULE ORAL
Qty: 30 CAPSULE | Refills: 11 | Status: SHIPPED | OUTPATIENT
Start: 2018-10-04 | End: 2019-10-08 | Stop reason: SDUPTHER

## 2018-10-04 NOTE — PROGRESS NOTES
FAMILY PRACTICE HEALTH MAINTENANCE OFFICE VISIT  [facility] - [facility]    NAME: Ric Ramirez  AGE: 46 y o  SEX: male  : 1965     DATE: 10/4/2018    Assessment and Plan     Problem List Items Addressed This Visit     Essential hypertension    Relevant Orders    Comprehensive metabolic panel    Hyperlipidemia    Relevant Medications    atorvastatin (LIPITOR) 20 mg tablet    Other Relevant Orders    Lipid panel    Hypothyroidism    GERD (gastroesophageal reflux disease)    Relevant Medications    esomeprazole (NexIUM) 40 MG capsule    Down's syndrome    Anemia    CHELSIE (iron deficiency anemia)    Chronic constipation    Relevant Medications    polyethylene glycol (GLYCOLAX) powder    docusate sodium (COLACE) 100 mg capsule    Bladder distention    Relevant Medications    tamsulosin (FLOMAX) 0 4 mg    Xerodermia    Relevant Medications    lanolin-mineral oil (Vipin Crumb) Sunny Ruiz      Other Visit Diagnoses     Health care maintenance    -  Primary    Relevant Medications    multivitamin-minerals (CENTRUM ADULTS) tablet    Need for influenza vaccination        Relevant Orders    influenza vaccine, [id number], quadrivalent, recombinant, PF, 0 5 mL, for patients 18 yr+ (FLUBLOK) (Completed)    Prostate cancer screening        Diabetes mellitus screening        Relevant Orders    Comprehensive metabolic panel    Drug therapy        Relevant Orders    Lipid panel    Comprehensive metabolic panel    Vitamin D deficiency        Relevant Medications    cholecalciferol (VITAMIN D3) 1,000 units tablet    Seasonal allergic rhinitis due to pollen        Relevant Medications    loratadine (CLARITIN) 10 mg tablet        Patient clinically stable at this time  Forms completed/scanned in chart  Cont with current plan of care     RTO as recommended and PRN        · Patient Counseling:   · Nutrition: Stressed importance of a well balanced diet, moderation of sodium/saturated fat, caloric balance and sufficient intake of fiber  · Exercise: Stressed the importance of regular exercise with a goal of 150 minutes per week  · Dental Health: Discussed daily flossing and brushing and regular dental visits   · Injury Prevention: Discussed Safety Belts, Safety Helmets, and Smoke Detectors    · Immunizations reviewed -need flu today  · Discussed benefits of screening labs  · Discussed the patient's BMI with him  The BMI is above average; BMI management plan is completed     Return in about 1 year (around 10/4/2019)  Chief Complaint     Chief Complaint   Patient presents with    Physical Exam     State PE with form       History of Present Illness     Lo Andre brought by [facility] group home care giver, for annual state PE    In usual state of health    + downs    Chart reviewed    Cardio (murmur-last echo 2/2018), endo (thyroid, hypogonadism/testosterone), uro (chronic bladder distention), psych Dr Teressa Merritt, follow up UTD    No concerns today    Last colon 2015        Well Adult Physical   Patient here for a comprehensive physical exam       Diet and Physical Activity  Diet: well balanced diet, gluten free, limited dairy  Weight concerns: Patient is overweight (BMI 25 0-29  9)  Exercise: frequently      Depression Screen  PHQ-9 Depression Screening    PHQ-9:    Frequency of the following problems over the past two weeks:               General Health  Hearing: Hearing aid use: bilateral  Vision: wears glasses  Dental: regular dental visits        The following portions of the patient's history were reviewed and updated as appropriate: allergies, current medications, past family history, past medical history, past social history, past surgical history and problem list     Review of Systems     Review of Systems   Unable to perform ROS: Psychiatric disorder       Past Medical History     Past Medical History:   Diagnosis Date    Abnormal weight gain     last assessed 4/18/16; resolved 1/25/17    Acute CH[facility]congest[facility] failure) ([facility] )     Anxiety     Dementia     Depression     Disease of thyroid gland     Down's syndrome     Fatigue     last assessed 2/23/17; resolved 6/1/17    GERD (gastroesophageal reflux disease)     Gluten free diet     Hernia of abdominal cavity     Hyperlipidemia     Hypogonadism in male     Impulse control disorder     OCD (obsessive compulsive disorder)     Parkinson disease ([facility] )     Parkinson disease ([facility] )     resolved 2/23/17    Pleural effusion     Sinus tachycardia     Vitamin D deficiency     last assessed 9/1/16; resolved 6/1/17       Past Surgical History     Past Surgical History:   Procedure Laterality Date    COLONOSCOPY      ESOPHAGOGASTRODUODENOSCOPY      with biopsy    PERICARDIOCENTESIS  4/29/2016            Social History     Social History     Social History    Marital status: Single     Spouse name: N/A    Number of children: N/A    Years of education: N/A     Social History Main Topics    Smoking status: Never Smoker    Smokeless tobacco: Never Used    Alcohol use No    Drug use: No    Sexual activity: Not Currently     Other Topics Concern    None     Social History Narrative    Caffeine use        Family History     Family History   Problem Relation Age of Onset    No Known Problems Mother     Parkinsonism Father         symtomatic       Current Medications       Current Outpatient Prescriptions:     ANDROGEL PUMP 20 25 MG/ACT (1 62%) TD gel pump, 3 pumps daily at 8 AM on shoulders/chest , Disp: 75 g, Rfl: 5    atorvastatin (LIPITOR) 20 mg tablet, Take 1 tablet (20 mg total) by mouth daily At 8pm, Disp: 30 tablet, Rfl: 11    Calcium Carb-Cholecalciferol (CALCIUM 600 + D) 600-200 MG-UNIT TABS, Take one tablet at 8am and 5pm, Disp: 60 each, Rfl: 11    cholecalciferol (VITAMIN D3) 1,000 units tablet, Take 1 tablet (1,000 Units total) by mouth 2 (two) times a day At 8am and 8pm, Disp: 60 tablet, Rfl: 11    divalproex sodium (DEPAKOTE) 500 mg EC tablet, Take 500 mg by mouth daily at bedtime  , Disp: , Rfl:     docusate sodium (COLACE) 100 mg capsule, At 8am, Disp: 30 capsule, Rfl: 11    esomeprazole (NexIUM) 40 MG capsule, Daily 30 minutes prior to breakfast, Disp: 30 capsule, Rfl: 11    levothyroxine 75 mcg tablet, Take 1 tablet (75 mcg total) by mouth daily Take at 8 pm, Disp: 30 tablet, Rfl: 11    loratadine (CLARITIN) 10 mg tablet, Take 1 tablet (10 mg total) by mouth daily At 8am As needed for allergies, Disp: 30 tablet, Rfl: 11    multivitamin-minerals (CENTRUM ADULTS) tablet, Take 1 tablet by mouth daily At 8am, Disp: 30 tablet, Rfl: 11    OLANZapine (ZyPREXA) 2 5 mg tablet, Take 1 tablet by mouth daily, Disp: , Rfl:     polyethylene glycol (GLYCOLAX) powder, Take 17 grams in 8 oz of water by mouth daily at 8 pm, Disp: 527 g, Rfl: 11    polyethylene glycol (MIRALAX) 17 g packet, Take 17 g by mouth daily  , Disp: , Rfl:     tamsulosin (FLOMAX) 0 4 mg, Take 1 capsule (0 4 mg total) by mouth daily at bedtime At 8pm, Disp: 30 capsule, Rfl: 11    lanolin-mineral oil (ARMAAN) LOTN, Apply topically as needed for dry skin Apply to hands daily as needed between 8 am to 8 pm, Disp: 1 Bottle, Rfl: 11    tobramycin (TOBREX) 0 3 % SOLN, 2 drops in right eye at 8am, 4pm and 8pm x 5 days (Patient not taking: Reported on 10/4/2018 ), Disp: 5 mL, Rfl: 1  No current facility-administered medications for this visit       Facility-Administered Medications Ordered in Other Visits:     midazolam (VERSED) injection, , Intravenous, PRN, Elenita Mccartney MD, 2 mg at 04/29/16 1218    propofol (DIPRIVAN) 10 mg/mL bolus injection, , Intravenous, PRN, Elenita Mccartney MD, 20 mg at 04/29/16 1240    sodium chloride 0 9 % infusion, , , Continuous PRN, Elenita Mccartney MD     Allergies     Allergies   Allergen Reactions    Advil [Ibuprofen]     Gluten Meal        Objective     /62 (BP Location: Left arm, Patient Position: Sitting, Cuff Size: Standard)   Pulse 72   Temp (!) 97 1 °F (36 2 °C) (Temporal)   Resp 14   Ht 5' (1 524 m)   Wt 67 6 kg (149 lb)   BMI 29 10 kg/m²      Physical Exam   Constitutional: Vital signs are normal  He appears well-developed and well-nourished  No distress  HENT:   Head: Normocephalic and atraumatic  Right Ear: External ear normal    Left Ear: External ear normal    Nose: Nose normal    Mouth/Throat: Oropharynx is clear and moist    Eyes: Pupils are equal, round, and reactive to light  Conjunctivae and EOM are normal  No scleral icterus  Neck: Neck supple  No JVD present  Carotid bruit is not present  No thyromegaly present  Cardiovascular: Normal rate, regular rhythm and intact distal pulses  Murmur heard  Pulmonary/Chest: Effort normal and breath sounds normal  No respiratory distress  Abdominal: Soft  Bowel sounds are normal  He exhibits no abdominal bruit  There is no hepatosplenomegaly  There is no tenderness  Musculoskeletal: He exhibits no edema or deformity  Lymphadenopathy:     He has no cervical adenopathy  Neurological: He is alert  Coordination normal    Skin: Skin is warm and dry  No pallor  Psychiatric: He has a normal mood and affect  Nursing note and vitals reviewed          No exam data present    Health Maintenance     Health Maintenance   Topic Date Due    CRC Screening: Colonoscopy  01/01/2015    INFLUENZA VACCINE  09/01/2018    DTaP,Tdap,and Td Vaccines (3 - Td) 02/23/2027    Depression Screening PHQ  Excluded     Immunization History   Administered Date(s) Administered    Hep B, adult 09/25/2008, 11/05/2008, 04/06/2009    Influenza Quadrivalent Preservative Free 3 years and older IM 09/18/2014    Influenza Quadrivalent, 6-35 Months IM 10/02/2017    Influenza TIV (IM) 11/11/2011, 11/30/2012, 09/17/2013, 09/29/2016    Influenza, recombinant, quadrivalent,injectable, preservative free 10/04/2018    Tdap 10/17/2006, 02/23/2017    Tuberculin Skin Test-PPD Intradermal 05/20/2005, 05/20/2006, 09/22/2014, 09/21/2015       Ita Branham, [address] [facility] [address] [place]

## 2018-10-04 NOTE — PATIENT INSTRUCTIONS

## 2018-10-26 DIAGNOSIS — M85.89 OSTEOPENIA OF MULTIPLE SITES: Primary | ICD-10-CM

## 2018-10-31 ENCOUNTER — TELEPHONE (OUTPATIENT)
Dept: FAMILY MEDICINE CLINIC | Facility: CLINIC | Age: 53
End: 2018-10-31

## 2018-11-10 ENCOUNTER — TRANSCRIBE ORDERS (OUTPATIENT)
Dept: ADMINISTRATIVE | Facility: HOSPITAL | Age: 53
End: 2018-11-10

## 2018-11-10 ENCOUNTER — LAB (OUTPATIENT)
Dept: LAB | Facility: HOSPITAL | Age: 53
End: 2018-11-10
Attending: INTERNAL MEDICINE
Payer: MEDICARE

## 2018-11-10 DIAGNOSIS — Z51.81 ENCOUNTER FOR THERAPEUTIC DRUG MONITORING: ICD-10-CM

## 2018-11-10 DIAGNOSIS — I10 ESSENTIAL HYPERTENSION, MALIGNANT: Primary | ICD-10-CM

## 2018-11-10 DIAGNOSIS — Z79.899 DRUG THERAPY: ICD-10-CM

## 2018-11-10 DIAGNOSIS — Z13.1 DIABETES MELLITUS SCREENING: ICD-10-CM

## 2018-11-10 DIAGNOSIS — E78.5 HYPERLIPIDEMIA, UNSPECIFIED HYPERLIPIDEMIA TYPE: ICD-10-CM

## 2018-11-10 DIAGNOSIS — E03.4 HYPOTHYROIDISM DUE TO ACQUIRED ATROPHY OF THYROID: ICD-10-CM

## 2018-11-10 DIAGNOSIS — E29.1 HYPOGONADISM, TESTICULAR: ICD-10-CM

## 2018-11-10 DIAGNOSIS — I10 ESSENTIAL HYPERTENSION: ICD-10-CM

## 2018-11-10 DIAGNOSIS — I10 ESSENTIAL HYPERTENSION, MALIGNANT: ICD-10-CM

## 2018-11-10 DIAGNOSIS — Z12.5 ENCOUNTER FOR SCREENING FOR MALIGNANT NEOPLASM OF PROSTATE: ICD-10-CM

## 2018-11-10 LAB
ALBUMIN SERPL BCP-MCNC: 3.5 G/DL (ref 3.5–5)
ALP SERPL-CCNC: 67 U/L (ref 46–116)
ALT SERPL W P-5'-P-CCNC: 29 U/L (ref 12–78)
ANION GAP SERPL CALCULATED.3IONS-SCNC: 2 MMOL/L (ref 4–13)
AST SERPL W P-5'-P-CCNC: 21 U/L (ref 5–45)
BASOPHILS # BLD AUTO: 0.04 THOUSANDS/ΜL (ref 0–0.1)
BASOPHILS NFR BLD AUTO: 1 % (ref 0–1)
BILIRUB SERPL-MCNC: 0.5 MG/DL (ref 0.2–1)
BUN SERPL-MCNC: 8 MG/DL (ref 5–25)
CALCIUM SERPL-MCNC: 9.3 MG/DL (ref 8.3–10.1)
CHLORIDE SERPL-SCNC: 99 MMOL/L (ref 100–108)
CHOLEST SERPL-MCNC: 206 MG/DL (ref 50–200)
CO2 SERPL-SCNC: 32 MMOL/L (ref 21–32)
CREAT SERPL-MCNC: 0.95 MG/DL (ref 0.6–1.3)
EOSINOPHIL # BLD AUTO: 0.01 THOUSAND/ΜL (ref 0–0.61)
EOSINOPHIL NFR BLD AUTO: 0 % (ref 0–6)
ERYTHROCYTE [DISTWIDTH] IN BLOOD BY AUTOMATED COUNT: 13.3 % (ref 11.6–15.1)
GFR SERPL CREATININE-BSD FRML MDRD: 90 ML/MIN/1.73SQ M
GLUCOSE P FAST SERPL-MCNC: 87 MG/DL (ref 65–99)
HCT VFR BLD AUTO: 48 % (ref 36.5–49.3)
HDLC SERPL-MCNC: 63 MG/DL (ref 40–60)
HGB BLD-MCNC: 15.9 G/DL (ref 12–17)
IMM GRANULOCYTES # BLD AUTO: 0.02 THOUSAND/UL (ref 0–0.2)
IMM GRANULOCYTES NFR BLD AUTO: 1 % (ref 0–2)
LDLC SERPL CALC-MCNC: 128 MG/DL (ref 0–100)
LYMPHOCYTES # BLD AUTO: 0.73 THOUSANDS/ΜL (ref 0.6–4.47)
LYMPHOCYTES NFR BLD AUTO: 24 % (ref 14–44)
MCH RBC QN AUTO: 32.9 PG (ref 26.8–34.3)
MCHC RBC AUTO-ENTMCNC: 33.1 G/DL (ref 31.4–37.4)
MCV RBC AUTO: 99 FL (ref 82–98)
MONOCYTES # BLD AUTO: 0.21 THOUSAND/ΜL (ref 0.17–1.22)
MONOCYTES NFR BLD AUTO: 7 % (ref 4–12)
NEUTROPHILS # BLD AUTO: 2.02 THOUSANDS/ΜL (ref 1.85–7.62)
NEUTS SEG NFR BLD AUTO: 67 % (ref 43–75)
NONHDLC SERPL-MCNC: 143 MG/DL
NRBC BLD AUTO-RTO: 0 /100 WBCS
PLATELET # BLD AUTO: 256 THOUSANDS/UL (ref 149–390)
PMV BLD AUTO: 9.6 FL (ref 8.9–12.7)
POTASSIUM SERPL-SCNC: 4.5 MMOL/L (ref 3.5–5.3)
PROT SERPL-MCNC: 7.8 G/DL (ref 6.4–8.2)
PSA SERPL-MCNC: <0.1 NG/ML (ref 0–4)
RBC # BLD AUTO: 4.83 MILLION/UL (ref 3.88–5.62)
SODIUM SERPL-SCNC: 133 MMOL/L (ref 136–145)
T4 FREE SERPL-MCNC: 0.94 NG/DL (ref 0.76–1.46)
TRIGL SERPL-MCNC: 74 MG/DL
TSH SERPL DL<=0.05 MIU/L-ACNC: 1.9 UIU/ML (ref 0.36–3.74)
VALPROATE SERPL-MCNC: 37 UG/ML (ref 50–100)
WBC # BLD AUTO: 3.03 THOUSAND/UL (ref 4.31–10.16)

## 2018-11-10 PROCEDURE — 80061 LIPID PANEL: CPT

## 2018-11-10 PROCEDURE — 84402 ASSAY OF FREE TESTOSTERONE: CPT

## 2018-11-10 PROCEDURE — 80164 ASSAY DIPROPYLACETIC ACD TOT: CPT

## 2018-11-10 PROCEDURE — G0103 PSA SCREENING: HCPCS

## 2018-11-10 PROCEDURE — 84443 ASSAY THYROID STIM HORMONE: CPT

## 2018-11-10 PROCEDURE — 84439 ASSAY OF FREE THYROXINE: CPT

## 2018-11-10 PROCEDURE — 36415 COLL VENOUS BLD VENIPUNCTURE: CPT

## 2018-11-10 PROCEDURE — 80053 COMPREHEN METABOLIC PANEL: CPT

## 2018-11-10 PROCEDURE — 84403 ASSAY OF TOTAL TESTOSTERONE: CPT

## 2018-11-10 PROCEDURE — 85025 COMPLETE CBC W/AUTO DIFF WBC: CPT

## 2018-11-12 ENCOUNTER — TELEPHONE (OUTPATIENT)
Dept: ENDOCRINOLOGY | Facility: CLINIC | Age: 53
End: 2018-11-12

## 2018-11-12 LAB
TESTOST FREE SERPL-MCNC: 3.9 PG/ML (ref 7.2–24)
TESTOST SERPL-MCNC: 375 NG/DL (ref 264–916)

## 2018-11-12 NOTE — TELEPHONE ENCOUNTER
----- Message from José Miguel Romo MD sent at 11/12/2018  8:27 AM EST -----  The laboratory testing results are normal

## 2018-11-13 ENCOUNTER — OFFICE VISIT (OUTPATIENT)
Dept: PODIATRY | Facility: CLINIC | Age: 53
End: 2018-11-13
Payer: MEDICARE

## 2018-11-13 ENCOUNTER — TELEPHONE (OUTPATIENT)
Dept: ENDOCRINOLOGY | Facility: CLINIC | Age: 53
End: 2018-11-13

## 2018-11-13 VITALS
HEIGHT: 60 IN | BODY MASS INDEX: 29.25 KG/M2 | DIASTOLIC BLOOD PRESSURE: 62 MMHG | WEIGHT: 149 LBS | SYSTOLIC BLOOD PRESSURE: 110 MMHG

## 2018-11-13 DIAGNOSIS — B35.1 ONYCHOMYCOSIS: ICD-10-CM

## 2018-11-13 DIAGNOSIS — M21.962 DEFORMITY OF BOTH FEET: ICD-10-CM

## 2018-11-13 DIAGNOSIS — M21.961 DEFORMITY OF BOTH FEET: ICD-10-CM

## 2018-11-13 DIAGNOSIS — M79.671 PAIN IN BOTH FEET: ICD-10-CM

## 2018-11-13 DIAGNOSIS — M79.672 PAIN IN BOTH FEET: ICD-10-CM

## 2018-11-13 DIAGNOSIS — L60.0 INGROWING NAIL: Primary | ICD-10-CM

## 2018-11-13 PROCEDURE — 99213 OFFICE O/P EST LOW 20 MIN: CPT | Performed by: PODIATRIST

## 2018-11-13 NOTE — TELEPHONE ENCOUNTER
----- Message from Marilou Kiser MD sent at 11/13/2018  8:08 AM EST -----  Please call the patient regarding his abnormal result      Total testosterone is normal

## 2018-11-13 NOTE — PROGRESS NOTES
Assessment/Plan:  Aseptic debridement and planning of nails x10 and manually and mechanically    Partial resection of ingrown nail bilateral great toes applied silver nitrate and dressing  Discussed possible need for permanent removal of ingrown  This orthotics discussed proper shoes discussed surgical conservative options for foot deformity  Follow-up 3 months     Diagnoses and all orders for this visit:    Ingrowing nail    Onychomycosis    Deformity of both feet    Pain in both feet          Subjective:      Patient ID: Vani Medrano is a 46 y o  male  Patient has pain in ingrown nails bilateral great toes  Has not noticed any redness or signs of infection  Patient has significant foot deformity bilateral large bunions  Some pain when walking and standing          Past Medical History:   Diagnosis Date    Abnormal weight gain     last assessed 4/18/16; resolved 1/25/17    [facility]) [facility]     Anxiety     Dementia     Depression     Disease of thyroid gland     Down's syndrome     Fatigue     last assessed 2/23/17; resolved 6/1/17    GERD (gastroesophageal reflux disease)     Gluten free diet     Hernia of abdominal cavity     Hyperlipidemia     Hypogonadism in male     Impulse control disorder     OCD (obsessive compulsive disorder)     [facility] disease [facility]     [facility]     resolved 2/23/17    Pleural effusion     Sinus tachycardia     Vitamin D deficiency     last assessed 9/1/16; resolved 6/1/17         Current Outpatient Prescriptions:     ANDROGEL PUMP 20 25 MG/ACT (1 62%) TD gel pump, 3 pumps daily at 8 AM on shoulders/chest , Disp: 75 g, Rfl: 5    atorvastatin (LIPITOR) 20 mg tablet, Take 1 tablet (20 mg total) by mouth daily At 8pm, Disp: 30 tablet, Rfl: 11    Calcium Carb-Cholecalciferol (CALCIUM 600 + D) 600-200 MG-UNIT TABS, Take one tablet at 8am and 5pm, Disp: 60 each, Rfl: 11    Calcium Carbonate-Vitamin D 600-400 MG-UNIT per tablet, TAKE 1 TABLET BY MOUTH TWICE A DAY (8AM & 8PM) -YANNA, Disp: 60 tablet, Rfl: 10    cholecalciferol (VITAMIN D3) 1,000 units tablet, Take 1 tablet (1,000 Units total) by mouth 2 (two) times a day At 8am and 8pm, Disp: 60 tablet, Rfl: 11    divalproex sodium (DEPAKOTE) 500 mg EC tablet, Take 500 mg by mouth daily at bedtime  , Disp: , Rfl:     docusate sodium (COLACE) 100 mg capsule, At 8am, Disp: 30 capsule, Rfl: 11    esomeprazole (NexIUM) 40 MG capsule, Daily 30 minutes prior to breakfast, Disp: 30 capsule, Rfl: 11    lanolin-mineral oil (ARMAAN) LOTN, Apply topically as needed for dry skin Apply to hands daily as needed between 8 am to 8 pm, Disp: 1 Bottle, Rfl: 11    levothyroxine 75 mcg tablet, Take 1 tablet (75 mcg total) by mouth daily Take at 8 pm, Disp: 30 tablet, Rfl: 11    loratadine (CLARITIN) 10 mg tablet, Take 1 tablet (10 mg total) by mouth daily At 8am As needed for allergies, Disp: 30 tablet, Rfl: 11    multivitamin-minerals (CENTRUM ADULTS) tablet, Take 1 tablet by mouth daily At 8am, Disp: 30 tablet, Rfl: 11    OLANZapine (ZyPREXA) 2 5 mg tablet, Take 1 tablet by mouth daily, Disp: , Rfl:     polyethylene glycol (GLYCOLAX) powder, Take 17 grams in 8 oz of water by mouth daily at 8 pm, Disp: 527 g, Rfl: 11    polyethylene glycol (MIRALAX) 17 g packet, Take 17 g by mouth daily  , Disp: , Rfl:     tamsulosin (FLOMAX) 0 4 mg, Take 1 capsule (0 4 mg total) by mouth daily at bedtime At 8pm, Disp: 30 capsule, Rfl: 11    tobramycin (TOBREX) 0 3 % SOLN, 2 drops in right eye at 8am, 4pm and 8pm x 5 days (Patient not taking: Reported on 10/4/2018 ), Disp: 5 mL, Rfl: 1  No current facility-administered medications for this visit       Facility-Administered Medications Ordered in Other Visits:     midazolam (VERSED) injection, , Intravenous, PRN, Jagruti Perez MD, 2 mg at 04/29/16 1218    propofol (DIPRIVAN) 10 mg/mL bolus injection, , Intravenous, PRN, Jagruti Perez MD, 20 mg at 04/29/16 1240    sodium chloride 0 9 % infusion, , , Continuous PRN, Neha Sosa MD    Past Surgical History:   Procedure Laterality Date    COLONOSCOPY      ESOPHAGOGASTRODUODENOSCOPY      with biopsy    PERICARDIOCENTESIS  4/29/2016            Allergies   Allergen Reactions    Advil [Ibuprofen]     Gluten Meal        Patient Active Problem List   Diagnosis    Acute exacerbation of CHF (congestive heart failure) (HCC)    Essential hypertension    Hyperlipidemia    Hypothyroidism    GERD (gastroesophageal reflux disease)    Down's syndrome    Anemia    CHELSIE (iron deficiency anemia)    Chronic constipation    Hypogonadism, testicular    Hypotension    Idiopathic osteoporosis    Mixed incontinence    Parkinson's disease (HCC)    Serositis (HCC)    Bladder distention    Hydronephrosis    Abdominal pain    Idiopathic pericardial effusion    Cardiac murmur    Xerodermia       Review of Systems   Constitutional: Negative  HENT: Negative  Eyes: Negative  Respiratory: Negative  Cardiovascular: Negative  Gastrointestinal: Negative  Endocrine: Negative  Genitourinary: Negative  Musculoskeletal: Negative  Skin: Negative  Allergic/Immunologic: Negative  Neurological: Negative  Hematological: Negative  Psychiatric/Behavioral: Negative  Objective:      /62   Ht 5' (1 524 m)   Wt 67 6 kg (149 lb)   BMI 29 10 kg/m²          Physical Exam    Constitutional: no acute distress, well appearing and well nourished   Orthopedic/Biomechanical: abnormal foot type,-- hammertoe(s),-- bunion(s),-- abnormal MPJ ROM,-- discolored nails,-- subungual debris-- and-- nail tenderness   Skin: keratoses  Left Foot: Appearance: a deformity  Great toe deformities include a bunion  Evaluation of the great toe nail demonstrates an ingrown nail, but-- no paronychia   Decreased range of motion first MPJ bilateral positive pain on palpation and range of motion right worse than left  Ingrown bilateral big toe  No signs of infection no erythema no exudate  Special Tests: significant bunion bilateral decreased range of motion first MPJ no pain on range of motion,no sign of infection  Right Foot: Appearance: Normal except as noted: a deformity  Great toe deformities include a bunion  Evaluation of the great toe nail demonstrates paronychia-- and-- an ingrown nail  Mild ingrown tibial border  Rigid hammertoes, 2 through 5, bilateral, moderate xerosis, fissures, bilateral heels, midfoot, bilateral, negative erythema  Negative exudate  Negative ulceration  Special Tests: Mild xerosis, no signs of infection  Neurological Exam: Light touch was decreased bilaterally  Vibratory sensation was decreased in both first metatarsophalangeal joints  Response to monofilament test was intact bilaterally  Vascular Exam: performed Dorsalis pedis pulses were 1/4 bilaterally  Posterior tibial pulses were 1/4 bilaterally  Capillary refill time was between 1-3 seconds bilaterally  Toenails: All of the toenails were elongated,-- hypertrophied,-- discolored,-- shown to have subungual debris-- and-- tender  Both first toenails were ingrown  Hyperkeratosis: present on both first sub metatarsals-- and-- present on both fifth sub metatarsals  Moderate bunion bilateral   Rigid hammertoes 2 through 5 bilateral   Significant equinus bilateral   Negative erythema no signs of infection  Large bunion right worse than left  Decreased range of motion 1st MPJ  Right 1st MPJ positive pain on end range of motion no crepitus no hypermobility of the 1st ray  Ingrown tibial border right worse than left  Positive paronychia right hallux medial border negative erythema no drainage no signs of infection

## 2018-12-06 ENCOUNTER — OFFICE VISIT (OUTPATIENT)
Dept: FAMILY MEDICINE CLINIC | Facility: CLINIC | Age: 53
End: 2018-12-06
Payer: MEDICARE

## 2018-12-06 VITALS
HEART RATE: 72 BPM | RESPIRATION RATE: 14 BRPM | BODY MASS INDEX: 29.1 KG/M2 | DIASTOLIC BLOOD PRESSURE: 60 MMHG | TEMPERATURE: 97.4 F | WEIGHT: 149 LBS | SYSTOLIC BLOOD PRESSURE: 92 MMHG

## 2018-12-06 DIAGNOSIS — H01.9 INFLAMMATION OF LID MARGIN: Primary | ICD-10-CM

## 2018-12-06 PROCEDURE — 99213 OFFICE O/P EST LOW 20 MIN: CPT | Performed by: NURSE PRACTITIONER

## 2018-12-06 RX ORDER — POLYMYXIN B SULFATE AND TRIMETHOPRIM 1; 10000 MG/ML; [USP'U]/ML
1 SOLUTION OPHTHALMIC EVERY 12 HOURS SCHEDULED
Qty: 10 ML | Refills: 0 | Status: SHIPPED | OUTPATIENT
Start: 2018-12-06 | End: 2019-02-25

## 2018-12-06 NOTE — PROGRESS NOTES
Subjective     History obtained from group home care giver  Kiah Browning is a 46 y o  male who presents for evaluation of right lower lid swelling in the right eye  He has noticed the above symptoms for a few hours  Onset was sudden while at work  Patient denies blurred vision, discharge, erythema, foreign body sensation, itching, pain and visual field deficit  Caregiver denies history of allergies, exposure to chemicals: at work, other family members with similar symptoms, trauma: at work and wearing glasses  The following portions of the patient's history were reviewed and updated as appropriate: allergies, current medications, past family history, past medical history, past social history, past surgical history and problem list     Review of Systems  Pertinent items are noted in HPI       Objective     BP 92/60 (BP Location: Left arm, Patient Position: Sitting, Cuff Size: Standard)   Pulse 72   Temp (!) 97 4 °F (36 3 °C) (Temporal)   Resp 14   Wt 67 6 kg (149 lb)   BMI 29 10 kg/m²        General: alert and oriented, in no acute distress   Eyes:  right lower eye lid mildly swollen  no discharge or redness observed     Assessment/Plan     lower lid swollen  School/ note written  Ophthalmic drops per orders  Warm compress to eye(s)  Local eye care discussed  FU here in 2 days or PRN  Neli Casas

## 2019-01-15 ENCOUNTER — TELEPHONE (OUTPATIENT)
Dept: ENDOCRINOLOGY | Facility: CLINIC | Age: 54
End: 2019-01-15

## 2019-01-15 DIAGNOSIS — E29.1 HYPOGONADISM, TESTICULAR: ICD-10-CM

## 2019-01-15 RX ORDER — TESTOSTERONE 16.2 MG/G
GEL TRANSDERMAL
Qty: 75 G | Refills: 5 | Status: SHIPPED | OUTPATIENT
Start: 2019-01-15 | End: 2019-01-24 | Stop reason: CLARIF

## 2019-01-15 NOTE — TELEPHONE ENCOUNTER
Yesy Delong, this pt saw Dr Jesus Casas last in Sept  Can you please get a new rx for his Androgel, stamp it and send to this pharmacy  I would do it if I could, however I am trying to play catch up   Thanks

## 2019-01-15 NOTE — TELEPHONE ENCOUNTER
Patient is in a state run facility in [facility] and he needs a refill on his Androgel sent to [facility]  He is out and because he is in a state run facility, he needs a note faxed over that states that the doctor is aware and it is ok for him to miss a dose  He stated that the note can state, "hold until medication is available"  Please call him when the script has been sent at [phone]  Please fax the note to [phone]  Thank you

## 2019-01-16 NOTE — TELEPHONE ENCOUNTER
Called [facility] [phone] and left message that note was faxed to [phone]   and prescription was faxed to [facility] pharmacy

## 2019-01-22 ENCOUNTER — OFFICE VISIT (OUTPATIENT)
Dept: PODIATRY | Facility: CLINIC | Age: 54
End: 2019-01-22
Payer: MEDICARE

## 2019-01-22 VITALS — HEIGHT: 60 IN | RESPIRATION RATE: 17 BRPM | WEIGHT: 149 LBS | HEART RATE: 72 BPM | BODY MASS INDEX: 29.25 KG/M2

## 2019-01-22 DIAGNOSIS — M79.672 PAIN IN BOTH FEET: ICD-10-CM

## 2019-01-22 DIAGNOSIS — B35.1 ONYCHOMYCOSIS: ICD-10-CM

## 2019-01-22 DIAGNOSIS — M21.962 DEFORMITY OF BOTH FEET: ICD-10-CM

## 2019-01-22 DIAGNOSIS — L60.0 INGROWING NAIL: Primary | ICD-10-CM

## 2019-01-22 DIAGNOSIS — M79.671 PAIN IN BOTH FEET: ICD-10-CM

## 2019-01-22 DIAGNOSIS — M21.961 DEFORMITY OF BOTH FEET: ICD-10-CM

## 2019-01-22 PROCEDURE — 99212 OFFICE O/P EST SF 10 MIN: CPT | Performed by: PODIATRIST

## 2019-01-23 ENCOUNTER — TRANSCRIBE ORDERS (OUTPATIENT)
Dept: ADMINISTRATIVE | Facility: HOSPITAL | Age: 54
End: 2019-01-23

## 2019-01-23 ENCOUNTER — APPOINTMENT (OUTPATIENT)
Dept: LAB | Facility: HOSPITAL | Age: 54
End: 2019-01-23
Payer: MEDICARE

## 2019-01-23 DIAGNOSIS — Z51.81 ENCOUNTER FOR THERAPEUTIC DRUG MONITORING: ICD-10-CM

## 2019-01-23 DIAGNOSIS — I10 ESSENTIAL HYPERTENSION, MALIGNANT: ICD-10-CM

## 2019-01-23 DIAGNOSIS — I10 ESSENTIAL HYPERTENSION, MALIGNANT: Primary | ICD-10-CM

## 2019-01-23 LAB
BASOPHILS # BLD AUTO: 0.06 THOUSANDS/ΜL (ref 0–0.1)
BASOPHILS NFR BLD AUTO: 2 % (ref 0–1)
EOSINOPHIL # BLD AUTO: 0.01 THOUSAND/ΜL (ref 0–0.61)
EOSINOPHIL NFR BLD AUTO: 0 % (ref 0–6)
ERYTHROCYTE [DISTWIDTH] IN BLOOD BY AUTOMATED COUNT: 13.2 % (ref 11.6–15.1)
HCT VFR BLD AUTO: 44.7 % (ref 36.5–49.3)
HGB BLD-MCNC: 14.8 G/DL (ref 12–17)
IMM GRANULOCYTES # BLD AUTO: 0.02 THOUSAND/UL (ref 0–0.2)
IMM GRANULOCYTES NFR BLD AUTO: 1 % (ref 0–2)
LYMPHOCYTES # BLD AUTO: 0.93 THOUSANDS/ΜL (ref 0.6–4.47)
LYMPHOCYTES NFR BLD AUTO: 23 % (ref 14–44)
MCH RBC QN AUTO: 33.3 PG (ref 26.8–34.3)
MCHC RBC AUTO-ENTMCNC: 33.1 G/DL (ref 31.4–37.4)
MCV RBC AUTO: 101 FL (ref 82–98)
MONOCYTES # BLD AUTO: 0.31 THOUSAND/ΜL (ref 0.17–1.22)
MONOCYTES NFR BLD AUTO: 8 % (ref 4–12)
NEUTROPHILS # BLD AUTO: 2.7 THOUSANDS/ΜL (ref 1.85–7.62)
NEUTS SEG NFR BLD AUTO: 66 % (ref 43–75)
NRBC BLD AUTO-RTO: 0 /100 WBCS
PLATELET # BLD AUTO: 265 THOUSANDS/UL (ref 149–390)
PMV BLD AUTO: 9.8 FL (ref 8.9–12.7)
RBC # BLD AUTO: 4.44 MILLION/UL (ref 3.88–5.62)
WBC # BLD AUTO: 4.03 THOUSAND/UL (ref 4.31–10.16)

## 2019-01-23 PROCEDURE — 36415 COLL VENOUS BLD VENIPUNCTURE: CPT

## 2019-01-23 PROCEDURE — 85025 COMPLETE CBC W/AUTO DIFF WBC: CPT

## 2019-01-23 PROCEDURE — 80165 DIPROPYLACETIC ACID FREE: CPT

## 2019-01-23 NOTE — PROGRESS NOTES
Assessment/Plan:  Aseptic debridement and planning of nails x10 and manually and mechanically    Debrided ingrown nails and hyperkeratotic lesions  Suggested custom-molded orthotics to offload foot deformity  Follow-up 3 months     Diagnoses and all orders for this visit:    Ingrowing nail    Onychomycosis    Pain in both feet    Deformity of both feet          Subjective:      Patient ID: Shena Bond is a 46 y o  male  Patient is having pain in recurrent ingrown nails and painful calluses plantar feet bilateral   Does have significant bunion and contracted hammertoes  Pain when walking and standing    Significant flatfoot bilateral         Past Medical History:   Diagnosis Date    Abnormal weight gain     last assessed 4/18/16; resolved 1/25/17    Acute CHF (congestive heart failure) ([facility] )     Anxiety     Dementia     Depression     Disease of thyroid gland     Down's syndrome     Fatigue     last assessed 2/23/17; resolved 6/1/17    GERD (gastroesophageal reflux disease)     Gluten free diet     Hernia of abdominal cavity     Hyperlipidemia     Hypogonadism in male     Impulse control disorder     OCD (obsessive compulsive disorder)     Parkinson disease ([facility]     Parkinson disease ([facility] )     resolved 2/23/17    Pleural effusion     Sinus tachycardia     Vitamin D deficiency     last assessed 9/1/16; resolved 6/1/17         Current Outpatient Prescriptions:     ANDROGEL PUMP 20 25 MG/ACT (1 62%) TD gel pump, 3 pumps daily at 8 AM on shoulders/chest , Disp: 75 g, Rfl: 5    atorvastatin (LIPITOR) 20 mg tablet, Take 1 tablet (20 mg total) by mouth daily At 8pm, Disp: 30 tablet, Rfl: 11    Calcium Carbonate-Vitamin D 600-400 MG-UNIT per tablet, TAKE 1 TABLET BY MOUTH TWICE A DAY (8AM & 8PM) -YANNA, Disp: 60 tablet, Rfl: 10    cholecalciferol (VITAMIN D3) 1,000 units tablet, Take 1 tablet (1,000 Units total) by mouth 2 (two) times a day At 8am and 8pm, Disp: 60 tablet, Rfl: 11   divalproex sodium (DEPAKOTE) 500 mg EC tablet, Take 500 mg by mouth daily at bedtime  , Disp: , Rfl:     docusate sodium (COLACE) 100 mg capsule, At 8am, Disp: 30 capsule, Rfl: 11    esomeprazole (NexIUM) 40 MG capsule, Daily 30 minutes prior to breakfast, Disp: 30 capsule, Rfl: 11    lanolin-mineral oil (ARMAAN) LOTN, Apply topically as needed for dry skin Apply to hands daily as needed between 8 am to 8 pm, Disp: 1 Bottle, Rfl: 11    levothyroxine 75 mcg tablet, Take 1 tablet (75 mcg total) by mouth daily Take at 8 pm, Disp: 30 tablet, Rfl: 11    loratadine (CLARITIN) 10 mg tablet, Take 1 tablet (10 mg total) by mouth daily At 8am As needed for allergies, Disp: 30 tablet, Rfl: 11    multivitamin-minerals (CENTRUM ADULTS) tablet, Take 1 tablet by mouth daily At 8am, Disp: 30 tablet, Rfl: 11    OLANZapine (ZyPREXA) 2 5 mg tablet, Take 1 tablet by mouth daily, Disp: , Rfl:     polyethylene glycol (GLYCOLAX) powder, Take 17 grams in 8 oz of water by mouth daily at 8 pm, Disp: 527 g, Rfl: 11    polyethylene glycol (MIRALAX) 17 g packet, Take 17 g by mouth daily  , Disp: , Rfl:     polymyxin b-trimethoprim (POLYTRIM) ophthalmic solution, Administer 1 drop to the right eye every 12 (twelve) hours 8am and 8pm   Discontinue on 12/13/18, Disp: 10 mL, Rfl: 0    tamsulosin (FLOMAX) 0 4 mg, Take 1 capsule (0 4 mg total) by mouth daily at bedtime At 8pm, Disp: 30 capsule, Rfl: 11    tobramycin (TOBREX) 0 3 % SOLN, 2 drops in right eye at 8am, 4pm and 8pm x 5 days (Patient not taking: Reported on 10/4/2018 ), Disp: 5 mL, Rfl: 1  No current facility-administered medications for this visit       Facility-Administered Medications Ordered in Other Visits:     midazolam (VERSED) injection, , Intravenous, PRN, Shyann Johnson MD, 2 mg at 04/29/16 1218    propofol (DIPRIVAN) 10 mg/mL bolus injection, , Intravenous, PRN, Shyann Johnson MD, 20 mg at 04/29/16 1240    sodium chloride 0 9 % infusion, , , Continuous PRN, Anjelica Bartholomew MD    Past Surgical History:   Procedure Laterality Date    COLONOSCOPY      ESOPHAGOGASTRODUODENOSCOPY      with biopsy    PERICARDIOCENTESIS  4/29/2016            Allergies   Allergen Reactions    Advil [Ibuprofen]     Gluten Meal        Patient Active Problem List   Diagnosis    Acute exacerbation of CHF (congestive heart failure) (HCC)    Essential hypertension    Hyperlipidemia    Hypothyroidism    GERD (gastroesophageal reflux disease)    Down's syndrome    Anemia    CHELSIE (iron deficiency anemia)    Chronic constipation    Hypogonadism, testicular    Hypotension    Idiopathic osteoporosis    Mixed incontinence    Parkinson's disease (HCC)    Serositis (HCC)    Bladder distention    Hydronephrosis    Abdominal pain    Idiopathic pericardial effusion    Cardiac murmur    Xerodermia       Review of Systems   Constitutional: Negative  HENT: Negative  Eyes: Negative  Respiratory: Negative  Cardiovascular: Negative  Gastrointestinal: Negative  Endocrine: Negative  Genitourinary: Negative  Musculoskeletal: Negative  Skin: Negative  Allergic/Immunologic: Negative  Neurological: Negative  Hematological: Negative  Psychiatric/Behavioral: Negative  Objective:      Pulse 72   Resp 17   Ht 5' (1 524 m)   Wt 67 6 kg (149 lb)   BMI 29 10 kg/m²          Physical Exam    Constitutional: no acute distress, well appearing and well nourished   Orthopedic/Biomechanical: abnormal foot type,-- hammertoe(s),-- bunion(s),-- abnormal MPJ ROM,-- discolored nails,-- subungual debris-- and-- nail tenderness   Skin: keratoses  Left Foot: Appearance: a deformity  Great toe deformities include a bunion  Evaluation of the great toe nail demonstrates an ingrown nail, but-- no paronychia  Decreased range of motion first MPJ bilateral positive pain on palpation and range of motion right worse than left  Ingrown bilateral big toe   No signs of infection no erythema no exudate  Special Tests: significant bunion bilateral decreased range of motion first MPJ no pain on range of motion,no sign of infection  Right Foot: Appearance: Normal except as noted: a deformity  Great toe deformities include a bunion  Evaluation of the great toe nail demonstrates paronychia-- and-- an ingrown nail  Mild ingrown tibial border  Rigid hammertoes, 2 through 5, bilateral, moderate xerosis, fissures, bilateral heels, midfoot, bilateral, negative erythema  Negative exudate  Negative ulceration  Special Tests: Mild xerosis, no signs of infection  Neurological Exam: Light touch was decreased bilaterally  Vibratory sensation was decreased in both first metatarsophalangeal joints  Response to monofilament test was intact bilaterally  Vascular Exam: performed Dorsalis pedis pulses were 1/4 bilaterally  Posterior tibial pulses were 1/4 bilaterally  Capillary refill time was between 1-3 seconds bilaterally  Toenails: All of the toenails were elongated,-- hypertrophied,-- discolored,-- shown to have subungual debris-- and-- tender  Both first toenails were ingrown  Hyperkeratosis: present on both first sub metatarsals-- and-- present on both fifth sub metatarsals  Moderate bunion bilateral   Rigid hammertoes 2 through 5 bilateral   Significant equinus bilateral   Negative erythema no signs of infection  Large bunion right worse than left  Decreased range of motion 1st MPJ  Right 1st MPJ positive pain on end range of motion no crepitus no hypermobility of the 1st ray      All nails thick discolored dystrophic  Ingrown bilateral hallux  No open wounds or signs of infection  Painful hyperkeratotic lesions medial 1st metatarsal head bilateral  Significant hallux limitus bilateral hammered hallux with hallux valgus bilateral

## 2019-01-24 ENCOUNTER — TELEPHONE (OUTPATIENT)
Dept: ENDOCRINOLOGY | Facility: CLINIC | Age: 54
End: 2019-01-24

## 2019-01-24 DIAGNOSIS — E29.1 TESTICULAR HYPOGONADISM: Primary | ICD-10-CM

## 2019-01-24 NOTE — TELEPHONE ENCOUNTER
We can put him on Androderm 4 mg per day  He may develop a rash with this  If he does, they should contact us

## 2019-01-24 NOTE — TELEPHONE ENCOUNTER
I got a call from [facility] caretaker who stated that they got in the mail a letter from insurance stating Androgel is no longer covered, only Testosterone gel or Androderm  Please advised    He will also need new script  and discontinued orders faxed  to [phone] Attn: Susana

## 2019-01-25 ENCOUNTER — TELEPHONE (OUTPATIENT)
Dept: ENDOCRINOLOGY | Facility: CLINIC | Age: 54
End: 2019-01-25

## 2019-01-25 NOTE — TELEPHONE ENCOUNTER
Group home called and they feel he wont leave the patches on he goes to work and they feel he will rip it off, is there anything else they can use since androgel is no longer covered, spoke to Carrie Webb fax [phone]

## 2019-01-27 ENCOUNTER — TELEPHONE (OUTPATIENT)
Dept: OTHER | Facility: OTHER | Age: 54
End: 2019-01-27

## 2019-01-28 LAB — VALPROATE FREE SERPL-MCNC: 3.3 UG/ML (ref 6–22)

## 2019-01-28 NOTE — TELEPHONE ENCOUNTER
Bereket Humphreys 1965  CONFIDENTIALTY NOTICE: This fax transmission is intended only for the addressee  It contains information that is legally privileged,  confidential or otherwise protected from use or disclosure  If you are not the intended recipient, you are strictly prohibited from reviewing,  disclosing, copying using or disseminating any of this information or taking any action in reliance on or regarding this information  If you have  received this fax in error, please notify us immediately by telephone so that we can arrange for its return to us  Page:   Call Id: [id number]  Health Call  Standard Call Report  Health Call  Patient Name: Bereket Humphreys  Gender: Male  : 1965  Age: 46 Y 6 M 5 D  Return Phone  Number: [phone] (Home)  Address: [address] [place] [address]  City/[facility]: [address] [place] [id number]  Practice Name: [facility]  Practice Charged:  Physician:  0 [facility] Name: [place] [facility]  Relationship To  Patient: Other  Return Phone Number: [phone] (Home)  Presenting Problem: "My client missed his dosage of  vitamin D3 on 19 @ 2000 "  Service Type: Messages  Charged Service 1: Messages  Pharmacy Name and  Number:  Nurse Assessment  Protocols  Protocol Title Nurse Date/Time  Disp  Time Disposition Final User  2019 9:10:19 PM Close Yes Mitchel Kirk  Comments  User: Hortensia Mcdonald RN Date/Time: 2019 9:10:12 PM  Patient is from a [address] [place] [address] Pointer just wanted to make office aware that patient missed his Vitamin D3 supplement  yesterday night at   Took Am and Pm doses today

## 2019-02-23 ENCOUNTER — APPOINTMENT (OUTPATIENT)
Dept: LAB | Facility: HOSPITAL | Age: 54
End: 2019-02-23
Payer: MEDICARE

## 2019-02-23 ENCOUNTER — TRANSCRIBE ORDERS (OUTPATIENT)
Dept: ADMINISTRATIVE | Facility: HOSPITAL | Age: 54
End: 2019-02-23

## 2019-02-23 DIAGNOSIS — Z51.81 ENCOUNTER FOR THERAPEUTIC DRUG MONITORING: ICD-10-CM

## 2019-02-23 DIAGNOSIS — Z51.81 ENCOUNTER FOR THERAPEUTIC DRUG MONITORING: Primary | ICD-10-CM

## 2019-02-23 DIAGNOSIS — I10 ESSENTIAL HYPERTENSION, MALIGNANT: ICD-10-CM

## 2019-02-23 LAB
ALBUMIN SERPL BCP-MCNC: 3.3 G/DL (ref 3.5–5)
ALP SERPL-CCNC: 68 U/L (ref 46–116)
ALT SERPL W P-5'-P-CCNC: 28 U/L (ref 12–78)
AST SERPL W P-5'-P-CCNC: 22 U/L (ref 5–45)
BASOPHILS # BLD AUTO: 0.06 THOUSANDS/ΜL (ref 0–0.1)
BASOPHILS NFR BLD AUTO: 2 % (ref 0–1)
BILIRUB DIRECT SERPL-MCNC: 0.1 MG/DL (ref 0–0.2)
BILIRUB SERPL-MCNC: 0.6 MG/DL (ref 0.2–1)
EOSINOPHIL # BLD AUTO: 0.02 THOUSAND/ΜL (ref 0–0.61)
EOSINOPHIL NFR BLD AUTO: 1 % (ref 0–6)
ERYTHROCYTE [DISTWIDTH] IN BLOOD BY AUTOMATED COUNT: 13.2 % (ref 11.6–15.1)
HCT VFR BLD AUTO: 46 % (ref 36.5–49.3)
HGB BLD-MCNC: 15.2 G/DL (ref 12–17)
IMM GRANULOCYTES # BLD AUTO: 0 THOUSAND/UL (ref 0–0.2)
IMM GRANULOCYTES NFR BLD AUTO: 0 % (ref 0–2)
LYMPHOCYTES # BLD AUTO: 0.93 THOUSANDS/ΜL (ref 0.6–4.47)
LYMPHOCYTES NFR BLD AUTO: 32 % (ref 14–44)
MCH RBC QN AUTO: 33.2 PG (ref 26.8–34.3)
MCHC RBC AUTO-ENTMCNC: 33 G/DL (ref 31.4–37.4)
MCV RBC AUTO: 100 FL (ref 82–98)
MONOCYTES # BLD AUTO: 0.42 THOUSAND/ΜL (ref 0.17–1.22)
MONOCYTES NFR BLD AUTO: 14 % (ref 4–12)
NEUTROPHILS # BLD AUTO: 1.52 THOUSANDS/ΜL (ref 1.85–7.62)
NEUTS SEG NFR BLD AUTO: 51 % (ref 43–75)
NRBC BLD AUTO-RTO: 0 /100 WBCS
PLATELET # BLD AUTO: 219 THOUSANDS/UL (ref 149–390)
PMV BLD AUTO: 10.1 FL (ref 8.9–12.7)
PROT SERPL-MCNC: 6.9 G/DL (ref 6.4–8.2)
RBC # BLD AUTO: 4.58 MILLION/UL (ref 3.88–5.62)
VALPROATE SERPL-MCNC: 35 UG/ML (ref 50–100)
WBC # BLD AUTO: 2.95 THOUSAND/UL (ref 4.31–10.16)

## 2019-02-23 PROCEDURE — 85025 COMPLETE CBC W/AUTO DIFF WBC: CPT | Performed by: PSYCHIATRY & NEUROLOGY

## 2019-02-23 PROCEDURE — 36415 COLL VENOUS BLD VENIPUNCTURE: CPT | Performed by: PSYCHIATRY & NEUROLOGY

## 2019-02-23 PROCEDURE — 80076 HEPATIC FUNCTION PANEL: CPT

## 2019-02-23 PROCEDURE — 80164 ASSAY DIPROPYLACETIC ACD TOT: CPT

## 2019-02-25 ENCOUNTER — OFFICE VISIT (OUTPATIENT)
Dept: ENDOCRINOLOGY | Facility: CLINIC | Age: 54
End: 2019-02-25
Payer: MEDICARE

## 2019-02-25 VITALS
SYSTOLIC BLOOD PRESSURE: 122 MMHG | BODY MASS INDEX: 29.64 KG/M2 | DIASTOLIC BLOOD PRESSURE: 60 MMHG | WEIGHT: 151 LBS | HEIGHT: 60 IN | HEART RATE: 73 BPM

## 2019-02-25 DIAGNOSIS — I10 ESSENTIAL HYPERTENSION: ICD-10-CM

## 2019-02-25 DIAGNOSIS — M81.8 IDIOPATHIC OSTEOPOROSIS: ICD-10-CM

## 2019-02-25 DIAGNOSIS — E29.1 TESTICULAR HYPOGONADISM: Primary | ICD-10-CM

## 2019-02-25 DIAGNOSIS — Z12.5 SCREENING PSA (PROSTATE SPECIFIC ANTIGEN): ICD-10-CM

## 2019-02-25 DIAGNOSIS — E03.4 HYPOTHYROIDISM DUE TO ACQUIRED ATROPHY OF THYROID: ICD-10-CM

## 2019-02-25 DIAGNOSIS — E78.5 HYPERLIPIDEMIA, UNSPECIFIED HYPERLIPIDEMIA TYPE: ICD-10-CM

## 2019-02-25 PROCEDURE — 99214 OFFICE O/P EST MOD 30 MIN: CPT | Performed by: INTERNAL MEDICINE

## 2019-02-25 NOTE — PROGRESS NOTES
Ginny Nolan 46 y o  male MRN: [id number]    Encounter: [id number]      Assessment/Plan     Assessment: This is a 46y o -year-old male with hypothyroidism, hypogonadism, hypertension, hyperlipidemia and osteoporosis  Plan:  1  Hypothyroidism-continue current thyroid hormone replacement  Laboratory testing shows that he is euthyroid  2  Hypogonadism-continue testosterone supplementation  Check testosterone levels prior to next visit  3  Hypertension- the blood pressure is under good control  4  Hyperlipidemia-continue statin  5  Osteoporosis-continue testosterone supplementation  He will be due for a DEXA scan in 2020  CC:   Hypothyroidism    History of Present Illness     HPI:  57-year-old male with hypothyroidism due to atrophy of the thyroid who is on thyroid hormone replacement  He denies any symptoms of hypo or hyperthyroidism  For the hypogonadism, he is on testosterone supplementation  He denies any difficulty urinating  For the osteoporosis, he is not on any medications but is being treated with testosterone supplementation  For the hypertension, he is not on any medications but is using lifestyle modification  For the hyperlipidemia, he is statin  He denies any myalgia  Review of Systems   Constitutional: Negative for chills and fever  Respiratory: Negative for shortness of breath  Cardiovascular: Negative for chest pain  Gastrointestinal: Negative for constipation, diarrhea, nausea and vomiting  All other systems reviewed and are negative        Historical Information   Past Medical History:   Diagnosis Date    Abnormal weight gain     last assessed 4/18/16; resolved 1/25/17    [facility]est[facility] [address] [facility]     Anxiety     Dementia     Depression     Disease of thyroid gland     Down's syndrome     Fatigue     last assessed 2/23/17; resolved 6/1/17    GERD (gastroesophageal reflux disease)     Gluten free diet     Hernia of abdominal cavity     Hyperlipidemia     Hypogonadism in male     Impulse control disorder     OCD (obsessive compulsive disorder)     [facility] [address]     Parkinson disease ([facility] [address] [facility]     resolved 2/23/17    Pleural effusion     Sinus tachycardia     Vitamin D deficiency     last assessed 9/1/16; resolved 6/1/17     Past Surgical History:   Procedure Laterality Date    COLONOSCOPY      ESOPHAGOGASTRODUODENOSCOPY      with biopsy    PERICARDIOCENTESIS  4/29/2016          Social History   Social History     Substance and Sexual Activity   Alcohol Use No     Social History     Substance and Sexual Activity   Drug Use No     Social History     Tobacco Use   Smoking Status Never Smoker   Smokeless Tobacco Never Used     Family History:   Family History   Problem Relation Age of Onset    No Known Problems Mother     Parkinsonism Father         symtomatic       Meds/Allergies   Current Outpatient Medications   Medication Sig Dispense Refill    atorvastatin (LIPITOR) 20 mg tablet Take 1 tablet (20 mg total) by mouth daily At 8pm 30 tablet 11    Calcium Carbonate-Vitamin D 600-400 MG-UNIT per tablet TAKE 1 TABLET BY MOUTH TWICE A DAY (8AM & 8PM) -YANNA 60 tablet 10    cholecalciferol (VITAMIN D3) 1,000 units tablet Take 1 tablet (1,000 Units total) by mouth 2 (two) times a day At 8am and 8pm 60 tablet 11    divalproex sodium (DEPAKOTE) 500 mg EC tablet Take 500 mg by mouth       docusate sodium (COLACE) 100 mg capsule At 8am 30 capsule 11    esomeprazole (NexIUM) 40 MG capsule Daily 30 minutes prior to breakfast 30 capsule 11    lanolin-mineral oil (ARMAAN) LOTN Apply topically as needed for dry skin Apply to hands daily as needed between 8 am to 8 pm 1 Bottle 11    levothyroxine 75 mcg tablet Take 1 tablet (75 mcg total) by mouth daily Take at 8 pm 30 tablet 11    loratadine (CLARITIN) 10 mg tablet Take 1 tablet (10 mg total) by mouth daily At 8am As needed for allergies 30 tablet 11    multivitamin-minerals (CENTRUM ADULTS) tablet Take 1 tablet by mouth daily At 8am 30 tablet 11    OLANZapine (ZyPREXA) 2 5 mg tablet Take 1 tablet by mouth daily      polyethylene glycol (GLYCOLAX) powder Take 17 grams in 8 oz of water by mouth daily at 8 pm 527 g 11    tamsulosin (FLOMAX) 0 4 mg Take 1 capsule (0 4 mg total) by mouth daily at bedtime At 8pm 30 capsule 11    Testosterone (ANDRODERM) 4 MG/24HR PT24 Place 1 patch on the skin daily for 90 days 30 patch 2     No current facility-administered medications for this visit  Facility-Administered Medications Ordered in Other Visits   Medication Dose Route Frequency Provider Last Rate Last Dose    midazolam (VERSED) injection   Intravenous PRN Talia Nathan MD   2 mg at 04/29/16 1218    propofol (DIPRIVAN) 10 mg/mL bolus injection   Intravenous PRN Talia Nathan MD   20 mg at 04/29/16 1240    sodium chloride 0 9 % infusion    Continuous PRN Talia Nathan MD         Allergies   Allergen Reactions    Advil [Ibuprofen]     Gluten Meal        Objective   Vitals: Blood pressure 122/60, pulse 73, height 5' (1 524 m), weight 68 5 kg (151 lb)  Physical Exam   Constitutional: He is oriented to person, place, and time  He appears well-developed and well-nourished  No distress  HENT:   Head: Normocephalic and atraumatic  Mouth/Throat: Oropharynx is clear and moist and mucous membranes are normal  No oropharyngeal exudate  Eyes: Conjunctivae, EOM and lids are normal  Right eye exhibits no discharge  Left eye exhibits no discharge  No scleral icterus  Neck: Neck supple  No thyromegaly present  Cardiovascular: Normal rate, regular rhythm and normal heart sounds  Exam reveals no gallop and no friction rub  No murmur heard  Pulmonary/Chest: Effort normal and breath sounds normal  No respiratory distress  He has no wheezes  Abdominal: Soft  Bowel sounds are normal  He exhibits no distension  There is no tenderness     Musculoskeletal: Normal range of motion  He exhibits no edema, tenderness or deformity  Lymphadenopathy:        Head (right side): No occipital adenopathy present  Head (left side): No occipital adenopathy present  Right: No supraclavicular adenopathy present  Left: No supraclavicular adenopathy present  Neurological: He is alert and oriented to person, place, and time  No cranial nerve deficit  Coordination normal    Skin: Skin is warm and intact  No rash noted  He is not diaphoretic  No erythema  Psychiatric: He has a normal mood and affect  His behavior is normal    Vitals reviewed  The history was obtained from the review of the chart, caregiver  Lab Results:   Lab Results   Component Value Date/Time    TSH 3RD GENERATON 1 904 11/10/2018 08:55 AM    TSH 3RD GENERATON 2 721 04/27/2018 07:35 AM    Free T4 0 94 11/10/2018 08:55 AM    Free T4 0 89 04/27/2018 07:35 AM       Imaging Studies:        I have personally reviewed pertinent reports  Portions of the record may have been created with voice recognition software  Occasional wrong word or "sound a like" substitutions may have occurred due to the inherent limitations of voice recognition software  Read the chart carefully and recognize, using context, where substitutions have occurred

## 2019-02-27 ENCOUNTER — OFFICE VISIT (OUTPATIENT)
Dept: NEUROLOGY | Facility: CLINIC | Age: 54
End: 2019-02-27
Payer: MEDICARE

## 2019-02-27 VITALS
SYSTOLIC BLOOD PRESSURE: 100 MMHG | BODY MASS INDEX: 27.88 KG/M2 | WEIGHT: 142 LBS | HEIGHT: 60 IN | DIASTOLIC BLOOD PRESSURE: 60 MMHG | HEART RATE: 66 BPM | RESPIRATION RATE: 18 BRPM

## 2019-02-27 DIAGNOSIS — G20 PARKINSON'S DISEASE (HCC): Primary | ICD-10-CM

## 2019-02-27 PROCEDURE — 99214 OFFICE O/P EST MOD 30 MIN: CPT | Performed by: PSYCHIATRY & NEUROLOGY

## 2019-02-27 NOTE — PROGRESS NOTES
Patient ID: Sara Roy is a 46 y o  male  Assessment/Plan:    Mild Parkinson disease    Down syndrome with mild cognitive impairment    [facility] patient does not need to take any medication for Parkinson symptoms at this time  He is fully functional and so far mild Parkinson signs have not changed quality of his life  I will continue to follow the patient every 6 months  Subjective:    48-year-old male followed in the office every 6 month for evaluation of the mild Parkinson disease  Patient is accompanied by the caretaker who reported that once in awhile he see mild resting tremor  Patient denies any slowness or stiffness on daily activities  Caretaker reported that patient is slower compared to 10 years ago however no change in the last year or so  Patient continued to do his daily activity by himself such as a showering, dressing putting is Ellender Bibber socks  He has a continent for both bladder and bowels  He also makes his own bed ,put away his dirty clothes in the hamper, also takes 8-10 cans of garbage in the front once a week  Patient does reminder vacuuming his own room  Patient feeds himself  He has not lost his balance or taken any falls  Patient has not regressed in activities or has not shown the signs of memory loss compared to before  No neuro symptoms history can't be asked to the patient            Past Medical History:   Diagnosis Date    Abnormal weight gain     last assessed 4/18/16; resolved 1/25/17    [facility]) [facility] [address] [facility]     Anxiety     Dementia     Depression     Disease of thyroid gland     Down's syndrome     Fatigue     last assessed 2/23/17; resolved 6/1/17    GERD (gastroesophageal reflux disease)     Gluten free diet     Hernia of abdominal cavity     Hyperlipidemia     Hypogonadism in male     Impulse control disorder     OCD (obsessive compulsive disorder)     [facility] disease ([facility] [address] [facility]     Parkinson disease ([facility] [address] [facility]     resolved 2/23/17    Pleural effusion     Sinus tachycardia     Vitamin D deficiency     last assessed 9/1/16; resolved 6/1/17       Family History   Problem Relation Age of Onset    No Known Problems Mother     Parkinsonism Father         symtomatic   ,    Social History     Socioeconomic History    Marital status: Single     Spouse name: Not on file    Number of children: Not on file    Years of education: Not on file    Highest education level: Not on file   Occupational History    Not on file   Social Needs    Financial resource strain: Not on file    Food insecurity:     Worry: Not on file     Inability: Not on file    Transportation needs:     Medical: Not on file     Non-medical: Not on file   Tobacco Use    Smoking status: Never Smoker    Smokeless tobacco: Never Used   Substance and Sexual Activity    Alcohol use: No    Drug use: No    Sexual activity: Not Currently   Lifestyle    Physical activity:     Days per week: Not on file     Minutes per session: Not on file    Stress: Not on file   Relationships    Social connections:     Talks on phone: Not on file     Gets together: Not on file     Attends Jew service: Not on file     Active member of club or organization: Not on file     Attends meetings of clubs or organizations: Not on file     Relationship status: Not on file    Intimate partner violence:     Fear of current or ex partner: Not on file     Emotionally abused: Not on file     Physically abused: Not on file     Forced sexual activity: Not on file   Other Topics Concern    Not on file   Social History Narrative    Caffeine use        Current Outpatient Medications on File Prior to Visit   Medication Sig Dispense Refill    atorvastatin (LIPITOR) 20 mg tablet Take 1 tablet (20 mg total) by mouth daily At 8pm 30 tablet 11    Calcium Carbonate-Vitamin D 600-400 MG-UNIT per tablet TAKE 1 TABLET BY MOUTH TWICE A DAY (8AM & 8PM) -YANNA 60 tablet 10    cholecalciferol (VITAMIN D3) 1,000 units tablet Take 1 tablet (1,000 Units total) by mouth 2 (two) times a day At 8am and 8pm 60 tablet 11    divalproex sodium (DEPAKOTE) 500 mg EC tablet Take 500 mg by mouth       docusate sodium (COLACE) 100 mg capsule At 8am 30 capsule 11    esomeprazole (NexIUM) 40 MG capsule Daily 30 minutes prior to breakfast 30 capsule 11    lanolin-mineral oil (ARMAAN) LOTN Apply topically as needed for dry skin Apply to hands daily as needed between 8 am to 8 pm 1 Bottle 11    levothyroxine 75 mcg tablet Take 1 tablet (75 mcg total) by mouth daily Take at 8 pm 30 tablet 11    loratadine (CLARITIN) 10 mg tablet Take 1 tablet (10 mg total) by mouth daily At 8am As needed for allergies 30 tablet 11    multivitamin-minerals (CENTRUM ADULTS) tablet Take 1 tablet by mouth daily At 8am 30 tablet 11    OLANZapine (ZyPREXA) 2 5 mg tablet Take 1 tablet by mouth daily      polyethylene glycol (GLYCOLAX) powder Take 17 grams in 8 oz of water by mouth daily at 8 pm 527 g 11    tamsulosin (FLOMAX) 0 4 mg Take 1 capsule (0 4 mg total) by mouth daily at bedtime At 8pm 30 capsule 11    Testosterone (ANDRODERM) 4 MG/24HR PT24 Place 1 patch on the skin daily for 90 days 30 patch 2     Current Facility-Administered Medications on File Prior to Visit   Medication Dose Route Frequency Provider Last Rate Last Dose    midazolam (VERSED) injection   Intravenous PRN Matilda Domingo MD   2 mg at 04/29/16 1218    propofol (DIPRIVAN) 10 mg/mL bolus injection   Intravenous PRN Matilda Domingo MD   20 mg at 04/29/16 1240    sodium chloride 0 9 % infusion    Continuous PRN Matilda Domingo MD                    Objective:    Blood pressure 100/60, pulse 66, resp  rate 18, height 5' (1 524 m), weight 64 4 kg (142 lb)  Physical Exam   Eyes: Pupils are equal, round, and reactive to light  EOM are normal    Neck: Normal carotid pulses present  Carotid bruit is not present     Neurological: He has normal strength and normal reflexes  Psychiatric: His speech is normal        Neurological Exam  Mental Status   Speech is normal   Patient is awake alert oriented to his name, age, date of birth month and the year  He also knows present month and the year  Spontaneous speech is normal he follows simple and 2 steps commands  Patient definitely have a right to left disorientation  He has mild cognitive impairment secondary to his Down syndrome       Cranial Nerves  CN II: Visual fields full to confrontation  CN III, IV, VI: Extraocular movements intact bilaterally  Pupils equal round and reactive to light bilaterally  CN V: Facial sensation is normal   CN VII: Full and symmetric facial movement  CN VIII: Hearing is normal   CN IX, X: Palate elevates symmetrically  Normal gag reflex  CN XI: Shoulder shrug strength is normal   CN XII: Tongue midline without atrophy or fasciculations  Motor  Normal muscle bulk throughout  No fasciculations present  Normal muscle tone  Strength is 5/5 throughout all four extremities  Mild cogwheel rigidity in both upper and lower extremity  Rapid movement is slow bilaterally in upper extremity examined by the finger tapping  Clapping slows down after a few times bilaterally       Sensory  Sensation is intact to light touch, pinprick, vibration and proprioception in all four extremities  Light touch is normal in upper and lower extremities  Pinprick is normal in upper and lower extremities  Proprioception is normal in upper and lower extremities  Reflexes  Deep tendon reflexes are 2+ and symmetric in all four extremities with downgoing toes bilaterally  Coordination  Right: Finger-to-nose normal  Heel-to-shin normal   Left: Finger-to-nose normal  Heel-to-shin normal     Gait  Patient walks with wide-based gait however not ataxic  He has a no arm swing upon walking           ROS:    Review of Systems   Reason unable to perform ROS: mentally challeged

## 2019-03-26 DIAGNOSIS — E03.4 HYPOTHYROIDISM DUE TO ACQUIRED ATROPHY OF THYROID: ICD-10-CM

## 2019-03-26 RX ORDER — LEVOTHYROXINE SODIUM 0.07 MG/1
TABLET ORAL
Qty: 30 TABLET | Refills: 10 | Status: SHIPPED | OUTPATIENT
Start: 2019-03-26 | End: 2020-02-25

## 2019-04-15 ENCOUNTER — OFFICE VISIT (OUTPATIENT)
Dept: CARDIOLOGY CLINIC | Facility: CLINIC | Age: 54
End: 2019-04-15
Payer: MEDICARE

## 2019-04-15 VITALS
HEIGHT: 60 IN | BODY MASS INDEX: 29.45 KG/M2 | SYSTOLIC BLOOD PRESSURE: 108 MMHG | HEART RATE: 56 BPM | WEIGHT: 150 LBS | OXYGEN SATURATION: 98 % | DIASTOLIC BLOOD PRESSURE: 64 MMHG

## 2019-04-15 DIAGNOSIS — I31.3 IDIOPATHIC PERICARDIAL EFFUSION: ICD-10-CM

## 2019-04-15 DIAGNOSIS — R00.1 SINUS BRADYCARDIA: ICD-10-CM

## 2019-04-15 DIAGNOSIS — E78.5 HYPERLIPIDEMIA, UNSPECIFIED HYPERLIPIDEMIA TYPE: ICD-10-CM

## 2019-04-15 DIAGNOSIS — Q90.9 DOWN'S SYNDROME: ICD-10-CM

## 2019-04-15 DIAGNOSIS — E03.4 HYPOTHYROIDISM DUE TO ACQUIRED ATROPHY OF THYROID: ICD-10-CM

## 2019-04-15 DIAGNOSIS — E29.1 HYPOGONADISM, TESTICULAR: ICD-10-CM

## 2019-04-15 DIAGNOSIS — R01.1 CARDIAC MURMUR: ICD-10-CM

## 2019-04-15 PROCEDURE — 93000 ELECTROCARDIOGRAM COMPLETE: CPT | Performed by: INTERNAL MEDICINE

## 2019-04-15 PROCEDURE — 99214 OFFICE O/P EST MOD 30 MIN: CPT | Performed by: INTERNAL MEDICINE

## 2019-04-16 ENCOUNTER — OFFICE VISIT (OUTPATIENT)
Dept: PODIATRY | Facility: CLINIC | Age: 54
End: 2019-04-16
Payer: MEDICARE

## 2019-04-16 VITALS — BODY MASS INDEX: 29.45 KG/M2 | HEIGHT: 60 IN | WEIGHT: 150 LBS

## 2019-04-16 DIAGNOSIS — M79.672 PAIN IN BOTH FEET: ICD-10-CM

## 2019-04-16 DIAGNOSIS — L60.0 INGROWN TOENAIL: ICD-10-CM

## 2019-04-16 DIAGNOSIS — B35.1 ONYCHOMYCOSIS: ICD-10-CM

## 2019-04-16 DIAGNOSIS — M79.671 PAIN IN BOTH FEET: ICD-10-CM

## 2019-04-16 DIAGNOSIS — B35.3 TINEA PEDIS OF BOTH FEET: Primary | ICD-10-CM

## 2019-04-16 PROCEDURE — 99213 OFFICE O/P EST LOW 20 MIN: CPT | Performed by: PODIATRIST

## 2019-05-22 DIAGNOSIS — E29.1 HYPOGONADISM, TESTICULAR: Primary | ICD-10-CM

## 2019-05-23 RX ORDER — TESTOSTERONE 16.2 MG/G
GEL TRANSDERMAL
Qty: 75 G | Refills: 4 | Status: SHIPPED | OUTPATIENT
Start: 2019-05-23 | End: 2019-06-24 | Stop reason: SDUPTHER

## 2019-06-24 DIAGNOSIS — E29.1 HYPOGONADISM, TESTICULAR: ICD-10-CM

## 2019-06-25 RX ORDER — TESTOSTERONE 16.2 MG/G
GEL TRANSDERMAL
Qty: 75 G | Refills: 4 | Status: SHIPPED | OUTPATIENT
Start: 2019-06-25 | End: 2019-12-26 | Stop reason: SDUPTHER

## 2019-07-03 ENCOUNTER — TELEPHONE (OUTPATIENT)
Dept: FAMILY MEDICINE CLINIC | Facility: CLINIC | Age: 54
End: 2019-07-03

## 2019-07-03 DIAGNOSIS — Q80.9 XERODERMIA: ICD-10-CM

## 2019-07-03 RX ORDER — PETROLATUM,WHITE 41 %
OINTMENT (GRAM) TOPICAL 2 TIMES DAILY
Qty: 1 BOTTLE | Refills: 11 | Status: SHIPPED | OUTPATIENT
Start: 2019-07-03 | End: 2019-10-08 | Stop reason: SDUPTHER

## 2019-07-23 ENCOUNTER — OFFICE VISIT (OUTPATIENT)
Dept: PODIATRY | Facility: CLINIC | Age: 54
End: 2019-07-23
Payer: MEDICARE

## 2019-07-23 VITALS
BODY MASS INDEX: 29.45 KG/M2 | WEIGHT: 150 LBS | HEIGHT: 60 IN | DIASTOLIC BLOOD PRESSURE: 64 MMHG | SYSTOLIC BLOOD PRESSURE: 105 MMHG

## 2019-07-23 DIAGNOSIS — L60.0 INGROWING NAIL: Primary | ICD-10-CM

## 2019-07-23 DIAGNOSIS — M79.671 PAIN IN BOTH FEET: ICD-10-CM

## 2019-07-23 DIAGNOSIS — M79.672 PAIN IN BOTH FEET: ICD-10-CM

## 2019-07-23 DIAGNOSIS — B35.1 ONYCHOMYCOSIS: ICD-10-CM

## 2019-07-23 PROCEDURE — 99212 OFFICE O/P EST SF 10 MIN: CPT | Performed by: PODIATRIST

## 2019-07-23 NOTE — PROGRESS NOTES
Assessment/Plan:  Aseptic debridement and planning of nails x10 and manually and mechanically    Partial resection of ingrown nail right hallux    Patient will consider matricectomy  Patient will call if any redness or signs of infection   Follow-up 2 months     Diagnoses and all orders for this visit:    Ingrowing nail    Onychomycosis    Pain in both feet          Subjective:      Patient ID: Sandro Shay is a 53 y o  male  Patient pain in big toenails right worse than left  Has had pain for the past few weeks  Has not noticed any redness or drainage        Past Medical History:   Diagnosis Date    Abnormal weight gain     last assessed 4/18/16; resolved 1/25/17    [facility]est[facility]) [facility] [address]     Anxiety     Dementia     Depression     Disease of thyroid gland     Down's syndrome     Fatigue     last assessed 2/23/17; resolved 6/1/17    GERD (gastroesophageal reflux disease)     Gluten free diet     Hernia of abdominal cavity     Hyperlipidemia     Hypogonadism in male     Impulse control disorder     OCD (obsessive compulsive disorder)     [facility] disease ([facility] [address] [facility]     [facility]     resolved 2/23/17    Pleural effusion     Sinus tachycardia     Vitamin D deficiency     last assessed 9/1/16; resolved 6/1/17         Current Outpatient Medications:     atorvastatin (LIPITOR) 20 mg tablet, Take 1 tablet (20 mg total) by mouth daily At 8pm, Disp: 30 tablet, Rfl: 11    Calcium Carbonate-Vitamin D 600-400 MG-UNIT per tablet, TAKE 1 TABLET BY MOUTH TWICE A DAY (8AM & 8PM) -YANNA, Disp: 60 tablet, Rfl: 10    cholecalciferol (VITAMIN D3) 1,000 units tablet, Take 1 tablet (1,000 Units total) by mouth 2 (two) times a day At 8am and 8pm, Disp: 60 tablet, Rfl: 11    divalproex sodium (DEPAKOTE) 500 mg EC tablet, Take 500 mg by mouth , Disp: , Rfl:     docusate sodium (COLACE) 100 mg capsule, At 8am, Disp: 30 capsule, Rfl: 11    esomeprazole (NexIUM) 40 MG capsule, Daily 30 minutes prior to breakfast, Disp: 30 capsule, Rfl: 11    lanolin-mineral oil (ARMAAN) LOTN, Apply topically 2 (two) times a day Apply to hands daily between 8 am to 8 pm, Disp: 1 Bottle, Rfl: 11    levothyroxine 75 mcg tablet, TAKE 1 TABLET BY MOUTH IN THE EVENING (8PM), Disp: 30 tablet, Rfl: 10    loratadine (CLARITIN) 10 mg tablet, Take 1 tablet (10 mg total) by mouth daily At 8am As needed for allergies (Patient not taking: Reported on 4/15/2019), Disp: 30 tablet, Rfl: 11    multivitamin-minerals (CENTRUM ADULTS) tablet, Take 1 tablet by mouth daily At 8am, Disp: 30 tablet, Rfl: 11    OLANZapine (ZyPREXA) 2 5 mg tablet, Take 1 tablet by mouth daily, Disp: , Rfl:     polyethylene glycol (GLYCOLAX) powder, Take 17 grams in 8 oz of water by mouth daily at 8 pm, Disp: 527 g, Rfl: 11    tamsulosin (FLOMAX) 0 4 mg, Take 1 capsule (0 4 mg total) by mouth daily at bedtime At 8pm, Disp: 30 capsule, Rfl: 11    Testosterone (ANDRODERM) 4 MG/24HR PT24, Place 1 patch on the skin daily for 90 days, Disp: 30 patch, Rfl: 2    testosterone (ANDROGEL) 1 62 % TD gel pump, APPLY  3 PUMPS TO SHOULDERS / CHEST EVERY DAY (8AM), Disp: 75 g, Rfl: 4    Past Surgical History:   Procedure Laterality Date    COLONOSCOPY      ESOPHAGOGASTRODUODENOSCOPY      with biopsy    PERICARDIOCENTESIS  4/29/2016            Allergies   Allergen Reactions    Advil [Ibuprofen]     Gluten Meal        Patient Active Problem List   Diagnosis    Essential hypertension    Hyperlipidemia    Hypothyroidism    GERD (gastroesophageal reflux disease)    Down's syndrome    Anemia    CHELSIE (iron deficiency anemia)    Chronic constipation    Hypogonadism, testicular    Hypotension    Idiopathic osteoporosis    Mixed incontinence    Parkinson's disease (HCC)    Serositis (HCC)    Bladder distention    Hydronephrosis    Abdominal pain    Idiopathic pericardial effusion    Cardiac murmur    Xerodermia    Sinus bradycardia Review of Systems   Constitutional: Negative  HENT: Negative  Eyes: Negative  Respiratory: Negative  Cardiovascular: Negative  Gastrointestinal: Negative  Endocrine: Negative  Genitourinary: Negative  Musculoskeletal: Negative  Skin: Negative  Allergic/Immunologic: Negative  Neurological: Negative  Hematological: Negative  Psychiatric/Behavioral: Negative  Objective:      /64   Ht 5' (1 524 m)   Wt 68 kg (150 lb)   BMI 29 29 kg/m²          Physical Exam    Constitutional: no acute distress, well appearing and well nourished   Orthopedic/Biomechanical: abnormal foot type,-- hammertoe(s),-- bunion(s),-- abnormal MPJ ROM,-- discolored nails,-- subungual debris-- and-- nail tenderness   Skin: keratoses  Left Foot: Appearance: a deformity  Great toe deformities include a bunion  Evaluation of the great toe nail demonstrates an ingrown nail, but-- no paronychia  Decreased range of motion first MPJ bilateral positive pain on palpation and range of motion right worse than left  Ingrown bilateral big toe  No signs of infection no erythema no exudate  Special Tests: significant bunion bilateral decreased range of motion first MPJ no pain on range of motion,no sign of infection  Right Foot: Appearance: Normal except as noted: a deformity  Great toe deformities include a bunion  Evaluation of the great toe nail demonstrates paronychia-- and-- an ingrown nail  Mild ingrown tibial border  Rigid hammertoes, 2 through 5, bilateral, moderate xerosis, fissures, bilateral heels, midfoot, bilateral, negative erythema  Negative exudate  Negative ulceration  Special Tests: Mild xerosis, no signs of infection  Neurological Exam: Light touch was decreased bilaterally  Vibratory sensation was decreased in both first metatarsophalangeal joints  Response to monofilament test was intact bilaterally  Vascular Exam: performed Dorsalis pedis pulses were 1/4 bilaterally  Posterior tibial pulses were 1/4 bilaterally  Capillary refill time was between 1-3 seconds bilaterally  Toenails: All of the toenails were elongated,-- hypertrophied,-- discolored,-- shown to have subungual debris-- and-- tender  Both first toenails were ingrown  Hyperkeratosis: present on both first sub metatarsals-- and-- present on both fifth sub metatarsals  Moderate bunion bilateral   Rigid hammertoes 2 through 5 bilateral   Significant equinus bilateral   Negative erythema no signs of infection  Large bunion right worse than left  Decreased range of motion 1st MPJ  Right 1st MPJ positive pain on end range of motion no crepitus no hypermobility of the 1st ray      All nails thick discolored dystrophic  Ingrown bilateral hallux  No open wounds or signs of infection  Painful hyperkeratotic lesions medial 1st metatarsal head bilateral  Significant hallux limitus bilateral hammered hallux with hallux valgus bilateral    Ingrown nail bilateral hallux  Right hallux medial border, mild swelling, positive pain on palpation negative exudate no purulence no signs of infection

## 2019-07-26 ENCOUNTER — TRANSCRIBE ORDERS (OUTPATIENT)
Dept: ADMINISTRATIVE | Facility: HOSPITAL | Age: 54
End: 2019-07-26

## 2019-07-26 ENCOUNTER — TELEPHONE (OUTPATIENT)
Dept: ENDOCRINOLOGY | Facility: CLINIC | Age: 54
End: 2019-07-26

## 2019-07-26 ENCOUNTER — LAB (OUTPATIENT)
Dept: LAB | Facility: HOSPITAL | Age: 54
End: 2019-07-26
Attending: INTERNAL MEDICINE
Payer: MEDICARE

## 2019-07-26 DIAGNOSIS — E03.4 HYPOTHYROIDISM DUE TO ACQUIRED ATROPHY OF THYROID: ICD-10-CM

## 2019-07-26 DIAGNOSIS — E29.1 TESTICULAR HYPOGONADISM: ICD-10-CM

## 2019-07-26 DIAGNOSIS — Z12.5 SCREENING PSA (PROSTATE SPECIFIC ANTIGEN): ICD-10-CM

## 2019-07-26 LAB
HCT VFR BLD AUTO: 47.7 % (ref 36.5–49.3)
HGB BLD-MCNC: 15.6 G/DL (ref 12–17)
PSA SERPL-MCNC: <0.1 NG/ML (ref 0–4)
T4 FREE SERPL-MCNC: 0.94 NG/DL (ref 0.76–1.46)
TSH SERPL DL<=0.05 MIU/L-ACNC: 2.84 UIU/ML (ref 0.36–3.74)

## 2019-07-26 PROCEDURE — 84439 ASSAY OF FREE THYROXINE: CPT

## 2019-07-26 PROCEDURE — 84443 ASSAY THYROID STIM HORMONE: CPT

## 2019-07-26 PROCEDURE — 36415 COLL VENOUS BLD VENIPUNCTURE: CPT

## 2019-07-26 PROCEDURE — 85014 HEMATOCRIT: CPT

## 2019-07-26 PROCEDURE — G0103 PSA SCREENING: HCPCS

## 2019-07-26 PROCEDURE — 84403 ASSAY OF TOTAL TESTOSTERONE: CPT

## 2019-07-26 PROCEDURE — 84402 ASSAY OF FREE TESTOSTERONE: CPT

## 2019-07-26 PROCEDURE — 85018 HEMOGLOBIN: CPT

## 2019-07-26 NOTE — TELEPHONE ENCOUNTER
----- Message from [facility] sent at 7/26/2019  9:47 AM EDT -----  Normal thyroid function test

## 2019-07-27 LAB
TESTOST FREE SERPL-MCNC: 9.6 PG/ML (ref 7.2–24)
TESTOST SERPL-MCNC: 630 NG/DL (ref 264–916)

## 2019-07-31 DIAGNOSIS — K21.9 GASTROESOPHAGEAL REFLUX DISEASE WITHOUT ESOPHAGITIS: ICD-10-CM

## 2019-07-31 DIAGNOSIS — K59.09 CHRONIC CONSTIPATION: ICD-10-CM

## 2019-07-31 RX ORDER — DOCUSATE SODIUM 100 MG/1
CAPSULE, LIQUID FILLED ORAL
Qty: 30 CAPSULE | Refills: 11 | Status: SHIPPED | OUTPATIENT
Start: 2019-07-31 | End: 2019-10-08 | Stop reason: SDUPTHER

## 2019-07-31 RX ORDER — ESOMEPRAZOLE MAGNESIUM 40 MG/1
CAPSULE, DELAYED RELEASE ORAL
Qty: 30 CAPSULE | Refills: 11 | Status: SHIPPED | OUTPATIENT
Start: 2019-07-31 | End: 2019-10-08 | Stop reason: SDUPTHER

## 2019-08-10 ENCOUNTER — APPOINTMENT (OUTPATIENT)
Dept: LAB | Facility: HOSPITAL | Age: 54
End: 2019-08-10
Payer: MEDICARE

## 2019-08-10 ENCOUNTER — TRANSCRIBE ORDERS (OUTPATIENT)
Dept: ADMINISTRATIVE | Facility: HOSPITAL | Age: 54
End: 2019-08-10

## 2019-08-10 DIAGNOSIS — Z51.81 ENCOUNTER FOR THERAPEUTIC DRUG MONITORING: ICD-10-CM

## 2019-08-10 DIAGNOSIS — R79.89 HYPOURICEMIA: ICD-10-CM

## 2019-08-10 DIAGNOSIS — I10 ESSENTIAL HYPERTENSION, MALIGNANT: Primary | ICD-10-CM

## 2019-08-10 DIAGNOSIS — I10 ESSENTIAL HYPERTENSION, MALIGNANT: ICD-10-CM

## 2019-08-10 LAB
ALBUMIN SERPL BCP-MCNC: 3.2 G/DL (ref 3.5–5)
ALP SERPL-CCNC: 69 U/L (ref 46–116)
ALT SERPL W P-5'-P-CCNC: 30 U/L (ref 12–78)
ANION GAP SERPL CALCULATED.3IONS-SCNC: 6 MMOL/L (ref 4–13)
AST SERPL W P-5'-P-CCNC: 16 U/L (ref 5–45)
BASOPHILS # BLD AUTO: 0.07 THOUSANDS/ΜL (ref 0–0.1)
BASOPHILS NFR BLD AUTO: 2 % (ref 0–1)
BILIRUB SERPL-MCNC: 0.6 MG/DL (ref 0.2–1)
BUN SERPL-MCNC: 6 MG/DL (ref 5–25)
CALCIUM SERPL-MCNC: 8.7 MG/DL (ref 8.3–10.1)
CHLORIDE SERPL-SCNC: 99 MMOL/L (ref 100–108)
CO2 SERPL-SCNC: 31 MMOL/L (ref 21–32)
CREAT SERPL-MCNC: 1 MG/DL (ref 0.6–1.3)
EOSINOPHIL # BLD AUTO: 0.03 THOUSAND/ΜL (ref 0–0.61)
EOSINOPHIL NFR BLD AUTO: 1 % (ref 0–6)
ERYTHROCYTE [DISTWIDTH] IN BLOOD BY AUTOMATED COUNT: 13.5 % (ref 11.6–15.1)
GFR SERPL CREATININE-BSD FRML MDRD: 84 ML/MIN/1.73SQ M
GLUCOSE P FAST SERPL-MCNC: 90 MG/DL (ref 65–99)
HCT VFR BLD AUTO: 46.6 % (ref 36.5–49.3)
HGB BLD-MCNC: 15.7 G/DL (ref 12–17)
IMM GRANULOCYTES # BLD AUTO: 0.02 THOUSAND/UL (ref 0–0.2)
IMM GRANULOCYTES NFR BLD AUTO: 1 % (ref 0–2)
LYMPHOCYTES # BLD AUTO: 1.18 THOUSANDS/ΜL (ref 0.6–4.47)
LYMPHOCYTES NFR BLD AUTO: 28 % (ref 14–44)
MCH RBC QN AUTO: 33.8 PG (ref 26.8–34.3)
MCHC RBC AUTO-ENTMCNC: 33.7 G/DL (ref 31.4–37.4)
MCV RBC AUTO: 100 FL (ref 82–98)
MONOCYTES # BLD AUTO: 0.49 THOUSAND/ΜL (ref 0.17–1.22)
MONOCYTES NFR BLD AUTO: 12 % (ref 4–12)
NEUTROPHILS # BLD AUTO: 2.47 THOUSANDS/ΜL (ref 1.85–7.62)
NEUTS SEG NFR BLD AUTO: 56 % (ref 43–75)
NRBC BLD AUTO-RTO: 0 /100 WBCS
PLATELET # BLD AUTO: 249 THOUSANDS/UL (ref 149–390)
PMV BLD AUTO: 10.1 FL (ref 8.9–12.7)
POTASSIUM SERPL-SCNC: 3.8 MMOL/L (ref 3.5–5.3)
PROT SERPL-MCNC: 7.3 G/DL (ref 6.4–8.2)
RBC # BLD AUTO: 4.64 MILLION/UL (ref 3.88–5.62)
SODIUM SERPL-SCNC: 136 MMOL/L (ref 136–145)
VALPROATE SERPL-MCNC: 40 UG/ML (ref 50–100)
WBC # BLD AUTO: 4.26 THOUSAND/UL (ref 4.31–10.16)

## 2019-08-10 PROCEDURE — 80164 ASSAY DIPROPYLACETIC ACD TOT: CPT

## 2019-08-10 PROCEDURE — 36415 COLL VENOUS BLD VENIPUNCTURE: CPT | Performed by: PSYCHIATRY & NEUROLOGY

## 2019-08-10 PROCEDURE — 85025 COMPLETE CBC W/AUTO DIFF WBC: CPT

## 2019-08-10 PROCEDURE — 80053 COMPREHEN METABOLIC PANEL: CPT | Performed by: PSYCHIATRY & NEUROLOGY

## 2019-08-13 DIAGNOSIS — M81.8 IDIOPATHIC OSTEOPOROSIS: Primary | ICD-10-CM

## 2019-08-13 NOTE — TELEPHONE ENCOUNTER
Received a call from Alicia at [facility] #[phone]  The patient is a transfer from [facility] and is overdue for his dexa scan  Susana would like to take him to have this done before his appointment with you on September 5th  His last scan was 2/8/18  Would you like me to place an order for the dexa scan?   He would like the order faxed to [phone]

## 2019-08-14 ENCOUNTER — OFFICE VISIT (OUTPATIENT)
Dept: NEUROLOGY | Facility: CLINIC | Age: 54
End: 2019-08-14
Payer: MEDICARE

## 2019-08-14 VITALS
HEIGHT: 60 IN | WEIGHT: 150 LBS | SYSTOLIC BLOOD PRESSURE: 120 MMHG | BODY MASS INDEX: 29.45 KG/M2 | HEART RATE: 73 BPM | DIASTOLIC BLOOD PRESSURE: 66 MMHG

## 2019-08-14 DIAGNOSIS — G20 PARKINSON'S DISEASE (HCC): Primary | ICD-10-CM

## 2019-08-14 DIAGNOSIS — Q90.9 DOWN'S SYNDROME: ICD-10-CM

## 2019-08-14 PROCEDURE — 99214 OFFICE O/P EST MOD 30 MIN: CPT | Performed by: PSYCHIATRY & NEUROLOGY

## 2019-08-14 NOTE — PROGRESS NOTES
Patient ID: Adelina Mckenzie is a 53 y o  male  Assessment/Plan:    Mild Parkinson disease    Down syndrome with mild cognitive impairment  Patient is stable presently with all symptoms and signs of Parkinson  It has not changed quality of his life for which reason I am not going to start him on any medication at this point  I will continue to follow this patient every 6 months    Caretaker has been advised to watch him for having any resting tremors or any worsening in his walking, shuffling gait or freezing gait  Subjective:    50-year-old male followed in the office for very mild Parkinson disease and who has a history of Down syndrome with mild cognitive impairment  Patient was last seen in the office was in February of 2019  Patient does living in a group home and he has is own bedroom  Caretaker who accompanied the patient reported that patient has been walking slow over however no change in the last year or so  Patient is able to do his activity of daily living by himself such as showering, dressing including tying his own shoe laces brushing his teeth  He does not need any reminder for daily activity except he needs reminded to comb his hair  Patient is not incontinent of bladder and bowel  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  Patient has no regression in his memory or in his daily activity  Patient does make his own bed and feed himself but he is unable to perform any household activity  Patient denies any heaviness in the legs upon walking, resting tremors however patient is slow were in is walking which is normal for him at least for past 1 year or so  Patient reported that he can smell coffee, food or perfume  Patient denies having constipation  And no fall and no unsteady gait        Pt denies double vision, blurred vision, transient monocular blindness, vertigo, tinnitus, hearing loss, slurred speech, difficulty expressing and understanding, dysphasia, and focal weakness, numbness, imbalance and incoordination  Pt denies bladder and bowel urgency, frequency and incontinence         Past Medical History:   Diagnosis Date    Abnormal weight gain     last assessed 4/18/16; resolved 1/25/17    [facility]F [facility]cong[facility] failure) ([facility] )     Anxiety     Dementia     Depression     Disease of thyroid gland     Down's syndrome     Fatigue     last assessed 2/23/17; resolved 6/1/17    GERD (gastroesophageal reflux disease)     Gluten free diet     Hernia of abdominal cavity     Hyperlipidemia     Hypogonadism in male     Impulse control disorder     OCD (obsessive compulsive disorder)     Parkinson disease ([facility] )     Parkinson disease ([facility] )     resolved 2/23/17    Pleural effusion     Sinus tachycardia     Vitamin D deficiency     last assessed 9/1/16; resolved 6/1/17       Family History   Problem Relation Age of Onset    No Known Problems Mother     Parkinsonism Father         symtomatic   ,    Social History     Socioeconomic History    Marital status: Single     Spouse name: Not on file    Number of children: Not on file    Years of education: Not on file    Highest education level: Not on file   Occupational History    Not on file   Social Needs    Financial resource strain: Not on file    Food insecurity:     Worry: Not on file     Inability: Not on file    Transportation needs:     Medical: Not on file     Non-medical: Not on file   Tobacco Use    Smoking status: Never Smoker    Smokeless tobacco: Never Used   Substance and Sexual Activity    Alcohol use: No    Drug use: No    Sexual activity: Not Currently   Lifestyle    Physical activity:     Days per week: Not on file     Minutes per session: Not on file    Stress: Not on file   Relationships    Social connections:     Talks on phone: Not on file     Gets together: Not on file     Attends L[employer] service: Not on file     Active member of club or organization: Not on file     Attends meetings of clubs or organizations: Not on file     Relationship status: Not on file    Intimate partner violence:     Fear of current or ex partner: Not on file     Emotionally abused: Not on file     Physically abused: Not on file     Forced sexual activity: Not on file   Other Topics Concern    Not on file   Social History Narrative    Caffeine use        Current Outpatient Medications on File Prior to Visit   Medication Sig Dispense Refill    atorvastatin (LIPITOR) 20 mg tablet Take 1 tablet (20 mg total) by mouth daily At 8pm 30 tablet 11    Calcium Carbonate-Vitamin D 600-400 MG-UNIT per tablet TAKE 1 TABLET BY MOUTH TWICE A DAY (8AM & 8PM) -YANNA 60 tablet 10    cholecalciferol (VITAMIN D3) 1,000 units tablet Take 1 tablet (1,000 Units total) by mouth 2 (two) times a day At 8am and 8pm 60 tablet 11    divalproex sodium (DEPAKOTE) 500 mg EC tablet Take 500 mg by mouth       docusate sodium (COLACE) 100 mg capsule Take one capsule at 8am 30 capsule 11    esomeprazole (NexIUM) 40 MG capsule Take one capsule daily 30 minutes prior to breakfast 30 capsule 11    lanolin-mineral oil (ARMAAN) LOTN Apply topically 2 (two) times a day Apply to hands daily between 8 am to 8 pm 1 Bottle 11    levothyroxine 75 mcg tablet TAKE 1 TABLET BY MOUTH IN THE EVENING (8PM) 30 tablet 10    multivitamin-minerals (CENTRUM ADULTS) tablet Take 1 tablet by mouth daily At 8am 30 tablet 11    OLANZapine (ZyPREXA) 2 5 mg tablet Take 1 tablet by mouth daily      polyethylene glycol (GLYCOLAX) powder Take 17 grams in 8 oz of water by mouth daily at 8 pm 527 g 11    tamsulosin (FLOMAX) 0 4 mg Take 1 capsule (0 4 mg total) by mouth daily at bedtime At 8pm 30 capsule 11    testosterone (ANDROGEL) 1 62 % TD gel pump APPLY  3 PUMPS TO SHOULDERS / CHEST EVERY DAY (8AM) 75 g 4    loratadine (CLARITIN) 10 mg tablet Take 1 tablet (10 mg total) by mouth daily At 8am As needed for allergies (Patient not taking: Reported on 4/15/2019) 30 tablet 11    Testosterone (ANDRODERM) 4 MG/24HR PT24 Place 1 patch on the skin daily for 90 days 30 patch 2     No current facility-administered medications on file prior to visit  Objective:    Blood pressure 120/66, pulse 73, height 5' (1 524 m), weight 68 kg (150 lb)  Physical Exam   Eyes: Pupils are equal, round, and reactive to light  EOM are normal    Neck: Normal carotid pulses present  Carotid bruit is not present  Neurological: He has normal strength and normal reflexes  Psychiatric: His speech is normal        Neurological Exam  Mental Status   Oriented to person, place, time and situation  Recent and remote memory are intact  Speech is normal  Language is fluent with no aphasia  Cranial Nerves  CN II: Visual fields full to confrontation  CN III, IV, VI: Extraocular movements intact bilaterally  Pupils equal round and reactive to light bilaterally  CN V: Facial sensation is normal   CN VII: Full and symmetric facial movement  CN VIII: Hearing is normal   CN IX, X: Palate elevates symmetrically  Normal gag reflex  CN XI: Shoulder shrug strength is normal   CN XII: Tongue midline without atrophy or fasciculations  Motor  Normal muscle bulk throughout  No fasciculations present  Strength is 5/5 throughout all four extremities  Patient does have very slow rapid movement with finger tapping and also clapping  Patient is unable to perform rapid alternating movement  There is no resting tremor appreciated in the office       Sensory  Sensation is intact to light touch, pinprick, vibration and proprioception in all four extremities  Light touch is normal in upper and lower extremities  Pinprick is normal in upper and lower extremities  Proprioception is normal in upper and lower extremities  Reflexes  Deep tendon reflexes are 2+ and symmetric in all four extremities with downgoing toes bilaterally      Coordination  Right: Finger-to-nose normal  Heel-to-shin normal   Left: Finger-to-nose normal  Heel-to-shin normal     Gait  Patient take baby step and has a no arm swing upon walking           ROS:    Review of Systems   Constitutional: Negative  HENT: Negative  Eyes: Negative  Respiratory: Negative  Cardiovascular: Negative  Gastrointestinal: Negative  Endocrine: Negative  Genitourinary: Negative  Musculoskeletal: Negative  Skin: Negative  Allergic/Immunologic: Negative  Neurological: Negative  Hematological: Negative  Psychiatric/Behavioral: Negative

## 2019-09-05 ENCOUNTER — OFFICE VISIT (OUTPATIENT)
Dept: ENDOCRINOLOGY | Facility: CLINIC | Age: 54
End: 2019-09-05
Payer: MEDICARE

## 2019-09-05 VITALS
DIASTOLIC BLOOD PRESSURE: 80 MMHG | SYSTOLIC BLOOD PRESSURE: 110 MMHG | HEIGHT: 60 IN | BODY MASS INDEX: 29.17 KG/M2 | HEART RATE: 71 BPM | WEIGHT: 148.6 LBS

## 2019-09-05 DIAGNOSIS — M81.0 OSTEOPOROSIS WITHOUT CURRENT PATHOLOGICAL FRACTURE, UNSPECIFIED OSTEOPOROSIS TYPE: ICD-10-CM

## 2019-09-05 DIAGNOSIS — E03.4 HYPOTHYROIDISM DUE TO ACQUIRED ATROPHY OF THYROID: ICD-10-CM

## 2019-09-05 DIAGNOSIS — E29.1 HYPOGONADISM, TESTICULAR: ICD-10-CM

## 2019-09-05 DIAGNOSIS — E55.9 VITAMIN D DEFICIENCY: Primary | ICD-10-CM

## 2019-09-05 PROCEDURE — 99215 OFFICE O/P EST HI 40 MIN: CPT | Performed by: INTERNAL MEDICINE

## 2019-09-05 NOTE — PATIENT INSTRUCTIONS
Please continue current medications  Please check vitamin-D level as ordered  You will be due for a repeat DEXA scan around February 2020   Follow-up in 6 months with repeat labs

## 2019-09-05 NOTE — PROGRESS NOTES
Jenise Satples 53 y o  male MRN: [id number]    Encounter: [id number]      Assessment/Plan     Assessment: This is a 53y o -year-old male with Down syndrome, Parkinson's, hypothyroidism, hypogonadism, osteoporosis    Plan:  1  Hypothyroidism  Clinically and biochemically euthyroid  Continue levothyroxine 75 mcg orally once a day  Repeat TSH, free T4 in 6 months    2  Hypogonadism  Labs at goal   Hematocrit, PSA within normal limits  Continue AndroGel 3 pumps transdermally daily  Repeat labs in 6 months  If there is any further trending up of testosterone levels, will decrease dose of AndroGel    3  History of osteoporosis  - management of hypogonadism as above  -continue calcium and vitamin-D supplementation  -check vitamin-D level  -weight-bearing exercises as tolerated  -repeat DEXA scan in 02/2020   - repeat vitamin-D, BMP in 6 months    CC:  Hypothyroidism, hypogonadism, osteoporosis    History of Present Illness     HPI:  Jenise Staples is a 53 y o  male who is here for a follow-up of hypothyroidism, hypogonadism, osteoporosis  Patient also has a past medical history of Down's syndrome, iron deficiency anemia, hyperlipidemia, Parkinson's disease, GERD  Patient was previously following up with Dr Courtney Honeycutt and was last seen in 02/2019  Accompanied by caretaker to the visit  For hypothyroidism, he is currently on levothyroxine 75 mcg orally once a day  Takes it in the morning, on an empty stomach    Hypogonadism  On AndroGel 3 pumps transdermally daily    Osteoporosis  On calcium, vitamin-D supplementation 600-801 tablet twice a day, vitamin D3 1000 international units daily  Last DEXA scan 02/2018, T-score -2 6 at the right hip, 5% decrease in total bone mineral density of the lumbar spine and 2% increase of bilateral hips    No history of fractures    Overall feels well and has no complaints  Energy levels are good  No heat or cold intolerance   no diarrhea constipation  Sleeps well  Has mild intermittent tremors due to Parkinson's, not worsening, or bothersome  Following up with neurology  No palpitations  No mood changes/no aggression/no acne  Participates in daily scheduled activities/walking  Has a shuffling gait    All other systems were reviewed and are negative         Review of Systems    Historical Information   Past Medical History:   Diagnosis Date    Abnormal weight gain     last assessed 4/18/16; resolved 1/25/17    Acute [facility] )     Anxiety     Dementia     Depression     Disease of thyroid gland     Down's syndrome     Fatigue     last assessed 2/23/17; resolved 6/1/17    GERD (gastroesophageal reflux disease)     Gluten free diet     Hernia of abdominal cavity     Hyperlipidemia     Hypogonadism in male     Impulse control disorder     OCD (obsessive compulsive disorder)     [facility] )     Parkinson disease ([facility] )     resolved 2/23/17    Pleural effusion     Sinus tachycardia     Vitamin D deficiency     last assessed 9/1/16; resolved 6/1/17     Past Surgical History:   Procedure Laterality Date    COLONOSCOPY      ESOPHAGOGASTRODUODENOSCOPY      with biopsy    PERICARDIOCENTESIS  4/29/2016          Social History   Social History     Substance and Sexual Activity   Alcohol Use No     Social History     Substance and Sexual Activity   Drug Use No     Social History     Tobacco Use   Smoking Status Never Smoker   Smokeless Tobacco Never Used     Family History:   Family History   Problem Relation Age of Onset    No Known Problems Mother     Parkinsonism Father         symtomatic       Meds/Allergies   Current Outpatient Medications   Medication Sig Dispense Refill    atorvastatin (LIPITOR) 20 mg tablet Take 1 tablet (20 mg total) by mouth daily At 8pm 30 tablet 11    Calcium Carbonate-Vitamin D 600-400 MG-UNIT per tablet TAKE 1 TABLET BY MOUTH TWICE A DAY (8AM & 8PM) -YANNA 60 tablet 10    cholecalciferol (VITAMIN D3) 1,000 units tablet Take 1 tablet (1,000 Units total) by mouth 2 (two) times a day At 8am and 8pm 60 tablet 11    divalproex sodium (DEPAKOTE) 500 mg EC tablet Take 500 mg by mouth       docusate sodium (COLACE) 100 mg capsule Take one capsule at 8am 30 capsule 11    esomeprazole (NexIUM) 40 MG capsule Take one capsule daily 30 minutes prior to breakfast 30 capsule 11    lanolin-mineral oil (ARMAAN) LOTN Apply topically 2 (two) times a day Apply to hands daily between 8 am to 8 pm 1 Bottle 11    levothyroxine 75 mcg tablet TAKE 1 TABLET BY MOUTH IN THE EVENING (8PM) 30 tablet 10    multivitamin-minerals (CENTRUM ADULTS) tablet Take 1 tablet by mouth daily At 8am 30 tablet 11    OLANZapine (ZyPREXA) 2 5 mg tablet Take 1 tablet by mouth daily      polyethylene glycol (GLYCOLAX) powder Take 17 grams in 8 oz of water by mouth daily at 8 pm 527 g 11    tamsulosin (FLOMAX) 0 4 mg Take 1 capsule (0 4 mg total) by mouth daily at bedtime At 8pm 30 capsule 11    testosterone (ANDROGEL) 1 62 % TD gel pump APPLY  3 PUMPS TO SHOULDERS / CHEST EVERY DAY (8AM) 75 g 4    loratadine (CLARITIN) 10 mg tablet Take 1 tablet (10 mg total) by mouth daily At 8am As needed for allergies (Patient not taking: Reported on 4/15/2019) 30 tablet 11     No current facility-administered medications for this visit  Allergies   Allergen Reactions    Advil [Ibuprofen]     Gluten Meal        Objective   Vitals: Blood pressure 110/80, pulse 71, height 5' (1 524 m), weight 67 4 kg (148 lb 9 6 oz)  Physical Exam   Constitutional: He is oriented to person, place, and time  He appears well-developed and well-nourished  No distress  Typical Down syndrome characteristics   HENT:   Head: Normocephalic and atraumatic  Eyes: Pupils are equal, round, and reactive to light  Conjunctivae are normal    Neck: Normal range of motion  Neck supple  No thyromegaly present  Cardiovascular: Normal rate, regular rhythm and normal heart sounds     No murmur heard  Pulmonary/Chest: Effort normal and breath sounds normal  No respiratory distress  He has no wheezes  Abdominal: Soft  He exhibits no distension  There is no tenderness  There is no guarding  Musculoskeletal: He exhibits no edema  Neurological: He is alert and oriented to person, place, and time  Skin: Skin is warm and dry  No rash noted  He is not diaphoretic  No erythema  Psychiatric: He has a normal mood and affect  His behavior is normal  Thought content normal    Vitals reviewed  The history was obtained from the review of the chart, patient and his caretaker  Lab Results:   Lab Results   Component Value Date/Time    TSH 3RD GENERATON 2 841 07/26/2019 07:07 AM    TSH 3RD GENERATON 1 904 11/10/2018 08:55 AM    Free T4 0 94 07/26/2019 07:07 AM    Free T4 0 94 11/10/2018 08:55 AM     Lab Results   Component Value Date    WBC 4 26 (L) 08/10/2019    HGB 15 7 08/10/2019    HCT 46 6 08/10/2019     (H) 08/10/2019     08/10/2019     Lab Results   Component Value Date    CREATININE 1 00 08/10/2019    BUN 6 08/10/2019     (L) 11/20/2015    K 3 8 08/10/2019    CL 99 (L) 08/10/2019    CO2 31 08/10/2019     No results found for: HGBA1C      Imaging Studies:        I have personally reviewed pertinent reports  Portions of the record may have been created with voice recognition software  Occasional wrong word or "sound a like" substitutions may have occurred due to the inherent limitations of voice recognition software  Read the chart carefully and recognize, using context, where substitutions have occurred

## 2019-09-12 ENCOUNTER — APPOINTMENT (OUTPATIENT)
Dept: LAB | Facility: HOSPITAL | Age: 54
End: 2019-09-12
Attending: INTERNAL MEDICINE
Payer: MEDICARE

## 2019-09-12 ENCOUNTER — TRANSCRIBE ORDERS (OUTPATIENT)
Dept: ADMINISTRATIVE | Facility: HOSPITAL | Age: 54
End: 2019-09-12

## 2019-09-12 DIAGNOSIS — E29.1 HYPOGONADISM, TESTICULAR: ICD-10-CM

## 2019-09-12 DIAGNOSIS — E55.9 VITAMIN D DEFICIENCY: ICD-10-CM

## 2019-09-12 DIAGNOSIS — E03.4 HYPOTHYROIDISM DUE TO ACQUIRED ATROPHY OF THYROID: ICD-10-CM

## 2019-09-12 DIAGNOSIS — M81.0 OSTEOPOROSIS WITHOUT CURRENT PATHOLOGICAL FRACTURE, UNSPECIFIED OSTEOPOROSIS TYPE: ICD-10-CM

## 2019-09-12 LAB
25(OH)D3 SERPL-MCNC: 44.3 NG/ML (ref 30–100)
BASOPHILS # BLD AUTO: 0.07 THOUSANDS/ΜL (ref 0–0.1)
BASOPHILS NFR BLD AUTO: 1 % (ref 0–1)
EOSINOPHIL # BLD AUTO: 0.02 THOUSAND/ΜL (ref 0–0.61)
EOSINOPHIL NFR BLD AUTO: 0 % (ref 0–6)
ERYTHROCYTE [DISTWIDTH] IN BLOOD BY AUTOMATED COUNT: 13.2 % (ref 11.6–15.1)
HCT VFR BLD AUTO: 44.6 % (ref 36.5–49.3)
HGB BLD-MCNC: 14.8 G/DL (ref 12–17)
IMM GRANULOCYTES # BLD AUTO: 0.03 THOUSAND/UL (ref 0–0.2)
IMM GRANULOCYTES NFR BLD AUTO: 1 % (ref 0–2)
LYMPHOCYTES # BLD AUTO: 0.94 THOUSANDS/ΜL (ref 0.6–4.47)
LYMPHOCYTES NFR BLD AUTO: 18 % (ref 14–44)
MCH RBC QN AUTO: 33.7 PG (ref 26.8–34.3)
MCHC RBC AUTO-ENTMCNC: 33.2 G/DL (ref 31.4–37.4)
MCV RBC AUTO: 102 FL (ref 82–98)
MONOCYTES # BLD AUTO: 0.33 THOUSAND/ΜL (ref 0.17–1.22)
MONOCYTES NFR BLD AUTO: 6 % (ref 4–12)
NEUTROPHILS # BLD AUTO: 3.86 THOUSANDS/ΜL (ref 1.85–7.62)
NEUTS SEG NFR BLD AUTO: 74 % (ref 43–75)
NRBC BLD AUTO-RTO: 0 /100 WBCS
PLATELET # BLD AUTO: 250 THOUSANDS/UL (ref 149–390)
PMV BLD AUTO: 10 FL (ref 8.9–12.7)
PSA SERPL-MCNC: <0.1 NG/ML (ref 0–4)
RBC # BLD AUTO: 4.39 MILLION/UL (ref 3.88–5.62)
T4 FREE SERPL-MCNC: 0.91 NG/DL (ref 0.76–1.46)
TSH SERPL DL<=0.05 MIU/L-ACNC: 1.22 UIU/ML (ref 0.36–3.74)
WBC # BLD AUTO: 5.25 THOUSAND/UL (ref 4.31–10.16)

## 2019-09-12 PROCEDURE — 84443 ASSAY THYROID STIM HORMONE: CPT

## 2019-09-12 PROCEDURE — 82306 VITAMIN D 25 HYDROXY: CPT

## 2019-09-12 PROCEDURE — 85025 COMPLETE CBC W/AUTO DIFF WBC: CPT

## 2019-09-12 PROCEDURE — 84439 ASSAY OF FREE THYROXINE: CPT

## 2019-09-12 PROCEDURE — G0103 PSA SCREENING: HCPCS

## 2019-09-12 PROCEDURE — 36415 COLL VENOUS BLD VENIPUNCTURE: CPT

## 2019-09-13 ENCOUNTER — TELEPHONE (OUTPATIENT)
Dept: ENDOCRINOLOGY | Facility: CLINIC | Age: 54
End: 2019-09-13

## 2019-09-13 NOTE — TELEPHONE ENCOUNTER
----- Message from Daniel Sanders MD sent at 9/13/2019  8:31 AM EDT -----  Vitamin-D within normal limits    Continue current dose of vitamin D3 supplementation

## 2019-10-08 ENCOUNTER — OFFICE VISIT (OUTPATIENT)
Dept: FAMILY MEDICINE CLINIC | Facility: CLINIC | Age: 54
End: 2019-10-08
Payer: MEDICARE

## 2019-10-08 VITALS
BODY MASS INDEX: 29.45 KG/M2 | SYSTOLIC BLOOD PRESSURE: 100 MMHG | HEART RATE: 78 BPM | HEIGHT: 60 IN | WEIGHT: 150 LBS | DIASTOLIC BLOOD PRESSURE: 58 MMHG

## 2019-10-08 DIAGNOSIS — Z00.00 HEALTH CARE MAINTENANCE: Primary | ICD-10-CM

## 2019-10-08 DIAGNOSIS — E55.9 VITAMIN D DEFICIENCY: ICD-10-CM

## 2019-10-08 DIAGNOSIS — K59.09 CHRONIC CONSTIPATION: ICD-10-CM

## 2019-10-08 DIAGNOSIS — M85.89 OSTEOPENIA OF MULTIPLE SITES: ICD-10-CM

## 2019-10-08 DIAGNOSIS — J30.1 SEASONAL ALLERGIC RHINITIS DUE TO POLLEN: ICD-10-CM

## 2019-10-08 DIAGNOSIS — E78.5 HYPERLIPIDEMIA, UNSPECIFIED HYPERLIPIDEMIA TYPE: ICD-10-CM

## 2019-10-08 DIAGNOSIS — K21.9 GASTROESOPHAGEAL REFLUX DISEASE WITHOUT ESOPHAGITIS: ICD-10-CM

## 2019-10-08 DIAGNOSIS — Q80.9 XERODERMIA: ICD-10-CM

## 2019-10-08 DIAGNOSIS — N32.89 BLADDER DISTENTION: ICD-10-CM

## 2019-10-08 PROCEDURE — G0438 PPPS, INITIAL VISIT: HCPCS | Performed by: NURSE PRACTITIONER

## 2019-10-08 RX ORDER — DOCUSATE SODIUM 100 MG/1
CAPSULE, LIQUID FILLED ORAL
Qty: 30 CAPSULE | Refills: 11 | Status: SHIPPED | OUTPATIENT
Start: 2019-10-08 | End: 2020-10-09 | Stop reason: SDUPTHER

## 2019-10-08 RX ORDER — POLYETHYLENE GLYCOL 3350 17 G/17G
POWDER, FOR SOLUTION ORAL
Qty: 527 G | Refills: 11 | Status: SHIPPED | OUTPATIENT
Start: 2019-10-08 | End: 2020-10-09 | Stop reason: SDUPTHER

## 2019-10-08 RX ORDER — PETROLATUM,WHITE 41 %
OINTMENT (GRAM) TOPICAL 2 TIMES DAILY
Qty: 1 BOTTLE | Refills: 11 | Status: SHIPPED | OUTPATIENT
Start: 2019-10-08 | End: 2020-10-09 | Stop reason: SDUPTHER

## 2019-10-08 RX ORDER — LORATADINE 10 MG/1
10 TABLET ORAL DAILY
Qty: 30 TABLET | Refills: 11 | Status: SHIPPED | OUTPATIENT
Start: 2019-10-08 | End: 2019-10-08 | Stop reason: SDUPTHER

## 2019-10-08 RX ORDER — TAMSULOSIN HYDROCHLORIDE 0.4 MG/1
0.4 CAPSULE ORAL
Qty: 30 CAPSULE | Refills: 11 | Status: SHIPPED | OUTPATIENT
Start: 2019-10-08 | End: 2019-12-18

## 2019-10-08 RX ORDER — POLYETHYLENE GLYCOL 3350 17 G/17G
POWDER, FOR SOLUTION ORAL
Qty: 527 G | Refills: 11 | Status: SHIPPED | OUTPATIENT
Start: 2019-10-08 | End: 2019-10-08 | Stop reason: SDUPTHER

## 2019-10-08 RX ORDER — ATORVASTATIN CALCIUM 20 MG/1
20 TABLET, FILM COATED ORAL DAILY
Qty: 30 TABLET | Refills: 11 | Status: SHIPPED | OUTPATIENT
Start: 2019-10-08 | End: 2020-08-19

## 2019-10-08 RX ORDER — ESOMEPRAZOLE MAGNESIUM 40 MG/1
CAPSULE, DELAYED RELEASE ORAL
Qty: 30 CAPSULE | Refills: 11 | Status: SHIPPED | OUTPATIENT
Start: 2019-10-08 | End: 2020-10-09 | Stop reason: SDUPTHER

## 2019-10-08 RX ORDER — LORATADINE 10 MG/1
10 TABLET ORAL DAILY
Qty: 30 TABLET | Refills: 11 | Status: SHIPPED | OUTPATIENT
Start: 2019-10-08 | End: 2019-11-19 | Stop reason: SDUPTHER

## 2019-10-08 RX ORDER — MULTIVITAMIN/IRON/FOLIC ACID 18MG-0.4MG
1 TABLET ORAL DAILY
Qty: 30 TABLET | Refills: 11 | Status: SHIPPED | OUTPATIENT
Start: 2019-10-08 | End: 2020-10-09 | Stop reason: SDUPTHER

## 2019-10-08 NOTE — PROGRESS NOTES
FAMILY PRACTICE HEALTH MAINTENANCE OFFICE VISIT  [facility] - [facility]    NAME: Lauro Essex Dibiase  AGE: 53 y o  SEX: male  : 1965     DATE: 10/8/2019    Assessment and Plan     Problem List Items Addressed This Visit        Digestive    GERD (gastroesophageal reflux disease)    Relevant Medications    esomeprazole (NexIUM) 40 MG capsule    Chronic constipation    Relevant Medications    docusate sodium (COLACE) 100 mg capsule    polyethylene glycol (GLYCOLAX) powder       Musculoskeletal and Integument    Xerodermia    Relevant Medications    lanolin-mineral oil (ARMAAN) LOTN       Other    Hyperlipidemia    Relevant Medications    atorvastatin (LIPITOR) 20 mg tablet    Bladder distention    Relevant Medications    tamsulosin (FLOMAX) 0 4 mg    Vitamin D deficiency    Relevant Medications    cholecalciferol (VITAMIN D3) 1,000 units tablet      Other Visit Diagnoses     Health care maintenance    -  Primary    Relevant Medications    multivitamin-minerals (CENTRUM ADULTS) tablet    Seasonal allergic rhinitis due to pollen        Relevant Medications    loratadine (CLARITIN) 10 mg tablet    Osteopenia of multiple sites        Relevant Medications    Calcium Carbonate-Vitamin D 600-400 MG-UNIT per tablet        Patient clinically stable at this time  Forms completed/scanned in chart  Cont with current plan of care  RTO as recommended and PRN        · Patient Counseling:   · Nutrition: Stressed importance of a well balanced diet, moderation of sodium/saturated fat, caloric balance and sufficient intake of fiber  · Exercise: Stressed the importance of regular exercise with a goal of 150 minutes per week  · Dental Health: Discussed daily flossing and brushing and regular dental visits   · Injury Prevention: Discussed Safety Belts, Safety Helmets, and Smoke Detectors    · Immunizations reviewed -need flu today  · Discussed benefits of screening labs  BMI Counseling:  Body mass index is 29 29 kg/m²  The BMI is above normal  Nutrition recommendations include reducing portion sizes, 3-5 servings of fruits/vegetables daily, consuming healthier snacks and moderation in carbohydrate intake  Exercise recommendations include exercising 3-5 times per week  Depression Screening: Unable to be performed due to medical contraindication  Return in about 6 months (around 4/8/2020)  Chief Complaint     Chief Complaint   Patient presents with    Physical Exam     State PE       History of Present Illness     [place] brought by Tod Paredes, group home care giver, for annual state PE    History obtained from guardian  In usual state of health    + downs syndrome    Chart reviewed    Cardio (Dr Kayy Campos) (murmur-last echo 2/2018), endo (Dr Tabitha Marsh) (thyroid, hypogonadism/testosterone), uro (chronic bladder distention), psych (Dr Dottie Womack), follow up UTD    No concerns today    Last colon 2015      Well Adult Physical   Patient here for a comprehensive physical exam       Diet and Physical Activity  Diet: well balanced diet, gluten free, limited dairy  Weight concerns: Patient is overweight (BMI 25 0-29  9)  Exercise: frequently      Depression Screen  PHQ-9 Depression Screening    PHQ-9:    Frequency of the following problems over the past two weeks:               General Health  Hearing: Hearing aid use: bilateral  Vision: wears glasses  Dental: regular dental visits        The following portions of the patient's history were reviewed and updated as appropriate: allergies, current medications, past family history, past medical history, past social history, past surgical history and problem list     Review of Systems     Review of Systems   Unable to perform ROS: Psychiatric disorder       Past Medical History     Past Medical History:   Diagnosis Date    Abnormal weight gain     last assessed 4/18/16; resolved 1/25/17    [facility]est[facility] failure) [facility] )     Anxiety     Dementia ([place] [address] )     Depression     Disease of thyroid gland     Down's syndrome     Fatigue     last assessed 2/23/17; resolved 6/1/17    GERD (gastroesophageal reflux disease)     Gluten free diet     Hernia of abdominal cavity     Hyperlipidemia     Hypogonadism in male     Impulse control disorder     OCD (obsessive compulsive disorder)     [place] disease ([place] [address] )     Parkinson disease ([place] [address] )     resolved 2/23/17    Pleural effusion     Sinus tachycardia     Vitamin D deficiency     last assessed 9/1/16; resolved 6/1/17       Past Surgical History     Past Surgical History:   Procedure Laterality Date    COLONOSCOPY      ESOPHAGOGASTRODUODENOSCOPY      with biopsy    PERICARDIOCENTESIS  4/29/2016            Social History     Social History     Socioeconomic History    Marital status: Single     Spouse name: None    Number of children: None    Years of education: None    Highest education level: None   Occupational History    None   Social Needs    Financial resource strain: None    Food insecurity:     Worry: None     Inability: None    Transportation needs:     Medical: None     Non-medical: None   Tobacco Use    Smoking status: Never Smoker    Smokeless tobacco: Never Used   Substance and Sexual Activity    Alcohol use: No    Drug use: No    Sexual activity: Not Currently   Lifestyle    Physical activity:     Days per week: None     Minutes per session: None    Stress: None   Relationships    Social connections:     Talks on phone: None     Gets together: None     Attends [employer] service: None     Active member of club or organization: None     Attends meetings of clubs or organizations: None     Relationship status: None    Intimate partner violence:     Fear of current or ex partner: None     Emotionally abused: None     Physically abused: None     Forced sexual activity: None   Other Topics Concern    None   Social History Narrative    Caffeine use        Family History Family History   Problem Relation Age of Onset    No Known Problems Mother     Parkinsonism Father         symtomatic       Current Medications       Current Outpatient Medications:     atorvastatin (LIPITOR) 20 mg tablet, Take 1 tablet (20 mg total) by mouth daily At 8pm, Disp: 30 tablet, Rfl: 11    Calcium Carbonate-Vitamin D 600-400 MG-UNIT per tablet, Take 1 tablet by mouth 2 (two) times a day At 8 am and 5 pm, Disp: 60 tablet, Rfl: 10    cholecalciferol (VITAMIN D3) 1,000 units tablet, Take 1 tablet (1,000 Units total) by mouth 2 (two) times a day At 8am and 8pm, Disp: 60 tablet, Rfl: 11    divalproex sodium (DEPAKOTE) 500 mg EC tablet, Take 500 mg by mouth , Disp: , Rfl:     docusate sodium (COLACE) 100 mg capsule, Take one capsule at 8am, Disp: 30 capsule, Rfl: 11    esomeprazole (NexIUM) 40 MG capsule, Take one capsule daily 30 minutes prior to breakfast, Disp: 30 capsule, Rfl: 11    lanolin-mineral oil (ARMAAN) LOTN, Apply topically 2 (two) times a day Apply to hands daily at 8 am and 8 pm, Disp: 1 Bottle, Rfl: 11    levothyroxine 75 mcg tablet, TAKE 1 TABLET BY MOUTH IN THE EVENING (8PM), Disp: 30 tablet, Rfl: 10    loratadine (CLARITIN) 10 mg tablet, Take 1 tablet (10 mg total) by mouth daily At 8am As needed for allergies, Disp: 30 tablet, Rfl: 11    multivitamin-minerals (CENTRUM ADULTS) tablet, Take 1 tablet by mouth daily At 8am, Disp: 30 tablet, Rfl: 11    OLANZapine (ZyPREXA) 2 5 mg tablet, Take 1 tablet by mouth daily, Disp: , Rfl:     polyethylene glycol (GLYCOLAX) powder, Take 17 grams in 8 oz of water by mouth daily at 8 pm, Disp: 527 g, Rfl: 11    tamsulosin (FLOMAX) 0 4 mg, Take 1 capsule (0 4 mg total) by mouth daily at bedtime At 8pm, Disp: 30 capsule, Rfl: 11    testosterone (ANDROGEL) 1 62 % TD gel pump, APPLY  3 PUMPS TO SHOULDERS / CHEST EVERY DAY (8AM), Disp: 75 g, Rfl: 4     Allergies     Allergies   Allergen Reactions    Advil [Ibuprofen]     Gluten Meal Objective     /58 (BP Location: Left arm, Patient Position: Sitting, Cuff Size: Standard)   Pulse 78   Ht 5' (1 524 m)   Wt 68 kg (150 lb)   BMI 29 29 kg/m²      Physical Exam   Constitutional: Vital signs are normal  He appears well-developed and well-nourished  No distress  HENT:   Head: Normocephalic and atraumatic  Right Ear: External ear normal    Left Ear: External ear normal    Nose: Nose normal    Mouth/Throat: Oropharynx is clear and moist    Eyes: Pupils are equal, round, and reactive to light  Conjunctivae and EOM are normal  No scleral icterus  Neck: Neck supple  No JVD present  Carotid bruit is not present  No thyromegaly present  Cardiovascular: Normal rate, regular rhythm and intact distal pulses  Murmur heard  Pulmonary/Chest: Effort normal and breath sounds normal  No respiratory distress  Abdominal: Soft  Bowel sounds are normal  He exhibits no abdominal bruit  There is no hepatosplenomegaly  There is no tenderness  Musculoskeletal: He exhibits no edema or deformity  Lymphadenopathy:     He has no cervical adenopathy  Neurological: He is alert  Coordination normal    Skin: Skin is warm and dry  No pallor  Psychiatric: He has a normal mood and affect  Nursing note and vitals reviewed          No exam data present    Health Maintenance     Health Maintenance   Topic Date Due    BMI: Followup Plan  10/04/2019    Hepatitis C Screening  11/08/2019 (Originally 1965)    Medicare Annual Wellness Visit (AWV)  11/08/2019 (Originally 1965)    BMI: Adult  10/08/2020    CRC Screening: Colonoscopy  06/25/2025    DTaP,Tdap,and Td Vaccines (3 - Td) 02/23/2027    Pneumococcal Vaccine: 65+ Years (1 of 2 - PCV13) 07/22/2029    INFLUENZA VACCINE  Completed    Pneumococcal Vaccine: Pediatrics (0 to 5 Years) and At-Risk Patients (6 to 59 Years)  Aged Out    HEPATITIS B VACCINES  Aged Out    Depression Screening PHQ  Discontinued     Immunization History Administered Date(s) Administered    Hep B, adult 09/25/2008, 11/05/2008, 04/06/2009    INFLUENZA 09/05/2019    Influenza Quadrivalent Preservative Free 3 years and older IM 09/18/2014    Influenza Quadrivalent, 6-35 Months IM 10/02/2017    Influenza TIV (IM) 11/11/2011, 11/30/2012, 09/17/2013, 09/29/2016    Influenza, recombinant, quadrivalent,injectable, preservative free 10/04/2018    Tdap 10/17/2006, 02/23/2017    Tuberculin Skin Test-PPD Intradermal 05/20/2005, 05/20/2006, 09/22/2014, 09/21/2015       Ita Branham, [address] [place]

## 2019-10-12 NOTE — PROGRESS NOTES
Progress Note - Cardiology Office  Brett Chavira 53 y o  male MRN: [id number]   Encounter: [id number]     1  Essential hypertension    2  Cardiac murmur    3  Down's syndrome    4  Hypothyroidism due to acquired atrophy of thyroid    5  Sinus bradycardia        Assessment/Plan     1  Idiopathic pericardial effusion  Patient has history of idiopathic pericardial effusion  Repeat echo in 2018 in February shows no evidence of any effusion  Continue monitoring  His heart sounds are normal   No fullness of neck veins noted he does not need echo Doppler at this time  2  Down syndrome with mild intellectual disability and behavior problems  Patient's echo reviewed  No significant valvular abnormality noted other than thickening of aortic valve  Echo Doppler from February 2018 reviewed  3   Dyslipidemia  Continue statins  Tolerating well  Cholesterol reviewed and acceptable  Patient on Lipitor 20 mg  Consider checking fasting lipids and LFTs at least once a year  Last LFTs in August 2019 were acceptable  4   Hypothyroidism on levothyroxine  Patient is tolerating 75 mg levothyroxine very well  Follow-up TSH  Management as per primary team     5  Tremors  Patient diagnosed to have Parkinson disease  Management as per neurology    6  Cardiac murmur  Patient's echo reviewed  Most likely etiology is thickened and calcified aortic valve without any stenosis  There is a trace AI  He has normal LV systolic function  Echo from 2018 reviewed  No echocardiogram needed at this time      7  Multiple other problems like history of hydronephrosis, hypogonadism, hypothyroidism and chronic allergies  Management as per medical team       Follow-up in 6 months  Patient's conditions, other symptoms were reviewed with patient's caretaker at length  He is currently stable  No further cardiac workup needed at this time  HPI :     Brett Chavira is a 53y o  year old male who came for follow up  Patient has past medical history significant for Down syndrome, recently diagnosed Parkinson disease, dyslipidemia, behavior problems, hypothyroidism, idiopathic pulmonary had who came for follow-up  Patient was [facility] about year ago  Large pericardial effusion  Etiology of pericardial effusion was never known and it was drained and he was close to tamponade  Since then 2 echoes were done and no significant pericardial effusion is seen  He is doing well he did gain weight of about 10-15 pounds  He came with his caretaker  Denies any chest pain or any shortness of breath  He has no fever no chills no nausea no vomiting no other complaint  10/15/2019  Above reviewed  Mortimer Salk came with caretaker  He is doing well has gained few lb of weight but otherwise no new complaints  No chest pain no shortness of breath  He was diagnosed to have mild Parkinson's disease his heart rate today is 60 beats per minute no significant ST changes noted on EKG  No nausea no vomiting no fever no chills no other significant issues  He has a blood test done in August 2019 his LFTs and kidney function were acceptable  Review of Systems   Constitutional: Negative for activity change, chills, diaphoresis, fever and unexpected weight change  HENT: Positive for hearing loss  Negative for congestion  Eyes: Negative for discharge and redness  Respiratory: Negative for cough, chest tightness, shortness of breath and wheezing  Cardiovascular: Negative  Negative for chest pain, palpitations and leg swelling  Gastrointestinal: Negative for abdominal pain, diarrhea and nausea  Endocrine: Negative  Genitourinary: Negative for decreased urine volume and urgency  Musculoskeletal: Negative  Negative for arthralgias, back pain and gait problem  Skin: Negative for rash and wound  Allergic/Immunologic: Negative      Neurological: Negative for dizziness, seizures, syncope, weakness, light-headedness and headaches  Diagnosed to have Parkinson's disease   Hematological: Negative  Psychiatric/Behavioral: Negative for agitation and confusion  The patient is nervous/anxious          Historical Information   Past Medical History:   Diagnosis Date    Abnormal weight gain     last assessed 4/18/16; resolved 1/25/17    [facility] )     Anxiety     Dementia ([place] [address] )     Depression     Disease of thyroid gland     Down's syndrome     Fatigue     last assessed 2/23/17; resolved 6/1/17    GERD (gastroesophageal reflux disease)     Gluten free diet     Hernia of abdominal cavity     Hyperlipidemia     Hypogonadism in male     Impulse control disorder     OCD (obsessive compulsive disorder)     Parkinson disease ([facility] )     Parkinson disease ([facility] )     resolved 2/23/17    Pleural effusion     Sinus tachycardia     Vitamin D deficiency     last assessed 9/1/16; resolved 6/1/17     Past Surgical History:   Procedure Laterality Date    COLONOSCOPY      ESOPHAGOGASTRODUODENOSCOPY      with biopsy    PERICARDIOCENTESIS  4/29/2016          Social History     Substance and Sexual Activity   Alcohol Use No     Social History     Substance and Sexual Activity   Drug Use No     Social History     Tobacco Use   Smoking Status Never Smoker   Smokeless Tobacco Never Used     Family History:   Family History   Problem Relation Age of Onset    No Known Problems Mother     Parkinsonism Father         symtomatic       Meds/Allergies     Allergies   Allergen Reactions    Advil [Ibuprofen]     Gluten Meal        Current Outpatient Medications:     atorvastatin (LIPITOR) 20 mg tablet, Take 1 tablet (20 mg total) by mouth daily At 8pm, Disp: 30 tablet, Rfl: 11    Calcium Carbonate-Vitamin D 600-400 MG-UNIT per tablet, Take 1 tablet by mouth 2 (two) times a day At 8 am and 5 pm, Disp: 60 tablet, Rfl: 10    cholecalciferol (VITAMIN D3) 1,000 units tablet, Take 1 tablet (1,000 Units total) by mouth 2 (two) times a day At 8am and 8pm, Disp: 60 tablet, Rfl: 11    divalproex sodium (DEPAKOTE) 500 mg EC tablet, Take 500 mg by mouth , Disp: , Rfl:     docusate sodium (COLACE) 100 mg capsule, Take one capsule at 8am, Disp: 30 capsule, Rfl: 11    esomeprazole (NexIUM) 40 MG capsule, Take one capsule daily 30 minutes prior to breakfast, Disp: 30 capsule, Rfl: 11    lanolin-mineral oil (ARMAAN) LOTN, Apply topically 2 (two) times a day Apply to hands daily at 8 am and 8 pm, Disp: 1 Bottle, Rfl: 11    levothyroxine 75 mcg tablet, TAKE 1 TABLET BY MOUTH IN THE EVENING (8PM), Disp: 30 tablet, Rfl: 10    loratadine (CLARITIN) 10 mg tablet, Take 1 tablet (10 mg total) by mouth daily At 8am As needed for allergies, Disp: 30 tablet, Rfl: 11    multivitamin-minerals (CENTRUM ADULTS) tablet, Take 1 tablet by mouth daily At 8am, Disp: 30 tablet, Rfl: 11    OLANZapine (ZyPREXA) 2 5 mg tablet, Take 1 tablet by mouth daily, Disp: , Rfl:     polyethylene glycol (GLYCOLAX) powder, Take 17 grams in 8 oz of water by mouth daily at 8 pm, Disp: 527 g, Rfl: 11    tamsulosin (FLOMAX) 0 4 mg, Take 1 capsule (0 4 mg total) by mouth daily at bedtime At 8pm, Disp: 30 capsule, Rfl: 11    testosterone (ANDROGEL) 1 62 % TD gel pump, APPLY  3 PUMPS TO SHOULDERS / CHEST EVERY DAY (8AM), Disp: 75 g, Rfl: 4    Vitals: Blood pressure 100/64, pulse 56, height 5' (1 524 m), weight 70 3 kg (155 lb), SpO2 99 %  Body mass index is 30 27 kg/m²  BP Readings from Last 3 Encounters:   10/15/19 100/64   10/08/19 100/58   09/05/19 110/80       Physical Exam:  Physical Exam   Constitutional: He is oriented to person, place, and time  He appears well-developed and well-nourished  No distress  HENT:   Head: Normocephalic and atraumatic  Eyes: Pupils are equal, round, and reactive to light  Neck: Neck supple  No JVD present  No tracheal deviation present  No thyromegaly present     Cardiovascular: Normal rate, regular rhythm, S1 normal and S2 normal  Exam reveals no gallop, no S3, no S4, no distant heart sounds and no friction rub  Murmur heard  Systolic (ejection) murmur is present with a grade of 2/6  S1-S2 regular with a 2/6 ejection systolic murmur  S4 present   Pulmonary/Chest: Effort normal  No respiratory distress  He has no wheezes  He has no rales  He exhibits no tenderness  Clear to auscultation   Abdominal: Soft  Bowel sounds are normal  He exhibits no distension  There is no tenderness  Musculoskeletal: He exhibits no edema or deformity  No edema   Neurological: He is alert and oriented to person, place, and time  Skin: Skin is warm and dry  No rash noted  He is not diaphoretic  No pallor  Psychiatric: He has a normal mood and affect  His behavior is normal        EK2018  Twelve lead EKG shows normal sinus rhythm heart rate 57 beats per minute  Baseline artifact  No other significant changes  Twelve lead EKG done 10/15/2019 shows normal sinus rhythm heart rate 59 beats per minute no significant ST changes intervals are within normal range  Diagnostic Studies Review Cardio:    Stress Test: Nuclear stress test on 10/21/2016 shows no evidence of ischemia  Echocardiogram/PRADEEP: His echo Doppler done in May 2016 and 2016 shows no evidence of any pericardial effusion  EF 65-70%, mild aortic regurgitation, mild MR, mild TR PA pressure 30 mmHg  Repeat echo Doppler done in 2018 shows normal LV systolic function EF 55 31%, left atrium mildly dilated, aortic valve was mildly thickened with mild calcification but no significant stenosis trace AI, no significant pericardial effusion was noted        Cardiac testing:   Results for orders placed during the hospital encounter of 16   Echo complete with contrast if indicated    Ryanne Babcock [address]  [address] [facility]   [phone]    Transthoracic Echocardiogram  2D, M-mode, and Doppler    Study date:  2016    Patient: Raegan Jung  MR number: [id number]  Account number: [de-identified]  : 1965  Age: 45 years  Gender: Male  Status: Inpatient  Location: Echo lab  Height: 60 in  Weight: 145 lb  BP: 120/ 63 mmHg    Indications: Pericardial effusion    Diagnoses: 420 99 - ACUTE PERICARDITIS NEC    Sonographer:  ADIEL Olvera  Primary Physician:  Maya Haywood  Referring Physician:  Salo Gary MD  Group:  [address] [place] [address] [facility]  Interpreting Physician:  Salo Gary MD    SUMMARY    LEFT VENTRICLE:  Systolic function was normal by visual assessment  Ejection fraction was  estimated in the range of 55 % to 65 %  There were no regional wall motion abnormalities  Doppler parameters were consistent with abnormal left ventricular relaxation  (grade 1 diastolic dysfunction)  AORTIC VALVE:  There was mild regurgitation  TRICUSPID VALVE:  There was mild regurgitation  PERICARDIUM:  A moderate to large pericardial effusion was identified circumferential to the  heart  The fluid exhibited a fibrinous appearance seems to be same quantity  PROCEDURE: The procedure was performed in the echo lab  This was a routine  study  The transthoracic approach was used  The study included complete 2D  imaging, M-mode, and complete spectral Doppler  The heart rate was 100 bpm, at  the start of the study  Echocardiographic views were limited due to poor  patient compliance  Image quality was adequate  LEFT VENTRICLE: Size was normal  Systolic function was normal by visual  assessment  Ejection fraction was estimated in the range of 55 % to 65 %  There  were no regional wall motion abnormalities  Wall thickness was normal  DOPPLER:  Doppler parameters were consistent with abnormal left ventricular relaxation  (grade 1 diastolic dysfunction)  RIGHT VENTRICLE: The size was normal  Systolic function was normal  There was  no evidence of a mass      LEFT ATRIUM: Size was normal     RIGHT ATRIUM: Size was normal     AORTIC VALVE: The valve was trileaflet  DOPPLER: There was mild regurgitation  TRICUSPID VALVE: DOPPLER: There was mild regurgitation  PULMONIC VALVE: Not well visualized  PERICARDIUM: A moderate to large pericardial effusion was identified  circumferential to the heart  The fluid exhibited a fibrinous appearance seems  to be same quantity  Features were not consistent with tamponade physiology  AORTA: The root exhibited normal size  SYSTEMIC VEINS: IVC: The inferior vena cava was normal in size  Respirophasic  changes were normal     PULMONARY VEINS: Not well visualized  [address] Accredited Echocardiography Laboratory    Prepared and electronically signed by    Teressa Rosas MD  Signed 29-Apr-2016 10:29:17       Lab Review   Lab Results   Component Value Date    WBC 5 25 09/12/2019    HGB 14 8 09/12/2019    HCT 44 6 09/12/2019     (H) 09/12/2019     09/12/2019     Lab Results   Component Value Date     (L) 11/20/2015    K 3 8 08/10/2019    CL 99 (L) 08/10/2019    CO2 31 08/10/2019    ANIONGAP 7 7 (L) 11/20/2015    BUN 6 08/10/2019    CREATININE 1 00 08/10/2019    GLUCOSE 96 11/20/2015    GLUF 90 08/10/2019    CALCIUM 8 7 08/10/2019    AST 16 08/10/2019    ALT 30 08/10/2019    ALKPHOS 69 08/10/2019    EGFR 84 08/10/2019     Lab Results   Component Value Date    GLUCOSE 96 11/20/2015    CALCIUM 8 7 08/10/2019     (L) 11/20/2015    K 3 8 08/10/2019    CO2 31 08/10/2019    CL 99 (L) 08/10/2019    BUN 6 08/10/2019    CREATININE 1 00 08/10/2019     Lipid Profile:   Lab Results   Component Value Date    HDL 63 (H) 11/10/2018    LDLCALC 128 (H) 11/10/2018    TRIG 74 11/10/2018     No results found for: CHOL  Lab Results   Component Value Date    TROPONINI <0 02 01/28/2018       Dr Teressa Rosas MD [facility]      "This note has been constructed using a voice recognition system  Therefore there may be syntax, spelling, and/or grammatical errors   Please call if you have any questions  "

## 2019-10-15 ENCOUNTER — OFFICE VISIT (OUTPATIENT)
Dept: CARDIOLOGY CLINIC | Facility: CLINIC | Age: 54
End: 2019-10-15
Payer: MEDICARE

## 2019-10-15 VITALS
WEIGHT: 155 LBS | SYSTOLIC BLOOD PRESSURE: 100 MMHG | HEART RATE: 56 BPM | OXYGEN SATURATION: 99 % | BODY MASS INDEX: 30.43 KG/M2 | HEIGHT: 60 IN | DIASTOLIC BLOOD PRESSURE: 64 MMHG

## 2019-10-15 DIAGNOSIS — R00.1 SINUS BRADYCARDIA: ICD-10-CM

## 2019-10-15 DIAGNOSIS — E03.4 HYPOTHYROIDISM DUE TO ACQUIRED ATROPHY OF THYROID: ICD-10-CM

## 2019-10-15 DIAGNOSIS — Q90.9 DOWN'S SYNDROME: ICD-10-CM

## 2019-10-15 DIAGNOSIS — I10 ESSENTIAL HYPERTENSION: ICD-10-CM

## 2019-10-15 DIAGNOSIS — R01.1 CARDIAC MURMUR: ICD-10-CM

## 2019-10-15 PROCEDURE — 93000 ELECTROCARDIOGRAM COMPLETE: CPT | Performed by: INTERNAL MEDICINE

## 2019-10-15 PROCEDURE — 99214 OFFICE O/P EST MOD 30 MIN: CPT | Performed by: INTERNAL MEDICINE

## 2019-10-19 ENCOUNTER — HOSPITAL ENCOUNTER (EMERGENCY)
Facility: HOSPITAL | Age: 54
Discharge: HOME | End: 2019-10-19
Attending: EMERGENCY MEDICINE | Admitting: EMERGENCY MEDICINE
Payer: MEDICARE

## 2019-10-19 VITALS
BODY MASS INDEX: 30.14 KG/M2 | SYSTOLIC BLOOD PRESSURE: 96 MMHG | RESPIRATION RATE: 18 BRPM | DIASTOLIC BLOOD PRESSURE: 51 MMHG | OXYGEN SATURATION: 100 % | WEIGHT: 154.32 LBS | TEMPERATURE: 98.5 F | HEART RATE: 76 BPM

## 2019-10-19 DIAGNOSIS — R53.1 GENERALIZED WEAKNESS: Primary | ICD-10-CM
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
## 2019-10-19 LAB
ALBUMIN SERPL BCP-MCNC: 3.1 G/DL (ref 3.5–5)
ALP SERPL-CCNC: 65 U/L (ref 46–116)
ALT SERPL W P-5'-P-CCNC: 27 U/L (ref 12–78)
ANION GAP SERPL CALCULATED.3IONS-SCNC: 6 MMOL/L (ref 4–13)
ANION GAP SERPL CALCULATED.3IONS-SCNC: 8 MMOL/L (ref 4–13)
AST SERPL W P-5'-P-CCNC: 44 U/L (ref 5–45)
BASOPHILS # BLD AUTO: 0.05 THOUSANDS/ΜL (ref 0–0.1)
BASOPHILS NFR BLD AUTO: 1 % (ref 0–1)
BILIRUB SERPL-MCNC: 0.6 MG/DL (ref 0.2–1)
BUN SERPL-MCNC: 8 MG/DL (ref 5–25)
BUN SERPL-MCNC: 8 MG/DL (ref 5–25)
CALCIUM SERPL-MCNC: 8.4 MG/DL (ref 8.3–10.1)
CALCIUM SERPL-MCNC: 8.7 MG/DL (ref 8.3–10.1)
CHLORIDE SERPL-SCNC: 102 MMOL/L (ref 100–108)
CHLORIDE SERPL-SCNC: 99 MMOL/L (ref 100–108)
CO2 SERPL-SCNC: 25 MMOL/L (ref 21–32)
CO2 SERPL-SCNC: 27 MMOL/L (ref 21–32)
CREAT SERPL-MCNC: 0.86 MG/DL (ref 0.6–1.3)
CREAT SERPL-MCNC: 0.86 MG/DL (ref 0.6–1.3)
EOSINOPHIL # BLD AUTO: 0.03 THOUSAND/ΜL (ref 0–0.61)
EOSINOPHIL NFR BLD AUTO: 1 % (ref 0–6)
ERYTHROCYTE [DISTWIDTH] IN BLOOD BY AUTOMATED COUNT: 13.2 % (ref 11.6–15.1)
GFR SERPL CREATININE-BSD FRML MDRD: 98 ML/MIN/1.73SQ M
GFR SERPL CREATININE-BSD FRML MDRD: 98 ML/MIN/1.73SQ M
GLUCOSE SERPL-MCNC: 85 MG/DL (ref 65–140)
GLUCOSE SERPL-MCNC: 88 MG/DL (ref 65–140)
HCT VFR BLD AUTO: 46.6 % (ref 36.5–49.3)
HGB BLD-MCNC: 15.4 G/DL (ref 12–17)
IMM GRANULOCYTES # BLD AUTO: 0.03 THOUSAND/UL (ref 0–0.2)
IMM GRANULOCYTES NFR BLD AUTO: 1 % (ref 0–2)
LYMPHOCYTES # BLD AUTO: 1.2 THOUSANDS/ΜL (ref 0.6–4.47)
LYMPHOCYTES NFR BLD AUTO: 24 % (ref 14–44)
MCH RBC QN AUTO: 33.4 PG (ref 26.8–34.3)
MCHC RBC AUTO-ENTMCNC: 33 G/DL (ref 31.4–37.4)
MCV RBC AUTO: 101 FL (ref 82–98)
MONOCYTES # BLD AUTO: 0.48 THOUSAND/ΜL (ref 0.17–1.22)
MONOCYTES NFR BLD AUTO: 10 % (ref 4–12)
NEUTROPHILS # BLD AUTO: 3.25 THOUSANDS/ΜL (ref 1.85–7.62)
NEUTS SEG NFR BLD AUTO: 63 % (ref 43–75)
NRBC BLD AUTO-RTO: 0 /100 WBCS
PLATELET # BLD AUTO: 251 THOUSANDS/UL (ref 149–390)
PMV BLD AUTO: 10.3 FL (ref 8.9–12.7)
POTASSIUM SERPL-SCNC: 4.5 MMOL/L (ref 3.5–5.3)
POTASSIUM SERPL-SCNC: 5.9 MMOL/L (ref 3.5–5.3)
PROT SERPL-MCNC: 7.1 G/DL (ref 6.4–8.2)
RBC # BLD AUTO: 4.61 MILLION/UL (ref 3.88–5.62)
SODIUM SERPL-SCNC: 132 MMOL/L (ref 136–145)
SODIUM SERPL-SCNC: 135 MMOL/L (ref 136–145)
TROPONIN I SERPL-MCNC: <0.02 NG/ML
WBC # BLD AUTO: 5.04 THOUSAND/UL (ref 4.31–10.16)

## 2019-10-19 PROCEDURE — 96361 HYDRATE IV INFUSION ADD-ON: CPT

## 2019-10-19 PROCEDURE — 96360 HYDRATION IV INFUSION INIT: CPT

## 2019-10-19 PROCEDURE — 80048 BASIC METABOLIC PNL TOTAL CA: CPT | Performed by: PHYSICIAN ASSISTANT

## 2019-10-19 PROCEDURE — 85025 COMPLETE CBC W/AUTO DIFF WBC: CPT | Performed by: PHYSICIAN ASSISTANT

## 2019-10-19 PROCEDURE — 80053 COMPREHEN METABOLIC PANEL: CPT | Performed by: PHYSICIAN ASSISTANT

## 2019-10-19 PROCEDURE — 84484 ASSAY OF TROPONIN QUANT: CPT | Performed by: PHYSICIAN ASSISTANT

## 2019-10-19 PROCEDURE — 36415 COLL VENOUS BLD VENIPUNCTURE: CPT | Performed by: PHYSICIAN ASSISTANT

## 2019-10-19 PROCEDURE — 93005 ELECTROCARDIOGRAM TRACING: CPT

## 2019-10-19 PROCEDURE — 99285 EMERGENCY DEPT VISIT HI MDM: CPT

## 2019-10-19 RX ADMIN — SODIUM CHLORIDE 1000 ML: 0.9 INJECTION, SOLUTION INTRAVENOUS at 12:13

## 2019-10-19 NOTE — ED PROVIDER NOTES
History  Chief Complaint   Patient presents with    Weakness - Generalized     pt lives in a group home, ems reports staff noticed pt apperas to have gen weakness     34-year-old male, history of down syndrome/PD/CHF/HLD/dementia/depression, presenting today with some generalized weakness sent from [facility]  A fire drill was performed this morning and patient was suddenly awoke in from a nap, per our staff patient seemed in a daze however was able to put on his shoes and walk outside however for short time seemed off balance and then fell, did not his head or lose consciousness  Patient now is feeling better, he is not having any symptoms at this time  Acting appropriately according to [facility] staff  Has not been recently sick  Makes no complaints at this point time  Denies nausea vomiting, headache, back pain, hip or groin pain, changes in vision, dizziness, lightheadedness, numbness, paresthesias, fevers, cough, congestion, syncope  Prior to Admission Medications   Prescriptions Last Dose Informant Patient Reported? Taking?    Calcium Carbonate-Vitamin D 600-400 MG-UNIT per tablet  Outside Facility (Specify) No No   Sig: Take 1 tablet by mouth 2 (two) times a day At 8 am and 5 pm   OLANZapine (ZyPREXA) 2 5 mg tablet  Outside Facility (Specify) Yes No   Sig: Take 1 tablet by mouth daily   atorvastatin (LIPITOR) 20 mg tablet  Outside Facility (Specify) No No   Sig: Take 1 tablet (20 mg total) by mouth daily At 8pm   cholecalciferol (VITAMIN D3) 1,000 units tablet  Outside Facility (Specify) No No   Sig: Take 1 tablet (1,000 Units total) by mouth 2 (two) times a day At 8am and 8pm   divalproex sodium (DEPAKOTE) 500 mg EC tablet  Outside Facility (Specify) Yes No   Sig: Take 500 mg by mouth    docusate sodium (COLACE) 100 mg capsule  Outside Facility (Specify) No No   Sig: Take one capsule at 8am   esomeprazole (NexIUM) 40 MG capsule  Outside Facility (Specify) No No   Sig: Take one capsule daily 30 minutes prior to breakfast   lanolin-mineral oil (ARMAAN) [address]  Outside Facility (Specify) No No   Sig: Apply topically 2 (two) times a day Apply to hands daily at 8 am and 8 pm   levothyroxine 75 mcg tablet  Outside Facility (Specify) No No   Sig: TAKE 1 TABLET BY MOUTH IN THE EVENING (8PM)   loratadine (CLARITIN) 10 mg tablet  Outside Facility (Specify) No No   Sig: Take 1 tablet (10 mg total) by mouth daily At 8am As needed for allergies   multivitamin-minerals (CENTRUM ADULTS) tablet  Outside Facility (Specify) No No   Sig: Take 1 tablet by mouth daily At 8am   polyethylene glycol (GLYCOLAX) powder  Outside Facility (Specify) No No   Sig: Take 17 grams in 8 oz of water by mouth daily at 8 pm   tamsulosin (FLOMAX) 0 4 mg  Outside Facility (Specify) No No   Sig: Take 1 capsule (0 4 mg total) by mouth daily at bedtime At 8pm   testosterone (ANDROGEL) 1 62 % TD gel pump  Outside Facility (Specify) No No   Sig: APPLY  3 PUMPS TO SHOULDERS / CHEST EVERY DAY (8AM)      Facility-Administered Medications: None       Past Medical History:   Diagnosis Date    Abnormal weight gain     last assessed 4/18/16; resolved 1/25/17    [facility]congestive [facility] failure[facility] ([facility])     Anxiety     Dementia ([facility] )     Depression     Disease of thyroid gland     Down's syndrome     Fatigue     last assessed 2/23/17; resolved 6/1/17    GERD (gastroesophageal reflux disease)     Gluten free diet     Hernia of abdominal cavity     Hyperlipidemia     Hypogonadism in male     Impulse control disorder     OCD (obsessive compulsive disorder)     Parkinson disease ([facility]     Parkinson disease ([facility] )     resolved 2/23/17    Pleural effusion     Sinus tachycardia     Vitamin D deficiency     last assessed 9/1/16; resolved 6/1/17       Past Surgical History:   Procedure Laterality Date    COLONOSCOPY      ESOPHAGOGASTRODUODENOSCOPY      with biopsy    PERICARDIOCENTESIS  4/29/2016            Family History   Problem Relation Age of Onset  No Known Problems Mother     Parkinsonism Father         symtomatic     I have reviewed and agree with the history as documented  Social History     Tobacco Use    Smoking status: Never Smoker    Smokeless tobacco: Never Used   Substance Use Topics    Alcohol use: No    Drug use: No        Review of Systems   Constitutional: Negative  HENT: Negative  Eyes: Negative  Respiratory: Negative  Cardiovascular: Negative  Gastrointestinal: Negative  Genitourinary: Negative  Musculoskeletal: Negative  Skin: Negative  Neurological: Negative  All other systems reviewed and are negative  Physical Exam  Physical Exam   Constitutional: He is oriented to person, place, and time  He appears well-developed and well-nourished  Patient eating and drinking the emergency department  HENT:   Head: Normocephalic and atraumatic  Right Ear: External ear normal    Left Ear: External ear normal    Nose: Nose normal    Mouth/Throat: Oropharynx is clear and moist    Eyes: Pupils are equal, round, and reactive to light  Conjunctivae and EOM are normal    Neck: Normal range of motion  Neck supple  No midline tenderness throughout entire spine  Cardiovascular: Normal rate, regular rhythm, normal heart sounds and intact distal pulses  Exam reveals no gallop and no friction rub  No murmur heard  Pulmonary/Chest: Effort normal and breath sounds normal  No stridor  No respiratory distress  He has no wheezes  He has no rales  He exhibits no tenderness  Abdominal: Soft  Bowel sounds are normal  He exhibits no distension and no mass  There is no tenderness  There is no rebound and no guarding  No hernia  Musculoskeletal:   No signs of injury throughout entire skin and musculoskeletal exam, patient does not have any bony tenderness  Ambulating difficulty  Neurological: He is alert and oriented to person, place, and time  Skin: Skin is warm and dry   Capillary refill takes less than 2 seconds  Nursing note and vitals reviewed        Vital Signs  ED Triage Vitals [10/19/19 1151]   Temperature Pulse Respirations Blood Pressure SpO2   98 5 °F (36 9 °C) 72 18 111/57 99 %      Temp Source Heart Rate Source Patient Position - Orthostatic VS BP Location FiO2 (%)   Tympanic -- Lying Left arm --      Pain Score       --           Vitals:    10/19/19 1254 10/19/19 1315 10/19/19 1330 10/19/19 1345   BP:       Pulse: 68 70 70 76   Patient Position - Orthostatic VS:             Visual Acuity      ED Medications  Medications   sodium chloride 0 9 % bolus 1,000 mL (0 mL Intravenous Stopped 10/19/19 1352)       Diagnostic Studies  Results Reviewed     Procedure Component Value Units Date/Time    Basic metabolic panel [[id number]]  (Abnormal) Collected:  10/19/19 1316    Lab Status:  Final result Specimen:  Blood from Hand, Left Updated:  10/19/19 1336     Sodium 135 mmol/L      Potassium 4 5 mmol/L      Chloride 102 mmol/L      CO2 25 mmol/L      ANION GAP 8 mmol/L      BUN 8 mg/dL      Creatinine 0 86 mg/dL      Glucose 88 mg/dL      Calcium 8 7 mg/dL      eGFR 98 ml/min/1 73sq m     Narrative:       [place] guidelines for Chronic Kidney Disease (CKD):     Stage 1 with normal or high GFR (GFR > 90 mL/min/1 73 square meters)    Stage 2 Mild CKD (GFR = 60-89 mL/min/1 73 square meters)    Stage 3A Moderate CKD (GFR = 45-59 mL/min/1 73 square meters)    Stage 3B Moderate CKD (GFR = 30-44 mL/min/1 73 square meters)    Stage 4 Severe CKD (GFR = 15-29 mL/min/1 73 square meters)    Stage 5 End Stage CKD (GFR <15 mL/min/1 73 square meters)  Note: GFR calculation is accurate only with a steady state creatinine    Troponin I [[id number]]  (Normal) Collected:  10/19/19 1211    Lab Status:  Final result Specimen:  Blood from Arm, Right Updated:  10/19/19 1242     Troponin I <0 02 ng/mL     Comprehensive metabolic panel [[id number]]  (Abnormal) Collected:  10/19/19 1211    Lab Status:  Final result Specimen:  Blood from Arm, Right Updated:  10/19/19 1240     Sodium 132 mmol/L      Potassium 5 9 mmol/L      Chloride 99 mmol/L      CO2 27 mmol/L      ANION GAP 6 mmol/L      BUN 8 mg/dL      Creatinine 0 86 mg/dL      Glucose 85 mg/dL      Calcium 8 4 mg/dL      AST 44 U/L      ALT 27 U/L      Alkaline Phosphatase 65 U/L      Total Protein 7 1 g/dL      Albumin 3 1 g/dL      Total Bilirubin 0 60 mg/dL      eGFR 98 ml/min/1 73sq m     Narrative:       National Kidney Disease Foundation guidelines for Chronic Kidney Disease (CKD):     Stage 1 with normal or high GFR (GFR > 90 mL/min/1 73 square meters)    Stage 2 Mild CKD (GFR = 60-89 mL/min/1 73 square meters)    Stage 3A Moderate CKD (GFR = 45-59 mL/min/1 73 square meters)    Stage 3B Moderate CKD (GFR = 30-44 mL/min/1 73 square meters)    Stage 4 Severe CKD (GFR = 15-29 mL/min/1 73 square meters)    Stage 5 End Stage CKD (GFR <15 mL/min/1 73 square meters)  Note: GFR calculation is accurate only with a steady state creatinine    CBC and differential [[id number]]  (Abnormal) Collected:  10/19/19 1211    Lab Status:  Final result Specimen:  Blood from Arm, Right Updated:  10/19/19 1218     WBC 5 04 Thousand/uL      RBC 4 61 Million/uL      Hemoglobin 15 4 g/dL      Hematocrit 46 6 %       fL      MCH 33 4 pg      MCHC 33 0 g/dL      RDW 13 2 %      MPV 10 3 fL      Platelets 722 Thousands/uL      nRBC 0 /100 WBCs      Neutrophils Relative 63 %      Immat GRANS % 1 %      Lymphocytes Relative 24 %      Monocytes Relative 10 %      Eosinophils Relative 1 %      Basophils Relative 1 %      Neutrophils Absolute 3 25 Thousands/µL      Immature Grans Absolute 0 03 Thousand/uL      Lymphocytes Absolute 1 20 Thousands/µL      Monocytes Absolute 0 48 Thousand/µL      Eosinophils Absolute 0 03 Thousand/µL      Basophils Absolute 0 05 Thousands/µL     UA w Reflex to Microscopic w Reflex to Culture [[id number]]     Lab Status:  No result Specimen:  Urine No orders to display              Procedures  ECG 12 Lead Documentation Only  Date/Time: 10/19/2019 12:17 PM  Performed by: Huseyin Aldridge PA-C  Authorized by: Huseyin Aldridge PA-C     Indications / Diagnosis:  Weakness   Patient location:  ED  Interpretation:     Interpretation: normal    Rate:     ECG rate:  71    ECG rate assessment: normal    Rhythm:     Rhythm: sinus rhythm    Ectopy:     Ectopy: none    QRS:     QRS axis:  Normal    QRS intervals:  Normal  Conduction:     Conduction: normal    ST segments:     ST segments:  Normal  T waves:     T waves: normal             ED Course  ED Course as of Oct 19 1353   Sat Oct 19, 2019   1250 Hemolyzed    Potassium(!): 5 9                               MDM  Number of Diagnoses or Management Options  Fall, initial encounter:   Generalized weakness:   Diagnosis management comments: Patient's symptoms have completely resolved, no signs of injury  Ambulating in the ED without difficulty, overall appears very well  Will have patient stand slowly from a seated position to avoid orthostatic hypotension  Patient is informed to return to the emergency department for worsening of symptoms and was given proper education regarding their diagnosis and symptoms  Otherwise the patient is informed to follow up with their primary care doctor for re-evaluation  The patient, patient's brother/guardian and arc worker verbalizes understanding and agrees with above assessment and plan  All questions were answered  Please Note: Fluency Direct voice recognition software may have been used in the creation of this document  Wrong words or sound a like substitutions may have occurred due to the inherent limitations of the voice software             Amount and/or Complexity of Data Reviewed  Clinical lab tests: ordered and reviewed  Review and summarize past medical records: yes  Independent visualization of images, tracings, or specimens: yes        Disposition  Final diagnoses:   Generalized weakness   Fall, initial encounter     Time reflects when diagnosis was documented in both MDM as applicable and the Disposition within this note     Time User Action Codes Description Comment    10/19/2019  1:43 PM Ayad Hdz Add [R53 1] Generalized weakness     10/19/2019  1:43 PM Ayad Hdz Add [W19  Fleeta Moll, initial encounter       ED Disposition     ED Disposition Condition Date/Time Comment    Discharge Stable Sat Oct 19, 2019  1:43 PM Tabatha Ramirez discharge to home/self care  Follow-up Information     Follow up With Specialties Details Why Contact Info Additional [address] Emergency Department Emergency Medicine Go to  If symptoms worsen [address] [facility]  [phone] [facility], [place], [place], [address] [place] [address], [address] [place] [address], Nurse Practitioner Schedule an appointment as soon as possible for a visit  As needed [address] [facility] [address] [phone]             Patient's Medications   Discharge Prescriptions    No medications on file     No discharge procedures on file      ED Provider  Electronically Signed by           Terrie Parikh PA-C  10/19/19 0827

## 2019-10-23 ENCOUNTER — OFFICE VISIT (OUTPATIENT)
Dept: FAMILY MEDICINE CLINIC | Facility: CLINIC | Age: 54
End: 2019-10-23
Payer: MEDICARE

## 2019-10-23 VITALS
RESPIRATION RATE: 16 BRPM | SYSTOLIC BLOOD PRESSURE: 90 MMHG | HEIGHT: 60 IN | HEART RATE: 60 BPM | TEMPERATURE: 97 F | BODY MASS INDEX: 30.47 KG/M2 | DIASTOLIC BLOOD PRESSURE: 50 MMHG | WEIGHT: 155.2 LBS

## 2019-10-23 DIAGNOSIS — Z09 FOLLOW UP: Primary | ICD-10-CM

## 2019-10-23 DIAGNOSIS — I95.1 ORTHOSTASIS: ICD-10-CM

## 2019-10-23 PROCEDURE — 99212 OFFICE O/P EST SF 10 MIN: CPT | Performed by: NURSE PRACTITIONER

## 2019-10-23 NOTE — PROGRESS NOTES
ER FOLLOW UP OFFICE VISIT  [facility]    NAME: Francia Ramirez  AGE: 53 y o  SEX: male  : 1965     DATE: 10/23/2019     Assessment and Plan:     Problem List Items Addressed This Visit     None      Visit Diagnoses     Follow up    -  Primary    Orthostasis        resolved        Patient clinically stable at this time  The case discussed with patient's caregiver using patient centered shared decision making  The patient's caregiver was counseled regarding instructions for management,-- risk factor reductions,-- prognosis,-- impressions,-- risks and benefits of treatment options,-- importance of compliance with treatment  I have reviewed the instructions with the patient's caregiver, answering all questions to her satisfaction  He is stable to return to work today  Advised slow position change, drink enough water  Advised caregiver to call with relapse       HPI:     Caro López brought by group home care giver for ER follow up  Was taken to [facility] ER on 10/19/19 by staff after experiencing near syncope/fall after getting up suddenly and quickly for fire drill  He recovered completely without injury  ER report reviewed  caregiver reports that patient is back to baseline and has not experienced recurrence of similar symptoms    The following portions of the patient's history were reviewed and updated as appropriate: allergies, current medications, past family history, past medical history, past social history, past surgical history and problem list      Review of Systems:     Review of Systems   Constitutional: Negative for fatigue and fever  Neurological: Negative for dizziness, syncope and weakness          Problem List:     Patient Active Problem List   Diagnosis    Essential hypertension    Hyperlipidemia    Hypothyroidism    GERD (gastroesophageal reflux disease)    Down's syndrome    Anemia    CHELSIE (iron deficiency anemia)    Chronic constipation  Hypogonadism, testicular    Hypotension    Osteoporosis without current pathological fracture    Mixed incontinence    Parkinson's disease (HCC)    Serositis (HCC)    Bladder distention    Hydronephrosis    Abdominal pain    Idiopathic pericardial effusion    Cardiac murmur    Xerodermia    Sinus bradycardia    Vitamin D deficiency        Objective:     BP 90/50 (BP Location: Left arm, Patient Position: Sitting, Cuff Size: Standard)   Pulse 60   Temp (!) 97 °F (36 1 °C)   Resp 16   Ht 5' (1 524 m)   Wt 70 4 kg (155 lb 3 2 oz)   BMI 30 31 kg/m²     Physical Exam   Constitutional: Vital signs are normal  He appears well-developed and well-nourished  No distress  HENT:   Head: Normocephalic and atraumatic  Eyes: Pupils are equal, round, and reactive to light  EOM are normal    Neck: Normal range of motion  Neck supple  No JVD present  No thyromegaly present  Cardiovascular: Normal rate, regular rhythm and intact distal pulses  Murmur heard  Pulmonary/Chest: Effort normal and breath sounds normal    Neurological: He is alert  Skin: Skin is warm and dry  Capillary refill takes less than 2 seconds  No pallor  Psychiatric: He has a normal mood and affect  Nursing note and vitals reviewed  Laboratory Results: I have personally reviewed the pertinent laboratory results/reports     Radiology/Other Diagnostic Testing Results: I have personally reviewed pertinent reports  No results found       Current Medications:     Outpatient Medications Prior to Visit   Medication Sig Dispense Refill    atorvastatin (LIPITOR) 20 mg tablet Take 1 tablet (20 mg total) by mouth daily At 8pm 30 tablet 11    Calcium Carbonate-Vitamin D 600-400 MG-UNIT per tablet Take 1 tablet by mouth 2 (two) times a day At 8 am and 5 pm 60 tablet 10    cholecalciferol (VITAMIN D3) 1,000 units tablet Take 1 tablet (1,000 Units total) by mouth 2 (two) times a day At 8am and 8pm 60 tablet 11    divalproex sodium (DEPAKOTE) 500 mg EC tablet Take 500 mg by mouth       docusate sodium (COLACE) 100 mg capsule Take one capsule at 8am 30 capsule 11    esomeprazole (NexIUM) 40 MG capsule Take one capsule daily 30 minutes prior to breakfast 30 capsule 11    lanolin-mineral oil (ARMAAN) LOTN Apply topically 2 (two) times a day Apply to hands daily at 8 am and 8 pm 1 Bottle 11    levothyroxine 75 mcg tablet TAKE 1 TABLET BY MOUTH IN THE EVENING (8PM) 30 tablet 10    loratadine (CLARITIN) 10 mg tablet Take 1 tablet (10 mg total) by mouth daily At 8am As needed for allergies 30 tablet 11    multivitamin-minerals (CENTRUM ADULTS) tablet Take 1 tablet by mouth daily At 8am 30 tablet 11    OLANZapine (ZyPREXA) 2 5 mg tablet Take 1 tablet by mouth daily      polyethylene glycol (GLYCOLAX) powder Take 17 grams in 8 oz of water by mouth daily at 8 pm 527 g 11    tamsulosin (FLOMAX) 0 4 mg Take 1 capsule (0 4 mg total) by mouth daily at bedtime At 8pm 30 capsule 11    testosterone (ANDROGEL) 1 62 % TD gel pump APPLY  3 PUMPS TO SHOULDERS / CHEST EVERY DAY (8AM) 75 g 4     No facility-administered medications prior to visit          Clarence Nissen, [address]

## 2019-10-23 NOTE — PATIENT INSTRUCTIONS
Fall Prevention   WHAT YOU NEED TO KNOW:   Fall prevention includes ways to make your home and other areas safer  It also includes ways you can move more carefully to prevent a fall  Health conditions that cause changes in your blood pressure, vision, or muscle strength and coordination may increase your risk for falls  Medicines may also increase your risk for falls if they make you dizzy, weak, or sleepy  DISCHARGE INSTRUCTIONS:   Call 911 or have someone else call if:   · You have fallen and are unconscious  · You have fallen and cannot move part of your body  Contact your healthcare provider if:   · You have fallen and have pain or a headache  · You have questions or concerns about your condition or care  Fall prevention tips:   · Stand or sit up slowly  This may help you keep your balance and prevent falls  · Use assistive devices as directed  Your healthcare provider may suggest that you use a cane or walker to help you keep your balance  You may need to have grab bars put in your bathroom near the toilet or in the shower  · Wear shoes that fit well and have soles that   Wear shoes both inside and outside  Use slippers with good   Do not wear shoes with high heels  · Wear a personal alarm  This is a device that allows you to call 911 if you fall and need help  Ask your healthcare provider for more information  · Stay active  Exercise can help strengthen your muscles and improve your balance  Your healthcare provider may recommend water aerobics or walking  He or she may also recommend physical therapy to improve your coordination  Never start an exercise program without talking to your healthcare provider first      · Manage your medical conditions  Keep all appointments with your healthcare providers  Visit your eye doctor as directed  Home safety tips:   · Add items to prevent falls in the bathroom    Put nonslip strips on your bath or shower floor to prevent you from slipping  Use a bath mat if you do not have carpet in the bathroom  This will prevent you from falling when you step out of the bath or shower  Use a shower seat so you do not need to stand while you shower  Sit on the toilet or a chair in your bathroom to dry yourself and put on clothing  This will prevent you from losing your balance from drying or dressing yourself while you are standing  · Keep paths clear  Remove books, shoes, and other objects from walkways and stairs  Place cords for telephones and lamps out of the way so that you do not need to walk over them  Tape them down if you cannot move them  Remove small rugs  If you cannot remove a rug, secure it with double-sided tape  This will prevent you from tripping  · Install bright lights in your home  Use night lights to help light paths to the bathroom or kitchen  Always turn on the light before you start walking  · Keep items you use often on shelves within reach  Do not use a step stool to help you reach an item  · Paint or place reflective tape on the edges of your stairs  This will help you see the stairs better  Follow up with your healthcare provider as directed:  Write down your questions so you remember to ask them during your visits  © 2017 [address] [employer] Information is for End User's use only and may not be sold, redistributed or otherwise used for commercial purposes  All illustrations and images included in CareNotes® are the copyrighted property of A D A M [employer]  or [employer] Lewis  The above information is an  only  It is not intended as medical advice for individual conditions or treatments  Talk to your doctor, nurse or pharmacist before following any medical regimen to see if it is safe and effective for you

## 2019-10-24 ENCOUNTER — OFFICE VISIT (OUTPATIENT)
Dept: PODIATRY | Facility: CLINIC | Age: 54
End: 2019-10-24
Payer: MEDICARE

## 2019-10-24 VITALS — HEIGHT: 60 IN | BODY MASS INDEX: 30.43 KG/M2 | WEIGHT: 155 LBS

## 2019-10-24 DIAGNOSIS — M79.675 PAIN IN TOES OF BOTH FEET: ICD-10-CM

## 2019-10-24 DIAGNOSIS — M79.674 PAIN IN TOES OF BOTH FEET: ICD-10-CM

## 2019-10-24 DIAGNOSIS — B35.1 DERMATOPHYTOSIS OF NAIL: ICD-10-CM

## 2019-10-24 LAB
ATRIAL RATE: 71 BPM
P AXIS: 58 DEGREES
PR INTERVAL: 142 MS
QRS AXIS: -23 DEGREES
QRSD INTERVAL: 100 MS
QT INTERVAL: 406 MS
QTC INTERVAL: 441 MS
T WAVE AXIS: 20 DEGREES
VENTRICULAR RATE: 71 BPM

## 2019-10-24 PROCEDURE — 93010 ELECTROCARDIOGRAM REPORT: CPT | Performed by: INTERNAL MEDICINE

## 2019-10-24 PROCEDURE — 11721 DEBRIDE NAIL 6 OR MORE: CPT | Performed by: PODIATRIST

## 2019-10-30 NOTE — PROGRESS NOTES
Assessment/Plan:  Aseptic debridement and planning of nails x10 and manually and mechanically    Partial resection of ingrown nail right hallux    Patient will consider matricectomy  Patient will call if any redness or signs of infection   Follow-up 2 months     There are no diagnoses linked to this encounter  Subjective:      Patient ID: Mibsah Lucero is a 53 y o  male  Patient pain in big toenails right worse than left  Has had pain for the past few weeks  Has not noticed any redness or drainage        Past Medical History:   Diagnosis Date    Abnormal weight gain     last assessed 4/18/16; resolved 1/25/17    [facility]estive heart failure) [facility] )     Anxiety     Dementia ([facility] )     Depression     Disease of thyroid gland     Down's syndrome     Fatigue     last assessed 2/23/17; resolved 6/1/17    GERD (gastroesophageal reflux disease)     Gluten free diet     Hernia of abdominal cavity     Hyperlipidemia     Hypogonadism in male     Impulse control disorder     OCD (obsessive compulsive disorder)     Parkinson disease ([facility] )     Parkinson disease ([facility] )     resolved 2/23/17    Pleural effusion     Sinus tachycardia     Vitamin D deficiency     last assessed 9/1/16; resolved 6/1/17         Current Outpatient Medications:     atorvastatin (LIPITOR) 20 mg tablet, Take 1 tablet (20 mg total) by mouth daily At 8pm, Disp: 30 tablet, Rfl: 11    Calcium Carbonate-Vitamin D 600-400 MG-UNIT per tablet, Take 1 tablet by mouth 2 (two) times a day At 8 am and 5 pm, Disp: 60 tablet, Rfl: 10    cholecalciferol (VITAMIN D3) 1,000 units tablet, Take 1 tablet (1,000 Units total) by mouth 2 (two) times a day At 8am and 8pm, Disp: 60 tablet, Rfl: 11    divalproex sodium (DEPAKOTE) 500 mg EC tablet, Take 500 mg by mouth , Disp: , Rfl:     docusate sodium (COLACE) 100 mg capsule, Take one capsule at 8am, Disp: 30 capsule, Rfl: 11    esomeprazole (NexIUM) 40 MG capsule, Take one capsule daily 30 minutes prior to breakfast, Disp: 30 capsule, Rfl: 11    lanolin-mineral oil (ARMAAN) LOTN, Apply topically 2 (two) times a day Apply to hands daily at 8 am and 8 pm, Disp: 1 Bottle, Rfl: 11    levothyroxine 75 mcg tablet, TAKE 1 TABLET BY MOUTH IN THE EVENING (8PM), Disp: 30 tablet, Rfl: 10    loratadine (CLARITIN) 10 mg tablet, Take 1 tablet (10 mg total) by mouth daily At 8am As needed for allergies, Disp: 30 tablet, Rfl: 11    multivitamin-minerals (CENTRUM ADULTS) tablet, Take 1 tablet by mouth daily At 8am, Disp: 30 tablet, Rfl: 11    OLANZapine (ZyPREXA) 2 5 mg tablet, Take 1 tablet by mouth daily, Disp: , Rfl:     polyethylene glycol (GLYCOLAX) powder, Take 17 grams in 8 oz of water by mouth daily at 8 pm, Disp: 527 g, Rfl: 11    tamsulosin (FLOMAX) 0 4 mg, Take 1 capsule (0 4 mg total) by mouth daily at bedtime At 8pm, Disp: 30 capsule, Rfl: 11    testosterone (ANDROGEL) 1 62 % TD gel pump, APPLY  3 PUMPS TO SHOULDERS / CHEST EVERY DAY (8AM), Disp: 75 g, Rfl: 4    Past Surgical History:   Procedure Laterality Date    COLONOSCOPY      ESOPHAGOGASTRODUODENOSCOPY      with biopsy    PERICARDIOCENTESIS  4/29/2016            Allergies   Allergen Reactions    Advil [Ibuprofen]     Gluten Meal        Patient Active Problem List   Diagnosis    Essential hypertension    Hyperlipidemia    Hypothyroidism    GERD (gastroesophageal reflux disease)    Down's syndrome    Anemia    CHELSIE (iron deficiency anemia)    Chronic constipation    Hypogonadism, testicular    Hypotension    Osteoporosis without current pathological fracture    Mixed incontinence    Parkinson's disease (HCC)    Serositis (HCC)    Bladder distention    Hydronephrosis    Abdominal pain    Idiopathic pericardial effusion    Cardiac murmur    Xerodermia    Sinus bradycardia    Vitamin D deficiency       Review of Systems   Constitutional: Negative  HENT: Negative  Eyes: Negative  Respiratory: Negative  Cardiovascular: Negative  Gastrointestinal: Negative  Endocrine: Negative  Genitourinary: Negative  Musculoskeletal: Negative  Skin: Negative  Allergic/Immunologic: Negative  Neurological: Negative  Hematological: Negative  Psychiatric/Behavioral: Negative  Objective:      Ht 5' (1 524 m)   Wt 70 3 kg (155 lb)   BMI 30 27 kg/m²          Physical Exam    Constitutional: no acute distress, well appearing and well nourished   Orthopedic/Biomechanical: abnormal foot type,-- hammertoe(s),-- bunion(s),-- abnormal MPJ ROM,-- discolored nails,-- subungual debris-- and-- nail tenderness   Skin: keratoses  Left Foot: Appearance: a deformity  Great toe deformities include a bunion  Evaluation of the great toe nail demonstrates an ingrown nail, but-- no paronychia  Decreased range of motion first MPJ bilateral positive pain on palpation and range of motion right worse than left  Ingrown bilateral big toe  No signs of infection no erythema no exudate  Special Tests: significant bunion bilateral decreased range of motion first MPJ no pain on range of motion,no sign of infection  Right Foot: Appearance: Normal except as noted: a deformity  Great toe deformities include a bunion  Evaluation of the great toe nail demonstrates paronychia-- and-- an ingrown nail  Mild ingrown tibial border  Rigid hammertoes, 2 through 5, bilateral, moderate xerosis, fissures, bilateral heels, midfoot, bilateral, negative erythema  Negative exudate  Negative ulceration  Special Tests: Mild xerosis, no signs of infection  Neurological Exam: Light touch was decreased bilaterally  Vibratory sensation was decreased in both first metatarsophalangeal joints  Response to monofilament test was intact bilaterally  Vascular Exam: performed Dorsalis pedis pulses were 1/4 bilaterally  Posterior tibial pulses were 1/4 bilaterally  Capillary refill time was between 1-3 seconds bilaterally  Toenails:  All of the toenails were elongated,-- hypertrophied,-- discolored,-- shown to have subungual debris-- and-- tender  Both first toenails were ingrown  Hyperkeratosis: present on both first sub metatarsals-- and-- present on both fifth sub metatarsals  Moderate bunion bilateral   Rigid hammertoes 2 through 5 bilateral   Significant equinus bilateral   Negative erythema no signs of infection  Large bunion right worse than left  Decreased range of motion 1st MPJ  Right 1st MPJ positive pain on end range of motion no crepitus no hypermobility of the 1st ray      All nails thick discolored dystrophic  Ingrown bilateral hallux  No open wounds or signs of infection  Painful hyperkeratotic lesions medial 1st metatarsal head bilateral  Significant hallux limitus bilateral hammered hallux with hallux valgus bilateral    Ingrown nail bilateral hallux  Right hallux medial border, mild swelling, positive pain on palpation negative exudate no purulence no signs of infection

## 2019-11-08 ENCOUNTER — TELEPHONE (OUTPATIENT)
Dept: OTHER | Facility: OTHER | Age: 54
End: 2019-11-08

## 2019-11-09 NOTE — TELEPHONE ENCOUNTER
Healthcall:    Spoke with Won from [facility]  She was questioning what to do about Rj's missed dose of vitamin D  Advised he can resume dosing tomorrow morning as scheduled  No need to double his dose  No further action necessary      Jeffrey Moore

## 2019-11-19 ENCOUNTER — TELEPHONE (OUTPATIENT)
Dept: FAMILY MEDICINE CLINIC | Facility: CLINIC | Age: 54
End: 2019-11-19

## 2019-11-19 DIAGNOSIS — J30.1 SEASONAL ALLERGIC RHINITIS DUE TO POLLEN: ICD-10-CM

## 2019-11-19 RX ORDER — LORATADINE 10 MG/1
10 TABLET ORAL DAILY
Qty: 30 TABLET | Refills: 11 | Status: SHIPPED | OUTPATIENT
Start: 2019-11-19 | End: 2020-10-12

## 2019-12-10 ENCOUNTER — TELEPHONE (OUTPATIENT)
Dept: ENDOCRINOLOGY | Facility: CLINIC | Age: 54
End: 2019-12-10

## 2019-12-10 DIAGNOSIS — M81.0 OSTEOPOROSIS WITHOUT CURRENT PATHOLOGICAL FRACTURE, UNSPECIFIED OSTEOPOROSIS TYPE: Primary | ICD-10-CM

## 2019-12-10 DIAGNOSIS — E29.1 HYPOGONADISM, TESTICULAR: ICD-10-CM

## 2019-12-10 NOTE — TELEPHONE ENCOUNTER
Patient's caretaker Myles Lizethanna  called and asked if he can have an order for Dexa scan that is due in Feb  He wants to have the order form now so he can call and schedule now      Please fax order to: [phone]

## 2019-12-12 ENCOUNTER — HOSPITAL ENCOUNTER (EMERGENCY)
Facility: HOSPITAL | Age: 54
Discharge: HOME | End: 2019-12-12
Attending: EMERGENCY MEDICINE
Payer: MEDICARE

## 2019-12-12 VITALS
DIASTOLIC BLOOD PRESSURE: 65 MMHG | SYSTOLIC BLOOD PRESSURE: 138 MMHG | HEART RATE: 73 BPM | WEIGHT: 154.98 LBS | BODY MASS INDEX: 30.27 KG/M2 | OXYGEN SATURATION: 97 % | TEMPERATURE: 97.3 F | RESPIRATION RATE: 16 BRPM

## 2019-12-12 DIAGNOSIS — R55 NEAR SYNCOPE: ICD-10-CM
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
## 2019-12-12 LAB
ANION GAP SERPL CALCULATED.3IONS-SCNC: 5 MMOL/L (ref 4–13)
BASOPHILS # BLD AUTO: 0.06 THOUSANDS/ΜL (ref 0–0.1)
BASOPHILS NFR BLD AUTO: 1 % (ref 0–1)
BUN SERPL-MCNC: 10 MG/DL (ref 5–25)
CALCIUM SERPL-MCNC: 8.2 MG/DL (ref 8.3–10.1)
CHLORIDE SERPL-SCNC: 100 MMOL/L (ref 100–108)
CO2 SERPL-SCNC: 30 MMOL/L (ref 21–32)
CREAT SERPL-MCNC: 1 MG/DL (ref 0.6–1.3)
EOSINOPHIL # BLD AUTO: 0.02 THOUSAND/ΜL (ref 0–0.61)
EOSINOPHIL NFR BLD AUTO: 0 % (ref 0–6)
ERYTHROCYTE [DISTWIDTH] IN BLOOD BY AUTOMATED COUNT: 13.3 % (ref 11.6–15.1)
GFR SERPL CREATININE-BSD FRML MDRD: 84 ML/MIN/1.73SQ M
GLUCOSE SERPL-MCNC: 121 MG/DL (ref 65–140)
GLUCOSE SERPL-MCNC: 122 MG/DL (ref 65–140)
HCT VFR BLD AUTO: 44.5 % (ref 36.5–49.3)
HGB BLD-MCNC: 14.8 G/DL (ref 12–17)
IMM GRANULOCYTES # BLD AUTO: 0.03 THOUSAND/UL (ref 0–0.2)
IMM GRANULOCYTES NFR BLD AUTO: 1 % (ref 0–2)
LYMPHOCYTES # BLD AUTO: 0.73 THOUSANDS/ΜL (ref 0.6–4.47)
LYMPHOCYTES NFR BLD AUTO: 14 % (ref 14–44)
MCH RBC QN AUTO: 33.6 PG (ref 26.8–34.3)
MCHC RBC AUTO-ENTMCNC: 33.3 G/DL (ref 31.4–37.4)
MCV RBC AUTO: 101 FL (ref 82–98)
MONOCYTES # BLD AUTO: 0.41 THOUSAND/ΜL (ref 0.17–1.22)
MONOCYTES NFR BLD AUTO: 8 % (ref 4–12)
NEUTROPHILS # BLD AUTO: 4.13 THOUSANDS/ΜL (ref 1.85–7.62)
NEUTS SEG NFR BLD AUTO: 76 % (ref 43–75)
NRBC BLD AUTO-RTO: 0 /100 WBCS
PLATELET # BLD AUTO: 243 THOUSANDS/UL (ref 149–390)
PMV BLD AUTO: 9.7 FL (ref 8.9–12.7)
POTASSIUM SERPL-SCNC: 4.3 MMOL/L (ref 3.5–5.3)
RBC # BLD AUTO: 4.4 MILLION/UL (ref 3.88–5.62)
SODIUM SERPL-SCNC: 135 MMOL/L (ref 136–145)
WBC # BLD AUTO: 5.38 THOUSAND/UL (ref 4.31–10.16)

## 2019-12-12 PROCEDURE — 96361 HYDRATE IV INFUSION ADD-ON: CPT

## 2019-12-12 PROCEDURE — 85025 COMPLETE CBC W/AUTO DIFF WBC: CPT | Performed by: EMERGENCY MEDICINE

## 2019-12-12 PROCEDURE — 96360 HYDRATION IV INFUSION INIT: CPT

## 2019-12-12 PROCEDURE — 80048 BASIC METABOLIC PNL TOTAL CA: CPT | Performed by: EMERGENCY MEDICINE

## 2019-12-12 PROCEDURE — 99284 EMERGENCY DEPT VISIT MOD MDM: CPT

## 2019-12-12 PROCEDURE — 36415 COLL VENOUS BLD VENIPUNCTURE: CPT | Performed by: EMERGENCY MEDICINE

## 2019-12-12 PROCEDURE — 82948 REAGENT STRIP/BLOOD GLUCOSE: CPT

## 2019-12-12 RX ADMIN — SODIUM CHLORIDE 1000 ML: 0.9 INJECTION, SOLUTION INTRAVENOUS at 11:28

## 2019-12-12 NOTE — ED PROVIDER NOTES
History  Chief Complaint   Patient presents with    Fall     Pt arrived from [facility]  Care giver said pt's leg buckled and he fell  No LOC but pt has been dizzy all day  50-year-old male with a history of Down syndrome from the [facility] presents after an episode of near-syncope where he felt lightheaded and drop down to his knees  No episode of syncope witnessed  No reports of nausea vomiting headache trauma no abdominal pain chest pain back pain or any other symptoms or complaints at this time  He had similar symptoms 2 months ago where he had full evaluation in the ED which was unremarkable  Patient did have a meal this morning  History provided by:  Patient   used: No        Prior to Admission Medications   Prescriptions Last Dose Informant Patient Reported? Taking?    Calcium Carbonate-Vitamin D 600-400 MG-UNIT per tablet  Outside Facility (Specify) No No   Sig: Take 1 tablet by mouth 2 (two) times a day At 8 am and 5 pm   OLANZapine (ZyPREXA) 2 5 mg tablet  Outside Facility (Specify) Yes No   Sig: Take 1 tablet by mouth daily   atorvastatin (LIPITOR) 20 mg tablet  Outside Facility (Specify) No No   Sig: Take 1 tablet (20 mg total) by mouth daily At 8pm   cholecalciferol (VITAMIN D3) 1,000 units tablet  Outside Facility (Specify) No No   Sig: Take 1 tablet (1,000 Units total) by mouth 2 (two) times a day At 8am and 8pm   divalproex sodium (DEPAKOTE) 500 mg EC tablet  Outside Facility (Specify) Yes No   Sig: Take 500 mg by mouth    docusate sodium (COLACE) 100 mg capsule  Outside Facility (Specify) No No   Sig: Take one capsule at 8am   esomeprazole (NexIUM) 40 MG capsule  Outside Facility (Specify) No No   Sig: Take one capsule daily 30 minutes prior to breakfast   lanolin-mineral oil (ARMAAN) LOTN  Outside Facility (Specify) No No   Sig: Apply topically 2 (two) times a day Apply to hands daily at 8 am and 8 pm   levothyroxine 75 mcg tablet  Outside Facility (Specify) No No Sig: TAKE 1 TABLET BY MOUTH IN THE EVENING (8PM)   loratadine (CLARITIN) 10 mg tablet   No No   Sig: Take 1 tablet (10 mg total) by mouth daily As needed for allergies  Do not exceed 1 dose per 24 hours   multivitamin-minerals (CENTRUM ADULTS) tablet  Outside Facility (Specify) No No   Sig: Take 1 tablet by mouth daily At 8am   polyethylene glycol (GLYCOLAX) powder  Outside Facility (Specify) No No   Sig: Take 17 grams in 8 oz of water by mouth daily at 8 pm   tamsulosin (FLOMAX) 0 4 mg  Outside Facility (Specify) No No   Sig: Take 1 capsule (0 4 mg total) by mouth daily at bedtime At 8pm   testosterone (ANDROGEL) 1 62 % TD gel pump  Outside Facility (Specify) No No   Sig: APPLY  3 PUMPS TO SHOULDERS / CHEST EVERY DAY (8AM)      Facility-Administered Medications: None       Past Medical History:   Diagnosis Date    Abnormal weight gain     last assessed 4/18/16; resolved 1/25/17    [facility]estive [facility]) [facility] )     Anxiety     Dementia ([facility] )     Depression     Disease of thyroid gland     Down's syndrome     Fatigue     last assessed 2/23/17; resolved 6/1/17    GERD (gastroesophageal reflux disease)     Gluten free diet     Hernia of abdominal cavity     Hyperlipidemia     Hypogonadism in male     Impulse control disorder     OCD (obsessive compulsive disorder)     Parkinson disease ([facility] )     Parkinson disease ([facility] )     resolved 2/23/17    Pleural effusion     Sinus tachycardia     Vitamin D deficiency     last assessed 9/1/16; resolved 6/1/17       Past Surgical History:   Procedure Laterality Date    COLONOSCOPY      ESOPHAGOGASTRODUODENOSCOPY      with biopsy    PERICARDIOCENTESIS  4/29/2016            Family History   Problem Relation Age of Onset    No Known Problems Mother     Parkinsonism Father         symtomatic     I have reviewed and agree with the history as documented      Social History     Tobacco Use    Smoking status: Never Smoker    Smokeless tobacco: Never Used   Substance Use Topics    Alcohol use: No    Drug use: No        Review of Systems   All other systems reviewed and are negative  Physical Exam  Physical Exam   Constitutional: He is oriented to person, place, and time  He appears well-developed and well-nourished  HENT:   Head: Normocephalic and atraumatic  Eyes: Pupils are equal, round, and reactive to light  EOM are normal    Neck: Normal range of motion  Neck supple  Cardiovascular: Normal rate and regular rhythm  Pulmonary/Chest: Effort normal and breath sounds normal    Abdominal: Soft  Bowel sounds are normal    Musculoskeletal: Normal range of motion  Neurological: He is alert and oriented to person, place, and time  He displays normal reflexes  No cranial nerve deficit or sensory deficit  He exhibits normal muscle tone  Coordination normal    Skin: Skin is warm and dry  Psychiatric: He has a normal mood and affect  Nursing note and vitals reviewed        Vital Signs  ED Triage Vitals   Temperature Pulse Respirations Blood Pressure SpO2   12/12/19 1031 12/12/19 1031 12/12/19 1031 12/12/19 1031 12/12/19 1031   98 9 °F (37 2 °C) 78 16 118/59 100 %      Temp Source Heart Rate Source Patient Position - Orthostatic VS BP Location FiO2 (%)   12/12/19 1128 12/12/19 1236 12/12/19 1201 12/12/19 1236 --   Tympanic Monitor Lying - Orthostatic VS Left arm       Pain Score       12/12/19 1031       No Pain           Vitals:    12/12/19 1204 12/12/19 1206 12/12/19 1209 12/12/19 1236   BP: 128/58 134/56 126/58 138/65   Pulse: 70 78 73 73   Patient Position - Orthostatic VS: Sitting - Orthostatic VS Standing - Orthostatic VS  Lying         Visual Acuity      ED Medications  Medications   sodium chloride 0 9 % bolus 1,000 mL (1,000 mL Intravenous New Bag 12/12/19 1128)       Diagnostic Studies  Results Reviewed     Procedure Component Value Units Date/Time    Basic metabolic panel [[id number]]  (Abnormal) Collected:  12/12/19 1156    Lab Status: Final result Specimen:  Blood from Arm, Right Updated:  12/12/19 1213     Sodium 135 mmol/L      Potassium 4 3 mmol/L      Chloride 100 mmol/L      CO2 30 mmol/L      ANION GAP 5 mmol/L      BUN 10 mg/dL      Creatinine 1 00 mg/dL      Glucose 121 mg/dL      Calcium 8 2 mg/dL      eGFR 84 ml/min/1 73sq m     Narrative:       [place] guidelines for Chronic Kidney Disease (CKD):     Stage 1 with normal or high GFR (GFR > 90 mL/min/1 73 square meters)    Stage 2 Mild CKD (GFR = 60-89 mL/min/1 73 square meters)    Stage 3A Moderate CKD (GFR = 45-59 mL/min/1 73 square meters)    Stage 3B Moderate CKD (GFR = 30-44 mL/min/1 73 square meters)    Stage 4 Severe CKD (GFR = 15-29 mL/min/1 73 square meters)    Stage 5 End Stage CKD (GFR <15 mL/min/1 73 square meters)  Note: GFR calculation is accurate only with a steady state creatinine    CBC and differential [[id number]]  (Abnormal) Collected:  12/12/19 1156    Lab Status:  Final result Specimen:  Blood from Arm, Right Updated:  12/12/19 1200     WBC 5 38 Thousand/uL      RBC 4 40 Million/uL      Hemoglobin 14 8 g/dL      Hematocrit 44 5 %       fL      MCH 33 6 pg      MCHC 33 3 g/dL      RDW 13 3 %      MPV 9 7 fL      Platelets 438 Thousands/uL      nRBC 0 /100 WBCs      Neutrophils Relative 76 %      Immat GRANS % 1 %      Lymphocytes Relative 14 %      Monocytes Relative 8 %      Eosinophils Relative 0 %      Basophils Relative 1 %      Neutrophils Absolute 4 13 Thousands/µL      Immature Grans Absolute 0 03 Thousand/uL      Lymphocytes Absolute 0 73 Thousands/µL      Monocytes Absolute 0 41 Thousand/µL      Eosinophils Absolute 0 02 Thousand/µL      Basophils Absolute 0 06 Thousands/µL                  No orders to display              Procedures  ECG 12 Lead Documentation Only  Performed by: Jo Carias DO  Authorized by: Jo Carias DO     ECG reviewed by me, the ED Provider: yes    Patient location:  ED  Previous ECG: Previous ECG:  Unavailable    Comparison to cardiac monitor: Yes    Interpretation:     Interpretation: non-specific    Rate:     ECG rate assessment: normal    Rhythm:     Rhythm: sinus rhythm    Ectopy:     Ectopy: none    QRS:     QRS axis:  Normal  Conduction:     Conduction: normal    ST segments:     ST segments:  Non-specific  T waves:     T waves: non-specific               ED Course                               MDM  Number of Diagnoses or Management Options  Diagnosis management comments: Patient evaluated with labs EKG  I reviewed the results and discussed them with the patient  Patient discharged with appropriate instructions and follow-up  Patient verbalized understanding had no further questions at the time of discharge  Patient had stable vital signs and well-appearing at the time of discharge  Amount and/or Complexity of Data Reviewed  Clinical lab tests: ordered and reviewed  Tests in the medicine section of CPT®: ordered and reviewed    Patient Progress  Patient progress: stable        Disposition  Final diagnoses:   Fall, initial encounter   Near syncope     Time reflects when diagnosis was documented in both MDM as applicable and the Disposition within this note     Time User Action Codes Description Comment    12/12/2019 12:48 PM Anepu, Francisca Flaming Add [P40  Alem Jimmies Fall, initial encounter     12/12/2019 12:48 PM Anepu, Francisca Flaming Add [R55] Near syncope       ED Disposition     ED Disposition Condition Date/Time Comment    Discharge Stable Thu Dec 12, 2019 12:48 PM Sherryle Finger Dibiase discharge to home/self care              Follow-up Information     Follow up With Specialties Details Why Contact Info Additional [address], [address] Nurse Practitioner Schedule an appointment as soon as possible for a visit   [id number] [facility] [address] [phone] [facility] Emergency Department Emergency Medicine  If symptoms worsen [address] [facility] [place] [id number] ED, [facility] [id number]          Patient's Medications   Discharge Prescriptions    No medications on file     No discharge procedures on file      ED Provider  Electronically Signed by           Jo Carias,   12/12/19 3954

## 2019-12-18 ENCOUNTER — TELEPHONE (OUTPATIENT)
Dept: NEUROLOGY | Facility: CLINIC | Age: 54
End: 2019-12-18

## 2019-12-18 ENCOUNTER — OFFICE VISIT (OUTPATIENT)
Dept: FAMILY MEDICINE CLINIC | Facility: CLINIC | Age: 54
End: 2019-12-18
Payer: MEDICARE

## 2019-12-18 VITALS
TEMPERATURE: 97.9 F | DIASTOLIC BLOOD PRESSURE: 60 MMHG | RESPIRATION RATE: 16 BRPM | HEART RATE: 88 BPM | WEIGHT: 153.2 LBS | HEIGHT: 60 IN | SYSTOLIC BLOOD PRESSURE: 98 MMHG | BODY MASS INDEX: 30.08 KG/M2

## 2019-12-18 DIAGNOSIS — Z09 HOSPITAL DISCHARGE FOLLOW-UP: Primary | ICD-10-CM

## 2019-12-18 DIAGNOSIS — R55 NEAR SYNCOPE: ICD-10-CM

## 2019-12-18 DIAGNOSIS — I95.9 HYPOTENSION, UNSPECIFIED HYPOTENSION TYPE: ICD-10-CM

## 2019-12-18 DIAGNOSIS — R42 DIZZINESS: ICD-10-CM

## 2019-12-18 PROCEDURE — 99214 OFFICE O/P EST MOD 30 MIN: CPT | Performed by: NURSE PRACTITIONER

## 2019-12-18 NOTE — PROGRESS NOTES
ER FOLLOW UP OFFICE VISIT  [facility]    NAME: Connor Ramirez  AGE: 53 y o  SEX: male  : 1965     DATE: 2019     Assessment and Plan:     Problem List Items Addressed This Visit        Cardiovascular and Mediastinum    Hypotension    Relevant Orders    Echo complete with contrast if indicated    Ambulatory referral to Cardiology    Ambulatory referral to Neurology    CT head wo contrast    VAS carotid complete study      Other Visit Diagnoses     Hospital discharge follow-up    -  Primary    Near syncope        recurrent    witnessed episodes 10/19/19, 19 with dizziness/without LOC    Relevant Orders    Echo complete with contrast if indicated    Ambulatory referral to Cardiology    Ambulatory referral to Neurology    CT head wo contrast    VAS carotid complete study    Dizziness        Relevant Orders    Echo complete with contrast if indicated    Ambulatory referral to Cardiology    Ambulatory referral to Neurology    CT head wo contrast    VAS carotid complete study      Patient clinically stable at this time  Suspect near syncope r/t orthosis  On exam there are signs of orthostasis  Bp dropped 10 points when standing  Check CT of head, carotids, echo in setting of near syncope of unclear cause  Advised f/u with cardio, neuro  Also hold flomax, with bp checks and record at group home  Advised staff to call with bp <100/50 or >150/90  RTO in 1 month for recheck    The case discussed with patient's caregiver using patient centered shared decision making  The patient's caregiver was counseled regarding instructions for management,-- risk factor reductions,-- prognosis,-- impressions,-- risks and benefits of treatment options,-- importance of compliance with treatment  I have reviewed the instructions with the patient's caregiver, answering all questions to her satisfaction  He is stable to return to work today   Advised slow position change, drink enough water  Advised caregiver to call with relapse    Depression Screening: Unable to be performed due to medical contraindication  HPI:   History obtained from caregiver, Susana Milligan brought by group home care giver for ER follow up  Was taken to [facility] ER on 12/12/19 by staff after experiencing near syncope/fall awhile at work shop  Was walking through room when became dizziness, knees buckled  Fell to knees  Episode was witnessed  No LOC occurred  He recovered completely without injury  ER report reviewed  Testing unremarkable  caregiver reports that patient is back to baseline and has not experienced recurrence of similar symptoms    It is concerning that this is the second episode in 2 months  The following portions of the patient's history were reviewed and updated as appropriate: allergies, current medications, past family history, past medical history, past social history, past surgical history and problem list      Review of Systems:     Review of Systems   Unable to perform ROS: Psychiatric disorder   Constitutional: Negative for fatigue, fever and unexpected weight change  Neurological:        Per hpi   Asymptomatic at time of office visit        Problem List:     Patient Active Problem List   Diagnosis    Essential hypertension    Hyperlipidemia    Hypothyroidism    GERD (gastroesophageal reflux disease)    Down's syndrome    Anemia    CHELSIE (iron deficiency anemia)    Chronic constipation    Hypogonadism, testicular    Hypotension    Osteoporosis without current pathological fracture    Mixed incontinence    Parkinson's disease (HCC)    Serositis ([address] )    Bladder distention    Hydronephrosis    Abdominal pain    Idiopathic pericardial effusion    Cardiac murmur    Xerodermia    Sinus bradycardia    Vitamin D deficiency        Objective:     BP 98/60   Pulse 88   Temp 97 9 °F (36 6 °C)   Resp 16   Ht 5' (1 524 m)   Wt 69 5 kg (153 lb 3 2 oz)   BMI 29 92 kg/m² Physical Exam   Constitutional: Vital signs are normal  He appears well-developed and well-nourished  No distress  HENT:   Head: Normocephalic and atraumatic  Mouth/Throat: Oropharynx is clear and moist    Eyes: Pupils are equal, round, and reactive to light  Conjunctivae and EOM are normal    Neck: Normal range of motion  Neck supple  No JVD present  Carotid bruit is not present  No thyromegaly present  Cardiovascular: Normal rate, regular rhythm and intact distal pulses  No extrasystoles are present  Murmur heard  Pulmonary/Chest: Effort normal and breath sounds normal  No respiratory distress  Musculoskeletal: He exhibits no edema  Neurological: He is alert  No sensory deficit  He exhibits normal muscle tone  Coordination normal    Skin: Skin is warm and dry  Capillary refill takes less than 2 seconds  No pallor  Psychiatric: He has a normal mood and affect  Nursing note and vitals reviewed  Laboratory Results: I have personally reviewed the pertinent laboratory results/reports     Radiology/Other Diagnostic Testing Results: I have personally reviewed pertinent reports  No results found       Current Medications:     Outpatient Medications Prior to Visit   Medication Sig Dispense Refill    atorvastatin (LIPITOR) 20 mg tablet Take 1 tablet (20 mg total) by mouth daily At 8pm 30 tablet 11    Calcium Carbonate-Vitamin D 600-400 MG-UNIT per tablet Take 1 tablet by mouth 2 (two) times a day At 8 am and 5 pm 60 tablet 10    cholecalciferol (VITAMIN D3) 1,000 units tablet Take 1 tablet (1,000 Units total) by mouth 2 (two) times a day At 8am and 8pm 60 tablet 11    divalproex sodium (DEPAKOTE) 500 mg EC tablet Take 500 mg by mouth       docusate sodium (COLACE) 100 mg capsule Take one capsule at 8am 30 capsule 11    esomeprazole (NexIUM) 40 MG capsule Take one capsule daily 30 minutes prior to breakfast 30 capsule 11    lanolin-mineral oil (ARMAAN) LOTN Apply topically 2 (two) times a day Apply to hands daily at 8 am and 8 pm 1 Bottle 11    levothyroxine 75 mcg tablet TAKE 1 TABLET BY MOUTH IN THE EVENING (8PM) 30 tablet 10    loratadine (CLARITIN) 10 mg tablet Take 1 tablet (10 mg total) by mouth daily As needed for allergies  Do not exceed 1 dose per 24 hours 30 tablet 11    multivitamin-minerals (CENTRUM ADULTS) tablet Take 1 tablet by mouth daily At 8am 30 tablet 11    OLANZapine (ZyPREXA) 2 5 mg tablet Take 1 tablet by mouth daily      polyethylene glycol (GLYCOLAX) powder Take 17 grams in 8 oz of water by mouth daily at 8 pm 527 g 11    testosterone (ANDROGEL) 1 62 % TD gel pump APPLY  3 PUMPS TO SHOULDERS / CHEST EVERY DAY (8AM) 75 g 4    tamsulosin (FLOMAX) 0 4 mg Take 1 capsule (0 4 mg total) by mouth daily at bedtime At 8pm 30 capsule 11     No facility-administered medications prior to visit          Barbra Araiza, [address] [facility] [address]

## 2019-12-18 NOTE — TELEPHONE ENCOUNTER
Called back to Susana Sagrario claire with [facility], patient is scheduled with Dr Kenneth Campbell for 12/23/2019

## 2019-12-18 NOTE — TELEPHONE ENCOUNTER
[facility] for Susana to CB and schedule patient with Dr Angeline Rosas  Gave my direct number in [place]

## 2019-12-23 ENCOUNTER — OFFICE VISIT (OUTPATIENT)
Dept: NEUROLOGY | Facility: CLINIC | Age: 54
End: 2019-12-23
Payer: MEDICARE

## 2019-12-23 VITALS
SYSTOLIC BLOOD PRESSURE: 112 MMHG | BODY MASS INDEX: 30.63 KG/M2 | HEIGHT: 60 IN | WEIGHT: 156 LBS | DIASTOLIC BLOOD PRESSURE: 66 MMHG | HEART RATE: 62 BPM

## 2019-12-23 DIAGNOSIS — R42 DIZZINESS: ICD-10-CM

## 2019-12-23 DIAGNOSIS — Q90.9 DOWN SYNDROME: ICD-10-CM

## 2019-12-23 DIAGNOSIS — I95.9 HYPOTENSION, UNSPECIFIED HYPOTENSION TYPE: ICD-10-CM

## 2019-12-23 DIAGNOSIS — R55 NEAR SYNCOPE: ICD-10-CM

## 2019-12-23 DIAGNOSIS — G45.0 VERTEBRO BASILAR INSUFFICIENCY: Primary | ICD-10-CM

## 2019-12-23 PROCEDURE — 99214 OFFICE O/P EST MOD 30 MIN: CPT | Performed by: PSYCHIATRY & NEUROLOGY

## 2019-12-23 NOTE — PROGRESS NOTES
Outpatient Neurology History and Physical  Ama Akers  [id number]  53 y o   1965          Consult: Yes    BJ Aguayo      Chief Complaint   Patient presents with    Parkinson's Disease           History Obtained from: Patient and caregiver     HPI:     Mr Mode Goff is a 54 yo M with PMH of Down syndrome, Parkinson's, dementia presents with cc of weakness  Twice in past month, patient became weak on his knees, lightheaded and fainted  There was no LOC  There is no mention of associated twitching, jerking, bowel, bladder incontinence  Denies tongue bite  Patient denies chest pain, palpitations  According to PCP notes, his BP systolic had dropped by 10 points upon standing  On 12/12, patient had similar episode and was brought to ER  Patient is not very accurate historian and also has tendency to fake at times  Patient used to be on antipsychotics previously when he was aggressive but not anymore       Past Medical History:   Diagnosis Date    Abnormal weight gain     last assessed 4/18/16; resolved 1/25/17    [facility]) [facility]     Anxiety     Dementia ([facility] )     Depression     Disease of thyroid gland     Down's syndrome     Fatigue     last assessed 2/23/17; resolved 6/1/17    GERD (gastroesophageal reflux disease)     Gluten free diet     Hernia of abdominal cavity     Hyperlipidemia     Hypogonadism in male     Impulse control disorder     OCD (obsessive compulsive disorder)     Parkinson disease ([facility]     Parkinson disease ([facility]     resolved 2/23/17    Pleural effusion     Sinus tachycardia     Vitamin D deficiency     last assessed 9/1/16; resolved 6/1/17               Current Outpatient Medications on File Prior to Visit   Medication Sig Dispense Refill    atorvastatin (LIPITOR) 20 mg tablet Take 1 tablet (20 mg total) by mouth daily At 8pm 30 tablet 11    Calcium Carbonate-Vitamin D 600-400 MG-UNIT per tablet Take 1 tablet by mouth 2 (two) times a day At 8 am and 5 pm 60 tablet 10    cholecalciferol (VITAMIN D3) 1,000 units tablet Take 1 tablet (1,000 Units total) by mouth 2 (two) times a day At 8am and 8pm 60 tablet 11    divalproex sodium (DEPAKOTE) 500 mg EC tablet Take 500 mg by mouth       docusate sodium (COLACE) 100 mg capsule Take one capsule at 8am 30 capsule 11    esomeprazole (NexIUM) 40 MG capsule Take one capsule daily 30 minutes prior to breakfast 30 capsule 11    lanolin-mineral oil (ARMAAN) LOTN Apply topically 2 (two) times a day Apply to hands daily at 8 am and 8 pm 1 Bottle 11    levothyroxine 75 mcg tablet TAKE 1 TABLET BY MOUTH IN THE EVENING (8PM) 30 tablet 10    loratadine (CLARITIN) 10 mg tablet Take 1 tablet (10 mg total) by mouth daily As needed for allergies  Do not exceed 1 dose per 24 hours 30 tablet 11    multivitamin-minerals (CENTRUM ADULTS) tablet Take 1 tablet by mouth daily At 8am 30 tablet 11    OLANZapine (ZyPREXA) 2 5 mg tablet Take 1 tablet by mouth daily      polyethylene glycol (GLYCOLAX) powder Take 17 grams in 8 oz of water by mouth daily at 8 pm 527 g 11    testosterone (ANDROGEL) 1 62 % TD gel pump APPLY  3 PUMPS TO SHOULDERS / CHEST EVERY DAY (8AM) 75 g 4     No current facility-administered medications on file prior to visit          Allergies   Allergen Reactions    Advil [Ibuprofen]     Gluten Meal          Family History   Problem Relation Age of Onset    No Known Problems Mother     Parkinsonism Father         symtomatic                Past Surgical History:   Procedure Laterality Date    COLONOSCOPY      ESOPHAGOGASTRODUODENOSCOPY      with biopsy    PERICARDIOCENTESIS  4/29/2016                Social History     Socioeconomic History    Marital status: Single     Spouse name: Not on file    Number of children: Not on file    Years of education: Not on file    Highest education level: Not on file   Occupational History    Not on file   Social Needs    Financial resource strain: Not on file    Food insecurity:     Worry: Not on file     Inability: Not on file    Transportation needs:     Medical: Not on file     Non-medical: Not on file   Tobacco Use    Smoking status: Never Smoker    Smokeless tobacco: Never Used   Substance and Sexual Activity    Alcohol use: No    Drug use: No    Sexual activity: Not on file   Lifestyle    Physical activity:     Days per week: Not on file     Minutes per session: Not on file    Stress: Not on file   Relationships    Social connections:     Talks on phone: Not on file     Gets together: Not on file     Attends [employer] service: Not on file     Active member of club or organization: Not on file     Attends meetings of clubs or organizations: Not on file     Relationship status: Not on file    Intimate partner violence:     Fear of current or ex partner: Not on file     Emotionally abused: Not on file     Physically abused: Not on file     Forced sexual activity: Not on file   Other Topics Concern    Not on file   Social History Narrative    Caffeine use        Review of Systems  Refer to positive review of systems in HPI  Constitutional- No fever  Eyes- No visual change  ENT- Hearing normal  CV- No chest pain  Resp- No Shortness of breath  GI- No diarrhea  - Bladder normal  MS- No Arthritis   Skin- No rash  Psych- No depression  Endo- No DM  Heme- No nodes    PHYSICAL EXAM:    Vitals:    12/23/19 1541   BP: 112/66   BP Location: Left arm   Patient Position: Sitting   Cuff Size: Adult   Pulse: 62   Weight: 70 8 kg (156 lb)   Height: 5' (1 524 m)         Appearance: No Acute Distress, dysmorphic features consistent with his h/o Down's     Ophthalmoscopic: Disc Flat, Normal fundus  Carotid/Heart/Peripheral Vascular: No Bruits, RRR  Orientation: Awake, Alert, and Oriented x 3  Mental status:  Memory: Registation 3/3 Recall 1/3  Attention: Normal  Knowledge: Appropriate  Language: appropriate for DS   Speech: mild dysarthria at baseline  Cranial Nerves:  2 No Visual Defect on Confrontation; Pupils round, equal, reactive to light  3,4,6 Extraocular Movements Intact; no nystagmus  5 Facial Sensation Intact  7 No facial asymmetry  8 Intact hearing  9,10 Palate symmetric, normal gag  11 Good shoulder shrug  12 Tongue Midline  Gait: wide based, unsteady at baseline  Coordination: No ataxia with finger to nose testing and heel to shin testing  Sensory: Intact, Symmetric to Pinprick, Light Touch, Vibration, and Joint Position  Muscle Tone: increased in b/l LE and mild increase in b/l UE   Muscle exam  Arm Right Left Leg Right Left   Deltoid 5/5 5/5 Iliopsoas 5/5 5/5   Biceps 5/5 5/5 Quads 5/5 5/5   Triceps 5/5 5/5 Hamstrings 5/5 5/5   Wrist Extension 5/5 5/5 Ankle Dorsi Flexion 5/5 5/5   Wrist Flexion 5/5 5/5 Ankle Plantar Flexion 5/5 5/5   Interossei 5/5 5/5 Ankle Eversion 5/5 5/5   APB 5/5 5/5 Ankle Inversion 5/5 5/5       Reflexes   RJ BJ TJ KJ AJ Plantars Everett's   Right 2+ 2+ 2+ 2+ 2+ Downgoing Not present   Left 2+ 2+ 2+ 2+ 2+ Downgoing Not present         Personal review of           None relevant     Assessment/Plan:     1  Vertebro basilar insufficiency     2  Near syncope  Ambulatory referral to Neurology    Tilt table    CTA head and neck w wo contrast    recurrent    witnessed episodes 10/19/19, 12/12/19 with dizziness/without LOC   3  Hypotension, unspecified hypotension type  Ambulatory referral to Neurology    Tilt table    CTA head and neck w wo contrast   4  Dizziness  Ambulatory referral to Neurology    Tilt table    CTA head and neck w wo contrast         Patient seems to have had 2 episodes of near syncope from either drop in BP or vertebro basilar insufficiency  Will get one time CTA head and neck w/wo contrast   Will get one time tilt table  He needs to stay hydrated  Counseling Documentation:  The patient and/or patient's family were  counseled regarding diagnostic results   Instructions for management,risk factor reductions,prognosis of disease were discussed  Patient and family were educated regarding impressions,risks and benefits of treatment options,importance of compliance with treatment  Total time of encounter: 45 min   More than 50% of time was spent in counseling and coordination of care of patient  SAMUEL Hess    [facility]  [phone]  [address]

## 2019-12-26 ENCOUNTER — OFFICE VISIT (OUTPATIENT)
Dept: PODIATRY | Facility: CLINIC | Age: 54
End: 2019-12-26
Payer: MEDICARE

## 2019-12-26 VITALS
WEIGHT: 156 LBS | BODY MASS INDEX: 30.63 KG/M2 | SYSTOLIC BLOOD PRESSURE: 112 MMHG | HEIGHT: 60 IN | DIASTOLIC BLOOD PRESSURE: 66 MMHG | HEART RATE: 62 BPM | RESPIRATION RATE: 17 BRPM

## 2019-12-26 DIAGNOSIS — E29.1 HYPOGONADISM, TESTICULAR: ICD-10-CM

## 2019-12-26 DIAGNOSIS — M79.675 PAIN IN TOES OF BOTH FEET: ICD-10-CM

## 2019-12-26 DIAGNOSIS — M79.674 PAIN IN TOES OF BOTH FEET: ICD-10-CM

## 2019-12-26 DIAGNOSIS — B35.1 DERMATOPHYTOSIS OF NAIL: Primary | ICD-10-CM

## 2019-12-26 PROCEDURE — 11721 DEBRIDE NAIL 6 OR MORE: CPT | Performed by: PODIATRIST

## 2019-12-26 NOTE — PROGRESS NOTES
Assessment/Plan:  Aseptic debridement and planning of nails x10 and manually and mechanically  Partial resection of ingrown nail right hallux  Patient will call if any redness or signs of infection   Follow-up 2 months     Diagnoses and all orders for this visit:    Dermatophytosis of nail    Pain in toes of both feet          Subjective:      Patient ID: Edwardo Gutierrez is a 53 y o  male  Patient pain in big toenails right worse than left  Has had pain for the past few weeks  Has not noticed any redness or drainage        Past Medical History:   Diagnosis Date    Abnormal weight gain     last assessed 4/18/16; resolved 1/25/17    [facility]) [facility]     Anxiety     Dementia ([facility] )     Depression     Disease of thyroid gland     Down's syndrome     Fatigue     last assessed 2/23/17; resolved 6/1/17    GERD (gastroesophageal reflux disease)     Gluten free diet     Hernia of abdominal cavity     Hyperlipidemia     Hypogonadism in male     Impulse control disorder     OCD (obsessive compulsive disorder)     Parkinson disease ([facility] )     Parkinson disease ([facility] )     resolved 2/23/17    Pleural effusion     Sinus tachycardia     Vitamin D deficiency     last assessed 9/1/16; resolved 6/1/17         Current Outpatient Medications:     atorvastatin (LIPITOR) 20 mg tablet, Take 1 tablet (20 mg total) by mouth daily At 8pm, Disp: 30 tablet, Rfl: 11    Calcium Carbonate-Vitamin D 600-400 MG-UNIT per tablet, Take 1 tablet by mouth 2 (two) times a day At 8 am and 5 pm, Disp: 60 tablet, Rfl: 10    cholecalciferol (VITAMIN D3) 1,000 units tablet, Take 1 tablet (1,000 Units total) by mouth 2 (two) times a day At 8am and 8pm, Disp: 60 tablet, Rfl: 11    divalproex sodium (DEPAKOTE) 500 mg EC tablet, Take 500 mg by mouth , Disp: , Rfl:     docusate sodium (COLACE) 100 mg capsule, Take one capsule at 8am, Disp: 30 capsule, Rfl: 11    esomeprazole (NexIUM) 40 MG capsule, Take one capsule daily 30 minutes prior to breakfast, Disp: 30 capsule, Rfl: 11    lanolin-mineral oil (ARMAAN) LOTN, Apply topically 2 (two) times a day Apply to hands daily at 8 am and 8 pm, Disp: 1 Bottle, Rfl: 11    levothyroxine 75 mcg tablet, TAKE 1 TABLET BY MOUTH IN THE EVENING (8PM), Disp: 30 tablet, Rfl: 10    loratadine (CLARITIN) 10 mg tablet, Take 1 tablet (10 mg total) by mouth daily As needed for allergies  Do not exceed 1 dose per 24 hours, Disp: 30 tablet, Rfl: 11    multivitamin-minerals (CENTRUM ADULTS) tablet, Take 1 tablet by mouth daily At 8am, Disp: 30 tablet, Rfl: 11    OLANZapine (ZyPREXA) 2 5 mg tablet, Take 1 tablet by mouth daily, Disp: , Rfl:     polyethylene glycol (GLYCOLAX) powder, Take 17 grams in 8 oz of water by mouth daily at 8 pm, Disp: 527 g, Rfl: 11    testosterone (ANDROGEL) 1 62 % TD gel pump, APPLY  3 PUMPS TO SHOULDERS / CHEST EVERY DAY (8AM), Disp: 75 g, Rfl: 4    Past Surgical History:   Procedure Laterality Date    COLONOSCOPY      ESOPHAGOGASTRODUODENOSCOPY      with biopsy    PERICARDIOCENTESIS  4/29/2016            Allergies   Allergen Reactions    Advil [Ibuprofen]     Gluten Meal        Patient Active Problem List   Diagnosis    Essential hypertension    Hyperlipidemia    Hypothyroidism    GERD (gastroesophageal reflux disease)    Down's syndrome    Anemia    CHELSIE (iron deficiency anemia)    Chronic constipation    Hypogonadism, testicular    Hypotension    Osteoporosis without current pathological fracture    Mixed incontinence    Parkinson's disease (HCC)    Serositis (HCC)    Bladder distention    Hydronephrosis    Abdominal pain    Idiopathic pericardial effusion    Cardiac murmur    Xerodermia    Sinus bradycardia    Vitamin D deficiency       Review of Systems   Constitutional: Negative  HENT: Negative  Eyes: Negative  Respiratory: Negative  Cardiovascular: Negative  Gastrointestinal: Negative  Endocrine: Negative  Genitourinary: Negative  Musculoskeletal: Negative  Skin: Negative  Allergic/Immunologic: Negative  Neurological: Negative  Hematological: Negative  Psychiatric/Behavioral: Negative  Objective:      /66   Pulse 62   Resp 17   Ht 5' (1 524 m)   Wt 70 8 kg (156 lb)   BMI 30 47 kg/m²          Physical Exam    Constitutional: no acute distress, well appearing and well nourished   Orthopedic/Biomechanical: abnormal foot type,-- hammertoe(s),-- bunion(s),-- abnormal MPJ ROM,-- discolored nails,-- subungual debris-- and-- nail tenderness   Skin: keratoses  Left Foot: Appearance: a deformity  Great toe deformities include a bunion  Evaluation of the great toe nail demonstrates an ingrown nail, but-- no paronychia  Decreased range of motion first MPJ bilateral positive pain on palpation and range of motion right worse than left  Ingrown bilateral big toe  No signs of infection no erythema no exudate  Special Tests: significant bunion bilateral decreased range of motion first MPJ no pain on range of motion,no sign of infection  Right Foot: Appearance: Normal except as noted: a deformity  Great toe deformities include a bunion  Evaluation of the great toe nail demonstrates paronychia-- and-- an ingrown nail  Mild ingrown tibial border  Rigid hammertoes, 2 through 5, bilateral, moderate xerosis, fissures, bilateral heels, midfoot, bilateral, negative erythema  Negative exudate  Negative ulceration  Special Tests: Mild xerosis, no signs of infection  Neurological Exam: Light touch was decreased bilaterally  Vibratory sensation was decreased in both first metatarsophalangeal joints  Response to monofilament test was intact bilaterally  Vascular Exam: performed Dorsalis pedis pulses were 1/4 bilaterally  Posterior tibial pulses were 1/4 bilaterally  Capillary refill time was between 1-3 seconds bilaterally  Toenails:  All of the toenails were elongated,-- hypertrophied,-- discolored,-- shown to have subungual debris-- and-- tender  Both first toenails were ingrown  Hyperkeratosis: present on both first sub metatarsals-- and-- present on both fifth sub metatarsals  Moderate bunion bilateral   Rigid hammertoes 2 through 5 bilateral   Significant equinus bilateral   Negative erythema no signs of infection  Large bunion right worse than left  Decreased range of motion 1st MPJ  Right 1st MPJ positive pain on end range of motion no crepitus no hypermobility of the 1st ray      All nails thick discolored dystrophic  Ingrown bilateral hallux  No open wounds or signs of infection  Painful hyperkeratotic lesions medial 1st metatarsal head bilateral  Significant hallux limitus bilateral hammered hallux with hallux valgus bilateral    Ingrown nail bilateral hallux  Right hallux medial border, mild swelling, positive pain on palpation negative exudate no purulence no signs of infection

## 2019-12-27 DIAGNOSIS — E29.1 HYPOGONADISM, TESTICULAR: ICD-10-CM

## 2019-12-27 RX ORDER — TESTOSTERONE 16.2 MG/G
GEL TRANSDERMAL
Qty: 75 G | Refills: 4 | OUTPATIENT
Start: 2019-12-27

## 2019-12-27 RX ORDER — TESTOSTERONE 16.2 MG/G
GEL TRANSDERMAL
Qty: 75 G | Refills: 4 | Status: SHIPPED | OUTPATIENT
Start: 2019-12-27 | End: 2019-12-30 | Stop reason: SDUPTHER

## 2019-12-30 DIAGNOSIS — E29.1 HYPOGONADISM, TESTICULAR: ICD-10-CM

## 2019-12-31 RX ORDER — TESTOSTERONE 16.2 MG/G
60.75 GEL TRANSDERMAL EVERY MORNING
Qty: 90 ACTUATION | Refills: 5 | Status: SHIPPED | OUTPATIENT
Start: 2019-12-31 | End: 2020-01-16 | Stop reason: SDUPTHER

## 2020-01-02 ENCOUNTER — HOSPITAL ENCOUNTER (OUTPATIENT)
Dept: NON INVASIVE DIAGNOSTICS | Facility: HOSPITAL | Age: 55
Discharge: HOME | End: 2020-01-02
Payer: MEDICARE

## 2020-01-02 ENCOUNTER — HOSPITAL ENCOUNTER (OUTPATIENT)
Dept: RADIOLOGY | Facility: HOSPITAL | Age: 55
Discharge: HOME | End: 2020-01-02
Attending: PSYCHIATRY & NEUROLOGY
Payer: MEDICARE

## 2020-01-02 ENCOUNTER — APPOINTMENT (OUTPATIENT)
Dept: RADIOLOGY | Facility: HOSPITAL | Age: 55
End: 2020-01-02
Payer: MEDICARE

## 2020-01-02 DIAGNOSIS — I67.5 MOYA MOYA DISEASE: Primary | ICD-10-CM

## 2020-01-02 DIAGNOSIS — R42 DIZZINESS: ICD-10-CM

## 2020-01-02 DIAGNOSIS — R55 NEAR SYNCOPE: ICD-10-CM

## 2020-01-02 DIAGNOSIS — I95.9 HYPOTENSION, UNSPECIFIED HYPOTENSION TYPE: ICD-10-CM

## 2020-01-02 PROCEDURE — 93306 TTE W/DOPPLER COMPLETE: CPT

## 2020-01-02 PROCEDURE — 70498 CT ANGIOGRAPHY NECK: CPT

## 2020-01-02 PROCEDURE — 70496 CT ANGIOGRAPHY HEAD: CPT

## 2020-01-02 PROCEDURE — 93306 TTE W/DOPPLER COMPLETE: CPT | Performed by: INTERNAL MEDICINE

## 2020-01-02 RX ORDER — ASPIRIN 81 MG/1
81 TABLET ORAL DAILY
Qty: 30 TABLET | Refills: 2 | Status: SHIPPED | OUTPATIENT
Start: 2020-01-02 | End: 2020-11-30

## 2020-01-02 RX ADMIN — IOHEXOL 85 ML: 350 INJECTION, SOLUTION INTRAVENOUS at 09:27

## 2020-01-03 ENCOUNTER — TELEPHONE (OUTPATIENT)
Dept: NEUROLOGY | Facility: CLINIC | Age: 55
End: 2020-01-03

## 2020-01-03 NOTE — TELEPHONE ENCOUNTER
Jose Jenkins from the group home called back  I relayed the information to her and provided her with the Central Scheduling phone number (they have to schedule the tests to work around their schedule)  She requested that I fax the orders to her - fax # is [phone] attn: Jose Jenkins  I also made her aware that an rx for Baby Aspirin was sent to the pharmacy  She requested an rx for this for them to keep on file   Will have [address] sign it and will fax everything together

## 2020-01-03 NOTE — TELEPHONE ENCOUNTER
----- Message from Elise Cook MD sent at 1/2/2020  5:14 PM EST -----  He is found to have a rare vascular condition on his CTA which may be chronic and not necessarily explain his dizziness  We need some more studies including MRI brain, EEG which are ordered  Please notify patient and have them schedule  In meantime, aspirin 81mg daily is sent to his pharmacy

## 2020-01-03 NOTE — PROGRESS NOTES
Progress Note - Cardiology Office  Chelsey Alegre 53 y o  male MRN: [id number]   Encounter: [id number]     1  Sinus bradycardia    2  Hypothyroidism due to acquired atrophy of thyroid    3  Near syncope    4  Dizziness    5  Parkinson's disease ([address] )    6  Down's syndrome    7  Cardiac murmur    8  Hypotension, unspecified hypotension type    9  Idiopathic pericardial effusion        Assessment/Plan     1  Near syncope with orthostatic hypotension  Most likely multifactorial   Patient also developed Parkinson's disease is also not drinking enough fluid  His recent echo shows normal EF no change in valvular function  Encouraged to drink more fluid  Will use p r n  Midodrine if blood pressure less than 100  We will use midodrine twice a day 8:00 a m  And 4:00 p m  Based on blood pressure it midodrine 2 5 a blood pressure less than 100 at that time  Will also check Holter monitor, electrolytes and free cortisol level  2  Down syndrome with mild intellectual disability and behavior problems  Repeat echo Doppler reviewed no significant change  Echo discussed with caretaker    3  Dyslipidemia  Continue statins  Lost LFTs were acceptable  4   Hypothyroidism on levothyroxine  Patient is tolerating 75 mg levothyroxine very well  Will check TSH     5  Parkinson's disease  Patient diagnosed to have Parkinson's disease which can cause also dysautonomia and may be contributing to orthostatic hypotension  If continues to have symptoms may need Ramón stockings  6   Cardiac murmur  Patient's echo reviewed  Most likely etiology is thickened and calcified aortic valve without any stenosis  There is a trace AI  He has normal LV systolic function  Repeat echo Doppler from 2019 reviewed      7  Sinus bradycardia  Patient is known to have sinus bradycardia  Holter monitor has been ordered to rule out any significant tachy-shiv arrhythmias    8  History of previous idiopathic pericardial effusion    Repeat echo shows low-normal LV systolic function without any effusion  Follow-up in 6 months  Patient's conditions, other symptoms were reviewed with patient's caretaker at length  He is currently stable  No further cardiac workup needed at this time  HPI :     Satcie Li is a 53y o  year old male who came for follow up  Patient has past medical history significant for Down syndrome, recently diagnosed Parkinson disease, dyslipidemia, behavior problems, hypothyroidism, idiopathic pulmonary had who came for follow-up  Patient was admitted to [facility] about year ago  Large pericardial effusion  Etiology of pericardial effusion was never known and it was drained and he was close to tamponade  Since then 2 echoes were done and no significant pericardial effusion is seen  He is doing well he did gain weight of about 10-15 pounds  He came with his caretaker  Denies any chest pain or any shortness of breath  He has no fever no chills no nausea no vomiting no other complaint  01/06/2020  Above reviewed  Isidra Doherty came for follow-up with caretaker  He had issues with dizziness lightheadedness and orthostatic hypotension  He almost passed out  He gets dizzy when he tried to quickly get up  The monitor his blood pressure and his blood pressure generally runs 100-110  Occasionally drops down further  He had past medical history significant for Down syndrome, cardiac murmur, idiopathic pleural effusion status post tapping with no recommendation and sinus bradycardia  He denies any fever any chills any nausea and vomiting  Today he is feeling better in blood pressure is 102 it is still low  He has also had low T syndrome  No other issues at this time  He has an echo done 01/02/2020 which shows patient has EF 50-55% and no significant change from old EKG  Review of Systems   Constitutional: Negative for activity change, chills, diaphoresis, fever and unexpected weight change     HENT: Positive for hearing loss  Negative for congestion  Eyes: Negative for discharge and redness  Respiratory: Negative for cough, chest tightness, shortness of breath and wheezing  Cardiovascular: Negative  Negative for chest pain, palpitations and leg swelling  Gastrointestinal: Negative for abdominal pain, diarrhea and nausea  Endocrine: Negative  Genitourinary: Negative for decreased urine volume and urgency  Musculoskeletal: Negative  Negative for arthralgias, back pain and gait problem  Skin: Negative for rash and wound  Allergic/Immunologic: Negative  Neurological: Positive for dizziness  Negative for seizures, syncope, weakness, light-headedness and headaches  As described above   Hematological: Negative  Psychiatric/Behavioral: Negative for agitation and confusion  The patient is nervous/anxious          Historical Information   Past Medical History:   Diagnosis Date    Abnormal weight gain     last assessed 4/18/16; resolved 1/25/17    [facility] CHF (congestive heart failure) [facility] )     Anxiety     Dementia ([facility] )     Depression     Disease of thyroid gland     Down's syndrome     Fatigue     last assessed 2/23/17; resolved 6/1/17    GERD (gastroesophageal reflux disease)     Gluten free diet     Hernia of abdominal cavity     Hyperlipidemia     Hypogonadism in male     Impulse control disorder     OCD (obsessive compulsive disorder)     Parkinson disease ([facility] )     Parkinson disease ([facility] )     resolved 2/23/17    Pleural effusion     Sinus tachycardia     Vitamin D deficiency     last assessed 9/1/16; resolved 6/1/17     Past Surgical History:   Procedure Laterality Date    COLONOSCOPY      ESOPHAGOGASTRODUODENOSCOPY      with biopsy    PERICARDIOCENTESIS  4/29/2016          Social History     Substance and Sexual Activity   Alcohol Use No     Social History     Substance and Sexual Activity   Drug Use No     Social History     Tobacco Use   Smoking Status Never Smoker   Smokeless Tobacco Never Used     Family History:   Family History   Problem Relation Age of Onset    No Known Problems Mother     Parkinsonism Father         symtomatic       Meds/Allergies     Allergies   Allergen Reactions    Advil [Ibuprofen]     Gluten Meal        Current Outpatient Medications:     atorvastatin (LIPITOR) 20 mg tablet, Take 1 tablet (20 mg total) by mouth daily At 8pm, Disp: 30 tablet, Rfl: 11    Calcium Carbonate-Vitamin D 600-400 MG-UNIT per tablet, Take 1 tablet by mouth 2 (two) times a day At 8 am and 5 pm, Disp: 60 tablet, Rfl: 10    cholecalciferol (VITAMIN D3) 1,000 units tablet, Take 1 tablet (1,000 Units total) by mouth 2 (two) times a day At 8am and 8pm, Disp: 60 tablet, Rfl: 11    divalproex sodium (DEPAKOTE) 500 mg EC tablet, Take 500 mg by mouth , Disp: , Rfl:     docusate sodium (COLACE) 100 mg capsule, Take one capsule at 8am, Disp: 30 capsule, Rfl: 11    esomeprazole (NexIUM) 40 MG capsule, Take one capsule daily 30 minutes prior to breakfast, Disp: 30 capsule, Rfl: 11    lanolin-mineral oil (ARMAAN) LOTN, Apply topically 2 (two) times a day Apply to hands daily at 8 am and 8 pm, Disp: 1 Bottle, Rfl: 11    levothyroxine 75 mcg tablet, TAKE 1 TABLET BY MOUTH IN THE EVENING (8PM), Disp: 30 tablet, Rfl: 10    loratadine (CLARITIN) 10 mg tablet, Take 1 tablet (10 mg total) by mouth daily As needed for allergies   Do not exceed 1 dose per 24 hours, Disp: 30 tablet, Rfl: 11    multivitamin-minerals (CENTRUM ADULTS) tablet, Take 1 tablet by mouth daily At 8am, Disp: 30 tablet, Rfl: 11    OLANZapine (ZyPREXA) 2 5 mg tablet, Take 1 tablet by mouth daily, Disp: , Rfl:     polyethylene glycol (GLYCOLAX) powder, Take 17 grams in 8 oz of water by mouth daily at 8 pm, Disp: 527 g, Rfl: 11    testosterone (ANDROGEL) 1 62 % TD gel pump, Apply 3 actuation (60 75 mg total) topically every morning, Disp: 90 actuation, Rfl: 5    aspirin (ECOTRIN LOW STRENGTH) 81 mg EC tablet, Take 1 tablet (81 mg total) by mouth daily (Patient not taking: Reported on 2020), Disp: 30 tablet, Rfl: 2    tamsulosin (FLOMAX) 0 4 mg, , Disp: , Rfl:     Vitals: Blood pressure 102/62, pulse 66, height 5' (1 524 m), weight 71 2 kg (157 lb), SpO2 99 %  Body mass index is 30 66 kg/m²  BP Readings from Last 3 Encounters:   20 102/62   19 112/66   19 112/66       Physical Exam:  Physical Exam   Constitutional: He is oriented to person, place, and time  He appears well-developed and well-nourished  No distress  HENT:   Head: Normocephalic and atraumatic  Eyes: Pupils are equal, round, and reactive to light  Neck: Neck supple  No JVD present  No tracheal deviation present  No thyromegaly present  Cardiovascular: Normal rate, regular rhythm, S1 normal and S2 normal  Exam reveals no gallop, no S3, no S4, no distant heart sounds and no friction rub  Murmur heard  Systolic (ejection) murmur is present with a grade of 2/6  S1-S2 regular with a 2/6 as murmur   Pulmonary/Chest: Effort normal  No respiratory distress  He has no wheezes  He has no rales  He exhibits no tenderness  Bilateral air entry clear to auscultate   Abdominal: Soft  Bowel sounds are normal  He exhibits no distension  There is no tenderness  Musculoskeletal: He exhibits no edema or deformity  Neurological: He is alert and oriented to person, place, and time  Skin: Skin is warm and dry  No rash noted  He is not diaphoretic  No pallor  Psychiatric: He has a normal mood and affect  His behavior is normal        EK2018  Twelve lead EKG shows normal sinus rhythm heart rate 57 beats per minute  Baseline artifact  No other significant changes  Twelve lead EKG done 10/15/2019 shows normal sinus rhythm heart rate 59 beats per minute no significant ST changes intervals are within normal range      Diagnostic Studies Review Cardio:    Stress Test: Nuclear stress test on 10/21/2016 shows no evidence of ischemia  Echocardiogram/PRADEEP: His echo Doppler done in May 2016 and 2016 shows no evidence of any pericardial effusion  EF 65-70%, mild aortic regurgitation, mild MR, mild TR PA pressure 30 mmHg  Repeat echo Doppler done in 2018 shows normal LV systolic function EF 55 31%, left atrium mildly dilated, aortic valve was mildly thickened with mild calcification but no significant stenosis trace AI, no significant pericardial effusion was noted  Cardiac testing:   Results for orders placed during the hospital encounter of 16   Echo complete with contrast if indicated    Narrative [address]  [address] [facility]   [phone]    Transthoracic Echocardiogram  2D, M-mode, and Doppler    Study date:  2016    Patient: Amador Leon  MR number: [id number]  Account number: [de-identified]  : 1965  Age: 45 years  Gender: Male  Status: Inpatient  Location: Echo lab  Height: 60 in  Weight: 145 lb  BP: 120/ 63 mmHg    Indications: Pericardial effusion    Diagnoses: 420 99 - ACUTE PERICARDITIS NEC    Sonographer:  ADIEL Verma  Primary Physician:  Skylar Emmanuel  Referring Physician:  Jasmeet Doe MD  Group:  [facility]  Interpreting Physician:  Jasmeet Doe MD    SUMMARY    LEFT VENTRICLE:  Systolic function was normal by visual assessment  Ejection fraction was  estimated in the range of 55 % to 65 %  There were no regional wall motion abnormalities  Doppler parameters were consistent with abnormal left ventricular relaxation  (grade 1 diastolic dysfunction)  AORTIC VALVE:  There was mild regurgitation  TRICUSPID VALVE:  There was mild regurgitation  PERICARDIUM:  A moderate to large pericardial effusion was identified circumferential to the  heart  The fluid exhibited a fibrinous appearance seems to be same quantity  PROCEDURE: The procedure was performed in the echo lab   This was a routine  study  The transthoracic approach was used  The study included complete 2D  imaging, M-mode, and complete spectral Doppler  The heart rate was 100 bpm, at  the start of the study  Echocardiographic views were limited due to poor  patient compliance  Image quality was adequate  LEFT VENTRICLE: Size was normal  Systolic function was normal by visual  assessment  Ejection fraction was estimated in the range of 55 % to 65 %  There  were no regional wall motion abnormalities  Wall thickness was normal  DOPPLER:  Doppler parameters were consistent with abnormal left ventricular relaxation  (grade 1 diastolic dysfunction)  RIGHT VENTRICLE: The size was normal  Systolic function was normal  There was  no evidence of a mass  LEFT ATRIUM: Size was normal     RIGHT ATRIUM: Size was normal     AORTIC VALVE: The valve was trileaflet  DOPPLER: There was mild regurgitation  TRICUSPID VALVE: DOPPLER: There was mild regurgitation  PULMONIC VALVE: Not well visualized  PERICARDIUM: A moderate to large pericardial effusion was identified  circumferential to the heart  The fluid exhibited a fibrinous appearance seems  to be same quantity  Features were not consistent with tamponade physiology  AORTA: The root exhibited normal size  SYSTEMIC VEINS: IVC: The inferior vena cava was normal in size  Respirophasic  changes were normal     PULMONARY VEINS: Not well visualized      [address] Accredited Echocardiography [facility]    Prepared and electronically signed by    Salo Gary MD  Signed 29-Apr-2016 10:29:17       Lab Review   Lab Results   Component Value Date    WBC 5 38 12/12/2019    HGB 14 8 12/12/2019    HCT 44 5 12/12/2019     (H) 12/12/2019     12/12/2019     Lab Results   Component Value Date     (L) 11/20/2015    K 4 3 12/12/2019     12/12/2019    CO2 30 12/12/2019    ANIONGAP 7 7 (L) 11/20/2015    BUN 10 12/12/2019    CREATININE 1 00 12/12/2019 GLUCOSE 96 11/20/2015    GLUF 90 08/10/2019    CALCIUM 8 2 (L) 12/12/2019    AST 44 10/19/2019    ALT 27 10/19/2019    ALKPHOS 65 10/19/2019    EGFR 84 12/12/2019     Lab Results   Component Value Date    GLUCOSE 96 11/20/2015    CALCIUM 8 2 (L) 12/12/2019     (L) 11/20/2015    K 4 3 12/12/2019    CO2 30 12/12/2019     12/12/2019    BUN 10 12/12/2019    CREATININE 1 00 12/12/2019     Lipid Profile:   Lab Results   Component Value Date    HDL 63 (H) 11/10/2018    LDLCALC 128 (H) 11/10/2018    TRIG 74 11/10/2018     No results found for: CHOL  Lab Results   Component Value Date    TROPONINI <0 02 10/19/2019       Dr Kali Metzger MD [facility]      "This note has been constructed using a voice recognition system  Therefore there may be syntax, spelling, and/or grammatical errors   Please call if you have any questions  "

## 2020-01-06 ENCOUNTER — TELEPHONE (OUTPATIENT)
Dept: FAMILY MEDICINE CLINIC | Facility: CLINIC | Age: 55
End: 2020-01-06

## 2020-01-06 ENCOUNTER — OFFICE VISIT (OUTPATIENT)
Dept: CARDIOLOGY CLINIC | Facility: CLINIC | Age: 55
End: 2020-01-06
Payer: MEDICARE

## 2020-01-06 VITALS
SYSTOLIC BLOOD PRESSURE: 102 MMHG | HEART RATE: 66 BPM | WEIGHT: 157 LBS | DIASTOLIC BLOOD PRESSURE: 62 MMHG | BODY MASS INDEX: 30.82 KG/M2 | HEIGHT: 60 IN | OXYGEN SATURATION: 99 %

## 2020-01-06 DIAGNOSIS — G20 PARKINSON'S DISEASE (HCC): ICD-10-CM

## 2020-01-06 DIAGNOSIS — Q90.9 DOWN'S SYNDROME: ICD-10-CM

## 2020-01-06 DIAGNOSIS — I95.9 HYPOTENSION, UNSPECIFIED HYPOTENSION TYPE: ICD-10-CM

## 2020-01-06 DIAGNOSIS — E03.4 HYPOTHYROIDISM DUE TO ACQUIRED ATROPHY OF THYROID: ICD-10-CM

## 2020-01-06 DIAGNOSIS — R42 DIZZINESS: ICD-10-CM

## 2020-01-06 DIAGNOSIS — R01.1 CARDIAC MURMUR: ICD-10-CM

## 2020-01-06 DIAGNOSIS — R00.1 SINUS BRADYCARDIA: ICD-10-CM

## 2020-01-06 DIAGNOSIS — I31.3 IDIOPATHIC PERICARDIAL EFFUSION: ICD-10-CM

## 2020-01-06 DIAGNOSIS — R55 NEAR SYNCOPE: ICD-10-CM

## 2020-01-06 PROCEDURE — 99214 OFFICE O/P EST MOD 30 MIN: CPT | Performed by: INTERNAL MEDICINE

## 2020-01-06 RX ORDER — TAMSULOSIN HYDROCHLORIDE 0.4 MG/1
CAPSULE ORAL
COMMUNITY
Start: 2019-12-21 | End: 2020-01-21 | Stop reason: HOSPADM

## 2020-01-06 RX ORDER — MIDODRINE HYDROCHLORIDE 2.5 MG/1
2.5 TABLET ORAL 2 TIMES DAILY PRN
Qty: 60 TABLET | Refills: 2 | Status: SHIPPED | OUTPATIENT
Start: 2020-01-06 | End: 2021-01-01 | Stop reason: SDUPTHER

## 2020-01-06 NOTE — PATIENT INSTRUCTIONS
Monitor  Blood pressure in the morning at  8:00 a m   And give midodrine a systolic  blood pressure less than 100 mg of mercury     regular diet with liberal salt intake   encourage hydration   Holter monitor   Echo reviewed

## 2020-01-13 ENCOUNTER — TELEPHONE (OUTPATIENT)
Dept: NEUROLOGY | Facility: CLINIC | Age: 55
End: 2020-01-13

## 2020-01-13 ENCOUNTER — TELEPHONE (OUTPATIENT)
Dept: ENDOCRINOLOGY | Facility: CLINIC | Age: 55
End: 2020-01-13

## 2020-01-13 ENCOUNTER — TELEPHONE (OUTPATIENT)
Dept: CARDIOLOGY CLINIC | Facility: CLINIC | Age: 55
End: 2020-01-13

## 2020-01-13 ENCOUNTER — HOSPITAL ENCOUNTER (EMERGENCY)
Facility: HOSPITAL | Age: 55
Discharge: HOME | End: 2020-01-13
Attending: EMERGENCY MEDICINE | Admitting: EMERGENCY MEDICINE
Payer: MEDICARE

## 2020-01-13 ENCOUNTER — LAB (OUTPATIENT)
Dept: LAB | Facility: HOSPITAL | Age: 55
End: 2020-01-13
Attending: INTERNAL MEDICINE
Payer: MEDICARE

## 2020-01-13 ENCOUNTER — APPOINTMENT (EMERGENCY)
Dept: RADIOLOGY | Facility: HOSPITAL | Age: 55
End: 2020-01-13
Payer: MEDICARE

## 2020-01-13 VITALS
OXYGEN SATURATION: 98 % | SYSTOLIC BLOOD PRESSURE: 109 MMHG | HEART RATE: 78 BPM | DIASTOLIC BLOOD PRESSURE: 53 MMHG | WEIGHT: 140 LBS | RESPIRATION RATE: 16 BRPM | BODY MASS INDEX: 27.34 KG/M2 | TEMPERATURE: 97 F

## 2020-01-13 DIAGNOSIS — R42 DIZZINESS: ICD-10-CM

## 2020-01-13 DIAGNOSIS — E55.9 VITAMIN D DEFICIENCY: ICD-10-CM

## 2020-01-13 DIAGNOSIS — M81.0 OSTEOPOROSIS WITHOUT CURRENT PATHOLOGICAL FRACTURE, UNSPECIFIED OSTEOPOROSIS TYPE: ICD-10-CM

## 2020-01-13 DIAGNOSIS — E03.4 HYPOTHYROIDISM DUE TO ACQUIRED ATROPHY OF THYROID: ICD-10-CM

## 2020-01-13 DIAGNOSIS — I67.5 MOYA MOYA DISEASE: Primary | ICD-10-CM
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
## 2020-01-13 DIAGNOSIS — R55 NEAR SYNCOPE: ICD-10-CM

## 2020-01-13 DIAGNOSIS — I95.9 HYPOTENSION, UNSPECIFIED HYPOTENSION TYPE: ICD-10-CM

## 2020-01-13 DIAGNOSIS — E29.1 HYPOGONADISM, TESTICULAR: ICD-10-CM

## 2020-01-13 LAB
25(OH)D3 SERPL-MCNC: 51.4 NG/ML (ref 30–100)
ALBUMIN SERPL BCP-MCNC: 2.8 G/DL (ref 3.5–5)
ALP SERPL-CCNC: 57 U/L (ref 46–116)
ALT SERPL W P-5'-P-CCNC: 21 U/L (ref 12–78)
ANION GAP SERPL CALCULATED.3IONS-SCNC: 4 MMOL/L (ref 4–13)
ANION GAP SERPL CALCULATED.3IONS-SCNC: 6 MMOL/L (ref 4–13)
AST SERPL W P-5'-P-CCNC: 23 U/L (ref 5–45)
BASOPHILS # BLD AUTO: 0.05 THOUSANDS/ΜL (ref 0–0.1)
BASOPHILS # BLD AUTO: 0.06 THOUSANDS/ΜL (ref 0–0.1)
BASOPHILS NFR BLD AUTO: 1 % (ref 0–1)
BASOPHILS NFR BLD AUTO: 1 % (ref 0–1)
BILIRUB SERPL-MCNC: 0.2 MG/DL (ref 0.2–1)
BUN SERPL-MCNC: 11 MG/DL (ref 5–25)
BUN SERPL-MCNC: 11 MG/DL (ref 5–25)
CALCIUM SERPL-MCNC: 8 MG/DL (ref 8.3–10.1)
CALCIUM SERPL-MCNC: 8.6 MG/DL (ref 8.3–10.1)
CHLORIDE SERPL-SCNC: 101 MMOL/L (ref 100–108)
CHLORIDE SERPL-SCNC: 99 MMOL/L (ref 100–108)
CO2 SERPL-SCNC: 31 MMOL/L (ref 21–32)
CO2 SERPL-SCNC: 32 MMOL/L (ref 21–32)
CORTIS AM PEAK SERPL-MCNC: 14.9 UG/DL (ref 4.2–22.4)
CREAT SERPL-MCNC: 0.86 MG/DL (ref 0.6–1.3)
CREAT SERPL-MCNC: 0.94 MG/DL (ref 0.6–1.3)
EOSINOPHIL # BLD AUTO: 0.03 THOUSAND/ΜL (ref 0–0.61)
EOSINOPHIL # BLD AUTO: 0.04 THOUSAND/ΜL (ref 0–0.61)
EOSINOPHIL NFR BLD AUTO: 1 % (ref 0–6)
EOSINOPHIL NFR BLD AUTO: 1 % (ref 0–6)
ERYTHROCYTE [DISTWIDTH] IN BLOOD BY AUTOMATED COUNT: 13.3 % (ref 11.6–15.1)
ERYTHROCYTE [DISTWIDTH] IN BLOOD BY AUTOMATED COUNT: 13.5 % (ref 11.6–15.1)
GFR SERPL CREATININE-BSD FRML MDRD: 91 ML/MIN/1.73SQ M
GFR SERPL CREATININE-BSD FRML MDRD: 98 ML/MIN/1.73SQ M
GLUCOSE P FAST SERPL-MCNC: 90 MG/DL (ref 65–99)
GLUCOSE SERPL-MCNC: 67 MG/DL (ref 65–140)
HCT VFR BLD AUTO: 45 % (ref 36.5–49.3)
HCT VFR BLD AUTO: 45.4 % (ref 36.5–49.3)
HGB BLD-MCNC: 15.1 G/DL (ref 12–17)
HGB BLD-MCNC: 15.2 G/DL (ref 12–17)
HOLD SPECIMEN: NORMAL
IMM GRANULOCYTES # BLD AUTO: 0.03 THOUSAND/UL (ref 0–0.2)
IMM GRANULOCYTES # BLD AUTO: 0.06 THOUSAND/UL (ref 0–0.2)
IMM GRANULOCYTES NFR BLD AUTO: 1 % (ref 0–2)
IMM GRANULOCYTES NFR BLD AUTO: 1 % (ref 0–2)
LYMPHOCYTES # BLD AUTO: 1.02 THOUSANDS/ΜL (ref 0.6–4.47)
LYMPHOCYTES # BLD AUTO: 1.12 THOUSANDS/ΜL (ref 0.6–4.47)
LYMPHOCYTES NFR BLD AUTO: 19 % (ref 14–44)
LYMPHOCYTES NFR BLD AUTO: 22 % (ref 14–44)
MCH RBC QN AUTO: 33.4 PG (ref 26.8–34.3)
MCH RBC QN AUTO: 33.5 PG (ref 26.8–34.3)
MCHC RBC AUTO-ENTMCNC: 33.5 G/DL (ref 31.4–37.4)
MCHC RBC AUTO-ENTMCNC: 33.6 G/DL (ref 31.4–37.4)
MCV RBC AUTO: 100 FL (ref 82–98)
MCV RBC AUTO: 100 FL (ref 82–98)
MONOCYTES # BLD AUTO: 0.4 THOUSAND/ΜL (ref 0.17–1.22)
MONOCYTES # BLD AUTO: 0.53 THOUSAND/ΜL (ref 0.17–1.22)
MONOCYTES NFR BLD AUTO: 9 % (ref 4–12)
MONOCYTES NFR BLD AUTO: 9 % (ref 4–12)
NEUTROPHILS # BLD AUTO: 3.05 THOUSANDS/ΜL (ref 1.85–7.62)
NEUTROPHILS # BLD AUTO: 3.98 THOUSANDS/ΜL (ref 1.85–7.62)
NEUTS SEG NFR BLD AUTO: 66 % (ref 43–75)
NEUTS SEG NFR BLD AUTO: 69 % (ref 43–75)
NRBC BLD AUTO-RTO: 0 /100 WBCS
NRBC BLD AUTO-RTO: 0 /100 WBCS
PLATELET # BLD AUTO: 287 THOUSANDS/UL (ref 149–390)
PLATELET # BLD AUTO: 292 THOUSANDS/UL (ref 149–390)
PMV BLD AUTO: 10.4 FL (ref 8.9–12.7)
PMV BLD AUTO: 9.8 FL (ref 8.9–12.7)
POTASSIUM SERPL-SCNC: 4 MMOL/L (ref 3.5–5.3)
POTASSIUM SERPL-SCNC: 4.6 MMOL/L (ref 3.5–5.3)
PROT SERPL-MCNC: 6.9 G/DL (ref 6.4–8.2)
RBC # BLD AUTO: 4.51 MILLION/UL (ref 3.88–5.62)
RBC # BLD AUTO: 4.55 MILLION/UL (ref 3.88–5.62)
SODIUM SERPL-SCNC: 136 MMOL/L (ref 136–145)
SODIUM SERPL-SCNC: 137 MMOL/L (ref 136–145)
T4 FREE SERPL-MCNC: 1.05 NG/DL (ref 0.76–1.46)
TSH SERPL DL<=0.05 MIU/L-ACNC: 2.16 UIU/ML (ref 0.36–3.74)
WBC # BLD AUTO: 4.6 THOUSAND/UL (ref 4.31–10.16)
WBC # BLD AUTO: 5.77 THOUSAND/UL (ref 4.31–10.16)

## 2020-01-13 PROCEDURE — 85025 COMPLETE CBC W/AUTO DIFF WBC: CPT

## 2020-01-13 PROCEDURE — A9585 GADOBUTROL INJECTION: HCPCS | Performed by: EMERGENCY MEDICINE

## 2020-01-13 PROCEDURE — 84403 ASSAY OF TOTAL TESTOSTERONE: CPT | Performed by: INTERNAL MEDICINE

## 2020-01-13 PROCEDURE — 82533 TOTAL CORTISOL: CPT

## 2020-01-13 PROCEDURE — 84443 ASSAY THYROID STIM HORMONE: CPT

## 2020-01-13 PROCEDURE — 80053 COMPREHEN METABOLIC PANEL: CPT | Performed by: EMERGENCY MEDICINE

## 2020-01-13 PROCEDURE — 96360 HYDRATION IV INFUSION INIT: CPT

## 2020-01-13 PROCEDURE — 84402 ASSAY OF FREE TESTOSTERONE: CPT | Performed by: INTERNAL MEDICINE

## 2020-01-13 PROCEDURE — 99284 EMERGENCY DEPT VISIT MOD MDM: CPT | Performed by: EMERGENCY MEDICINE

## 2020-01-13 PROCEDURE — 93005 ELECTROCARDIOGRAM TRACING: CPT

## 2020-01-13 PROCEDURE — 70553 MRI BRAIN STEM W/O & W/DYE: CPT

## 2020-01-13 PROCEDURE — 84439 ASSAY OF FREE THYROXINE: CPT

## 2020-01-13 PROCEDURE — 36415 COLL VENOUS BLD VENIPUNCTURE: CPT | Performed by: INTERNAL MEDICINE

## 2020-01-13 PROCEDURE — 85025 COMPLETE CBC W/AUTO DIFF WBC: CPT | Performed by: EMERGENCY MEDICINE

## 2020-01-13 PROCEDURE — 99284 EMERGENCY DEPT VISIT MOD MDM: CPT

## 2020-01-13 PROCEDURE — 82306 VITAMIN D 25 HYDROXY: CPT

## 2020-01-13 PROCEDURE — 80048 BASIC METABOLIC PNL TOTAL CA: CPT | Performed by: INTERNAL MEDICINE

## 2020-01-13 RX ADMIN — SODIUM CHLORIDE 1000 ML: 0.9 INJECTION, SOLUTION INTRAVENOUS at 10:16

## 2020-01-13 RX ADMIN — GADOBUTROL 6 ML: 604.72 INJECTION INTRAVENOUS at 13:33

## 2020-01-13 NOTE — TELEPHONE ENCOUNTER
I spoke to ED physician,  Dr Rivas Ayala  Will try to get MRI brain while in hospital  We had ordered outpatient study but it's not until early of next month

## 2020-01-13 NOTE — TELEPHONE ENCOUNTER
Evelyn Seals [facility] Staci Santamaria [phone], spok eto [facility]    She advised that the calcium drawn at 7:30 am was outpatient   Lab drawn at 11:56 am was re-done in the ER  They must not realized that the patient had it drawn in the lab earlier

## 2020-01-13 NOTE — TELEPHONE ENCOUNTER
----- Message from Rosemarie Musa MD sent at 1/13/2020 12:50 PM EST -----  Patient labs are acceptable  Continue same medications  Patient is advised to follow up  appointment regularly  Please call patient about the  about results

## 2020-01-13 NOTE — TELEPHONE ENCOUNTER
----- Message from Tato Sheehan MD sent at 1/13/2020  1:44 PM EST -----  Please call the lab and confirm if the patient had 2 different samples drawn today at 7:30 and 1156  There are 2 calcium levels reported 1 which is normal at 8 6, the other low at 8 0   Please ask them to confirm which is correct

## 2020-01-13 NOTE — ED PROVIDER NOTES
History  Chief Complaint   Patient presents with    Fall     per caregiver he got dizzy and fell on his bottom  they say he has been getting dizzy recently     Patient brought in by group home staff after he had a fall  Patient got lightheaded/dizzy and fell backwards onto his butt  No LOC  Denies any complaints of pain currently  Patient has been having issues with dizziness intermittently for a little while  Seen by cardiology and Neurology  Scheduled for outpatient testing over the coming weeks  Patient was fasting this morning and had lab work drawn  Patient denies symptoms currently  History provided by:  Patient and caregiver   used: No    Fall   Associated symptoms: no abdominal pain, no back pain, no chest pain, no headaches, no nausea and no vomiting        Prior to Admission Medications   Prescriptions Last Dose Informant Patient Reported? Taking?    Calcium Carbonate-Vitamin D 600-400 MG-UNIT per tablet  Outside Facility (Specify) No No   Sig: Take 1 tablet by mouth 2 (two) times a day At 8 am and 5 pm   OLANZapine (ZyPREXA) 2 5 mg tablet  Outside Facility (Specify) Yes No   Sig: Take 1 tablet by mouth daily   aspirin (ECOTRIN LOW STRENGTH) 81 mg EC tablet  Outside Facility (Specify) No No   Sig: Take 1 tablet (81 mg total) by mouth daily   Patient not taking: Reported on 1/6/2020   atorvastatin (LIPITOR) 20 mg tablet  Outside Facility (Specify) No No   Sig: Take 1 tablet (20 mg total) by mouth daily At 8pm   cholecalciferol (VITAMIN D3) 1,000 units tablet  Outside Facility (Specify) No No   Sig: Take 1 tablet (1,000 Units total) by mouth 2 (two) times a day At 8am and 8pm   divalproex sodium (DEPAKOTE) 500 mg EC tablet  Outside Facility (Specify) Yes No   Sig: Take 500 mg by mouth    docusate sodium (COLACE) 100 mg capsule  Outside Facility (Specify) No No   Sig: Take one capsule at 8am   esomeprazole (NexIUM) 40 MG capsule  Outside Facility (Specify) No No   Sig: Take one capsule daily 30 minutes prior to breakfast   lanolin-mineral oil (ARMAAN) [address] (Specify) No No   Sig: Apply topically 2 (two) times a day Apply to hands daily at 8 am and 8 pm   levothyroxine 75 mcg tablet  Outside Facility (Specify) No No   Sig: TAKE 1 TABLET BY MOUTH IN THE EVENING (8PM)   loratadine (CLARITIN) 10 mg tablet  Outside Facility (Specify) No No   Sig: Take 1 tablet (10 mg total) by mouth daily As needed for allergies   Do not exceed 1 dose per 24 hours   midodrine (PROAMATINE) 2 5 mg tablet   No No   Sig: Take 1 tablet (2 5 mg total) by mouth 2 (two) times a day as needed (If systolic blood pressure less than 100 and patient is symptomatic)   multivitamin-minerals (CENTRUM ADULTS) tablet  Outside Facility (Specify) No No   Sig: Take 1 tablet by mouth daily At 8am   polyethylene glycol (GLYCOLAX) powder  Outside Facility (Specify) No No   Sig: Take 17 grams in 8 oz of water by mouth daily at 8 pm   tamsulosin (FLOMAX) 0 4 mg  Outside Facility (Specify) Yes No   testosterone (ANDROGEL) 1 62 % TD gel pump  Outside Facility (Specify) No No   Sig: Apply 3 actuation (60 75 mg total) topically every morning      Facility-Administered Medications: None       Past Medical History:   Diagnosis Date    Abnormal weight gain     last assessed 4/18/16; resolved 1/25/17    [facility]     Anxiety     Dementia ([facility]     Depression     Disease of thyroid gland     Down's syndrome     Fatigue     last assessed 2/23/17; resolved 6/1/17    GERD (gastroesophageal reflux disease)     Gluten free diet     Hernia of abdominal cavity     Hyperlipidemia     Hypogonadism in male     Impulse control disorder     OCD (obsessive compulsive disorder)     Parkinson disease ([facility] [facility]     Parkinson disease ([facility] [address] [facility]     resolved 2/23/17    Pleural effusion     Sinus tachycardia     Vitamin D deficiency     last assessed 9/1/16; resolved 6/1/17       Past Surgical History: Procedure Laterality Date    COLONOSCOPY      ESOPHAGOGASTRODUODENOSCOPY      with biopsy    PERICARDIOCENTESIS  4/29/2016            Family History   Problem Relation Age of Onset    No Known Problems Mother     Parkinsonism Father         symtomatic     I have reviewed and agree with the history as documented  Social History     Tobacco Use    Smoking status: Never Smoker    Smokeless tobacco: Never Used   Substance Use Topics    Alcohol use: No    Drug use: No        Review of Systems   Respiratory: Negative for shortness of breath  Cardiovascular: Negative for chest pain  Gastrointestinal: Negative for abdominal pain, nausea and vomiting  Musculoskeletal: Negative for back pain  Neurological: Positive for dizziness and light-headedness  Negative for headaches  All other systems reviewed and are negative  Physical Exam  Physical Exam   Constitutional: He is oriented to person, place, and time  No distress  HENT:   Mouth/Throat: Oropharynx is clear and moist    Eyes: Pupils are equal, round, and reactive to light  Neck: Normal range of motion  Cardiovascular: Normal rate, regular rhythm and intact distal pulses  Pulmonary/Chest: Effort normal and breath sounds normal  No respiratory distress  Abdominal: Soft  There is no tenderness  Musculoskeletal: Normal range of motion  Neurological: He is alert and oriented to person, place, and time  No cranial nerve deficit or sensory deficit  He exhibits normal muscle tone  Coordination normal    Finger to nose wnl   Skin: Capillary refill takes less than 2 seconds  He is not diaphoretic  Nursing note and vitals reviewed        Vital Signs  ED Triage Vitals [01/13/20 0946]   Temperature Pulse Respirations Blood Pressure SpO2   (!) 97 °F (36 1 °C) 69 18 114/55 100 %      Temp Source Heart Rate Source Patient Position - Orthostatic VS BP Location FiO2 (%)   Tympanic Monitor Lying Left arm --      Pain Score       No Pain Vitals:    01/13/20 1010 01/13/20 1012 01/13/20 1201 01/13/20 1430   BP: 114/55 100/57 100/57 109/53   Pulse: 74 75 73 78   Patient Position - Orthostatic VS: Sitting - Orthostatic VS Standing - Orthostatic VS  Sitting         Visual Acuity      ED Medications  Medications   sodium chloride 0 9 % bolus 1,000 mL (0 mL Intravenous Stopped 1/13/20 1116)   Gadobutrol injection (SINGLE-DOSE) SOLN 6 mL (6 mL Intravenous Given 1/13/20 1333)       Diagnostic Studies  Results Reviewed     Procedure Component Value Units Date/Time    Comprehensive metabolic panel [[id number]]  (Abnormal) Collected:  01/13/20 1156    Lab Status:  Final result Specimen:  Blood from Arm, Right Updated:  01/13/20 1223     Sodium 136 mmol/L      Potassium 4 6 mmol/L      Chloride 101 mmol/L      CO2 31 mmol/L      ANION GAP 4 mmol/L      BUN 11 mg/dL      Creatinine 0 86 mg/dL      Glucose 67 mg/dL      Calcium 8 0 mg/dL      AST 23 U/L      ALT 21 U/L      Alkaline Phosphatase 57 U/L      Total Protein 6 9 g/dL      Albumin 2 8 g/dL      Total Bilirubin 0 20 mg/dL      eGFR 98 ml/min/1 73sq m     Narrative:       [place] guidelines for Chronic Kidney Disease (CKD):     Stage 1 with normal or high GFR (GFR > 90 mL/min/1 73 square meters)    Stage 2 Mild CKD (GFR = 60-89 mL/min/1 73 square meters)    Stage 3A Moderate CKD (GFR = 45-59 mL/min/1 73 square meters)    Stage 3B Moderate CKD (GFR = 30-44 mL/min/1 73 square meters)    Stage 4 Severe CKD (GFR = 15-29 mL/min/1 73 square meters)    Stage 5 End Stage CKD (GFR <15 mL/min/1 73 square meters)  Note: GFR calculation is accurate only with a steady state creatinine    [place] draw [[id number]] Collected:  01/13/20 1003    Lab Status:  Final result Specimen:  Blood from Arm, Right Updated:  01/13/20 1201    Narrative: The following orders were created for panel order [place] draw    Procedure                               Abnormality         Status ---------                               -----------         ------                     Debbie Thomas Top on WAIY[[id number]]                           Final result               Green / Black tube on SCDE[[id number]]                       Final result                 Please view results for these tests on the individual orders  CBC and differential [[id number]]  (Abnormal) Collected:  01/13/20 1003    Lab Status:  Final result Specimen:  Blood from Arm, Right Updated:  01/13/20 1008     WBC 5 77 Thousand/uL      RBC 4 55 Million/uL      Hemoglobin 15 2 g/dL      Hematocrit 45 4 %       fL      MCH 33 4 pg      MCHC 33 5 g/dL      RDW 13 5 %      MPV 10 4 fL      Platelets 439 Thousands/uL      nRBC 0 /100 WBCs      Neutrophils Relative 69 %      Immat GRANS % 1 %      Lymphocytes Relative 19 %      Monocytes Relative 9 %      Eosinophils Relative 1 %      Basophils Relative 1 %      Neutrophils Absolute 3 98 Thousands/µL      Immature Grans Absolute 0 06 Thousand/uL      Lymphocytes Absolute 1 12 Thousands/µL      Monocytes Absolute 0 53 Thousand/µL      Eosinophils Absolute 0 03 Thousand/µL      Basophils Absolute 0 05 Thousands/µL                  MRI brain w wo contrast   Final Result by Dain Oseguera MD (01/13 3969)   Persistent lateral ventricular dilatation likely related to central atrophy      Several tiny primarily subcortical left frontal white matter foci, likely microangiopathic changes including the type which can accompany migraine headaches, unchanged compared with prior MRI from 5/30/2006        No pathologic enhancement      Persistent paranasal sinus disease      Consistent with prior studies      Workstation performed: [id number]                    Procedures  Procedures         ED Course                               MDM  Number of Diagnoses or Management Options  Dizziness:   Fall, initial encounter:   Diagnosis management comments: Pulse ox 98% on RA indicating adequate oxygenation    Dr Jacquie Wilkinson called while patient was in the ER  Requested we get stat MRI of the brain to r/o stroke  MRI was obtained and results reviewed with Dr Jacquie Wilkinson  No signs of stroke can be discharged  Amount and/or Complexity of Data Reviewed  Clinical lab tests: ordered and reviewed  Tests in the radiology section of CPT®: ordered and reviewed  Decide to obtain previous medical records or to obtain history from someone other than the patient: yes  Review and summarize past medical records: yes  Discuss the patient with other providers: yes    Patient Progress  Patient progress: stable        Disposition  Final diagnoses:   Fall, initial encounter   Dizziness     Time reflects when diagnosis was documented in both MDM as applicable and the Disposition within this note     Time User Action Codes Description Comment    1/13/2020  2:23 PM Sandi Phillips Add [H73  [id number]] Fall, initial encounter     1/13/2020  2:23 PM Viviana Joseph Add [R42] Dizziness       ED Disposition     ED Disposition Condition Date/Time Comment    Discharge Stable Mon Jan 13, 2020  2:23 PM Vermark Ramirez discharge to home/self care              Follow-up Information     Follow up With Specialties Details Why [id number] [facility], [address] [facility], Nurse Practitioner  as scheduled [address] [facility]  [phone]            Discharge Medication List as of 1/13/2020  2:23 PM      CONTINUE these medications which have NOT CHANGED    Details   aspirin (ECOTRIN LOW STRENGTH) 81 mg EC tablet Take 1 tablet (81 mg total) by mouth daily, Starting Thu 1/2/2020, Normal      atorvastatin (LIPITOR) 20 mg tablet Take 1 tablet (20 mg total) by mouth daily At 8pm, Starting Tue 10/8/2019, Print      Calcium Carbonate-Vitamin D 600-400 MG-UNIT per tablet Take 1 tablet by mouth 2 (two) times a day At 8 am and 5 pm, Starting Tue 10/8/2019, Print      cholecalciferol (VITAMIN D3) 1,000 units tablet Take 1 tablet (1,000 Units total) by mouth 2 (two) times a day At 8am and 8pm, Starting Tue 10/8/2019, Print      divalproex sodium (DEPAKOTE) 500 mg EC tablet Take 500 mg by mouth , Historical Med      docusate sodium (COLACE) 100 mg capsule Take one capsule at 8am, Print      esomeprazole (NexIUM) 40 MG capsule Take one capsule daily 30 minutes prior to breakfast, Print      lanolin-mineral oil (ARMAAN) LOTN Apply topically 2 (two) times a day Apply to hands daily at 8 am and 8 pm, Starting Tue 10/8/2019, Print      levothyroxine 75 mcg tablet TAKE 1 TABLET BY MOUTH IN THE EVENING (8PM), Normal      loratadine (CLARITIN) 10 mg tablet Take 1 tablet (10 mg total) by mouth daily As needed for allergies  Do not exceed 1 dose per 24 hours, Starting Tue 11/19/2019, Print      midodrine (PROAMATINE) 2 5 mg tablet Take 1 tablet (2 5 mg total) by mouth 2 (two) times a day as needed (If systolic blood pressure less than 100 and patient is symptomatic), Starting Mon 1/6/2020, Normal      multivitamin-minerals (CENTRUM ADULTS) tablet Take 1 tablet by mouth daily At 8am, Starting Tue 10/8/2019, Print      OLANZapine (ZyPREXA) 2 5 mg tablet Take 1 tablet by mouth daily, Starting Wed 2/28/2018, Historical Med      polyethylene glycol (GLYCOLAX) powder Take 17 grams in 8 oz of water by mouth daily at 8 pm, Print      tamsulosin (FLOMAX) 0 4 mg Starting Sat 12/21/2019, Historical Med      testosterone (ANDROGEL) 1 62 % TD gel pump Apply 3 actuation (60 75 mg total) topically every morning, Starting Tue 12/31/2019, Until Sun 6/28/2020, Normal           No discharge procedures on file      ED Provider  Electronically Signed by           Alejandro Mehta DO  01/13/20 1161

## 2020-01-13 NOTE — ED PROCEDURE NOTE
PROCEDURE  ECG 12 Lead Documentation Only  Date/Time: 1/13/2020 10:10 AM  Performed by: Emperatriz Uribe DO  Authorized by: Emperatriz Uribe DO     ECG reviewed by me, the ED Provider: yes    Patient location:  ED  Interpretation:     Interpretation: abnormal    Rate:     ECG rate:  67    ECG rate assessment: normal    Rhythm:     Rhythm: sinus rhythm    Ectopy:     Ectopy: none    ST segments:     ST segments:  Normal  Other findings:     Other findings: LVH           Emperatriz Uribe DO  01/13/20 1011

## 2020-01-13 NOTE — TELEPHONE ENCOUNTER
Patient's caregiver Susana (with group home) asking if patient needs a sooner appt with recent CTA results  Also reports patient had another episode today at work  States he passed out and they called 911  He states he does not have any more information at this time  States the patient is going to hospitals       Patient currently has appt scheduled with Dr Kristal Sims on 2/19/20 and Dr Alesha Donis on 3/23/20     [phone]  Okay to leave detailed message

## 2020-01-14 ENCOUNTER — HOSPITAL ENCOUNTER (OUTPATIENT)
Dept: NON INVASIVE DIAGNOSTICS | Facility: HOSPITAL | Age: 55
Discharge: HOME | End: 2020-01-14
Attending: PSYCHIATRY & NEUROLOGY
Payer: MEDICARE

## 2020-01-14 ENCOUNTER — TELEPHONE (OUTPATIENT)
Dept: ENDOCRINOLOGY | Facility: CLINIC | Age: 55
End: 2020-01-14

## 2020-01-14 DIAGNOSIS — I95.9 HYPOTENSION, UNSPECIFIED HYPOTENSION TYPE: ICD-10-CM

## 2020-01-14 DIAGNOSIS — R55 NEAR SYNCOPE: ICD-10-CM

## 2020-01-14 DIAGNOSIS — R42 DIZZINESS: ICD-10-CM

## 2020-01-14 LAB
ATRIAL RATE: 67 BPM
P AXIS: 62 DEGREES
PR INTERVAL: 148 MS
QRS AXIS: -19 DEGREES
QRSD INTERVAL: 108 MS
QT INTERVAL: 420 MS
QTC INTERVAL: 443 MS
T WAVE AXIS: -7 DEGREES
TESTOST FREE SERPL-MCNC: 17.1 PG/ML (ref 7.2–24)
TESTOST SERPL-MCNC: 951 NG/DL (ref 264–916)
VENTRICULAR RATE: 67 BPM

## 2020-01-14 PROCEDURE — 93660 TILT TABLE EVALUATION: CPT

## 2020-01-14 PROCEDURE — 93010 ELECTROCARDIOGRAM REPORT: CPT | Performed by: INTERNAL MEDICINE

## 2020-01-14 NOTE — NURSING NOTE
Tilt table test completed without incident  Patient remained asymptomatic throughout test  Patient discharged ambulatory in stable condition accompanied by caretaker

## 2020-01-15 ENCOUNTER — TELEPHONE (OUTPATIENT)
Dept: FAMILY MEDICINE CLINIC | Facility: CLINIC | Age: 55
End: 2020-01-15

## 2020-01-15 NOTE — TELEPHONE ENCOUNTER
Won called from group home once more  Wants to know if patient should be given flom[facility] which [facility] in [facility] as they have been doing all this month  Please advise  I advised that Shruthi Tang is out of the office until tomorrow

## 2020-01-15 NOTE — TELEPHONE ENCOUNTER
Susana from the [facility] called stating that the pts Flomax was D/C'd 12/19/2019 but the pharm sent it in January 2020 staff has been giving the medication to the pt for all of January  Susana wants to make sure it is ok  Please call Susana at [phone]

## 2020-01-16 ENCOUNTER — HOSPITAL ENCOUNTER (INPATIENT)
Facility: HOSPITAL | Age: 55
LOS: 4 days | Discharge: HOME | DRG: 872 | End: 2020-01-21
Attending: EMERGENCY MEDICINE | Admitting: FAMILY MEDICINE
Payer: MEDICARE

## 2020-01-16 ENCOUNTER — APPOINTMENT (EMERGENCY)
Dept: RADIOLOGY | Facility: HOSPITAL | Age: 55
DRG: 872 | End: 2020-01-16
Payer: MEDICARE

## 2020-01-16 ENCOUNTER — OFFICE VISIT (OUTPATIENT)
Dept: FAMILY MEDICINE CLINIC | Facility: CLINIC | Age: 55
End: 2020-01-16
Payer: MEDICARE

## 2020-01-16 ENCOUNTER — TELEPHONE (OUTPATIENT)
Dept: ENDOCRINOLOGY | Facility: CLINIC | Age: 55
End: 2020-01-16

## 2020-01-16 VITALS
RESPIRATION RATE: 16 BRPM | SYSTOLIC BLOOD PRESSURE: 100 MMHG | HEART RATE: 108 BPM | DIASTOLIC BLOOD PRESSURE: 60 MMHG | WEIGHT: 144 LBS | BODY MASS INDEX: 28.27 KG/M2 | HEIGHT: 60 IN | TEMPERATURE: 101.6 F

## 2020-01-16 DIAGNOSIS — E29.1 HYPOGONADISM, TESTICULAR: Primary | ICD-10-CM

## 2020-01-16 DIAGNOSIS — E29.1 HYPOGONADISM, TESTICULAR: ICD-10-CM

## 2020-01-16 DIAGNOSIS — I95.1 ORTHOSTATIC HYPOTENSION: ICD-10-CM

## 2020-01-16 DIAGNOSIS — R33.9 URINARY RETENTION: ICD-10-CM

## 2020-01-16 DIAGNOSIS — R42 DIZZINESS: ICD-10-CM

## 2020-01-16 DIAGNOSIS — K52.9 ENTERITIS: ICD-10-CM

## 2020-01-16 DIAGNOSIS — R53.1 WEAKNESS: ICD-10-CM

## 2020-01-16 DIAGNOSIS — N30.90 CYSTITIS: Primary | ICD-10-CM

## 2020-01-16 DIAGNOSIS — K52.9 GASTROENTERITIS: Primary | ICD-10-CM

## 2020-01-16 DIAGNOSIS — G31.9 CEREBRAL VENTRICULOMEGALY DUE TO BRAIN ATROPHY (HCC): ICD-10-CM

## 2020-01-16 LAB
ALBUMIN SERPL BCP-MCNC: 2.8 G/DL (ref 3.5–5)
ALP SERPL-CCNC: 49 U/L (ref 46–116)
ALT SERPL W P-5'-P-CCNC: 20 U/L (ref 12–78)
AMORPH URATE CRY URNS QL MICRO: ABNORMAL /HPF
ANION GAP SERPL CALCULATED.3IONS-SCNC: 8 MMOL/L (ref 4–13)
AST SERPL W P-5'-P-CCNC: 17 U/L (ref 5–45)
BACTERIA UR QL AUTO: ABNORMAL /HPF
BASOPHILS # BLD AUTO: 0.02 THOUSANDS/ΜL (ref 0–0.1)
BASOPHILS NFR BLD AUTO: 0 % (ref 0–1)
BILIRUB SERPL-MCNC: 0.4 MG/DL (ref 0.2–1)
BILIRUB UR QL STRIP: NEGATIVE
BUN SERPL-MCNC: 17 MG/DL (ref 5–25)
CALCIUM SERPL-MCNC: 8 MG/DL (ref 8.3–10.1)
CHLORIDE SERPL-SCNC: 100 MMOL/L (ref 100–108)
CLARITY UR: CLEAR
CO2 SERPL-SCNC: 27 MMOL/L (ref 21–32)
COLOR UR: ABNORMAL
CREAT SERPL-MCNC: 1.23 MG/DL (ref 0.6–1.3)
EOSINOPHIL # BLD AUTO: 0 THOUSAND/ΜL (ref 0–0.61)
EOSINOPHIL NFR BLD AUTO: 0 % (ref 0–6)
ERYTHROCYTE [DISTWIDTH] IN BLOOD BY AUTOMATED COUNT: 13.9 % (ref 11.6–15.1)
FLUAV RNA NPH QL NAA+PROBE: NORMAL
FLUBV RNA NPH QL NAA+PROBE: NORMAL
GFR SERPL CREATININE-BSD FRML MDRD: 66 ML/MIN/1.73SQ M
GLUCOSE SERPL-MCNC: 116 MG/DL (ref 65–140)
GLUCOSE UR STRIP-MCNC: NEGATIVE MG/DL
HCT VFR BLD AUTO: 43.9 % (ref 36.5–49.3)
HGB BLD-MCNC: 14.8 G/DL (ref 12–17)
HGB UR QL STRIP.AUTO: NEGATIVE
HYALINE CASTS #/AREA URNS LPF: ABNORMAL /LPF
IMM GRANULOCYTES # BLD AUTO: 0.09 THOUSAND/UL (ref 0–0.2)
IMM GRANULOCYTES NFR BLD AUTO: 1 % (ref 0–2)
KETONES UR STRIP-MCNC: NEGATIVE MG/DL
LEUKOCYTE ESTERASE UR QL STRIP: NEGATIVE
LIPASE SERPL-CCNC: 51 U/L (ref 73–393)
LYMPHOCYTES # BLD AUTO: 0.54 THOUSANDS/ΜL (ref 0.6–4.47)
LYMPHOCYTES NFR BLD AUTO: 3 % (ref 14–44)
MAGNESIUM SERPL-MCNC: 1.8 MG/DL (ref 1.6–2.6)
MCH RBC QN AUTO: 33.6 PG (ref 26.8–34.3)
MCHC RBC AUTO-ENTMCNC: 33.7 G/DL (ref 31.4–37.4)
MCV RBC AUTO: 100 FL (ref 82–98)
MONOCYTES # BLD AUTO: 0.74 THOUSAND/ΜL (ref 0.17–1.22)
MONOCYTES NFR BLD AUTO: 5 % (ref 4–12)
NEUTROPHILS # BLD AUTO: 14.85 THOUSANDS/ΜL (ref 1.85–7.62)
NEUTS SEG NFR BLD AUTO: 91 % (ref 43–75)
NITRITE UR QL STRIP: POSITIVE
NON-SQ EPI CELLS URNS QL MICRO: ABNORMAL /HPF
NRBC BLD AUTO-RTO: 0 /100 WBCS
PH UR STRIP.AUTO: 6.5 [PH]
PLATELET # BLD AUTO: 301 THOUSANDS/UL (ref 149–390)
PMV BLD AUTO: 9.7 FL (ref 8.9–12.7)
POTASSIUM SERPL-SCNC: 3.7 MMOL/L (ref 3.5–5.3)
PROT SERPL-MCNC: 6.8 G/DL (ref 6.4–8.2)
PROT UR STRIP-MCNC: NEGATIVE MG/DL
RBC # BLD AUTO: 4.41 MILLION/UL (ref 3.88–5.62)
RBC #/AREA URNS AUTO: ABNORMAL /HPF
RSV RNA NPH QL NAA+PROBE: NORMAL
SODIUM SERPL-SCNC: 135 MMOL/L (ref 136–145)
SP GR UR STRIP.AUTO: <=1.005 (ref 1–1.03)
TROPONIN I SERPL-MCNC: <0.02 NG/ML
UROBILINOGEN UR QL STRIP.AUTO: 0.2 E.U./DL
WBC # BLD AUTO: 16.24 THOUSAND/UL (ref 4.31–10.16)
WBC #/AREA URNS AUTO: ABNORMAL /HPF

## 2020-01-16 PROCEDURE — 85025 COMPLETE CBC W/AUTO DIFF WBC: CPT | Performed by: EMERGENCY MEDICINE

## 2020-01-16 PROCEDURE — 99285 EMERGENCY DEPT VISIT HI MDM: CPT

## 2020-01-16 PROCEDURE — 51798 US URINE CAPACITY MEASURE: CPT

## 2020-01-16 PROCEDURE — 96374 THER/PROPH/DIAG INJ IV PUSH: CPT

## 2020-01-16 PROCEDURE — 96361 HYDRATE IV INFUSION ADD-ON: CPT

## 2020-01-16 PROCEDURE — C9113 INJ PANTOPRAZOLE SODIUM, VIA: HCPCS | Performed by: EMERGENCY MEDICINE

## 2020-01-16 PROCEDURE — 99285 EMERGENCY DEPT VISIT HI MDM: CPT | Performed by: EMERGENCY MEDICINE

## 2020-01-16 PROCEDURE — 36415 COLL VENOUS BLD VENIPUNCTURE: CPT | Performed by: EMERGENCY MEDICINE

## 2020-01-16 PROCEDURE — 74177 CT ABD & PELVIS W/CONTRAST: CPT

## 2020-01-16 PROCEDURE — 96375 TX/PRO/DX INJ NEW DRUG ADDON: CPT

## 2020-01-16 PROCEDURE — 84484 ASSAY OF TROPONIN QUANT: CPT | Performed by: EMERGENCY MEDICINE

## 2020-01-16 PROCEDURE — 83735 ASSAY OF MAGNESIUM: CPT | Performed by: EMERGENCY MEDICINE

## 2020-01-16 PROCEDURE — 99220 PR INITIAL OBSERVATION CARE/DAY 70 MINUTES: CPT | Performed by: HOSPITALIST

## 2020-01-16 PROCEDURE — 80053 COMPREHEN METABOLIC PANEL: CPT | Performed by: EMERGENCY MEDICINE

## 2020-01-16 PROCEDURE — 87631 RESP VIRUS 3-5 TARGETS: CPT | Performed by: EMERGENCY MEDICINE

## 2020-01-16 PROCEDURE — 70450 CT HEAD/BRAIN W/O DYE: CPT

## 2020-01-16 PROCEDURE — 99213 OFFICE O/P EST LOW 20 MIN: CPT | Performed by: NURSE PRACTITIONER

## 2020-01-16 PROCEDURE — 87086 URINE CULTURE/COLONY COUNT: CPT | Performed by: EMERGENCY MEDICINE

## 2020-01-16 PROCEDURE — 83690 ASSAY OF LIPASE: CPT | Performed by: EMERGENCY MEDICINE

## 2020-01-16 PROCEDURE — 81001 URINALYSIS AUTO W/SCOPE: CPT | Performed by: EMERGENCY MEDICINE

## 2020-01-16 PROCEDURE — 71045 X-RAY EXAM CHEST 1 VIEW: CPT

## 2020-01-16 PROCEDURE — 93005 ELECTROCARDIOGRAM TRACING: CPT

## 2020-01-16 RX ORDER — ONDANSETRON 2 MG/ML
4 INJECTION INTRAMUSCULAR; INTRAVENOUS ONCE
Status: COMPLETED | OUTPATIENT
Start: 2020-01-16 | End: 2020-01-16

## 2020-01-16 RX ORDER — SACCHAROMYCES BOULARDII 250 MG
250 CAPSULE ORAL 2 TIMES DAILY
Status: DISCONTINUED | OUTPATIENT
Start: 2020-01-17 | End: 2020-01-21 | Stop reason: HOSPADM

## 2020-01-16 RX ORDER — PANTOPRAZOLE SODIUM 40 MG/1
40 INJECTION, POWDER, FOR SOLUTION INTRAVENOUS ONCE
Status: COMPLETED | OUTPATIENT
Start: 2020-01-16 | End: 2020-01-16

## 2020-01-16 RX ORDER — ONDANSETRON 2 MG/ML
4 INJECTION INTRAMUSCULAR; INTRAVENOUS EVERY 4 HOURS PRN
Status: DISCONTINUED | OUTPATIENT
Start: 2020-01-16 | End: 2020-01-21 | Stop reason: HOSPADM

## 2020-01-16 RX ORDER — TESTOSTERONE 16.2 MG/G
40.5 GEL TRANSDERMAL EVERY MORNING
Qty: 90 ACTUATION | Refills: 5 | Status: SHIPPED | OUTPATIENT
Start: 2020-01-16 | End: 2020-01-21 | Stop reason: HOSPADM

## 2020-01-16 RX ORDER — SODIUM CHLORIDE 9 MG/ML
75 INJECTION, SOLUTION INTRAVENOUS CONTINUOUS
Status: DISCONTINUED | OUTPATIENT
Start: 2020-01-16 | End: 2020-01-17

## 2020-01-16 RX ORDER — CEFTRIAXONE 1 G/50ML
1000 INJECTION, SOLUTION INTRAVENOUS ONCE
Status: COMPLETED | OUTPATIENT
Start: 2020-01-16 | End: 2020-01-17

## 2020-01-16 RX ORDER — CEFTRIAXONE 1 G/50ML
1000 INJECTION, SOLUTION INTRAVENOUS EVERY 24 HOURS
Status: DISCONTINUED | OUTPATIENT
Start: 2020-01-17 | End: 2020-01-20

## 2020-01-16 RX ADMIN — IOHEXOL 100 ML: 350 INJECTION, SOLUTION INTRAVENOUS at 21:16

## 2020-01-16 RX ADMIN — CEFTRIAXONE 1000 MG: 1 INJECTION, SOLUTION INTRAVENOUS at 23:31

## 2020-01-16 RX ADMIN — PANTOPRAZOLE SODIUM 40 MG: 40 INJECTION, POWDER, FOR SOLUTION INTRAVENOUS at 21:12

## 2020-01-16 RX ADMIN — ONDANSETRON 4 MG: 2 INJECTION INTRAMUSCULAR; INTRAVENOUS at 21:13

## 2020-01-16 RX ADMIN — ONDANSETRON 4 MG: 2 INJECTION INTRAMUSCULAR; INTRAVENOUS at 21:12

## 2020-01-16 RX ADMIN — SODIUM CHLORIDE 75 ML/HR: 0.9 INJECTION, SOLUTION INTRAVENOUS at 23:39

## 2020-01-16 RX ADMIN — SODIUM CHLORIDE 1000 ML: 0.9 INJECTION, SOLUTION INTRAVENOUS at 20:10

## 2020-01-16 NOTE — PATIENT INSTRUCTIONS
Gastroenteritis   WHAT YOU NEED TO KNOW:   Gastroenteritis, or stomach flu, is an infection of the stomach and intestines  DISCHARGE INSTRUCTIONS:   Call 911 for any of the following:   · You have trouble breathing or a very fast pulse  Return to the emergency department if:   · You see blood in your diarrhea  · You cannot stop vomiting  · You have not urinated for 12 hours  · You feel like you are going to faint  Contact your healthcare provider if:   · You have a fever  · You continue to vomit or have diarrhea, even after treatment  · You see worms in your diarrhea  · Your mouth or eyes are dry  You are not urinating as much or as often  · You have questions or concerns about your condition or care  Medicines:   · Medicines  may be given to stop vomiting or diarrhea, decrease abdominal cramps, or treat an infection  · Take your medicine as directed  Contact your healthcare provider if you think your medicine is not helping or if you have side effects  Tell him or her if you are allergic to any medicine  Keep a list of the medicines, vitamins, and herbs you take  Include the amounts, and when and why you take them  Bring the list or the pill bottles to follow-up visits  Carry your medicine list with you in case of an emergency  Manage your symptoms:   · Drink liquids as directed  Ask your healthcare provider how much liquid to drink each day, and which liquids are best for you  You may also need to drink an oral rehydration solution (ORS)  An ORS has the right amounts of sugar, salt, and minerals in water to replace body fluids  · Eat bland foods  When you feel hungry, begin eating soft, bland foods  Examples are bananas, clear soup, potatoes, and applesauce  Do not have dairy products, alcohol, sugary drinks, or drinks with caffeine until you feel better  · Rest as much as possible  Slowly start to do more each day when you begin to feel better    Prevent the spread of gastroenteritis:  Gastroenteritis can spread easily  Keep yourself, your family, and your surroundings clean to help prevent the spread of gastroenteritis:  · Wash your hands often  Use soap and water  Wash your hands after you use the bathroom, change a child's diapers, or sneeze  Wash your hands before you prepare or eat food  · Clean surfaces and do laundry often  Wash your clothes and towels separately from the rest of the laundry  Clean surfaces in your home with antibacterial  or bleach  · Clean food thoroughly and cook safely  Wash raw vegetables before you cook  Cook meat, fish, and eggs fully  Do not use the same dishes for raw meat as you do for other foods  Refrigerate any leftover food immediately  · Be aware when you camp or travel  Drink only clean water  Do not drink from rivers or lakes unless you purify or boil the water first  When you travel, drink bottled water and do not add ice  Do not eat fruit that has not been peeled  Do not eat raw fish or meat that is not fully cooked  Follow up with your healthcare provider as directed:  Write down your questions so you remember to ask them during your visits  © 2017 [address] [employer] Information is for End User's use only and may not be sold, redistributed or otherwise used for commercial purposes  All illustrations and images included in CareNotes® are the copyrighted property of A D A M [employer]  or Jose Lewis  The above information is an  only  It is not intended as medical advice for individual conditions or treatments  Talk to your doctor, nurse or pharmacist before following any medical regimen to see if it is safe and effective for you

## 2020-01-16 NOTE — PROGRESS NOTES
Assessment/Plan:    1  Gastroenteritis    2  Dizziness    3  Weakness            Pt became weak when he stood up to put on his coat  He lowered himself to the ground  I examined him  No injuries noted  He was taken out to [place] by wheelchair  Delicia Garcia advised bland diet  She was advised to force fluids, to include pedialyte  If he gets progressively weaker or shows s/s dehydration, advised ER for IVFs  No workshop until 1/21/20    Patient Instructions     Gastroenteritis   WHAT YOU NEED TO KNOW:   Gastroenteritis, or stomach flu, is an infection of the stomach and intestines  DISCHARGE INSTRUCTIONS:   Call 911 for any of the following:   · You have trouble breathing or a very fast pulse  Return to the emergency department if:   · You see blood in your diarrhea  · You cannot stop vomiting  · You have not urinated for 12 hours  · You feel like you are going to faint  Contact your healthcare provider if:   · You have a fever  · You continue to vomit or have diarrhea, even after treatment  · You see worms in your diarrhea  · Your mouth or eyes are dry  You are not urinating as much or as often  · You have questions or concerns about your condition or care  Medicines:   · Medicines  may be given to stop vomiting or diarrhea, decrease abdominal cramps, or treat an infection  · Take your medicine as directed  Contact your healthcare provider if you think your medicine is not helping or if you have side effects  Tell him or her if you are allergic to any medicine  Keep a list of the medicines, vitamins, and herbs you take  Include the amounts, and when and why you take them  Bring the list or the pill bottles to follow-up visits  Carry your medicine list with you in case of an emergency  Manage your symptoms:   · Drink liquids as directed  Ask your healthcare provider how much liquid to drink each day, and which liquids are best for you   You may also need to drink an oral rehydration solution (ORS)  An ORS has the right amounts of sugar, salt, and minerals in water to replace body fluids  · Eat bland foods  When you feel hungry, begin eating soft, bland foods  Examples are bananas, clear soup, potatoes, and applesauce  Do not have dairy products, alcohol, sugary drinks, or drinks with caffeine until you feel better  · Rest as much as possible  Slowly start to do more each day when you begin to feel better  Prevent the spread of gastroenteritis:  Gastroenteritis can spread easily  Keep yourself, your family, and your surroundings clean to help prevent the spread of gastroenteritis:  · Wash your hands often  Use soap and water  Wash your hands after you use the bathroom, change a child's diapers, or sneeze  Wash your hands before you prepare or eat food  · Clean surfaces and do laundry often  Wash your clothes and towels separately from the rest of the laundry  Clean surfaces in your home with antibacterial  or bleach  · Clean food thoroughly and cook safely  Wash raw vegetables before you cook  Cook meat, fish, and eggs fully  Do not use the same dishes for raw meat as you do for other foods  Refrigerate any leftover food immediately  · Be aware when you camp or travel  Drink only clean water  Do not drink from rivers or lakes unless you purify or boil the water first  When you travel, drink bottled water and do not add ice  Do not eat fruit that has not been peeled  Do not eat raw fish or meat that is not fully cooked  Follow up with your healthcare provider as directed:  Write down your questions so you remember to ask them during your visits  © 2017 [employer] [employer]  Information is for End User's use only and may not be sold, redistributed or otherwise used for commercial purposes  All illustrations and images included in CareNotes® are the copyrighted property of [employer]  or Jose Lewis    The above information is an  only  It is not intended as medical advice for individual conditions or treatments  Talk to your doctor, nurse or pharmacist before following any medical regimen to see if it is safe and effective for you  No follow-ups on file  Subjective:      Patient ID: Marco A Villa is a 53 y o  male  Chief Complaint   Patient presents with    Fever     pt c/o fever, vomiting and diarrhea       History obtained from caregiver, Anjelica Velasco  Several of his peers at [facility] came down with similar illness this week    She observed Eufemia Donato started having episodes of vomiting and diarrhea at 1 am this am    He has been refusing to eat and drink today    He has a fever >100    W/U in progress for ongoing dizziness and falls  Was seen at [facility] ER on 1/13/[facility] after fall  MRI of brain showed some dilatation of ventricle  He is referred to neurosurgeon  He was given IVFs in ER  Records reviewed        Fever   This is a new (+sick contacts at [facility] with similar illness) problem  The current episode started today  The problem has been unchanged  Associated symptoms include abdominal pain, fatigue, a fever, vomiting and weakness  Pertinent negatives include no headaches  Associated symptoms comments: Diarrhea, decreased appetite    The following portions of the patient's history were reviewed and updated as appropriate: allergies, current medications, past family history, past medical history, past social history, past surgical history and problem list     Review of Systems   Constitutional: Positive for appetite change, fatigue and fever  Respiratory: Negative  Cardiovascular: Negative  Gastrointestinal: Positive for abdominal pain, diarrhea and vomiting  Neurological: Positive for dizziness and weakness  Negative for headaches           Current Outpatient Medications   Medication Sig Dispense Refill    atorvastatin (LIPITOR) 20 mg tablet Take 1 tablet (20 mg total) by mouth daily At 8pm 30 tablet 11    Calcium Carbonate-Vitamin D 600-400 MG-UNIT per tablet Take 1 tablet by mouth 2 (two) times a day At 8 am and 5 pm 60 tablet 10    cholecalciferol (VITAMIN D3) 1,000 units tablet Take 1 tablet (1,000 Units total) by mouth 2 (two) times a day At 8am and 8pm 60 tablet 11    divalproex sodium (DEPAKOTE) 500 mg EC tablet Take 500 mg by mouth       docusate sodium (COLACE) 100 mg capsule Take one capsule at 8am 30 capsule 11    esomeprazole (NexIUM) 40 MG capsule Take one capsule daily 30 minutes prior to breakfast 30 capsule 11    lanolin-mineral oil (ARMAAN) LOTN Apply topically 2 (two) times a day Apply to hands daily at 8 am and 8 pm 1 Bottle 11    levothyroxine 75 mcg tablet TAKE 1 TABLET BY MOUTH IN THE EVENING (8PM) 30 tablet 10    loratadine (CLARITIN) 10 mg tablet Take 1 tablet (10 mg total) by mouth daily As needed for allergies  Do not exceed 1 dose per 24 hours 30 tablet 11    midodrine (PROAMATINE) 2 5 mg tablet Take 1 tablet (2 5 mg total) by mouth 2 (two) times a day as needed (If systolic blood pressure less than 100 and patient is symptomatic) 60 tablet 2    multivitamin-minerals (CENTRUM ADULTS) tablet Take 1 tablet by mouth daily At 8am 30 tablet 11    OLANZapine (ZyPREXA) 2 5 mg tablet Take 1 tablet by mouth daily      polyethylene glycol (GLYCOLAX) powder Take 17 grams in 8 oz of water by mouth daily at 8 pm 527 g 11    tamsulosin (FLOMAX) 0 4 mg       testosterone (ANDROGEL) 1 62 % TD gel pump Apply 2 actuation (40 5 mg total) topically every morning at 8 am 90 actuation 5    aspirin (ECOTRIN LOW STRENGTH) 81 mg EC tablet Take 1 tablet (81 mg total) by mouth daily (Patient not taking: Reported on 1/16/2020) 30 tablet 2     No current facility-administered medications for this visit          Objective:    /60 (BP Location: Left arm, Patient Position: Sitting, Cuff Size: Large)   Pulse (!) 108   Temp (!) 101 6 °F (38 7 °C)   Resp 16   Ht 5' (1 524 m)   Wt 65 3 kg (144 lb)   BMI 28 12 kg/m²        Physical Exam   Constitutional: Vital signs are normal  He appears well-developed and well-nourished  He appears ill  No distress  HENT:   Mouth/Throat: Oropharynx is clear and moist    Cardiovascular: Normal rate, regular rhythm and intact distal pulses  Murmur heard  Pulmonary/Chest: Effort normal and breath sounds normal    Abdominal: Soft  Bowel sounds are normal  There is no tenderness  Lymphadenopathy:     He has no cervical adenopathy  Neurological: He is alert  Skin: Capillary refill takes less than 2 seconds  No pallor  Nursing note and vitals reviewed               Solange Giles, [address]

## 2020-01-16 NOTE — TELEPHONE ENCOUNTER
----- Message from Alisha Camarena MA sent at 1/15/2020  3:24 PM EST -----  Regarding: FW: Lab results      ----- Message -----  From: Alisha Camarena MA  Sent: 1/15/2020   3:23 PM EST  To: Valentine Ahumada, MA  Subject: Lab results                                          ----- Message -----  From: Sabrina Baltazar MD  Sent: 1/15/2020   3:05 PM EST  To: Alisha Camarena MA    Please call inform patient of results    Testosterone level has come back high, recommend decreasing dose of AndroGel to 2 pumps daily  Repeat total, free testosterone in 3 months [As Noted in HPI] : as noted in HPI [Nasal Congestion] : nasal congestion [Sinus Pressure] : sinus pressure [Negative] : Heme/Lymph

## 2020-01-16 NOTE — TELEPHONE ENCOUNTER
CALLED CAREGIVER ZACK AND ADVISED HER NOT TO GIVE PT THE FLOMAX, PT HAS APPT   TOMORROW AND WILL DISCUSS THEN

## 2020-01-16 NOTE — TELEPHONE ENCOUNTER
Please advise it was d/cd in dec due to his low BP and passing out   It should not be resumed until determined

## 2020-01-16 NOTE — TELEPHONE ENCOUNTER
Spoke to patient's caretaker Susana, provided lab results  Repeat labs in 3 months    He asked to please fax letter stating discontinue  AndroGel to 3 pumps daily and start  AndroGel to 2 pumps daily at 8 am     Will fax letter [phone]

## 2020-01-17 LAB
ALBUMIN SERPL BCP-MCNC: 2.4 G/DL (ref 3.5–5)
ALP SERPL-CCNC: 44 U/L (ref 46–116)
ALT SERPL W P-5'-P-CCNC: 16 U/L (ref 12–78)
ANION GAP SERPL CALCULATED.3IONS-SCNC: 6 MMOL/L (ref 4–13)
AST SERPL W P-5'-P-CCNC: 21 U/L (ref 5–45)
ATRIAL RATE: 90 BPM
BILIRUB SERPL-MCNC: 0.3 MG/DL (ref 0.2–1)
BUN SERPL-MCNC: 10 MG/DL (ref 5–25)
CALCIUM SERPL-MCNC: 7 MG/DL (ref 8.3–10.1)
CHLORIDE SERPL-SCNC: 104 MMOL/L (ref 100–108)
CO2 SERPL-SCNC: 27 MMOL/L (ref 21–32)
CREAT SERPL-MCNC: 0.87 MG/DL (ref 0.6–1.3)
ERYTHROCYTE [DISTWIDTH] IN BLOOD BY AUTOMATED COUNT: 13.9 % (ref 11.6–15.1)
GFR SERPL CREATININE-BSD FRML MDRD: 97 ML/MIN/1.73SQ M
GLUCOSE P FAST SERPL-MCNC: 94 MG/DL (ref 65–99)
GLUCOSE SERPL-MCNC: 94 MG/DL (ref 65–140)
HCT VFR BLD AUTO: 39.9 % (ref 36.5–49.3)
HGB BLD-MCNC: 13.4 G/DL (ref 12–17)
MCH RBC QN AUTO: 33.6 PG (ref 26.8–34.3)
MCHC RBC AUTO-ENTMCNC: 33.6 G/DL (ref 31.4–37.4)
MCV RBC AUTO: 100 FL (ref 82–98)
P AXIS: 51 DEGREES
PLATELET # BLD AUTO: 257 THOUSANDS/UL (ref 149–390)
PMV BLD AUTO: 9.8 FL (ref 8.9–12.7)
POTASSIUM SERPL-SCNC: 3.7 MMOL/L (ref 3.5–5.3)
PR INTERVAL: 146 MS
PROT SERPL-MCNC: 6 G/DL (ref 6.4–8.2)
QRS AXIS: -40 DEGREES
QRSD INTERVAL: 106 MS
QT INTERVAL: 364 MS
QTC INTERVAL: 445 MS
RBC # BLD AUTO: 3.99 MILLION/UL (ref 3.88–5.62)
SODIUM SERPL-SCNC: 137 MMOL/L (ref 136–145)
T WAVE AXIS: 4 DEGREES
TSH SERPL DL<=0.05 MIU/L-ACNC: 1.73 UIU/ML (ref 0.36–3.74)
VENTRICULAR RATE: 90 BPM
WBC # BLD AUTO: 10.87 THOUSAND/UL (ref 4.31–10.16)

## 2020-01-17 PROCEDURE — 80053 COMPREHEN METABOLIC PANEL: CPT | Performed by: HOSPITALIST

## 2020-01-17 PROCEDURE — 93660 TILT TABLE EVALUATION: CPT | Performed by: INTERNAL MEDICINE

## 2020-01-17 PROCEDURE — 87081 CULTURE SCREEN ONLY: CPT | Performed by: HOSPITALIST

## 2020-01-17 PROCEDURE — 99232 SBSQ HOSP IP/OBS MODERATE 35: CPT | Performed by: NURSE PRACTITIONER

## 2020-01-17 PROCEDURE — 94762 N-INVAS EAR/PLS OXIMTRY CONT: CPT

## 2020-01-17 PROCEDURE — 94760 N-INVAS EAR/PLS OXIMETRY 1: CPT

## 2020-01-17 PROCEDURE — 85027 COMPLETE CBC AUTOMATED: CPT | Performed by: HOSPITALIST

## 2020-01-17 PROCEDURE — 97530 THERAPEUTIC ACTIVITIES: CPT

## 2020-01-17 PROCEDURE — 99214 OFFICE O/P EST MOD 30 MIN: CPT | Performed by: PSYCHIATRY & NEUROLOGY

## 2020-01-17 PROCEDURE — 93010 ELECTROCARDIOGRAM REPORT: CPT | Performed by: INTERNAL MEDICINE

## 2020-01-17 PROCEDURE — 97162 PT EVAL MOD COMPLEX 30 MIN: CPT

## 2020-01-17 PROCEDURE — 84443 ASSAY THYROID STIM HORMONE: CPT | Performed by: HOSPITALIST

## 2020-01-17 RX ORDER — SODIUM CHLORIDE 9 MG/ML
50 INJECTION, SOLUTION INTRAVENOUS CONTINUOUS
Status: DISCONTINUED | OUTPATIENT
Start: 2020-01-17 | End: 2020-01-19

## 2020-01-17 RX ORDER — ATORVASTATIN CALCIUM 20 MG/1
20 TABLET, FILM COATED ORAL DAILY
Status: DISCONTINUED | OUTPATIENT
Start: 2020-01-17 | End: 2020-01-21 | Stop reason: HOSPADM

## 2020-01-17 RX ORDER — MIDODRINE HYDROCHLORIDE 5 MG/1
2.5 TABLET ORAL
Status: DISCONTINUED | OUTPATIENT
Start: 2020-01-17 | End: 2020-01-21 | Stop reason: HOSPADM

## 2020-01-17 RX ORDER — TAMSULOSIN HYDROCHLORIDE 0.4 MG/1
0.8 CAPSULE ORAL
Status: DISCONTINUED | OUTPATIENT
Start: 2020-01-17 | End: 2020-01-21 | Stop reason: HOSPADM

## 2020-01-17 RX ORDER — ASPIRIN 81 MG/1
81 TABLET ORAL DAILY
Status: DISCONTINUED | OUTPATIENT
Start: 2020-01-17 | End: 2020-01-21 | Stop reason: HOSPADM

## 2020-01-17 RX ORDER — PANTOPRAZOLE SODIUM 40 MG/1
40 TABLET, DELAYED RELEASE ORAL
Status: DISCONTINUED | OUTPATIENT
Start: 2020-01-17 | End: 2020-01-21 | Stop reason: HOSPADM

## 2020-01-17 RX ORDER — MIDODRINE HYDROCHLORIDE 5 MG/1
2.5 TABLET ORAL 2 TIMES DAILY PRN
Status: DISCONTINUED | OUTPATIENT
Start: 2020-01-17 | End: 2020-01-17

## 2020-01-17 RX ORDER — LORATADINE 10 MG/1
10 TABLET ORAL DAILY
Status: DISCONTINUED | OUTPATIENT
Start: 2020-01-17 | End: 2020-01-21 | Stop reason: HOSPADM

## 2020-01-17 RX ORDER — TESTOSTERONE 16.2 MG/G
40.5 GEL TRANSDERMAL EVERY MORNING
Status: DISCONTINUED | OUTPATIENT
Start: 2020-01-17 | End: 2020-01-17

## 2020-01-17 RX ORDER — ACETAMINOPHEN 325 MG/1
650 TABLET ORAL EVERY 6 HOURS PRN
Status: DISCONTINUED | OUTPATIENT
Start: 2020-01-17 | End: 2020-01-21 | Stop reason: HOSPADM

## 2020-01-17 RX ORDER — DIVALPROEX SODIUM 500 MG/1
500 TABLET, DELAYED RELEASE ORAL
Status: DISCONTINUED | OUTPATIENT
Start: 2020-01-17 | End: 2020-01-21 | Stop reason: HOSPADM

## 2020-01-17 RX ORDER — LEVOTHYROXINE SODIUM 0.07 MG/1
75 TABLET ORAL
Status: DISCONTINUED | OUTPATIENT
Start: 2020-01-17 | End: 2020-01-21 | Stop reason: HOSPADM

## 2020-01-17 RX ADMIN — Medication 250 MG: at 18:09

## 2020-01-17 RX ADMIN — TAMSULOSIN HYDROCHLORIDE 0.8 MG: 0.4 CAPSULE ORAL at 21:17

## 2020-01-17 RX ADMIN — SODIUM CHLORIDE 75 ML/HR: 0.9 INJECTION, SOLUTION INTRAVENOUS at 13:31

## 2020-01-17 RX ADMIN — MIDODRINE HYDROCHLORIDE 2.5 MG: 5 TABLET ORAL at 16:30

## 2020-01-17 RX ADMIN — ENOXAPARIN SODIUM 40 MG: 40 INJECTION SUBCUTANEOUS at 08:31

## 2020-01-17 RX ADMIN — Medication 250 MG: at 08:30

## 2020-01-17 RX ADMIN — DIVALPROEX SODIUM 500 MG: 500 TABLET, DELAYED RELEASE ORAL at 21:25

## 2020-01-17 RX ADMIN — SODIUM CHLORIDE 50 ML/HR: 0.9 INJECTION, SOLUTION INTRAVENOUS at 16:16

## 2020-01-17 RX ADMIN — ATORVASTATIN CALCIUM 20 MG: 20 TABLET, FILM COATED ORAL at 16:22

## 2020-01-17 RX ADMIN — PANTOPRAZOLE SODIUM 40 MG: 40 TABLET, DELAYED RELEASE ORAL at 05:44

## 2020-01-17 RX ADMIN — PANTOPRAZOLE SODIUM 40 MG: 40 TABLET, DELAYED RELEASE ORAL at 16:22

## 2020-01-17 RX ADMIN — LEVOTHYROXINE SODIUM 75 MCG: 75 TABLET ORAL at 05:44

## 2020-01-17 RX ADMIN — ACETAMINOPHEN 650 MG: 325 TABLET, FILM COATED ORAL at 16:16

## 2020-01-17 NOTE — ASSESSMENT & PLAN NOTE
· Appreciate neurology input  · Patient is stable    · Patient can follow-up with neurosurgery for outpatient follow-up for moyamoya

## 2020-01-17 NOTE — PHYSICAL THERAPY NOTE
PT EVALUATION       01/17/20 0950   Note Type   Note type Eval/Treat   Pain Assessment   Pain Assessment No/denies pain   Home Living   Type of Home House;Group Home   Home Layout Two level  (no steps to enter )   Home Equipment   (none)   Prior Function   Level of [place] Independent with ADLs and functional mobility   Lives With Facility staff   Vocational   (attends a day program)   Restrictions/Precautions   Other Precautions Fall Risk;Bed Alarm; Chair Alarm   General   Additional Pertinent History Pt admitted with dizziness, nausea and vomiting now resolved  Cognition   Arousal/Participation Cooperative   Following Commands Follows one step commands without difficulty   RLE Assessment   RLE Assessment WFL   LLE Assessment   LLE Assessment WFL   Bed Mobility   Supine to Sit 4  Minimal assistance   Sit to Supine 5  Supervision   Transfers   Sit to Stand 4  Minimal assistance   Stand to Sit 4  Minimal assistance   Stand pivot 4  Minimal assistance   Ambulation/Elevation   Gait pattern   (guarded)   Gait Assistance 4  Minimal assist   Assistive Device   (handheld)   Distance 50 feet   Balance   Static Sitting Fair +   Dynamic Sitting Fair +   Static Standing Fair +   Dynamic Standing Fair   Activity Tolerance   Activity Tolerance Patient tolerated treatment well   Assessment   Prognosis Good   Problem List Decreased strength;Decreased endurance; Impaired balance;Decreased mobility   Assessment Patient seen for Physical Therapy evaluation  Patient admitted with Gastroenteritis  Comorbidities affecting patient's physical performance include: down's syndrome, Parkinsons disease, dizziness, cerebral ventriculomegaly due to brain atrophy  Personal factors affecting patient at time of initial evaluation include: inability to navigate level surfaces without external assistance and inability to live alone   Prior to admission, patient was independent with functional mobility without assistive device and independent with ADLS  Please find objective findings from Physical Therapy assessment regarding body systems outlined above with impairments and limitations including decreased ROM, impaired balance, decreased endurance, decreased activity tolerance, decreased functional mobility tolerance and fall risk  The Barthel Index was used as a functional outcome tool presenting with a score of 65 today indicating moderate limitations of functional mobility and ADLS  Patient's clinical presentation is currently evolving as seen in patient's presentation of varying levels of cognitive performance, increased fall risk, new onset of impairment of functional mobility, decreased endurance and new onset of weakness  Pt would benefit from continued Physical Therapy treatment to address deficits as defined above and maximize level of functional mobility  As demonstrated by objective findings, the assigned level of complexity for this evaluation is moderate  Goals   Patient Goals "go home"   STG Expiration Date 01/24/20   Short Term Goal #1 independent bed mobility, independent transfers, independent ambulation 150 feet indoor level surfaces, supervision up and down 10 steps   LTG Expiration Date 01/31/20   Long Term Goal #1 independent ambulation outdoor surfaces 500 feet, indpendent up and down 10 steps   Plan   Treatment/Interventions ADL retraining;Functional transfer training;LE strengthening/ROM; Elevations; Therapeutic exercise; Endurance training;Gait training;Equipment eval/education;Patient/family training   PT Frequency 5x/wk   Recommendation   Recommendation Home with family support   Equipment Recommended   (none)   Barthel Index   Feeding 10   Bathing 0   Grooming Score 5   Dressing Score 5   Bladder Score 5   Bowels Score 10   Toilet Use Score 5   Transfers (Bed/Chair) Score 10   Mobility (Level Surface) Score 10   Stairs Score 5   Barthel Index Score 65   Licensure   [place] License Number  Kary Angelica PT [id number]       Time In: 0935  Time Out:1000  Total Time: 22      S:  "I have to go to the bathroom"  O:  Supervision to ambulate to the bathroom, min assist to manage clothing which was soiled so pt assisted to don clean  PJ bottoms and underwear  Pt ambulated 200 feet with supervision in hallway without balance loss  A:  Pt tolerated activity well, Pt appears close to baseline    P:  Continue PT    Mario Boyle, PT

## 2020-01-17 NOTE — ASSESSMENT & PLAN NOTE
Flomax was held at the group home for concerns of hypotension  · Will continue with gentle IV hydration until certain that patient is tolerating his diet without any difficulty  New line midodrine resume 2 5 mg t i d  · Urology consulted, appreciate recommendations of increasing Flomax from 0 4 mg to 0 8 mg daily with dinner  · Urinary retention protocol was followed, patient required insertion of Navarro catheter on morning of 1/18  · Will discuss with Urology when to attempt voiding trial   · Monitor patient's bowel movements, consider stool softener/suppositories if patient does not have BM

## 2020-01-17 NOTE — ED PROVIDER NOTES
History  Chief Complaint   Patient presents with    Multiple Falls     pt has recently been diagnosed with the stomach bug and was seen by the doctor today  Per his care giver he has also had episodes of falling which the doctor is aware about and attributed to dehydration     A 76-year-old male with past medical history of Down syndrome, intellectual disability, dementia, anxiety, hyperlipidemia, GERD, OCD, impulse control disorder, hypothyroidism, Parkinson's disease, CHF, presents to the ER with complaint of multiple episodes of watery diarrhea, nonbloody nonbilious vomiting that started at 1 a m  This morning  Patient lives at a local group home  Caregiver states that patient is very weak as a result of his symptoms and collapsed multiple times today without losing consciousness  Patient was seen by PCP for his symptoms and there was concern about dehydration  Patient sent to the ED for further evaluation  The past few months patient has had some dizziness and had full workup including an MRI brain  Patient today states that he feels lightheaded and dry  Caregiver denies patient having any fevers  History provided by:  Patient and caregiver      Prior to Admission Medications   Prescriptions Last Dose Informant Patient Reported? Taking?    Calcium Carbonate-Vitamin D 600-400 MG-UNIT per tablet  Outside Facility (Specify) No Yes   Sig: Take 1 tablet by mouth 2 (two) times a day At 8 am and 5 pm   OLANZapine (ZyPREXA) 2 5 mg tablet  Outside Facility (Specify) Yes Yes   Sig: Take 1 tablet by mouth daily   aspirin (ECOTRIN LOW STRENGTH) 81 mg EC tablet  Outside Facility (Specify) No Yes   Sig: Take 1 tablet (81 mg total) by mouth daily   atorvastatin (LIPITOR) 20 mg tablet  Outside Facility (Specify) No Yes   Sig: Take 1 tablet (20 mg total) by mouth daily At 8pm   cholecalciferol (VITAMIN D3) 1,000 units tablet  Outside Facility (Specify) No Yes   Sig: Take 1 tablet (1,000 Units total) by mouth 2 (two) times a day At 8am and 8pm   divalproex sodium (DEPAKOTE) 500 mg EC tablet  Outside Facility (Specify) Yes Yes   Sig: Take 500 mg by mouth    docusate sodium (COLACE) 100 mg capsule  Outside Facility (Specify) No Yes   Sig: Take one capsule at 8am   esomeprazole (NexIUM) 40 MG capsule  Outside Facility (Specify) No Yes   Sig: Take one capsule daily 30 minutes prior to breakfast   lanolin-mineral oil (ARMAAN) [address]  [place] [address] (Specify) No Yes   Sig: Apply topically 2 (two) times a day Apply to hands daily at 8 am and 8 pm   levothyroxine 75 mcg tablet  Outside Facility (Specify) No Yes   Sig: TAKE 1 TABLET BY MOUTH IN THE EVENING (8PM)   loratadine (CLARITIN) 10 mg tablet  Outside Facility (Specify) No Yes   Sig: Take 1 tablet (10 mg total) by mouth daily As needed for allergies   Do not exceed 1 dose per 24 hours   midodrine (PROAMATINE) 2 5 mg tablet   No Yes   Sig: Take 1 tablet (2 5 mg total) by mouth 2 (two) times a day as needed (If systolic blood pressure less than 100 and patient is symptomatic)   multivitamin-minerals (CENTRUM ADULTS) tablet  Outside Facility (Specify) No Yes   Sig: Take 1 tablet by mouth daily At 8am   polyethylene glycol (GLYCOLAX) powder  Outside Facility (Specify) No Yes   Sig: Take 17 grams in 8 oz of water by mouth daily at 8 pm   tamsulosin (FLOMAX) 0 4 mg  Outside Facility (Specify) Yes No   testosterone (ANDROGEL) 1 62 % TD gel pump   No Yes   Sig: Apply 2 actuation (40 5 mg total) topically every morning at 8 am      Facility-Administered Medications: None       Past Medical History:   Diagnosis Date    Abnormal weight gain     last assessed 4/18/16; resolved 1/25/17    [facility] [address] [facility]     Anxiety     Dementia ([facility] [address] )     Depression     Disease of thyroid gland     Down's syndrome     Fatigue     last assessed 2/23/17; resolved 6/1/17    GERD (gastroesophageal reflux disease)     Gluten free diet     Hernia of abdominal cavity     Hyperlipidemia     Hypogonadism in male     Impulse control disorder     OCD (obsessive compulsive disorder)     [facility] [address] [facility]     [facility]     resolved 2/23/17    Pleural effusion     Sinus tachycardia     Vitamin D deficiency     last assessed 9/1/16; resolved 6/1/17       Past Surgical History:   Procedure Laterality Date    COLONOSCOPY      ESOPHAGOGASTRODUODENOSCOPY      with biopsy    PERICARDIOCENTESIS  4/29/2016            Family History   Problem Relation Age of Onset    No Known Problems Mother     Parkinsonism Father         symtomatic     I have reviewed and agree with the history as documented  Social History     Tobacco Use    Smoking status: Never Smoker    Smokeless tobacco: Never Used   Substance Use Topics    Alcohol use: No    Drug use: No        Review of Systems   Constitutional: Negative for activity change, fatigue and fever  HENT: Negative for congestion, ear discharge and sore throat  Eyes: Negative for pain and redness  Respiratory: Negative for cough, chest tightness, shortness of breath and wheezing  Cardiovascular: Negative for chest pain  Gastrointestinal: Negative for abdominal pain, diarrhea, nausea and vomiting  Endocrine: Negative for cold intolerance  Genitourinary: Negative for dysuria and urgency  Musculoskeletal: Negative for arthralgias and back pain  Neurological: Positive for light-headedness  Negative for dizziness, weakness and headaches  Psychiatric/Behavioral: Negative for agitation and behavioral problems  Physical Exam  Physical Exam   Constitutional: He is oriented to person, place, and time  He appears well-developed and well-nourished  HENT:   Head: Normocephalic and atraumatic  Nose: Nose normal    Mouth/Throat: Oropharynx is clear and moist    Eyes: Conjunctivae and EOM are normal    Neck: Normal range of motion  Neck supple     Cardiovascular: Normal rate, regular rhythm and normal heart sounds  Pulmonary/Chest: Effort normal and breath sounds normal    Abdominal: Soft  Bowel sounds are normal  He exhibits no distension  There is no tenderness  Musculoskeletal: Normal range of motion  Neurological: He is alert and oriented to person, place, and time  Skin: Skin is warm  Capillary refill takes 2 to 3 seconds  Dry mucous membranes noted on exam    Psychiatric: He has a normal mood and affect  His behavior is normal  Judgment and thought content normal    Nursing note and vitals reviewed        Vital Signs  ED Triage Vitals   Temperature Pulse Respirations Blood Pressure SpO2   01/16/20 1951 01/16/20 1951 01/16/20 1951 01/16/20 1945 01/16/20 1951   99 °F (37 2 °C) 90 20 110/58 97 %      Temp src Heart Rate Source Patient Position - Orthostatic VS BP Location FiO2 (%)   -- 01/16/20 1951 01/16/20 1951 01/16/20 1951 --    Monitor Lying Right arm       Pain Score       --                  Vitals:    01/16/20 1945 01/16/20 1951   BP: 110/58 110/58   Pulse:  90   Patient Position - Orthostatic VS:  Lying         Visual Acuity      ED Medications  Medications   sodium chloride 0 9 % bolus 1,000 mL (1,000 mL Intravenous New Bag 1/16/20 2010)   pantoprazole (PROTONIX) injection 40 mg (has no administration in time range)   ondansetron (ZOFRAN) injection 4 mg (has no administration in time range)   ondansetron (ZOFRAN) injection 4 mg (has no administration in time range)       Diagnostic Studies  Results Reviewed     Procedure Component Value Units Date/Time    Influenza A/B and RSV PCR [[id number]]  (Normal) Collected:  01/16/20 2012    Lab Status:  Final result Specimen:  Nasopharyngeal Swab Updated:  01/16/20 2050     INFLUENZA A PCR None Detected     INFLUENZA B PCR None Detected     RSV PCR None Detected    CBC and differential [[id number]]  (Abnormal) Collected:  01/16/20 2012    Lab Status:  Final result Specimen:  Blood from Arm, Left Updated:  01/16/20 2044     WBC 16 24 Thousand/uL      RBC 4 41 Million/uL      Hemoglobin 14 8 g/dL      Hematocrit 43 9 %       fL      MCH 33 6 pg      MCHC 33 7 g/dL      RDW 13 9 %      MPV 9 7 fL      Platelets 407 Thousands/uL      nRBC 0 /100 WBCs      Neutrophils Relative 91 %      Immat GRANS % 1 %      Lymphocytes Relative 3 %      Monocytes Relative 5 %      Eosinophils Relative 0 %      Basophils Relative 0 %      Neutrophils Absolute 14 85 Thousands/µL      Immature Grans Absolute 0 09 Thousand/uL      Lymphocytes Absolute 0 54 Thousands/µL      Monocytes Absolute 0 74 Thousand/µL      Eosinophils Absolute 0 00 Thousand/µL      Basophils Absolute 0 02 Thousands/µL     Narrative: This is an appended report  These results have been appended to a previously verified report      Troponin I [[id number]]  (Normal) Collected:  01/16/20 2012    Lab Status:  Final result Specimen:  Blood from Arm, Left Updated:  01/16/20 2041     Troponin I <0 02 ng/mL     Comprehensive metabolic panel [[id number]]  (Abnormal) Collected:  01/16/20 2012    Lab Status:  Final result Specimen:  Blood from Arm, Left Updated:  01/16/20 2038     Sodium 135 mmol/L      Potassium 3 7 mmol/L      Chloride 100 mmol/L      CO2 27 mmol/L      ANION GAP 8 mmol/L      BUN 17 mg/dL      Creatinine 1 23 mg/dL      Glucose 116 mg/dL      Calcium 8 0 mg/dL      AST 17 U/L      ALT 20 U/L      Alkaline Phosphatase 49 U/L      Total Protein 6 8 g/dL      Albumin 2 8 g/dL      Total Bilirubin 0 40 mg/dL      eGFR 66 ml/min/1 73sq m     Narrative:       [place] guidelines for Chronic Kidney Disease (CKD):     Stage 1 with normal or high GFR (GFR > 90 mL/min/1 73 square meters)    Stage 2 Mild CKD (GFR = 60-89 mL/min/1 73 square meters)    Stage 3A Moderate CKD (GFR = 45-59 mL/min/1 73 square meters)    Stage 3B Moderate CKD (GFR = 30-44 mL/min/1 73 square meters)    Stage 4 Severe CKD (GFR = 15-29 mL/min/1 73 square meters)    Stage 5 End Stage CKD (GFR <15 mL/min/1 73 square meters)  Note: GFR calculation is accurate only with a steady state creatinine    Magnesium [[id number]]  (Normal) Collected:  01/16/20 2012    Lab Status:  Final result Specimen:  Blood from Arm, Left Updated:  01/16/20 2038     Magnesium 1 8 mg/dL     Lipase [[id number]]  (Abnormal) Collected:  01/16/20 2012    Lab Status:  Final result Specimen:  Blood from Arm, Left Updated:  01/16/20 2038     Lipase 51 u/L     UA w Reflex to Microscopic w Reflex to Culture [[id number]]     Lab Status:  No result Specimen:  Urine                  XR chest 1 view portable    (Results Pending)   CT abdomen pelvis with contrast    (Results Pending)              Procedures  ECG 12 Lead Documentation Only  Date/Time: 1/16/2020 8:14 PM  Performed by: Haylie Rock DO  Authorized by: Haylie Rock DO     Indications / Diagnosis:  Lightheadedness  ECG reviewed by me, the ED Provider: yes    Patient location:  ED  Previous ECG:     Previous ECG:  Compared to current    Similarity:  No change    Comparison to cardiac monitor: Yes    Interpretation:     Interpretation: abnormal    Comments:      Sinus rhythm, rate 90, left axis deviation, minimal voltage criteria for LVH, no acute ST elevations noted, T-wave inversions noted to lead 3 and AVF, nonspecific T-wave abnormalities, essentially unchanged EKG from previous study  ED Course  ED Course as of Jan 16 2050   Thu Jan 16, 2020 2037 ORTHOSTATIC VITAL SIGNS BLOOD PRESSURE HEART RATE          LYING 105/55 80          SITTING 106/56 83          STANDING 96/53 94                                      MDM  Number of Diagnoses or Management Options  Diagnosis management comments: Obtain orthostatic vital signs, blood work, EKG, chest x-ray  Give IV fluids, antiemetics, Protonix and reassess symptoms         Amount and/or Complexity of Data Reviewed  Clinical lab tests: ordered and reviewed  Tests in the radiology section of CPT®: ordered and reviewed  Tests in the medicine section of CPT®: ordered and reviewed  Review and summarize past medical records: yes  Independent visualization of images, tracings, or specimens: yes    Risk of Complications, Morbidity, and/or Mortality  General comments: Patient's orthostatic vital signs were positive in the ED  This may be secondary to volume loss with patient's acute onset vomiting and diarrhea today  Patient's white count was mildly elevated in the ED  At this time IV fluids are given and CT abdomen/pelvis are ordered  Care patient signed out to the next attending who will follow up with CT abdomen/pelvis and re-evaluate patient after fluid bolus and dispo patient appropriately  Patient Progress  Patient progress: stable        Disposition  Final diagnoses:   None     ED Disposition     None      Follow-up Information    None         Patient's Medications   Discharge Prescriptions    No medications on file     No discharge procedures on file      ED Provider  Electronically Signed by           Brittny Barrett DO  01/16/20 2050

## 2020-01-17 NOTE — H&P
H&P- Edwardo Gutierrez 1965, 53 y o  male MRN: [id number]    Unit/Bed#: ED 08 Encounter: [id number]    Primary Care Provider: Tiffany Collado   Date and time admitted to hospital: 1/16/2020  7:37 PM             [facility] - History and Physical:       Edwardo Gutierrez 53 y o  male MRN: [id number]  Unit/Bed#: ED 08 Encounter: [id number]  Admitting Physician: Ganesh Lantigua MD  PCP: Tiffany Collado  Date of Admission:  01/16/20        Assessment and Plan:     * Gastroenteritis  Assessment & Plan  -will put the patient on a clear diet for now  Will advance diet as tolerated  -IV Zofran p r n  For nausea and vomiting  -will add probiotics  -will continue IV fluid hydration  -continue PPI    Orthostatic hypotension  Assessment & Plan  -will put the patient on fall precautions  -IV fluid hydration  -will order physical therapy  -repeat orthostatic vital signs  -midodrine as needed    Cystitis  Assessment & Plan  -will start IV ceftriaxone  -follow-up urine cultures    Cerebral ventriculomegaly due to brain atrophy [facility])  Assessment & Plan  -neurosurgery follow-up as outpatient    Urinary retention  Assessment & Plan  -consult to Urology placed  -Flomax was held at the group home for concerns of hypotension  -will consider Navarro catheter placement if needed    Dizziness  Assessment & Plan  Probably multifactorial in etiology  -fall precautions  -orthostatic vital signs  -consult Neurology for additional recommendations    Hydronephrosis  Assessment & Plan  Patient has left-sided hydronephrosis  Probably chronic   -follow-up urology is recommendations        Parkinson's disease ([facility] [address] )  Assessment & Plan  -continue depakote and olanzapine     Hypogonadism, testicular  Assessment & Plan  -continue testosterone topical  -endocrinology follow-up as outpatient    Chronic constipation  Assessment & Plan  -continue Colace and Glycolax    Down's syndrome  Assessment & Plan  -fall precautions    Hypothyroidism  Assessment & Plan  -continue levothyroxine            VTE Prophylaxis: Enoxaparin (Lovenox)  / sequential compression device   Code Status: Full  Discussion with family:  Family present at bedside    Anticipated Length of Stay:  Patient will be admitted on an Observation basis with an anticipated length of stay of  1-2 midnights  Justification for Hospital Stay: Nausea/vomiting/diarrhea    Total Time for Visit, including Counseling / Coordination of Care: 45 minutes  Greater than 50% of this total time spent on direct patient counseling and coordination of care  Chief Complaint:   Nausea/vomiting/diarrhea    History of Present Illness:    Ame Macario is a 53 y o  male who lives in a group home started having symptoms of nausea and vomiting  He also had loose watery diarrhea  The patient went to go see his PCP today and was diagnosed with acute gastroenteritis  His PCP recommended that he come to the emergency room to be evaluated after the patient became very weak when he stood up to put on his coat  He lowered himself to the ground  The PCP was concerned that the patient developed generalized weakness from dehydration  Upon being admitted to the ED the patient did not have any more episodes of vomiting and apparently his diarrhea had subsided  The patient denied any subjective fevers/chills/abdominal discomfort  The patient was evaluated in the ED a couple of days ago for dizziness  He was seen by both Cardiology and Neurology  The patient had an MRI performed which showed persistent lateral ventricular dilatation likely related to central atrophy  Neurology recommended neurosurgery follow-up  Cardiology also performed a tilt-table test which was essentially unremarkable  He was cleared by both Cardiology and Neurology and was discharged from the emergency room on that day  Review of Systems:    Review of Systems   Constitutional: Negative      HENT: Negative  Eyes: Negative  Respiratory: Negative  Genitourinary: Positive for difficulty urinating  Musculoskeletal: Negative  Skin: Negative  Neurological: Positive for dizziness  Psychiatric/Behavioral: Negative  Past Medical and Surgical History:     Past Medical History:   Diagnosis Date    Abnormal weight gain     last assessed 4/18/16; resolved 1/25/17    [facility] failure) ([facility] )     Anxiety     Dementia ([facility] )     Depression     Disease of thyroid gland     Down's syndrome     Fatigue     last assessed 2/23/17; resolved 6/1/17    GERD (gastroesophageal reflux disease)     Gluten free diet     Hernia of abdominal cavity     Hyperlipidemia     Hypogonadism in male     Impulse control disorder     OCD (obsessive compulsive disorder)     Parkinson disease ([facility] )     Parkinson disease ([facility] )     resolved 2/23/17    Pleural effusion     Sinus tachycardia     Vitamin D deficiency     last assessed 9/1/16; resolved 6/1/17       Past Surgical History:   Procedure Laterality Date    COLONOSCOPY      ESOPHAGOGASTRODUODENOSCOPY      with biopsy    PERICARDIOCENTESIS  4/29/2016            Meds/Allergies:    Prior to Admission medications    Medication Sig Start Date End Date Taking?  Authorizing Provider   aspirin (ECOTRIN LOW STRENGTH) 81 mg EC tablet Take 1 tablet (81 mg total) by mouth daily 1/2/20  Yes Mirta Sanchez MD   atorvastatin (LIPITOR) 20 mg tablet Take 1 tablet (20 mg total) by mouth daily At 8pm 10/8/19  Yes BJ Larsen   Calcium Carbonate-Vitamin D 600-400 MG-UNIT per tablet Take 1 tablet by mouth 2 (two) times a day At 8 am and 5 pm 10/8/19  Yes BJ Larsen   cholecalciferol (VITAMIN D3) 1,000 units tablet Take 1 tablet (1,000 Units total) by mouth 2 (two) times a day At 8am and 8pm 10/8/19  Yes BJ Larsen   divalproex sodium (DEPAKOTE) 500 mg EC tablet Take 500 mg by mouth    Yes Historical Provider, MD   docusate sodium (COLACE) 100 mg capsule Take one capsule at 8am 10/8/19  Yes BJ Larsen   esomeprazole (NexIUM) 40 MG capsule Take one capsule daily 30 minutes prior to breakfast 10/8/19  Yes BJ Larsen   lanolin-mineral oil (ARMAAN) LOTN Apply topically 2 (two) times a day Apply to hands daily at 8 am and 8 pm 10/8/19  Yes BJ Larsen   levothyroxine 75 mcg tablet TAKE 1 TABLET BY MOUTH IN THE EVENING (8PM) 3/26/19  Yes Nicola Wei PA-C   loratadine (CLARITIN) 10 mg tablet Take 1 tablet (10 mg total) by mouth daily As needed for allergies  Do not exceed 1 dose per 24 hours 11/19/19  Yes BJ Larsen   midodrine (PROAMATINE) 2 5 mg tablet Take 1 tablet (2 5 mg total) by mouth 2 (two) times a day as needed (If systolic blood pressure less than 100 and patient is symptomatic) 1/6/20  Yes Nidia Centeno MD   multivitamin-minerals (CENTRUM ADULTS) tablet Take 1 tablet by mouth daily At 8am 10/8/19  Yes BJ Larsen   OLANZapine (ZyPREXA) 2 5 mg tablet Take 1 tablet by mouth daily 2/28/18  Yes Historical Provider, MD   polyethylene glycol (GLYCOLAX) powder Take 17 grams in 8 oz of water by mouth daily at 8 pm 10/8/19  Yes BJ Larsen   testosterone (ANDROGEL) 1 62 % TD gel pump Apply 2 actuation (40 5 mg total) topically every morning at 8 am 1/16/20 7/14/20 Yes Jessica Cook MD   tamsulosin [facility]) 0 4 mg  12/21/19   Historical Provider, MD   testosterone (ANDROGEL) 1 62 % TD gel pump Apply 3 actuation (60 75 mg total) topically every morning 12/31/19 1/16/20  Janneth Pastrana PA-C     I have reviewed home medications with patient personally  Allergies:    Allergies   Allergen Reactions    Advil [Ibuprofen]     Gluten Meal        Social History:     Marital Status: Single     Social History     Substance and Sexual Activity   Alcohol Use No     Social History     Tobacco Use   Smoking Status Never Smoker   Smokeless Tobacco Never Used     Social History Substance and Sexual Activity   Drug Use No       Family History:    non-contributory    Physical Exam:     Vitals:   Blood Pressure: 110/59 (01/16/20 2345)  Pulse: 84 (01/16/20 2330)  Temperature: 99 °F (37 2 °C) (01/16/20 1951)  Respirations: 18 (01/16/20 2230)  Weight - Scale: 65 3 kg (144 lb) (01/16/20 1944)  SpO2: 95 % (01/16/20 2330)    Physical Exam   Constitutional: He is oriented to person, place, and time  He appears well-developed and well-nourished  HENT:   Head: Normocephalic and atraumatic  Eyes: Pupils are equal, round, and reactive to light  EOM are normal    Neck: Neck supple  Cardiovascular: Normal rate and regular rhythm  Pulmonary/Chest: Effort normal and breath sounds normal    Abdominal: Soft  Bowel sounds are normal    Musculoskeletal: Normal range of motion  Neurological: He is alert and oriented to person, place, and time  Skin: Skin is warm and dry  Psychiatric: His behavior is normal          Additional Data:     Lab Results: I have personally reviewed pertinent reports  Results from last 7 days   Lab Units 01/16/20 2012   WBC Thousand/uL 16 24*   HEMOGLOBIN g/dL 14 8   HEMATOCRIT % 43 9   PLATELETS Thousands/uL 301   NEUTROS PCT % 91*   LYMPHS PCT % 3*   MONOS PCT % 5   EOS PCT % 0     Results from last 7 days   Lab Units 01/16/20 2012   SODIUM mmol/L 135*   POTASSIUM mmol/L 3 7   CHLORIDE mmol/L 100   CO2 mmol/L 27   BUN mg/dL 17   CREATININE mg/dL 1 23   ANION GAP mmol/L 8   CALCIUM mg/dL 8 0*   ALBUMIN g/dL 2 8*   TOTAL BILIRUBIN mg/dL 0 40   ALK PHOS U/L 49   ALT U/L 20   AST U/L 17   GLUCOSE RANDOM mg/dL 116                       Imaging: I have personally reviewed pertinent reports        CT head without contrast   Final Result by Trang Sanabria DO (01/16 2223)      Limited study due to recent administration of IV contrast   No evidence of large acute intracranial hemorrhage                  Workstation performed: [id number]         CT abdomen pelvis with contrast   Final Result by Angy Poe MD (01/16 1577)         1  Extremely distended bladder with diffuse bladder wall thickening could relate to cystitis and/or outlet obstruction, correlate with urinalysis  2  Suspect small bowel enteritis  3  Small hiatal hernia with gastric esophageal reflux  4  Trace bilateral pleural effusions  Workstation performed: [id number]         XR chest 1 view portable    (Results Pending)           Allscripts / Epic Records Reviewed: Yes     ** Please Note: This note has been constructed using a voice recognition system   **

## 2020-01-17 NOTE — ASSESSMENT & PLAN NOTE
CT abdomen pelvis revealed mild right hydronephrosis  · Urology was consulted, initially avoided placement of Navarro catheter  · Urinary retention protocol was followed  · Patient required to have Navarro catheter placed this morning as per protocol  · Creatinine stable  · Monitor

## 2020-01-17 NOTE — PLAN OF CARE
Problem: Potential for Falls  Goal: Patient will remain free of falls  Description  INTERVENTIONS:  - Assess patient frequently for physical needs  -  Identify cognitive and physical deficits and behaviors that affect risk of falls    -  [place] fall precautions as indicated by assessment   - Educate patient/family on patient safety including physical limitations  - Instruct patient to call for assistance with activity based on assessment  - Modify environment to reduce risk of injury  - Consider OT/PT consult to assist with strengthening/mobility  1/17/2020 0739 by Jeffery Flores RN  Outcome: Progressing  1/17/2020 0738 by Jeffery Flores RN  Outcome: Progressing     Problem: METABOLIC, FLUID AND ELECTROLYTES - ADULT  Goal: Electrolytes maintained within normal limits  Description  INTERVENTIONS:  - Monitor labs and assess patient for signs and symptoms of electrolyte imbalances  - Administer electrolyte replacement as ordered  - Monitor response to electrolyte replacements, including repeat lab results as appropriate  - Instruct patient on fluid and nutrition as appropriate  Outcome: Progressing  Goal: Fluid balance maintained  Description  INTERVENTIONS:  - Monitor labs   - Monitor I/O and WT  - Instruct patient on fluid and nutrition as appropriate  - Assess for signs & symptoms of volume excess or deficit  Outcome: Progressing  Goal: Glucose maintained within target range  Description  INTERVENTIONS:  - Monitor Blood Glucose as ordered  - Assess for signs and symptoms of hyperglycemia and hypoglycemia  - Administer ordered medications to maintain glucose within target range  - Assess nutritional intake and initiate nutrition service referral as needed  Outcome: Progressing     Problem: MUSCULOSKELETAL - ADULT  Goal: Maintain or return mobility to safest level of function  Description  INTERVENTIONS:  - Assess patient's ability to carry out ADLs; assess patient's baseline for ADL function and identify physical deficits which impact ability to perform ADLs (bathing, care of mouth/teeth, toileting, grooming, dressing, etc )  - Assess/evaluate cause of self-care deficits   - Assess range of motion  - Assess patient's mobility  - Assess patient's need for assistive devices and provide as appropriate  - Encourage maximum independence but intervene and supervise when necessary  - Involve family in performance of ADLs  - Assess for home care needs following discharge   - Consider OT consult to assist with ADL evaluation and planning for discharge  - Provide patient education as appropriate  Outcome: Progressing  Goal: Maintain proper alignment of affected body part  Description  INTERVENTIONS:  - Support, maintain and protect limb and body alignment  - Provide patient/ family with appropriate education  Outcome: Progressing

## 2020-01-17 NOTE — ASSESSMENT & PLAN NOTE
· Restarted patient's Colace and MiraLax as he has not had a bowel movement since admission  · Monitor bowel movements closely, as constipation may be contributing to urinary retention

## 2020-01-17 NOTE — ASSESSMENT & PLAN NOTE
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

## 2020-01-17 NOTE — ASSESSMENT & PLAN NOTE
Patient had nausea vomiting and diarrhea in the group home followed by 2 episodes of near-syncope  CT abdomen pelvis revealed small bowel enteritis  · Continue clear liquid diet, IV hydration, and IV Zofran for nausea  · Added probiotics  · Continue PPI  · Advanced diet as tolerated  · Check stool studies, monitor off antibiotics  · Consult GI if no improvement

## 2020-01-17 NOTE — PROGRESS NOTES
Progress Note - Chalino Ocampo 1965, 53 y o  male MRN: [id number]    Unit/Bed#: [address] [place] [address] Encounter: [id number]    Primary Care Provider: Tiffany Archibald   Date and time admitted to hospital: 1/16/2020  7:37 PM        * Gastroenteritis  Assessment & Plan  Patient had nausea vomiting and diarrhea in the group home followed by 2 episodes of near-syncope  CT abdomen pelvis revealed small bowel enteritis  · Continue clear liquid diet, IV hydration, and IV Zofran for nausea  · Added probiotics  · Continue PPI  · Advanced diet as tolerated  · Check stool studies, monitor off antibiotics  · Consult GI if no improvement    Cystitis  Assessment & Plan  CT abdomen pelvis revealed an extremely distended bladder with diffuse bladder wall thickening likely due to cystitis or outlet obstruction  Urinalysis abnormal with nitrates  · Continue IV ceftriaxone daily, day #2   · Follow-up urine culture  · Urology consult    Orthostatic hypotension  Assessment & Plan  · Acute on chronic    In the setting of nausea and diarrhea from gastroenteritis with a history of Parkinson's disease which can cause dysautonomia  · Orthostatic hypotension positive with a systolic blood pressure decrease of more than 20 points  · Continue IV hydration  · Per Neurology, resume midodrine 2 5 mg TID   · Cardiology had recommended BID dosing, consider titrating midodrine down to BID dosing if needed  · Fall precautions  · PT/OT    Urinary retention  Assessment & Plan  Flomax was held at the group home for concerns of hypotension  · Continue gentle IV hydration and resume midodrine  · Consult urology  · Can likely resume Flomax as patient will be placed back on midodrine and has received IV hydration, will defer to urology  · Consider Navarro catheter placement if needed  · Monitor I&O, check bladder scan    Dizziness  Assessment & Plan  Probably multifactorial in etiology, likely from orthostatic hypotension and dehydration from gastroenteritis  · Patient is orthostatic hypotensive  · Neurology following, appreciate input  · Per Neurology, 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 that can be found in patients with Down syndrome  No intervention is necessary  This is likely not the cause of his dizziness  · Follow-up tilt-table test results  · Scheduled for an outpatient EEG  · Continue IV hydration and antibiotics  · Resume midodrine  · Continue aspirin 81 mg daily for stroke prevention  · Recent MRI without any acute process  · May follow-up with neurosurgery as an outpatient    Cerebral ventriculomegaly due to brain atrophy [facility])  Assessment & Plan  · Appreciate neurology input  Patient is stable  Neurosurgery follow-up as outpatient    Hydronephrosis  Assessment & Plan  CT abdomen pelvis revealed mild right hydronephrosis  · Consulted urology    Parkinson's disease [facility])  Assessment & Plan  · Continue depakote and olanzapine     Hypogonadism, testicular  Assessment & Plan  · Continue testosterone topical  · Endocrinology follow-up as outpatient    Chronic constipation  Assessment & Plan  · Continue Colace and Glycolax    Down's syndrome  Assessment & Plan  · Supportive care, fall precautions    Hypothyroidism  Assessment & Plan  · Continue levothyroxine 75 mcg daily    VTE Pharmacologic Prophylaxis:   Pharmacologic: Enoxaparin (Lovenox)  Mechanical VTE Prophylaxis in Place: Yes    Patient Centered Rounds: I have performed bedside rounds with nursing staff today  Discussions with Specialists or Other Care Team Provider: Nursing, CM, neurology     Education and Discussions with Family / Patient: I have answered all questions to the best of my ability  Spoke with group home staff member  Time Spent for Care: 30 minutes  More than 50% of total time spent on counseling and coordination of care as described above      Current Length of Stay: 0 day(s)    Current Patient Status: Inpatient Certification Statement: The patient will continue to require additional inpatient hospital stay due to Dizziness, orthostatic hypotension, cystitis, enteritis    Discharge Plan: Likely in 48 hours back to group home    Code Status: Level 1 - Full Code      Subjective:   Patient is a poor historian due to history of down syndrome  He states that he fell twice yesterday but did not hit his head  He states right now he has no pain  He has no nausea or vomiting  He is drinking ginger ale without difficulty  Objective:     Vitals:   Temp (24hrs), Av 9 °F (37 2 °C), Min:98 1 °F (36 7 °C), Max:99 2 °F (37 3 °C)    Temp:  [98 1 °F (36 7 °C)-99 2 °F (37 3 °C)] 98 1 °F (36 7 °C)  HR:  [84-96] 94  Resp:  [16-20] 18  BP: (105-169)/(52-81) 112/53  SpO2:  [93 %-99 %] 94 %  Body mass index is 27 99 kg/m²  Input and Output Summary (last 24 hours): Intake/Output Summary (Last 24 hours) at 2020 1600  Last data filed at 2020 1331  Gross per 24 hour   Intake 1220 ml   Output 300 ml   Net 920 ml       Physical Exam:     Physical Exam   Constitutional: He appears well-developed  No distress  HENT:   Head: Normocephalic  Neck: Normal range of motion  Cardiovascular: Normal rate and regular rhythm  Pulmonary/Chest: Effort normal and breath sounds normal  No respiratory distress  He has no wheezes  He has no rhonchi  He has no rales  Abdominal: Soft  Bowel sounds are normal  He exhibits no distension  There is no tenderness  Musculoskeletal: Normal range of motion  He exhibits no edema or tenderness  Neurological: He is alert  He has normal reflexes  Alert to person, disoriented to place and time    Skin: Skin is warm and dry  No rash noted  He is not diaphoretic  Psychiatric: He has a normal mood and affect  Judgment normal    Nursing note and vitals reviewed        Additional Data:     Labs:    Results from last 7 days   Lab Units 20  0536 20   WBC Thousand/uL 10 87* 16 24*   HEMOGLOBIN g/dL 13 4 14 8   HEMATOCRIT % 39 9 43 9   PLATELETS Thousands/uL 257 301   NEUTROS PCT %  --  91*   LYMPHS PCT %  --  3*   MONOS PCT %  --  5   EOS PCT %  --  0     Results from last 7 days   Lab Units 01/17/20  0536   POTASSIUM mmol/L 3 7   CHLORIDE mmol/L 104   CO2 mmol/L 27   BUN mg/dL 10   CREATININE mg/dL 0 87   CALCIUM mg/dL 7 0*   ALK PHOS U/L 44*   ALT U/L 16   AST U/L 21           * I Have Reviewed All Lab Data Listed Above  * Additional Pertinent Lab Tests Reviewed: All Labs Within Last 24 Hours Reviewed    Imaging:    Imaging Reports Reviewed Today Include: CT head, CT A/P, CXR  Imaging Personally Reviewed by Myself Includes:  None    Recent Cultures (last 7 days):           Last 24 Hours Medication List:     Current Facility-Administered Medications:  acetaminophen 650 mg Oral Q6H PRN Brennon Palma MD   aspirin 81 mg Oral Daily Brennon Palma MD   atorvastatin 20 mg Oral Daily Brennon Palma MD   cefTRIAXone 1,000 mg Intravenous Q24H Brennon Palma MD   divalproex sodium 500 mg Oral HS Brennon Palma MD   enoxaparin 40 mg Subcutaneous Q24H [facility] Brennon Palma MD   levothyroxine 75 mcg Oral Early Morning Brennon Palma MD   loratadine 10 mg Oral Daily Brennon Palma MD   midodrine 2 5 mg Oral TID AC BJ Dickerson   ondansetron 4 mg Intravenous Q4H PRN Brennon Palma MD   pantoprazole 40 mg Oral BID AC Brennon Palma MD   saccharomyces boulardii 250 mg Oral BID Brennon Palma MD   sodium chloride 50 mL/hr Intravenous Continuous BJ Dickerson   testosterone 40 5 mg Topical QAM Brennon Palma MD        Today, Patient Was Seen By: BJ Dickerson    ** Please Note: Dictation voice to text software may have been used in the creation of this document   **

## 2020-01-17 NOTE — ED NOTES
ORTHOSTATIC VITAL SIGNS BLOOD PRESSURE HEART RATE           LYING 105/55 87           SITTING 106/56 83           STANDING 96/53 845 [address] [place] [address]  01/16/20 2004

## 2020-01-17 NOTE — ASSESSMENT & PLAN NOTE
-will put the patient on a clear diet for now  Will advance diet as tolerated  -IV Zofran p r n   For nausea and vomiting  -will add probiotics  -will continue IV fluid hydration  -continue PPI

## 2020-01-17 NOTE — PLAN OF CARE
Problem: Potential for Falls  Goal: Patient will remain free of falls  Description  INTERVENTIONS:  - Assess patient frequently for physical needs  -  Identify cognitive and physical deficits and behaviors that affect risk of falls    -  [place] fall precautions as indicated by assessment   - Educate patient/family on patient safety including physical limitations  - Instruct patient to call for assistance with activity based on assessment  - Modify environment to reduce risk of injury  - Consider OT/PT consult to assist with strengthening/mobility  Outcome: Progressing

## 2020-01-17 NOTE — CONSULTS
Neurology Consult Follow Up      Elizabeth Schilling is a 53 y o  male  [facility]    [id number]        Assessment/Recommendations:    1  Lightheadedness   2  Dehydration from gastroenteritis   3  Orthostatic hypotension   4  Moyamoya   5  Down syndrome     Patient provides limited history due to h/o Down syndrome  He is found to be orthostatic in recent 2 admissions  This could be from recent episodes of diarrhea, vomiting leading to dehydration  Continue IV hydration and abx per primary team   Resume midodrine 2 5mg tid as he has tendency for orthostasis prior to gastroenteritis  Patient had EEG pending as outpatient  His tilt table was completed but report is pending  I have discussed his vascular abnormality of moyamoya in detail with radiologist and NS Dr Oumar Alcala  It is a chronic finding that can be found in patients with DS  No intervention is necessary and is not likely to cause his dizziness  He is ordered to have outpatient f/u with Dr Oumar Alcala  Resume aspirin 81mg daily for stroke prevention  His MRI brain was negative for any acute process, completed on 13th  Discussed plan with primary team              Chief Complaint:  Fall, syncope   Subjective:   Patient has returned to ED with multiple episodes of watery diarrhea, non bloody, non bilious vomiting  Patient was very weak yesterday morning per group home  He was feeling very weak and falling  In ED his BP supine did drop from 105 to 96 upon standing       on 1/13 patient was brought to ED for dizziness and fall  He was fine by the time of arrival    Patient apparently had blood work in morning and was fasting  That day as well his BP in ED systolic had dropped from 114 to 100 upon standing  Patient was seen by me on 12/ 23 for dizziness episodes intermittently  He had tilt table, EEG and CTA ordered  His CTA is significant for vascular finding of padilla padilla however it appeared chronic and has already developed collaterals  On 1/13 admission we had asked ED to get MRI brain   This was normal except small vessel disease          Past Medical History:   Diagnosis Date    Abnormal weight gain     last assessed 4/18/16; resolved 1/25/17    Acute CHF (congestive heart failure) ([facility] )     Anxiety     Dementia ([place] [address] )     Depression     Disease of thyroid gland     Down's syndrome     Fatigue     last assessed 2/23/17; resolved 6/1/17    GERD (gastroesophageal reflux disease)     Gluten free diet     Hernia of abdominal cavity     Hyperlipidemia     Hypogonadism in male     Impulse control disorder     OCD (obsessive compulsive disorder)     Parkinson disease ([facility] )     Parkinson disease ([place] [address] )     resolved 2/23/17    Pleural effusion     Sinus tachycardia     Vitamin D deficiency     last assessed 9/1/16; resolved 6/1/17     Social History     Socioeconomic History    Marital status: Single     Spouse name: Not on file    Number of children: Not on file    Years of education: Not on file    Highest education level: Not on file   Occupational History    Not on file   Social Needs    Financial resource strain: Not on file    Food insecurity:     Worry: Not on file     Inability: Not on file    Transportation needs:     Medical: Not on file     Non-medical: Not on file   Tobacco Use    Smoking status: Never Smoker    Smokeless tobacco: Never Used   Substance and Sexual Activity    Alcohol use: Never     Frequency: Never    Drug use: Never    Sexual activity: Not on file   Lifestyle    Physical activity:     Days per week: Not on file     Minutes per session: Not on file    Stress: Not on file   Relationships    Social connections:     Talks on phone: Not on file     Gets together: Not on file     Attends [employer] service: Not on file     Active member of club or organization: Not on file     Attends meetings of clubs or organizations: Not on file     Relationship status: Not on file    Intimate partner violence: Fear of current or ex partner: Not on file     Emotionally abused: Not on file     Physically abused: Not on file     Forced sexual activity: Not on file   Other Topics Concern    Not on file   Social History Narrative    Caffeine use      Family History   Problem Relation Age of Onset    No Known Problems Mother     Parkinsonism Father         symtomatic       ROS:  Please see HPI for positive symptoms  + fever, no chills, no weight change  Ocular: No drainage, no blurred vision  HEENT:  No sore throat, earache, or congestion  No neck pain  COR:  No chest pain  No palpitations  Lungs:  no sob, wheezing,  GI:  ++  nausea, + vomiting, + diarrhea, no constipation, no anorexia  :  No dysuria, frequency, or urgency  No hematuria  No vaginal discharge or vaginal bleeding  Musculoskeletal:  No joint pain or swelling or edema  Skin:  No rash or itching  Psychiatric:  no anxiety, no depression  Endocrine:  No polyuria or polydipsia  Objective:  /59 (BP Location: Right arm)   Pulse 89   Temp 98 1 °F (36 7 °C) (Tympanic)   Resp 18   Ht 5' (1 524 m)   Wt 65 kg (143 lb 4 8 oz)   SpO2 99%   BMI 27 99 kg/m²     General: alert   Mental status: oriented x3, baseline intellectual disability from down syndrome   Attention: normal  Knowledge: fair  Language and Speech: normal, communicates with short phrases, one word and that is his baseline   Cranial nerves: II-XII intact  Muscle tone: normal  Motor strength:  Spontaneously moves all 4 and purposefully however doesn't understand commands fully   Sensory: normal to light touch, pin prick, vibration     Gait: unsteady  Coordination: finger to nose and heel to toe normal  Reflexes: 2+ throughout  except as noted      Labs:      Lab Results   Component Value Date    WBC 10 87 (H) 01/17/2020    HGB 13 4 01/17/2020    HCT 39 9 01/17/2020     (H) 01/17/2020     01/17/2020     No results found for: HGBA1C  Lab Results   Component Value Date    ALT 16 01/17/2020    AST 21 01/17/2020    ALKPHOS 44 (L) 01/17/2020     Lab Results   Component Value Date    GLUCOSE 96 11/20/2015    CALCIUM 7 0 (L) 01/17/2020     (L) 11/20/2015    K 3 7 01/17/2020    CO2 27 01/17/2020     01/17/2020    BUN 10 01/17/2020    CREATININE 0 87 01/17/2020         Review of reports and notes reveal:       Xr Chest 1 View Portable    Result Date: 1/17/2020  No acute cardiopulmonary disease  Workstation performed: [id number]     Ct Head Without Contrast    Result Date: 1/16/2020  Limited study due to recent administration of IV contrast   No evidence of large acute intracranial hemorrhage Workstation performed: [id number]     Mri Brain W Wo Contrast    Result Date: 1/13/2020  Persistent lateral ventricular dilatation likely related to central atrophy Several tiny primarily subcortical left frontal white matter foci, likely microangiopathic changes including the type which can accompany migraine headaches, unchanged compared with prior MRI from 5/30/2006  No pathologic enhancement Persistent paranasal sinus disease Consistent with prior studies Workstation performed: [id number]     Ct Abdomen Pelvis With Contrast    Result Date: 1/16/2020  1  Extremely distended bladder with diffuse bladder wall thickening could relate to cystitis and/or outlet obstruction, correlate with urinalysis  2  Suspect small bowel enteritis  3  Small hiatal hernia with gastric esophageal reflux  4  Trace bilateral pleural effusions  Workstation performed: [id number]             Thank you for this consult      Total time of encounter:  30 min  More than 50% of the time was used in counseling and/or coordination of care  Extent of couseling and/or coordination of care        MD [facility]  [address]  [address]  [phone]

## 2020-01-17 NOTE — ASSESSMENT & PLAN NOTE
CT abdomen pelvis revealed an extremely distended bladder with diffuse bladder wall thickening likely due to cystitis or outlet obstruction  Urinalysis abnormal with nitrates, cultures pending, follow-up  · Currently on ceftriaxone day 3    · Urology consulted  · Patient continued with urinary retention requiring insertion of Navarro catheter today  · WBCs elevated at 11  13   · T-max in last 24 hours is 99  · Continue to monitor  0 = independent

## 2020-01-17 NOTE — ASSESSMENT & PLAN NOTE
Probably multifactorial in etiology, likely from orthostatic hypotension and dehydration from gastroenteritis  · Patient had orthostatic hypotension on admission  · Per Neurology, 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 that can be found in patients with Down syndrome  No intervention is necessary  This is likely not the cause of his dizziness  · Patient also has a history of Parkinson's disease which could contribute to orthostatic hypotension  · Tilt-table test performed, negative as per report from Cardiology  · Patient is scheduled for outpatient EEG  · Will continue with IV hydration and antibiotics at this time  · Continue midodrine 2 5 mg t i d  Before meals  · Recent MRI negative  · Patient denies any dizziness today, assisted with ambulation into bathroom, did not experience dizziness with ambulation  · Monitor

## 2020-01-17 NOTE — SOCIAL WORK
Called to the group home, spoke to [address] [place], [place] [address]  Explained pt is walking around fine, in that respect should be able to return to the home  Only barrier would be if he needs a patel catheter  They can not accept him back then  Will need to follow  Also Azul Bacon states pt was only off the flomax from 12/19 to 12/31/19  Their pharmacy made an error and placed him back on the Flomax  The error was found on 1/15/20 of which pt did not receive that dose  Update to [employer]

## 2020-01-17 NOTE — ASSESSMENT & PLAN NOTE
-consult to Urology placed  -Flomax was held at the group home for concerns of hypotension  -will consider Navarro catheter placement if needed

## 2020-01-17 NOTE — ASSESSMENT & PLAN NOTE
· Acute on chronic  In the setting of nausea and diarrhea from gastroenteritis with a history of Parkinson's disease which can cause dysautonomia  · Orthostatics positive with a drop of more than 20 points on admission  · Will continue IV hydration until certain that patient is tolerating his oral diet without any difficulty  · Continue midodrine 2 5 mg t i d  With meals  · Cardiology had recommended BID dosing, consider titrating midodrine down to BID dosing if needed  · Vital signs q 4 hours ordered for next 24 hours to monitor  · Continue with fall precautions  · PT/OT evaluation treatment ordered

## 2020-01-17 NOTE — ASSESSMENT & PLAN NOTE
-will put the patient on fall precautions  -IV fluid hydration  -will order physical therapy  -repeat orthostatic vital signs  -midodrine as needed

## 2020-01-17 NOTE — ASSESSMENT & PLAN NOTE
· Acute on chronic    In the setting of nausea and diarrhea from gastroenteritis with a history of Parkinson's disease which can cause dysautonomia  · Orthostatic hypotension positive with a systolic blood pressure decrease of more than 20 points  · Continue IV hydration  · Per Neurology, resume midodrine 2 5 mg TID   · Cardiology had recommended BID dosing, consider titrating midodrine down to BID dosing if needed  · Fall precautions  · PT/OT

## 2020-01-17 NOTE — ASSESSMENT & PLAN NOTE
Patient had nausea vomiting and diarrhea in the group home followed by 2 episodes of near-syncope  CT abdomen pelvis revealed small bowel enteritis  · Patient was tolerating clear liquid diet, denied any abdominal pain, nausea or vomiting after eating  · Advanced diet to low residue, low-fiber  Monitor how patient is doing  · Patient reported that he was very hungry and wanted to eat real food  · Continue probiotics and PPI  · Patient has not had a bowel movement since he has been hospitalized  · May consider stool softener/suppository patient does not move bowels by tomorrow  · Monitor

## 2020-01-17 NOTE — UTILIZATION REVIEW
Initial Clinical Review    Admission: Date/Time/Statement: 01/16/1559  OBSERVATION CHANGED TO INPATIENT 01/17/2020 FOR CONTINUED MONITORING OF GASTROENTERITIS AND SAFE DISCHARGE PLAN TO GROUP HOME    Inpatient Admission Once     Transfer Service: General Medicine       Question Answer   Admitting Physician [facility]   Level of Care Med Surg   Estimated length of stay More than 2 Midnights   Certification I certify that inpatient services are medically necessary for this patient for a duration of greater than two midnights  See H&P and MD Progress Notes for additional information about the patient's course of treatment  ED Arrival Information     Expected Arrival Acuity Means of Arrival Escorted By Service Admission Type    - 1/16/2020 19:26 Urgent Walk-In Other (Comment) General Medicine Urgent    Arrival Complaint    dizzy and falling        Chief Complaint   Patient presents with    Multiple Falls     pt has recently been diagnosed with the stomach bug and was seen by the doctor today  Per his care giver he has also had episodes of falling which the doctor is aware about and attributed to dehydration     Assessment/Plan:       53year old male presents to ed from group home for evaluation and treatment of watery diarrhea and non bloody, non bilious vomiting which began at 1 am   PMHX  Down syndrome, dementia, ocd, parkinson's, chf   He bhad ba recent workup with MRI for dizziness  On arrival caregiver states he has become very weak and collapsed multiple times today  Without loss of consciousness  Pcp is concerned for dehydration and sent patient to the ed  On physical exam : dry mucous membranes, wbc 16 24, CT shows cystitis and enteritis  Treated in ed with iv fluids   9% ns bolus, iv zofran x2, iv protonix x1  iv  ceftriaxone and iv  9%ns 75/hr  Admit to observation for gastroenteritis    Plan iv zofran prn, clear liquid diet, add probiotics, iv fluid hydration,  Continue iv protonix, Repeat orthostatic bp, obtain urine culture, continue iv antibiotic           ED Triage Vitals   01/16/20 1951 01/16/20 1951 01/16/20 1951 01/16/20 1945 01/16/20 1951   99 °F (37 2 °C) 90 20 110/58 97 %      Tympanic Monitor         No Pain       01/17/20 65 kg (143 lb 4 8 oz)     Additional Vital Signs:      Date/Time  Temp  Pulse  Resp  BP  SpO2  O2 Device   01/17/20 0424          94 %  None (Room air)   01/17/20 0416          94 %  None (Room air)   01/17/20 0312  98 9 °F (37 2 °C)  96  18  105/66  94 %     01/17/20 0101            None (Room air)   01/17/20 0028  99 2 °F (37 3 °C)    18  123/60  96 %     01/16/20 2345        110/59       01/16/20 2330    84      95 %     01/16/20 2315    86      96 %     01/16/20 2300    86      95 %     01/16/20 2245    88      97 %     01/16/20 2230    94  18    96 %     01/16/20 2215    88  16  169/81  96 %     01/16/20 2145    88  16    93 %     01/16/20 2130    92  16    96 %     01/16/20 2115    96  20    97 %           ORTHOSTATIC VITAL SIGNS BLOOD PRESSURE   HEART RATE          LYING                                        105/55                             87          SITTING                                     106/56                             83          STANDING                                  96/53                             94      Pertinent Labs/Diagnostic Test Results:     ECG 12 Lead Documentation Only  Date/Time: 1/16/2020 8:14 PM  Sinus rhythm, rate 90, left axis deviation, minimal voltage criteria for LVH, no acute ST elevations noted, T-wave inversions noted to lead 3 and AVF, nonspecific T-wave abnormalities, essentially unchanged EKG from previous study  CT head without contrast   Final (01/16 2223)      Limited study due to recent administration of IV contrast   No evidence of large acute intracranial hemorrhage         CT abdomen pelvis with contrast   Final (01/16 2215)         1   Extremely distended bladder with diffuse bladder wall thickening could relate to cystitis and/or outlet obstruction, correlate with urinalysis  2  Suspect small bowel enteritis  3  Small hiatal hernia with gastric esophageal reflux  4  Trace bilateral pleural effusions  XR chest 1 view portable    (Results Pending)     Results from last 7 days   Lab Units 01/17/20  0536 01/16/20 2012 01/13/20  1003 01/13/20  0729   WBC Thousand/uL 10 87* 16 24* 5 77 4 60   HEMOGLOBIN g/dL 13 4 14 8 15 2 15 1   HEMATOCRIT % 39 9 43 9 45 4 45 0   PLATELETS Thousands/uL 257 301 292 287   NEUTROS ABS Thousands/µL  --  14 85* 3 98 3 05         Results from last 7 days   Lab Units 01/17/20  0536 01/16/20 2012 01/13/20  1156 01/13/20  0729   SODIUM mmol/L 137 135* 136 137   POTASSIUM mmol/L 3 7 3 7 4 6 4 0   CHLORIDE mmol/L 104 100 101 99*   CO2 mmol/L 27 27 31 32   ANION GAP mmol/L 6 8 4 6   BUN mg/dL 10 17 11 11   CREATININE mg/dL 0 87 1 23 0 86 0 94   EGFR ml/min/1 73sq m 97 66 98 91   CALCIUM mg/dL 7 0* 8 0* 8 0* 8 6   MAGNESIUM mg/dL  --  1 8  --   --      Results from last 7 days   Lab Units 01/17/20 0536 01/16/20 2012 01/13/20  1156   AST U/L 21 17 23   ALT U/L 16 20 21   ALK PHOS U/L 44* 49 57   TOTAL PROTEIN g/dL 6 0* 6 8 6 9   ALBUMIN g/dL 2 4* 2 8* 2 8*   TOTAL BILIRUBIN mg/dL 0 30 0 40 0 20         Results from last 7 days   Lab Units 01/17/20  0536 01/16/20 2012 01/13/20  1156   GLUCOSE RANDOM mg/dL 94 116 67       Results from last 7 days   Lab Units 01/16/20 2012   TROPONIN I ng/mL <0 02       Results from last 7 days   Lab Units 01/17/20  0536 01/13/20  0729   TSH 3RD GENERATON uIU/mL 1 731 2  156     Results from last 7 days   Lab Units 01/16/20 2012   LIPASE u/L 51*       Results from last 7 days   Lab Units 01/16/20  2231   CLARITY UA  Clear   COLOR UA  Light Yellow   SPEC GRAV UA  <=1 005   PH UA  6 5   GLUCOSE UA mg/dl Negative   KETONES UA mg/dl Negative   BLOOD UA  Negative   PROTEIN UA mg/dl Negative NITRITE UA  Positive*   BILIRUBIN UA  Negative   UROBILINOGEN UA E U /dl 0 2   LEUKOCYTES UA  Negative   WBC UA /hpf 10-20*   RBC UA /hpf 0-1*   BACTERIA UA /hpf Moderate*   EPITHELIAL CELLS WET PREP /hpf Occasional     Results from last 7 days   Lab Units 01/16/20 2012   INFLUENZA A PCR  None Detected   RSV PCR  None Detected       ED Treatment:   Medication Administration from 01/16/2020 1926 to 01/17/2020 0010       Date/Time Order Dose Route Action     01/16/2020 2010 sodium chloride 0 9 % bolus 1,000 mL 1,000 mL Intravenous New Bag     01/16/2020 2112 pantoprazole (PROTONIX) injection 40 mg 40 mg Intravenous Given     01/16/2020 2112 ondansetron (ZOFRAN) injection 4 mg 4 mg Intravenous Given     01/16/2020 2113 ondansetron (ZOFRAN) injection 4 mg 4 mg Intravenous Given     01/16/2020 2331 cefTRIAXone (ROCEPHIN) IVPB (premix) 1,000 mg 1,000 mg Intravenous New Bag     01/16/2020 2339 sodium chloride 0 9 % infusion 75 mL/hr Intravenous New Bag        Past Medical History:   Diagnosis    Abnormal weight gain    last assessed 4/18/16; resolved 1/25/17    [facility]    Anxiety    Dementia ([facility]    Depression    Disease of thyroid gland    Down's syndrome    Fatigue    last assessed 2/23/17; resolved 6/1/17    GERD (gastroesophageal reflux disease)    Gluten free diet    Hernia of abdominal cavity    Hyperlipidemia    Hypogonadism in male    Impulse control disorder    OCD (obsessive compulsive disorder)    Parkinson disease ([facility]    Parkinson disease ([facility] )    resolved 2/23/17    Pleural effusion    Sinus tachycardia    Vitamin D deficiency    last assessed 9/1/16; resolved 6/1/17     Present on Admission:   Hypothyroidism   Hydronephrosis   Dizziness   Chronic constipation   Parkinson's disease ([facility]   Hypogonadism, testicular      Admitting Diagnosis:     Orthostatic hypotension [I95 1]  Urinary retention [R33 9]  Dizziness [R42]  Enteritis [K52 9]  Cystitis [N30 90]  Cerebral ventriculomegaly due to brain atrophy ([address] ) [G31 9]    Age/Sex: 53 y o  male     Admission Orders:    Scheduled Medications:    Medications:  aspirin 81 mg Oral Daily   atorvastatin 20 mg Oral Daily   cefTRIAXone 1,000 mg Intravenous Q24H   divalproex sodium 500 mg Oral HS   enoxaparin 40 mg Subcutaneous Daily   enoxaparin 40 mg Subcutaneous Q24H [facility]   levothyroxine 75 mcg Oral Early Morning   loratadine 10 mg Oral Daily   pantoprazole 40 mg Oral BID AC   saccharomyces boulardii 250 mg Oral BID   testosterone 40 5 mg Topical QAM     Continuous IV Infusions:    sodium chloride 75 mL/hr Intravenous Continuous     PRN Meds:    acetaminophen 650 mg Oral Q6H PRN   midodrine 2 5 mg Oral BID PRN   ondansetron 4 mg Intravenous Q4H PRN       IP CONSULT TO UROLOGY  IP CONSULT TO CASE MANAGEMENT  IP CONSULT TO NEUROLOGY    Network Utilization Review Department  [email or web address]  ATTENTION: Please call with any questions or concerns to [phone] and carefully listen to the prompts so that you are directed to the right person  All voicemails are confidential   Janine Hurtado all requests for admission clinical reviews, approved or denied determinations and any other requests to dedicated fax number below belonging to the campus where the patient is receiving treatment   List of dedicated fax numbers for the Facilities:  FACILITY NAME UR FAX NUMBER   ADMISSION DENIALS (Administrative/Medical Necessity) [phone]   [address]  (Maternity/NICU/Pediatrics) [phone]   Anna Elkins [phone]   Madai Nova [phone]   [facility] [phone]   Ashli Angel [facility] [address] [facility] [address] [facility] [phone] [address] [place] [address],  [phone]

## 2020-01-17 NOTE — ASSESSMENT & PLAN NOTE
· As per urology testosterone currently is being placed on hold as patient has a history of BPH, and testosterone may cause growth of prostate, leading to urinary retention, hydronephrosis  · Monitor

## 2020-01-17 NOTE — ASSESSMENT & PLAN NOTE
Probably multifactorial in etiology  -fall precautions  -orthostatic vital signs  -consult Neurology for additional recommendations

## 2020-01-17 NOTE — ASSESSMENT & PLAN NOTE
CT abdomen pelvis revealed an extremely distended bladder with diffuse bladder wall thickening likely due to cystitis or outlet obstruction  Urinalysis abnormal with nitrates  · Continue IV ceftriaxone daily, day #2   · Follow-up urine culture  · Urology consult

## 2020-01-17 NOTE — SOCIAL WORK
Pt is currently in observation status  Per documentation, pt resides in the [address] [place] [address]  Pt was observed ambulating with PT independently  Is his previous baseline  Anticipate pt to be able to return to the group home when medically stable with no d/c needs from CM

## 2020-01-17 NOTE — ASSESSMENT & PLAN NOTE
Flomax was held at the group home for concerns of hypotension  · Continue gentle IV hydration and resume midodrine  · Consult urology  · Can likely resume Flomax as patient will be placed back on midodrine and has received IV hydration, will defer to urology  · Consider Navarro catheter placement if needed  · Monitor I&O, check bladder scan

## 2020-01-18 LAB
ANION GAP SERPL CALCULATED.3IONS-SCNC: 5 MMOL/L (ref 4–13)
BASOPHILS # BLD AUTO: 0.03 THOUSANDS/ΜL (ref 0–0.1)
BASOPHILS NFR BLD AUTO: 0 % (ref 0–1)
BUN SERPL-MCNC: 5 MG/DL (ref 5–25)
CALCIUM SERPL-MCNC: 7.5 MG/DL (ref 8.3–10.1)
CHLORIDE SERPL-SCNC: 102 MMOL/L (ref 100–108)
CO2 SERPL-SCNC: 31 MMOL/L (ref 21–32)
CREAT SERPL-MCNC: 0.91 MG/DL (ref 0.6–1.3)
EOSINOPHIL # BLD AUTO: 0.05 THOUSAND/ΜL (ref 0–0.61)
EOSINOPHIL NFR BLD AUTO: 0 % (ref 0–6)
ERYTHROCYTE [DISTWIDTH] IN BLOOD BY AUTOMATED COUNT: 13.7 % (ref 11.6–15.1)
GFR SERPL CREATININE-BSD FRML MDRD: 95 ML/MIN/1.73SQ M
GLUCOSE SERPL-MCNC: 99 MG/DL (ref 65–140)
HCT VFR BLD AUTO: 41.5 % (ref 36.5–49.3)
HGB BLD-MCNC: 13.9 G/DL (ref 12–17)
IMM GRANULOCYTES # BLD AUTO: 0.08 THOUSAND/UL (ref 0–0.2)
IMM GRANULOCYTES NFR BLD AUTO: 1 % (ref 0–2)
LYMPHOCYTES # BLD AUTO: 1.07 THOUSANDS/ΜL (ref 0.6–4.47)
LYMPHOCYTES NFR BLD AUTO: 10 % (ref 14–44)
MAGNESIUM SERPL-MCNC: 2 MG/DL (ref 1.6–2.6)
MCH RBC QN AUTO: 33.4 PG (ref 26.8–34.3)
MCHC RBC AUTO-ENTMCNC: 33.5 G/DL (ref 31.4–37.4)
MCV RBC AUTO: 100 FL (ref 82–98)
MONOCYTES # BLD AUTO: 0.68 THOUSAND/ΜL (ref 0.17–1.22)
MONOCYTES NFR BLD AUTO: 6 % (ref 4–12)
MRSA NOSE QL CULT: NORMAL
NEUTROPHILS # BLD AUTO: 9.22 THOUSANDS/ΜL (ref 1.85–7.62)
NEUTS SEG NFR BLD AUTO: 83 % (ref 43–75)
NRBC BLD AUTO-RTO: 0 /100 WBCS
PHOSPHATE SERPL-MCNC: 1.7 MG/DL (ref 2.7–4.5)
PLATELET # BLD AUTO: 262 THOUSANDS/UL (ref 149–390)
PMV BLD AUTO: 9.6 FL (ref 8.9–12.7)
POTASSIUM SERPL-SCNC: 3.3 MMOL/L (ref 3.5–5.3)
RBC # BLD AUTO: 4.16 MILLION/UL (ref 3.88–5.62)
SODIUM SERPL-SCNC: 138 MMOL/L (ref 136–145)
WBC # BLD AUTO: 11.13 THOUSAND/UL (ref 4.31–10.16)

## 2020-01-18 PROCEDURE — 85025 COMPLETE CBC W/AUTO DIFF WBC: CPT | Performed by: NURSE PRACTITIONER

## 2020-01-18 PROCEDURE — 94762 N-INVAS EAR/PLS OXIMTRY CONT: CPT

## 2020-01-18 PROCEDURE — 94760 N-INVAS EAR/PLS OXIMETRY 1: CPT

## 2020-01-18 PROCEDURE — 84100 ASSAY OF PHOSPHORUS: CPT | Performed by: NURSE PRACTITIONER

## 2020-01-18 PROCEDURE — 80048 BASIC METABOLIC PNL TOTAL CA: CPT | Performed by: NURSE PRACTITIONER

## 2020-01-18 PROCEDURE — 83735 ASSAY OF MAGNESIUM: CPT | Performed by: NURSE PRACTITIONER

## 2020-01-18 PROCEDURE — 99232 SBSQ HOSP IP/OBS MODERATE 35: CPT | Performed by: NURSE PRACTITIONER

## 2020-01-18 RX ORDER — POLYETHYLENE GLYCOL 3350 17 G/17G
17 POWDER, FOR SOLUTION ORAL DAILY
Status: DISCONTINUED | OUTPATIENT
Start: 2020-01-19 | End: 2020-01-21 | Stop reason: HOSPADM

## 2020-01-18 RX ORDER — DOCUSATE SODIUM 100 MG/1
100 CAPSULE, LIQUID FILLED ORAL 2 TIMES DAILY
Status: DISCONTINUED | OUTPATIENT
Start: 2020-01-18 | End: 2020-01-21 | Stop reason: HOSPADM

## 2020-01-18 RX ADMIN — LEVOTHYROXINE SODIUM 75 MCG: 75 TABLET ORAL at 06:54

## 2020-01-18 RX ADMIN — DIVALPROEX SODIUM 500 MG: 500 TABLET, DELAYED RELEASE ORAL at 21:05

## 2020-01-18 RX ADMIN — PANTOPRAZOLE SODIUM 40 MG: 40 TABLET, DELAYED RELEASE ORAL at 16:28

## 2020-01-18 RX ADMIN — MIDODRINE HYDROCHLORIDE 2.5 MG: 5 TABLET ORAL at 13:58

## 2020-01-18 RX ADMIN — POTASSIUM PHOSPHATE, MONOBASIC AND POTASSIUM PHOSPHATE, DIBASIC 12 MMOL: 224; 236 INJECTION, SOLUTION, CONCENTRATE INTRAVENOUS at 09:17

## 2020-01-18 RX ADMIN — Medication 250 MG: at 09:11

## 2020-01-18 RX ADMIN — CEFTRIAXONE 1000 MG: 1 INJECTION, SOLUTION INTRAVENOUS at 22:38

## 2020-01-18 RX ADMIN — CEFTRIAXONE 1000 MG: 1 INJECTION, SOLUTION INTRAVENOUS at 01:26

## 2020-01-18 RX ADMIN — DOCUSATE SODIUM 100 MG: 100 CAPSULE, LIQUID FILLED ORAL at 17:36

## 2020-01-18 RX ADMIN — ATORVASTATIN CALCIUM 20 MG: 20 TABLET, FILM COATED ORAL at 16:28

## 2020-01-18 RX ADMIN — ASPIRIN 81 MG: 81 TABLET, COATED ORAL at 09:11

## 2020-01-18 RX ADMIN — Medication 250 MG: at 17:36

## 2020-01-18 RX ADMIN — ENOXAPARIN SODIUM 40 MG: 40 INJECTION SUBCUTANEOUS at 09:12

## 2020-01-18 RX ADMIN — LORATADINE 10 MG: 10 TABLET ORAL at 09:11

## 2020-01-18 RX ADMIN — TAMSULOSIN HYDROCHLORIDE 0.8 MG: 0.4 CAPSULE ORAL at 16:28

## 2020-01-18 RX ADMIN — PANTOPRAZOLE SODIUM 40 MG: 40 TABLET, DELAYED RELEASE ORAL at 06:55

## 2020-01-18 RX ADMIN — MIDODRINE HYDROCHLORIDE 2.5 MG: 5 TABLET ORAL at 09:11

## 2020-01-18 RX ADMIN — MIDODRINE HYDROCHLORIDE 2.5 MG: 5 TABLET ORAL at 16:28

## 2020-01-18 RX ADMIN — SODIUM CHLORIDE 50 ML/HR: 0.9 INJECTION, SOLUTION INTRAVENOUS at 06:56

## 2020-01-18 NOTE — PROGRESS NOTES
Progress Note - urology      Patient: Ama Akers   : 1965 Sex: male   MRN: [id number]     CSN: [id number]  Unit/Bed#: [address] [place] [address]     SUBJECTIVE:   Sitting in bed  No complaints  Indwelling Navarro  Slight discomfort    Objective   Vitals: /53 (BP Location: Left arm)   Pulse 83   Temp 98 °F (36 7 °C) (Tympanic)   Resp 18   Ht 5' (1 524 m)   Wt 65 kg (143 lb 4 8 oz)   SpO2 93%   BMI 27 99 kg/m²     I/O last 24 hours: In: 6696 [P O :690;  I V :1040; IV Piggyback:50]  Out: 1800 [Urine:1800]      Physical Exam:   General Alert awake   Normocephalic atraumatic PERRLA  Lungs clear bilaterally  Cardiac normal S1 normal S2  Abdomen soft, flank pain  Extremities no edema      Lab Results: CBC:   Lab Results   Component Value Date    WBC 11 13 (H) 2020    HGB 13 9 2020    HCT 41 5 2020     (H) 2020     2020    ADJUSTEDWBC 3 60 (L) 10/03/2016    MCH 33 4 2020    MCHC 33 5 2020    RDW 13 7 2020    MPV 9 6 2020    NRBC 0 2020     CMP:   Lab Results   Component Value Date     (L) 2015     2020    CL 99 2015    CO2 31 2020    CO2 33 (H) 2015    ANIONGAP 7 7 (L) 2015    BUN 5 2020    BUN 10 2015    CREATININE 0 91 2020    CREATININE 0 7 2015    GLUCOSE 96 2015    CALCIUM 7 5 (L) 2020    CALCIUM 8 5 2016    AST 21 2020    ALT 16 2020    ALKPHOS 44 (L) 2020    EGFR 95 2020     Urinalysis:   Lab Results   Component Value Date    COLORU Light Yellow 2020    COLORU Yellow 2017    CLARITYU Clear 2020    CLARITYU Transparent 2017    SPECGRAV <=1 005 2020    SPECGRAV 1 005 2017    PHUR 6 5 2020    PHUR 7 5 2017    PHUR 7 2017    LEUKOCYTESUR Negative 2020    LEUKOCYTESUR neg 2017    NITRITE Positive (A) 2020    NITRITE neg 2017    PROTEINUA neg 02/23/2017    GLUCOSEU Negative 01/16/2020    KETONESU Negative 01/16/2020    KETONESU neg 02/23/2017    BILIRUBINUR Negative 01/16/2020    BILIRUBINUR neg 02/23/2017    BLOODU Negative 01/16/2020    BLOODU neg 02/23/2017     Urine Culture:   Lab Results   Component Value Date    URINECX Culture too young- will reincubate 01/16/2020     PSA:   Lab Results   Component Value Date    PSA <0 1 09/12/2019    PSA <0 1 07/26/2019    PSA <0 1 11/10/2018    PSA <0 1 06/08/2018    PSA <0 1 07/15/2017    PSA <0 1 10/03/2016    PSA <0 1 04/16/2016         Assessment/ Plan:  Urinary tension  BPH on tamsulosin 0 8 mg  Cystitis  Right hydro  Continue Navarro catheter  Continue tamsulosin 0 8 mg  Voiding trial in 5 days          Mitzy Isabel MD

## 2020-01-18 NOTE — CONSULTS
H&P Exam - Urology       Patient: Mercy Valdez   : 1965 Sex: male   MRN: [id number]     [facility]: [id number]      History of Present Illness   HPI:  Mercy Valdez is a 53 y o  male who presents with abdominal pain diarrhea or undergoing CT scan confirming distended bladder admitted late last night found to have urinary tract infection urologic consult called for patient with intellectual disability down syndrome states has no problems urinating when seen at the bedside has significant bladder distension bladder scan confirms greater than 600 cc  Patient on tamsulosin 0 4 mg a day and testosterone      Review of Systems:   Constitutional:  Negative for activity change, fever, chills and diaphoresis  HENT: Negative for hearing loss and trouble swallowing  Eyes: Negative for itching and visual disturbance  Respiratory: Negative for chest tightness and shortness of breath  Cardiovascular: Negative for chest pain, edema  Gastrointestinal: Negative for abdominal distention, na abdominal pain, constipation, diarrhea, Nausea and vomiting  Genitourinary: Negative for decreased urine volume, difficulty urinating, dysuria, enuresis, frequency, hematuria and urgency  Musculoskeletal: Negative for gait problem and myalgias  Neurological: Negative for dizziness and headaches  Hematological: Does not bruise/bleed easily         Historical Information   Past Medical History:   Diagnosis Date    Abnormal weight gain     last assessed 16; resolved 17    Acute [facility]estive [facility] failure) [facility]     Anxiety     D[facility]     Depression     Disease of thyroid gland     Down's syndrome     Fatigue     last assessed 17; resolved 17    GERD (gastroesophageal reflux disease)     Gluten free diet     Hernia of abdominal cavity     Hyperlipidemia     Hypogonadism in male     Impulse control disorder     OCD (obsessive compulsive disorder)     Parkinson disease ([facility]     Parkinson disease ([facility] )     resolved 2/23/17    Pleural effusion     Sinus tachycardia     Vitamin D deficiency     last assessed 9/1/16; resolved 6/1/17     Past Surgical History:   Procedure Laterality Date    COLONOSCOPY      ESOPHAGOGASTRODUODENOSCOPY      with biopsy    PERICARDIOCENTESIS  4/29/2016          Social History   Social History     Substance and Sexual Activity   Alcohol Use Never    Frequency: Never     Social History     Substance and Sexual Activity   Drug Use Never     Social History     Tobacco Use   Smoking Status Never Smoker   Smokeless Tobacco Never Used     Family History:   Family History   Problem Relation Age of Onset    No Known Problems Mother     Parkinsonism Father         symtomatic       Meds/Allergies   Medications Prior to Admission   Medication    aspirin (ECOTRIN LOW STRENGTH) 81 mg EC tablet    atorvastatin (LIPITOR) 20 mg tablet    Calcium Carbonate-Vitamin D 600-400 MG-UNIT per tablet    cholecalciferol (VITAMIN D3) 1,000 units tablet    divalproex sodium (DEPAKOTE) 500 mg EC tablet    docusate sodium (COLACE) 100 mg capsule    esomeprazole (NexIUM) 40 MG capsule    lanolin-mineral oil (ARMAAN) LOTN    levothyroxine 75 mcg tablet    loratadine (CLARITIN) 10 mg tablet    midodrine (PROAMATINE) 2 5 mg tablet    multivitamin-minerals (CENTRUM ADULTS) tablet    OLANZapine (ZyPREXA) 2 5 mg tablet    polyethylene glycol (GLYCOLAX) powder    testosterone (ANDROGEL) 1 62 % TD gel pump    tamsulosin (FLOMAX) 0 4 mg     Allergies   Allergen Reactions    Advil [Ibuprofen]     Gluten Meal        Objective   Vitals: /63 (BP Location: Right arm)   Pulse 99   Temp (!) 101 3 °F (38 5 °C) (Tympanic)   Resp 18   Ht 5' (1 524 m)   Wt 65 kg (143 lb 4 8 oz)   SpO2 96%   BMI 27 99 kg/m²     Physical Exam:  General Alert awake   Normocephalic atraumatic PERRLA  Lungs clear bilaterally  Cardiac normal S1 normal S2  Abdomen soft, flank pain  To testicles down atrophic  Rectal exam deferred  Distended bladder  Extremities no edema    I/O last 24 hours: In: 5801 [P O :610;  I V :1050]  Out: 650 [Urine:650]    Invasive Devices     Peripheral Intravenous Line            Peripheral IV 01/16/20 Left Forearm less than 1 day                    Lab Results: CBC:   Lab Results   Component Value Date    WBC 10 87 (H) 01/17/2020    HGB 13 4 01/17/2020    HCT 39 9 01/17/2020     (H) 01/17/2020     01/17/2020    ADJUSTEDWBC 3 60 (L) 10/03/2016    MCH 33 6 01/17/2020    MCHC 33 6 01/17/2020    RDW 13 9 01/17/2020    MPV 9 8 01/17/2020    NRBC 0 01/16/2020     CMP:   Lab Results   Component Value Date     (L) 11/20/2015     01/17/2020    CL 99 11/20/2015    CO2 27 01/17/2020    CO2 33 (H) 11/20/2015    ANIONGAP 7 7 (L) 11/20/2015    BUN 10 01/17/2020    BUN 10 11/20/2015    CREATININE 0 87 01/17/2020    CREATININE 0 7 11/20/2015    GLUCOSE 96 11/20/2015    CALCIUM 7 0 (L) 01/17/2020    CALCIUM 8 5 01/02/2016    AST 21 01/17/2020    ALT 16 01/17/2020    ALKPHOS 44 (L) 01/17/2020    EGFR 97 01/17/2020     Urinalysis:   Lab Results   Component Value Date    COLORU Light Yellow 01/16/2020    COLORU Yellow 02/23/2017    CLARITYU Clear 01/16/2020    CLARITYU Transparent 02/23/2017    SPECGRAV <=1 005 01/16/2020    SPECGRAV 1 005 02/23/2017    PHUR 6 5 01/16/2020    PHUR 7 5 07/28/2017    PHUR 7 02/23/2017    LEUKOCYTESUR Negative 01/16/2020    LEUKOCYTESUR neg 02/23/2017    NITRITE Positive (A) 01/16/2020    NITRITE neg 02/23/2017    PROTEINUA neg 02/23/2017    GLUCOSEU Negative 01/16/2020    KETONESU Negative 01/16/2020    KETONESU neg 02/23/2017    BILIRUBINUR Negative 01/16/2020    BILIRUBINUR neg 02/23/2017    BLOODU Negative 01/16/2020    BLOODU neg 02/23/2017     Urine Culture:   Lab Results   Component Value Date    URINECX No Growth <1000 cfu/mL 02/24/2017     PSA:   Lab Results   Component Value Date    PSA <0 1 09/12/2019    PSA <0 1 07/26/2019    PSA <0 1 11/10/2018    PSA <0 1 06/08/2018    PSA <0 1 07/15/2017    PSA <0 1 10/03/2016    PSA <0 1 04/16/2016           Assessment/ Plan:  Urinary tension  BPH obstruction  Low testosterone  Straight cath patient Q 6 H p r n  For postvoid residual greater than 400 cc  Increase tamsulosin 0 8 mg p o   Daily  Hold testosterone since can cause prostate growth  Do not feel candidate for indwelling Navarro in light of multiple issues      Candy Rouse MD

## 2020-01-18 NOTE — PLAN OF CARE
Problem: Potential for Falls  Goal: Patient will remain free of falls  Description  INTERVENTIONS:  - Assess patient frequently for physical needs  -  Identify cognitive and physical deficits and behaviors that affect risk of falls    -  [place] fall precautions as indicated by assessment   - Educate patient/family on patient safety including physical limitations  - Instruct patient to call for assistance with activity based on assessment  - Modify environment to reduce risk of injury  - Consider OT/PT consult to assist with strengthening/mobility  Outcome: Progressing     Problem: METABOLIC, FLUID AND ELECTROLYTES - ADULT  Goal: Electrolytes maintained within normal limits  Description  INTERVENTIONS:  - Monitor labs and assess patient for signs and symptoms of electrolyte imbalances  - Administer electrolyte replacement as ordered  - Monitor response to electrolyte replacements, including repeat lab results as appropriate  - Instruct patient on fluid and nutrition as appropriate  Outcome: Progressing  Goal: Fluid balance maintained  Description  INTERVENTIONS:  - Monitor labs   - Monitor I/O and WT  - Instruct patient on fluid and nutrition as appropriate  - Assess for signs & symptoms of volume excess or deficit  Outcome: Progressing  Goal: Glucose maintained within target range  Description  INTERVENTIONS:  - Monitor Blood Glucose as ordered  - Assess for signs and symptoms of hyperglycemia and hypoglycemia  - Administer ordered medications to maintain glucose within target range  - Assess nutritional intake and initiate nutrition service referral as needed  Outcome: Progressing     Problem: MUSCULOSKELETAL - ADULT  Goal: Maintain or return mobility to safest level of function  Description  INTERVENTIONS:  - Assess patient's ability to carry out ADLs; assess patient's baseline for ADL function and identify physical deficits which impact ability to perform ADLs (bathing, care of mouth/teeth, toileting, grooming, dressing, etc )  - Assess/evaluate cause of self-care deficits   - Assess range of motion  - Assess patient's mobility  - Assess patient's need for assistive devices and provide as appropriate  - Encourage maximum independence but intervene and supervise when necessary  - Involve family in performance of ADLs  - Assess for home care needs following discharge   - Consider OT consult to assist with ADL evaluation and planning for discharge  - Provide patient education as appropriate  Outcome: Progressing  Goal: Maintain proper alignment of affected body part  Description  INTERVENTIONS:  - Support, maintain and protect limb and body alignment  - Provide patient/ family with appropriate education  Outcome: Progressing

## 2020-01-18 NOTE — NURSING NOTE
Several attempts made for indwelling patel catheter insertion  Patient refusing; insisting that he was able to void despite persistent urinary retention as evidenced by bladder scans (last  ml)  Nursing supervisor (Felipe Menjivar RN) at bedside to educate patient on need for indwelling catheter and attempt patel placement  Report provided to TAMMY MILLER

## 2020-01-18 NOTE — NURSING NOTE
Received report from the previous RN  Navarro inserted under aseptic precautions  Pt resting comfortably in bed

## 2020-01-18 NOTE — PROGRESS NOTES
Progress Note - Audra Granado 1965, 53 y o  male MRN: [id number]    Unit/Bed#: [address] [place] [address] Encounter: [id number]    Primary Care Provider: Tiffany Solis   Date and time admitted to hospital: 1/16/2020  7:37 PM        * Gastroenteritis  Assessment & Plan  Patient had nausea vomiting and diarrhea in the group home followed by 2 episodes of near-syncope  CT abdomen pelvis revealed small bowel enteritis  · Patient was tolerating clear liquid diet, denied any abdominal pain, nausea or vomiting after eating  · Advanced diet to low residue, low-fiber  Monitor how patient is doing  · Patient reported that he was very hungry and wanted to eat real food  · Continue probiotics and PPI  · Patient has not had a bowel movement since he has been hospitalized  · May consider stool softener/suppository patient does not move bowels by tomorrow  · Monitor  Cystitis  Assessment & Plan  CT abdomen pelvis revealed an extremely distended bladder with diffuse bladder wall thickening likely due to cystitis or outlet obstruction  Urinalysis abnormal with nitrates, cultures pending, follow-up  · Currently on ceftriaxone day 3    · Urology consulted  · Patient continued with urinary retention requiring insertion of Navarro catheter today  · WBCs elevated at 11  13   · T-max in last 24 hours is 99  · Continue to monitor  Hydronephrosis  Assessment & Plan  CT abdomen pelvis revealed mild right hydronephrosis  · Urology was consulted, initially avoided placement of Navarro catheter  · Urinary retention protocol was followed  · Patient required to have Navarro catheter placed this morning as per protocol  · Creatinine stable  · Monitor  Dizziness  Assessment & Plan  Probably multifactorial in etiology, likely from orthostatic hypotension and dehydration from gastroenteritis  · Patient had orthostatic hypotension on admission    · Per Neurology, 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 that can be found in patients with Down syndrome  No intervention is necessary  This is likely not the cause of his dizziness  · Patient also has a history of Parkinson's disease which could contribute to orthostatic hypotension  · Tilt-table test performed, negative as per report from Cardiology  · Patient is scheduled for outpatient EEG  · Will continue with IV hydration and antibiotics at this time  · Continue midodrine 2 5 mg t i d  Before meals  · Recent MRI negative  · Patient denies any dizziness today, assisted with ambulation into bathroom, did not experience dizziness with ambulation  · Monitor  Orthostatic hypotension  Assessment & Plan  · Acute on chronic  In the setting of nausea and diarrhea from gastroenteritis with a history of Parkinson's disease which can cause dysautonomia  · Orthostatics positive with a drop of more than 20 points on admission  · Will continue IV hydration until certain that patient is tolerating his oral diet without any difficulty  · Continue midodrine 2 5 mg t i d  With meals  · Cardiology had recommended BID dosing, consider titrating midodrine down to BID dosing if needed  · Vital signs q 4 hours ordered for next 24 hours to monitor  · Continue with fall precautions  · PT/OT evaluation treatment ordered  Urinary retention  Assessment & Plan  Flomax was held at the group home for concerns of hypotension  · Will continue with gentle IV hydration until certain that patient is tolerating his diet without any difficulty  New line midodrine resume 2 5 mg t i d  · Urology consulted, appreciate recommendations of increasing Flomax from 0 4 mg to 0 8 mg daily with dinner  · Urinary retention protocol was followed, patient required insertion of Navarro catheter on morning of 1/18    · Will discuss with Urology when to attempt voiding trial   · Monitor patient's bowel movements, consider stool softener/suppositories if patient does not have BM     Chronic constipation  Assessment & Plan  · Restarted patient's Colace and MiraLax as he has not had a bowel movement since admission  · Monitor bowel movements closely, as constipation may be contributing to urinary retention  Hypogonadism, testicular  Assessment & Plan  · As per urology testosterone currently is being placed on hold as patient has a history of BPH, and testosterone may cause growth of prostate, leading to urinary retention, hydronephrosis  · Monitor  Hypothyroidism  Assessment & Plan  · Will continue with patient's home medication levothyroxine 75 mcg p o  Daily  Parkinson's disease [facility])  Assessment & Plan  · Will continue patient's home medication of Depakote and midodrine  Cerebral ventriculomegaly due to brain atrophy [facility])  Assessment & Plan  · Appreciate neurology input  · Patient is stable  · Patient can follow-up with neurosurgery for outpatient follow-up for moyamoya       Down's syndrome  Assessment & Plan  · Supportive care, fall precautions        VTE Pharmacologic Prophylaxis:   Pharmacologic: Enoxaparin (Lovenox)  Mechanical VTE Prophylaxis in Place: Yes    Patient Centered Rounds: I have performed bedside rounds with nursing staff today  Discussions with Specialists or Other Care Team Provider:  Urology note appreciated  Education and Discussions with Family / Patient:  I spoke with the patient at the bedside, I also called the patient's brother Jina Sweeney, left a message on his cell phone  Time Spent for Care: 30 minutes  More than 50% of total time spent on counseling and coordination of care as described above      Current Length of Stay: 1 day(s)    Current Patient Status: Inpatient   Certification Statement: The patient will continue to require additional inpatient hospital stay due to Advancement of diet, monitoring tolerance of diet, urology consultation with Navarro insertion, increasing Flomax, ?  voiding trial     Discharge Plan:  Not stable for discharge today, most likely in the next 24-48 hours pending patient's progress  Code Status: Level 1 - Full Code      Subjective:   Patient seen sitting up in bed, had just eaten his breakfast   Reports that he really does not like the broth very much  He is asking for foods like cereal   He denies any nausea, vomiting or abdominal pain  Reports he did not have a bowel movement this morning  He did ambulate into the bathroom and attempted to have bowel movement  Was only able to pass gas  Objective:     Vitals:   Temp (24hrs), Av 6 °F (37 6 °C), Min:98 5 °F (36 9 °C), Max:101 3 °F (38 5 °C)    Temp:  [98 5 °F (36 9 °C)-101 3 °F (38 5 °C)] 98 5 °F (36 9 °C)  HR:  [] 86  Resp:  [17-18] 17  BP: (107-137)/(53-63) 125/55  SpO2:  [93 %-99 %] 99 %  Body mass index is 27 99 kg/m²  Input and Output Summary (last 24 hours): Intake/Output Summary (Last 24 hours) at 2020 1527  Last data filed at 2020 0700  Gross per 24 hour   Intake 300 ml   Output 1800 ml   Net -1500 ml       Physical Exam:     Physical Exam   Constitutional: He appears well-nourished  No distress  HENT:   Head: Normocephalic  Eyes: Conjunctivae and EOM are normal    Neck: Normal range of motion  Cardiovascular: Normal rate and regular rhythm  Pulmonary/Chest: Effort normal and breath sounds normal  No respiratory distress  Abdominal: Soft  Bowel sounds are normal  He exhibits no distension  There is no tenderness  Genitourinary:   Genitourinary Comments: Navarro catheter present, draining clear yellow urine  Musculoskeletal: Normal range of motion  Trace bilateral lower edema noted  Neurological: He is alert  Patient was able to tell me that he is in the hospital, his name and date of birth, and why he was here  He knows it is winter time  Is very interactive with staff  Skin: Skin is warm and dry  Capillary refill takes less than 2 seconds     Psychiatric:   Very pleasant and cooperative, very interactive with staff appropriately  Nursing note and vitals reviewed  Additional Data:     Labs:    Results from last 7 days   Lab Units 01/18/20  0650   WBC Thousand/uL 11 13*   HEMOGLOBIN g/dL 13 9   HEMATOCRIT % 41 5   PLATELETS Thousands/uL 262   NEUTROS PCT % 83*   LYMPHS PCT % 10*   MONOS PCT % 6   EOS PCT % 0     Results from last 7 days   Lab Units 01/18/20  0650 01/17/20  0536   POTASSIUM mmol/L 3 3* 3 7   CHLORIDE mmol/L 102 104   CO2 mmol/L 31 27   BUN mg/dL 5 10   CREATININE mg/dL 0 91 0 87   CALCIUM mg/dL 7 5* 7 0*   ALK PHOS U/L  --  44*   ALT U/L  --  16   AST U/L  --  21           * I Have Reviewed All Lab Data Listed Above  * Additional Pertinent Lab Tests Reviewed: Veronica 65 Admission Reviewed    Imaging:    Imaging Reports Reviewed Today Include:  CT of abdomen and pelvis, CT head  Imaging Personally Reviewed by Myself Includes:  None      Recent Cultures (last 7 days):     Results from last 7 days   Lab Units 01/16/20  2231   URINE CULTURE  Culture too young- will reincubate       Last 24 Hours Medication List:     Current Facility-Administered Medications:  acetaminophen 650 mg Oral Q6H PRN Kurtis Hinton MD    aspirin 81 mg Oral Daily Kurtis Hinton MD    atorvastatin 20 mg Oral Daily Kurtis Hinton MD    cefTRIAXone 1,000 mg Intravenous Q24H Kurtis Hinton MD Last Rate: 1,000 mg (01/18/20 0126)   divalproex sodium 500 mg Oral HS Kurtis Hinton MD    docusate sodium 100 mg Oral BID BJ Shea    enoxaparin 40 mg Subcutaneous Q24H [address] Kurtis Hintno MD    levothyroxine 75 mcg Oral Early Morning Kurtis Hinton MD    loratadine 10 mg Oral Daily Kurtis Hinton MD    midodrine 2 5 mg Oral TID AC BJ Cochran    ondansetron 4 mg Intravenous Q4H PRN Kurtis Hinton MD    pantoprazole 40 mg Oral BID AC Kurtis Hinton MD    [START ON 1/19/2020] polyethylene glycol 17 g Oral Daily BJ Shea    saccharomyces boulardii 250 mg Oral BID Henrietta Barnes MD    sodium chloride 50 mL/hr Intravenous Continuous BJ Rooney Mai Last Rate: 50 mL/hr (01/18/20 6551)   tamsulosin 0 8 mg Oral Daily With Pillo Bean MD         Today, Patient Was Seen By: BJ Guzman    ** Please Note: Dictation voice to text software may have been used in the creation of this document   **

## 2020-01-18 NOTE — PLAN OF CARE
Problem: Potential for Falls  Goal: Patient will remain free of falls  Description  INTERVENTIONS:  - Assess patient frequently for physical needs  -  Identify cognitive and physical deficits and behaviors that affect risk of falls    -  [place] fall precautions as indicated by assessment   - Educate patient/family on patient safety including physical limitations  - Instruct patient to call for assistance with activity based on assessment  - Modify environment to reduce risk of injury  - Consider OT/PT consult to assist with strengthening/mobility  Outcome: Progressing     Problem: METABOLIC, FLUID AND ELECTROLYTES - ADULT  Goal: Electrolytes maintained within normal limits  Description  INTERVENTIONS:  - Monitor labs and assess patient for signs and symptoms of electrolyte imbalances  - Administer electrolyte replacement as ordered  - Monitor response to electrolyte replacements, including repeat lab results as appropriate  - Instruct patient on fluid and nutrition as appropriate  Outcome: Progressing  Goal: Fluid balance maintained  Description  INTERVENTIONS:  - Monitor labs   - Monitor I/O and WT  - Instruct patient on fluid and nutrition as appropriate  - Assess for signs & symptoms of volume excess or deficit  Outcome: Progressing  Goal: Glucose maintained within target range  Description  INTERVENTIONS:  - Monitor Blood Glucose as ordered  - Assess for signs and symptoms of hyperglycemia and hypoglycemia  - Administer ordered medications to maintain glucose within target range  - Assess nutritional intake and initiate nutrition service referral as needed  Outcome: Progressing     Problem: MUSCULOSKELETAL - ADULT  Goal: Maintain or return mobility to safest level of function  Description  INTERVENTIONS:  - Assess patient's ability to carry out ADLs; assess patient's baseline for ADL function and identify physical deficits which impact ability to perform ADLs (bathing, care of mouth/teeth, toileting, grooming, dressing, etc )  - Assess/evaluate cause of self-care deficits   - Assess range of motion  - Assess patient's mobility  - Assess patient's need for assistive devices and provide as appropriate  - Encourage maximum independence but intervene and supervise when necessary  - Involve family in performance of ADLs  - Assess for home care needs following discharge   - Consider OT consult to assist with ADL evaluation and planning for discharge  - Provide patient education as appropriate  Outcome: Progressing  Goal: Maintain proper alignment of affected body part  Description  INTERVENTIONS:  - Support, maintain and protect limb and body alignment  - Provide patient/ family with appropriate education  Outcome: Progressing

## 2020-01-18 NOTE — PROGRESS NOTES
Received verbal order to insert patel catheter from Dr David Johnston  Straight cath order recently completed  Reported to Night shift to insert patel per 's order

## 2020-01-19 LAB
ANION GAP SERPL CALCULATED.3IONS-SCNC: 7 MMOL/L (ref 4–13)
BACTERIA UR CULT: NORMAL
BASOPHILS # BLD AUTO: 0.04 THOUSANDS/ΜL (ref 0–0.1)
BASOPHILS NFR BLD AUTO: 1 % (ref 0–1)
BUN SERPL-MCNC: 6 MG/DL (ref 5–25)
CALCIUM SERPL-MCNC: 7.5 MG/DL (ref 8.3–10.1)
CHLORIDE SERPL-SCNC: 102 MMOL/L (ref 100–108)
CO2 SERPL-SCNC: 28 MMOL/L (ref 21–32)
CREAT SERPL-MCNC: 0.88 MG/DL (ref 0.6–1.3)
EOSINOPHIL # BLD AUTO: 0.1 THOUSAND/ΜL (ref 0–0.61)
EOSINOPHIL NFR BLD AUTO: 1 % (ref 0–6)
ERYTHROCYTE [DISTWIDTH] IN BLOOD BY AUTOMATED COUNT: 13.3 % (ref 11.6–15.1)
GFR SERPL CREATININE-BSD FRML MDRD: 97 ML/MIN/1.73SQ M
GLUCOSE SERPL-MCNC: 97 MG/DL (ref 65–140)
HCT VFR BLD AUTO: 40 % (ref 36.5–49.3)
HGB BLD-MCNC: 13.6 G/DL (ref 12–17)
IMM GRANULOCYTES # BLD AUTO: 0.14 THOUSAND/UL (ref 0–0.2)
IMM GRANULOCYTES NFR BLD AUTO: 2 % (ref 0–2)
LYMPHOCYTES # BLD AUTO: 1.3 THOUSANDS/ΜL (ref 0.6–4.47)
LYMPHOCYTES NFR BLD AUTO: 15 % (ref 14–44)
MCH RBC QN AUTO: 33.7 PG (ref 26.8–34.3)
MCHC RBC AUTO-ENTMCNC: 34 G/DL (ref 31.4–37.4)
MCV RBC AUTO: 99 FL (ref 82–98)
MONOCYTES # BLD AUTO: 0.64 THOUSAND/ΜL (ref 0.17–1.22)
MONOCYTES NFR BLD AUTO: 7 % (ref 4–12)
NEUTROPHILS # BLD AUTO: 6.61 THOUSANDS/ΜL (ref 1.85–7.62)
NEUTS SEG NFR BLD AUTO: 74 % (ref 43–75)
NRBC BLD AUTO-RTO: 0 /100 WBCS
PLATELET # BLD AUTO: 269 THOUSANDS/UL (ref 149–390)
PMV BLD AUTO: 9.8 FL (ref 8.9–12.7)
POTASSIUM SERPL-SCNC: 3.8 MMOL/L (ref 3.5–5.3)
PROCALCITONIN SERPL-MCNC: <0.05 NG/ML
RBC # BLD AUTO: 4.04 MILLION/UL (ref 3.88–5.62)
SODIUM SERPL-SCNC: 137 MMOL/L (ref 136–145)
WBC # BLD AUTO: 8.83 THOUSAND/UL (ref 4.31–10.16)

## 2020-01-19 PROCEDURE — 85025 COMPLETE CBC W/AUTO DIFF WBC: CPT | Performed by: NURSE PRACTITIONER

## 2020-01-19 PROCEDURE — 84145 PROCALCITONIN (PCT): CPT | Performed by: NURSE PRACTITIONER

## 2020-01-19 PROCEDURE — 80048 BASIC METABOLIC PNL TOTAL CA: CPT | Performed by: NURSE PRACTITIONER

## 2020-01-19 PROCEDURE — 99232 SBSQ HOSP IP/OBS MODERATE 35: CPT | Performed by: NURSE PRACTITIONER

## 2020-01-19 RX ADMIN — POLYETHYLENE GLYCOL 3350 17 G: 17 POWDER, FOR SOLUTION ORAL at 08:57

## 2020-01-19 RX ADMIN — PANTOPRAZOLE SODIUM 40 MG: 40 TABLET, DELAYED RELEASE ORAL at 06:03

## 2020-01-19 RX ADMIN — TAMSULOSIN HYDROCHLORIDE 0.8 MG: 0.4 CAPSULE ORAL at 16:08

## 2020-01-19 RX ADMIN — MIDODRINE HYDROCHLORIDE 2.5 MG: 5 TABLET ORAL at 12:04

## 2020-01-19 RX ADMIN — MIDODRINE HYDROCHLORIDE 2.5 MG: 5 TABLET ORAL at 16:09

## 2020-01-19 RX ADMIN — Medication 250 MG: at 08:57

## 2020-01-19 RX ADMIN — DIVALPROEX SODIUM 500 MG: 500 TABLET, DELAYED RELEASE ORAL at 21:15

## 2020-01-19 RX ADMIN — LORATADINE 10 MG: 10 TABLET ORAL at 08:57

## 2020-01-19 RX ADMIN — MIDODRINE HYDROCHLORIDE 2.5 MG: 5 TABLET ORAL at 06:03

## 2020-01-19 RX ADMIN — LEVOTHYROXINE SODIUM 75 MCG: 75 TABLET ORAL at 06:03

## 2020-01-19 RX ADMIN — MAGNESIUM HYDROXIDE 30 ML: 400 SUSPENSION ORAL at 16:39

## 2020-01-19 RX ADMIN — DOCUSATE SODIUM 100 MG: 100 CAPSULE, LIQUID FILLED ORAL at 08:57

## 2020-01-19 RX ADMIN — ENOXAPARIN SODIUM 40 MG: 40 INJECTION SUBCUTANEOUS at 08:57

## 2020-01-19 RX ADMIN — ASPIRIN 81 MG: 81 TABLET, COATED ORAL at 08:57

## 2020-01-19 RX ADMIN — PANTOPRAZOLE SODIUM 40 MG: 40 TABLET, DELAYED RELEASE ORAL at 16:09

## 2020-01-19 RX ADMIN — DOCUSATE SODIUM 100 MG: 100 CAPSULE, LIQUID FILLED ORAL at 17:43

## 2020-01-19 RX ADMIN — CEFTRIAXONE 1000 MG: 1 INJECTION, SOLUTION INTRAVENOUS at 23:29

## 2020-01-19 RX ADMIN — SODIUM CHLORIDE 50 ML/HR: 0.9 INJECTION, SOLUTION INTRAVENOUS at 09:03

## 2020-01-19 RX ADMIN — ATORVASTATIN CALCIUM 20 MG: 20 TABLET, FILM COATED ORAL at 16:09

## 2020-01-19 RX ADMIN — Medication 250 MG: at 17:43

## 2020-01-19 NOTE — PLAN OF CARE
Problem: Potential for Falls  Goal: Patient will remain free of falls  Description  INTERVENTIONS:  - Assess patient frequently for physical needs  -  Identify cognitive and physical deficits and behaviors that affect risk of falls    -  [place] fall precautions as indicated by assessment   - Educate patient/family on patient safety including physical limitations  - Instruct patient to call for assistance with activity based on assessment  - Modify environment to reduce risk of injury  - Consider OT/PT consult to assist with strengthening/mobility  Outcome: Progressing     Problem: METABOLIC, FLUID AND ELECTROLYTES - ADULT  Goal: Electrolytes maintained within normal limits  Description  INTERVENTIONS:  - Monitor labs and assess patient for signs and symptoms of electrolyte imbalances  - Administer electrolyte replacement as ordered  - Monitor response to electrolyte replacements, including repeat lab results as appropriate  - Instruct patient on fluid and nutrition as appropriate  Outcome: Progressing  Goal: Fluid balance maintained  Description  INTERVENTIONS:  - Monitor labs   - Monitor I/O and WT  - Instruct patient on fluid and nutrition as appropriate  - Assess for signs & symptoms of volume excess or deficit  Outcome: Progressing  Goal: Glucose maintained within target range  Description  INTERVENTIONS:  - Monitor Blood Glucose as ordered  - Assess for signs and symptoms of hyperglycemia and hypoglycemia  - Administer ordered medications to maintain glucose within target range  - Assess nutritional intake and initiate nutrition service referral as needed  Outcome: Progressing     Problem: MUSCULOSKELETAL - ADULT  Goal: Maintain or return mobility to safest level of function  Description  INTERVENTIONS:  - Assess patient's ability to carry out ADLs; assess patient's baseline for ADL function and identify physical deficits which impact ability to perform ADLs (bathing, care of mouth/teeth, toileting, grooming, dressing, etc )  - Assess/evaluate cause of self-care deficits   - Assess range of motion  - Assess patient's mobility  - Assess patient's need for assistive devices and provide as appropriate  - Encourage maximum independence but intervene and supervise when necessary  - Involve family in performance of ADLs  - Assess for home care needs following discharge   - Consider OT consult to assist with ADL evaluation and planning for discharge  - Provide patient education as appropriate  Outcome: Progressing  Goal: Maintain proper alignment of affected body part  Description  INTERVENTIONS:  - Support, maintain and protect limb and body alignment  - Provide patient/ family with appropriate education  Outcome: Progressing

## 2020-01-19 NOTE — ASSESSMENT & PLAN NOTE
Patient had nausea vomiting and diarrhea in the group home followed by 2 episodes of near-syncope  CT abdomen pelvis revealed small bowel enteritis  · Patient initially was placed on clear liquid diet, vomiting and diarrhea resolved  · Diet advanced, currently is on regular diet and tolerating well  · Continue probiotics and PPI  · Patient has not had a bowel movement during his stay, will give a dose of milk of magnesia at bedtime tonight to avoid constipation/urinary obstruction  · Monitor

## 2020-01-19 NOTE — PROGRESS NOTES
Progress Note - Thea Christina 1965, 53 y o  male MRN: [id number]    Unit/Bed#: [address] [place] [address] Encounter: [id number]    Primary Care Provider: Tiffany Banda   Date and time admitted to hospital: 1/16/2020  7:37 PM        * Gastroenteritis  Assessment & Plan  Patient had nausea vomiting and diarrhea in the group home followed by 2 episodes of near-syncope  CT abdomen pelvis revealed small bowel enteritis  · Patient initially was placed on clear liquid diet, vomiting and diarrhea resolved  · Diet advanced, currently is on regular diet and tolerating well  · Continue probiotics and PPI  · Patient has not had a bowel movement during his stay, will give a dose of milk of magnesia at bedtime tonight to avoid constipation/urinary obstruction  · Monitor  Cystitis  Assessment & Plan  CT abdomen pelvis revealed an extremely distended bladder with diffuse bladder wall thickening likely due to cystitis or outlet obstruction  Urinalysis abnormal with nitrates, cultures show mixed contaminant x3, 06651-19331 CFU/mL  · Today is actually day 3 of ceftriaxone, yesterday was day 2    · Patient required insertion of Navarro catheter on 01/18 following urinary retention protocol  · Urology recommends leaving Navarro catheter in, attempting voiding trial in 5 days  · WBCs have decreased to 8 83  · T-max in past 24 hours 99 7  · Patient is noted to not have had bowel movements since hospitalized, will continue with Colace and MiraLax  Will have patient take a dose of milk of magnesia prior to bed tonight to aid with having bowel movement  · Continue to monitor  Hydronephrosis  Assessment & Plan  CT abdomen pelvis revealed mild right hydronephrosis  · Urology was consulted, initially avoided placement of Navarro catheter  · Urinary retention protocol was followed  · Patient required to have Navarro catheter placed on 01/18    · As per urology notes, recommends leaving Navarro catheter in and attempting voiding trial in 5 days  · Creatinine stable  · Monitor  Dizziness  Assessment & Plan  Probably multifactorial in etiology, likely from orthostatic hypotension and dehydration from gastroenteritis  · Patient had orthostatic hypotension on admission  · Per Neurology, 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 that can be found in patients with Down syndrome  No intervention is necessary  This is likely not the cause of his dizziness  · Patient also has a history of Parkinson's disease which could contribute to orthostatic hypotension  · Tilt-table test performed, negative as per report from Cardiology  · Patient is scheduled for outpatient EEG  · Patient is eating very well, will discontinue IV fluids  · Continue midodrine 2 5 mg t i d  Before meals  · Recent MRI negative  · Patient denies any dizziness, has been ambulating with staff in room to the bathroom  · Monitor  Orthostatic hypotension  Assessment & Plan  · Acute on chronic  In the setting of nausea and diarrhea from gastroenteritis with a history of Parkinson's disease which can cause dysautonomia  · Orthostatics positive with a drop of more than 20 points on admission  · Patient is taking in adequate oral intake, will discontinue IV fluids at this time  · Continue midodrine 2 5 mg t i d  With meals  · Cardiology had recommended BID dosing, consider titrating midodrine down to BID dosing if needed  · Patient continues with occasional lower blood pressures, most current 97/52  Will have nursing recheck as patient has just received his midodrine  · Continue to monitor  · Continue with fall precautions  · PT/OT evaluation treatment ordered  Urinary retention  Assessment & Plan  Flomax was held at the group home for concerns of hypotension  · Oral intake has improved, no further nausea vomiting  Id IV fluids discontinued    · Urology consulted, appreciate recommendations of increasing Flomax from 0 4 mg to 0 8 mg daily with dinner  · Urinary retention protocol was followed, patient required insertion of Navarro catheter on morning of 1/18  · As per urology note patient is to attempt voiding trial in 5 days  · Patient has not had a bowel movement since admission, will continue patient's Colace b i d  And MiraLax daily  Will give 1 dose of milk of magnesia at bedtime tonight and monitor results in morning  Chronic constipation  Assessment & Plan  · Restarted patient's Colace and MiraLax as he has not had a bowel movement since admission  · Monitor bowel movements closely, as constipation may be contributing to urinary retention  · As noted above will give additional dose of milk of magnesia tonight  Hypogonadism, testicular  Assessment & Plan  · As per urology testosterone currently is being placed on hold as patient has a history of BPH, and testosterone may cause growth of prostate, leading to urinary retention, hydronephrosis  · Monitor  Hypothyroidism  Assessment & Plan  · Will continue with patient's home medication levothyroxine 75 mcg p o  Daily  Parkinson's disease [facility])  Assessment & Plan  · Will continue patient's home medication of Depakote and midodrine  Cerebral ventriculomegaly due to brain atrophy [facility])  Assessment & Plan  · Appreciate neurology input  · Patient is stable  · Patient can follow-up with neurosurgery for outpatient follow-up for moyamoya       Down's syndrome  Assessment & Plan  · Supportive care, fall precautions        VTE Pharmacologic Prophylaxis:   Pharmacologic: Enoxaparin (Lovenox)  Mechanical VTE Prophylaxis in Place: Yes    Patient Centered Rounds: I have performed bedside rounds with nursing staff today  Discussions with Specialists or Other Care Team Provider:  Yes  Education and Discussions with Family / Patient:  Message left for brother  Time Spent for Care: 30 minutes    More than 50% of total time spent on counseling and coordination of care as described above  Current Length of Stay: 2 day(s)    Current Patient Status: Inpatient   Certification Statement: The patient will continue to require additional inpatient hospital stay due to Continued IV antibiotic, Navarro catheter, absence of bowel movement, administration of laxatives and monitor affect  Discharge Plan:  Not stable for discharge today  Code Status: Level 1 - Full Code      Subjective:   Patient seen sitting up in bed, playing on computer  He denies any headache, dizziness, shortness of breath, chest pain, abdominal pain, nausea, vomiting or diarrhea  When asked patient denied having bowel movement today  Objective:     Vitals:   Temp (24hrs), Av 9 °F (36 6 °C), Min:97 6 °F (36 4 °C), Max:98 4 °F (36 9 °C)    Temp:  [97 6 °F (36 4 °C)-98 4 °F (36 9 °C)] 97 6 °F (36 4 °C)  HR:  [71-88] 71  Resp:  [18] 18  BP: ()/(52-56) 97/52  SpO2:  [93 %-95 %] 95 %  Body mass index is 27 99 kg/m²  Input and Output Summary (last 24 hours): Intake/Output Summary (Last 24 hours) at 2020 1646  Last data filed at 2020 1642  Gross per 24 hour   Intake 3191 67 ml   Output 4100 ml   Net -908  33 ml       Physical Exam:     Physical Exam   Constitutional: He is oriented to person, place, and time  He appears well-nourished  No distress  HENT:   Head: Normocephalic  Eyes: Conjunctivae and EOM are normal    Neck: Normal range of motion  Cardiovascular: Normal rate, regular rhythm and normal heart sounds  Pulmonary/Chest: Breath sounds normal  No respiratory distress  Abdominal: Soft  Bowel sounds are normal  He exhibits no distension  There is no tenderness  Genitourinary:   Genitourinary Comments: Navarro catheter present, draining clear yellow urine  Musculoskeletal: Normal range of motion  Neurological: He is alert and oriented to person, place, and time  Patient has intellectual disabilities  Skin: Skin is warm and dry   Capillary refill takes less than 2 seconds  Psychiatric: He has a normal mood and affect  His behavior is normal  Judgment and thought content normal    Nursing note and vitals reviewed  Additional Data:     Labs:    Results from last 7 days   Lab Units 01/19/20  0606   WBC Thousand/uL 8 83   HEMOGLOBIN g/dL 13 6   HEMATOCRIT % 40 0   PLATELETS Thousands/uL 269   NEUTROS PCT % 74   LYMPHS PCT % 15   MONOS PCT % 7   EOS PCT % 1     Results from last 7 days   Lab Units 01/19/20  0606  01/17/20  0536   POTASSIUM mmol/L 3 8   < > 3 7   CHLORIDE mmol/L 102   < > 104   CO2 mmol/L 28   < > 27   BUN mg/dL 6   < > 10   CREATININE mg/dL 0 88   < > 0 87   CALCIUM mg/dL 7 5*   < > 7 0*   ALK PHOS U/L  --   --  44*   ALT U/L  --   --  16   AST U/L  --   --  21    < > = values in this interval not displayed  * I Have Reviewed All Lab Data Listed Above  * Additional Pertinent Lab Tests Reviewed: All Labs Within Last 24 Hours Reviewed    Imaging:    Imaging Reports Reviewed Today Include:  None  Imaging Personally Reviewed by Myself Includes:  None      Recent Cultures (last 7 days):     Results from last 7 days   Lab Units 01/16/20 2231   URINE CULTURE  80,000-89,000 cfu/ml        Last 24 Hours Medication List:     Current Facility-Administered Medications:  acetaminophen 650 mg Oral Q6H PRN Elodia Triplett MD    aspirin 81 mg Oral Daily Elodia Triplett MD    atorvastatin 20 mg Oral Daily Elodia Triplett MD    cefTRIAXone 1,000 mg Intravenous Q24H Elodia Triplett MD Last Rate: 1,000 mg (01/18/20 2238)   divalproex sodium 500 mg Oral HS Elodia Triplett MD    docusate sodium 100 mg Oral BID BJ Lewis    enoxaparin 40 mg Subcutaneous Q24H [address] Elodia Triplett MD    levothyroxine 75 mcg Oral Early Morning Elodia Triplett MD    loratadine 10 mg Oral Daily Elodia Triplett MD    midodrine 2 5 mg Oral TID AC BJ Dyer    ondansetron 4 mg Intravenous Q4H PRN Elodia Triplett MD    pantoprazole 40 mg Oral BID AC Sheryl Bhat MD    polyethylene glycol 17 g Oral Daily BJ Cardenas    saccharomyces boulardii 250 mg Oral BID Sheryl Bhat MD    tamsulosin 0 8 mg Oral Daily With Cesario Miranda MD         Today, Patient Was Seen By: BJ Cardenas    ** Please Note: Dictation voice to text software may have been used in the creation of this document   **

## 2020-01-19 NOTE — ASSESSMENT & PLAN NOTE
· Acute on chronic  In the setting of nausea and diarrhea from gastroenteritis with a history of Parkinson's disease which can cause dysautonomia  · Orthostatics positive with a drop of more than 20 points on admission  · Patient is taking in adequate oral intake, will discontinue IV fluids at this time  · Continue midodrine 2 5 mg t i d  With meals  · Cardiology had recommended BID dosing, consider titrating midodrine down to BID dosing if needed  · Patient continues with occasional lower blood pressures, most current 97/52  Will have nursing recheck as patient has just received his midodrine  · Continue to monitor  · Continue with fall precautions  · PT/OT evaluation treatment ordered

## 2020-01-19 NOTE — ASSESSMENT & PLAN NOTE
CT abdomen pelvis revealed an extremely distended bladder with diffuse bladder wall thickening likely due to cystitis or outlet obstruction  Urinalysis abnormal with nitrates, cultures show mixed contaminant x3, 70049-34042 CFU/mL  · Today is actually day 3 of ceftriaxone, yesterday was day 2    · Patient required insertion of Navarro catheter on 01/18 following urinary retention protocol  · Urology recommends leaving Navarro catheter in, attempting voiding trial in 5 days  · WBCs have decreased to 8 83  · T-max in past 24 hours 99 7  · Patient is noted to not have had bowel movements since hospitalized, will continue with Colace and MiraLax  Will have patient take a dose of milk of magnesia prior to bed tonight to aid with having bowel movement  · Continue to monitor

## 2020-01-19 NOTE — ASSESSMENT & PLAN NOTE
Flomax was held at the group home for concerns of hypotension  · Oral intake has improved, no further nausea vomiting  Id IV fluids discontinued  · Urology consulted, appreciate recommendations of increasing Flomax from 0 4 mg to 0 8 mg daily with dinner  · Urinary retention protocol was followed, patient required insertion of Navarro catheter on morning of 1/18  · As per urology note patient is to attempt voiding trial in 5 days  · Patient has not had a bowel movement since admission, will continue patient's Colace b i d  And MiraLax daily  Will give 1 dose of milk of magnesia at bedtime tonight and monitor results in morning

## 2020-01-19 NOTE — ASSESSMENT & PLAN NOTE
CT abdomen pelvis revealed mild right hydronephrosis  · Urology was consulted, initially avoided placement of Navarro catheter  · Urinary retention protocol was followed  · Patient required to have Navarro catheter placed on 01/18  · As per urology notes, recommends leaving Navarro catheter in and attempting voiding trial in 5 days  · Creatinine stable  · Monitor

## 2020-01-19 NOTE — ASSESSMENT & PLAN NOTE
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

## 2020-01-19 NOTE — ASSESSMENT & PLAN NOTE
· Restarted patient's Colace and MiraLax as he has not had a bowel movement since admission  · Monitor bowel movements closely, as constipation may be contributing to urinary retention  · As noted above will give additional dose of milk of magnesia tonight

## 2020-01-20 LAB
ANION GAP SERPL CALCULATED.3IONS-SCNC: 6 MMOL/L (ref 4–13)
BUN SERPL-MCNC: 5 MG/DL (ref 5–25)
CALCIUM SERPL-MCNC: 7.8 MG/DL (ref 8.3–10.1)
CHLORIDE SERPL-SCNC: 99 MMOL/L (ref 100–108)
CO2 SERPL-SCNC: 31 MMOL/L (ref 21–32)
CREAT SERPL-MCNC: 0.83 MG/DL (ref 0.6–1.3)
ERYTHROCYTE [DISTWIDTH] IN BLOOD BY AUTOMATED COUNT: 13.1 % (ref 11.6–15.1)
GFR SERPL CREATININE-BSD FRML MDRD: 99 ML/MIN/1.73SQ M
GLUCOSE SERPL-MCNC: 103 MG/DL (ref 65–140)
HCT VFR BLD AUTO: 39.4 % (ref 36.5–49.3)
HGB BLD-MCNC: 13.3 G/DL (ref 12–17)
MCH RBC QN AUTO: 33.3 PG (ref 26.8–34.3)
MCHC RBC AUTO-ENTMCNC: 33.8 G/DL (ref 31.4–37.4)
MCV RBC AUTO: 99 FL (ref 82–98)
PLATELET # BLD AUTO: 293 THOUSANDS/UL (ref 149–390)
PMV BLD AUTO: 9.8 FL (ref 8.9–12.7)
POTASSIUM SERPL-SCNC: 3.9 MMOL/L (ref 3.5–5.3)
RBC # BLD AUTO: 3.99 MILLION/UL (ref 3.88–5.62)
SODIUM SERPL-SCNC: 136 MMOL/L (ref 136–145)
WBC # BLD AUTO: 7.8 THOUSAND/UL (ref 4.31–10.16)

## 2020-01-20 PROCEDURE — 97110 THERAPEUTIC EXERCISES: CPT

## 2020-01-20 PROCEDURE — 99232 SBSQ HOSP IP/OBS MODERATE 35: CPT | Performed by: NURSE PRACTITIONER

## 2020-01-20 PROCEDURE — 80048 BASIC METABOLIC PNL TOTAL CA: CPT | Performed by: NURSE PRACTITIONER

## 2020-01-20 PROCEDURE — 85027 COMPLETE CBC AUTOMATED: CPT | Performed by: NURSE PRACTITIONER

## 2020-01-20 RX ADMIN — LEVOTHYROXINE SODIUM 75 MCG: 75 TABLET ORAL at 05:20

## 2020-01-20 RX ADMIN — Medication 250 MG: at 08:46

## 2020-01-20 RX ADMIN — MIDODRINE HYDROCHLORIDE 2.5 MG: 5 TABLET ORAL at 17:20

## 2020-01-20 RX ADMIN — DIVALPROEX SODIUM 500 MG: 500 TABLET, DELAYED RELEASE ORAL at 21:48

## 2020-01-20 RX ADMIN — PANTOPRAZOLE SODIUM 40 MG: 40 TABLET, DELAYED RELEASE ORAL at 05:20

## 2020-01-20 RX ADMIN — ATORVASTATIN CALCIUM 20 MG: 20 TABLET, FILM COATED ORAL at 17:19

## 2020-01-20 RX ADMIN — MIDODRINE HYDROCHLORIDE 2.5 MG: 5 TABLET ORAL at 11:48

## 2020-01-20 RX ADMIN — DOCUSATE SODIUM 100 MG: 100 CAPSULE, LIQUID FILLED ORAL at 08:46

## 2020-01-20 RX ADMIN — POLYETHYLENE GLYCOL 3350 17 G: 17 POWDER, FOR SOLUTION ORAL at 08:46

## 2020-01-20 RX ADMIN — PANTOPRAZOLE SODIUM 40 MG: 40 TABLET, DELAYED RELEASE ORAL at 17:20

## 2020-01-20 RX ADMIN — MIDODRINE HYDROCHLORIDE 2.5 MG: 5 TABLET ORAL at 06:15

## 2020-01-20 RX ADMIN — ASPIRIN 81 MG: 81 TABLET, COATED ORAL at 08:46

## 2020-01-20 RX ADMIN — LORATADINE 10 MG: 10 TABLET ORAL at 08:46

## 2020-01-20 RX ADMIN — ENOXAPARIN SODIUM 40 MG: 40 INJECTION SUBCUTANEOUS at 08:46

## 2020-01-20 RX ADMIN — DOCUSATE SODIUM 100 MG: 100 CAPSULE, LIQUID FILLED ORAL at 17:19

## 2020-01-20 RX ADMIN — TAMSULOSIN HYDROCHLORIDE 0.8 MG: 0.4 CAPSULE ORAL at 17:20

## 2020-01-20 RX ADMIN — Medication 250 MG: at 17:20

## 2020-01-20 NOTE — ASSESSMENT & PLAN NOTE
· Continue patient's MiraLax and Colace on a daily basis  · Patient did receive 1 dose of milk of magnesia at bedtime last night  · Did have large bowel movement yesterday afternoon and once again this morning as per nursing  · Monitor

## 2020-01-20 NOTE — ASSESSMENT & PLAN NOTE
Sepsis, POA, now resolved setting of UTI as evidence by will count of 16 24 on admission with initial temperature 101 6° and heart rate of 108 in ED requiring IV fluid bolus and IV ceftriaxone  Patient has improved, completed 3 days of ceftriaxone  Procalcitonin negative  WBC normalized to 7 80

## 2020-01-20 NOTE — ASSESSMENT & PLAN NOTE
Patient had nausea vomiting and diarrhea in the group home followed by 2 episodes of near-syncope  CT abdomen pelvis revealed small bowel enteritis  · Patient initially was placed on clear liquid diet, vomiting and diarrhea resolved  · Diet advanced, currently is on regular diet and tolerating well  · Continue probiotics and PPI  · As per nursing patient had large bowel movement yesterday afternoon and and other large softly formed bowel movement this morning after receiving milk of magnesia at bedtime last night  · Continue to monitor

## 2020-01-20 NOTE — ASSESSMENT & PLAN NOTE
· Acute on chronic  In the setting of nausea and diarrhea from gastroenteritis with a history of Parkinson's disease which can cause dysautonomia  · Orthostatics positive with a drop of more than 20 points on admission  · Patient is taking in adequate oral intake, will discontinue IV fluids at this time  · Continue midodrine 2 5 mg t i d  With meals  · Cardiology had recommended BID dosing, consider titrating midodrine down to BID dosing if needed  · Patient continues with occasional lower blood pressures, most current 117/56       · Continue to monitor  · Continue with fall precautions  · PT/OT evaluation treatment ordered

## 2020-01-20 NOTE — PHYSICAL THERAPY NOTE
PT TREATMENT     01/20/20 1600   Pain Assessment   Pain Assessment No/denies pain   Restrictions/Precautions   Other Precautions Cognitive   General   Chart Reviewed Yes   Family/Caregiver Present No   Cognition   Arousal/Participation Cooperative   Subjective   Subjective no pain complaints   Transfers   Sit to Stand 7  Independent   Stand to Sit 7  Independent   Ambulation/Elevation   Gait Assistance   (supervision to independent)   Assistive Device   (none)   Distance 200 feet with change in direction and no balance loss noted, guidance at times due to cognitive deficits   Stair Management Assistance 5  Supervision   Stair Management Technique One rail R;Alternating pattern   Number of Stairs 4   Balance   Static Sitting Good   Dynamic Sitting Good   Static Standing Good   Dynamic Standing Fair +   Ambulatory Fair +   Exercises   Neuro re-ed sidestepping and backward walking completed for balance training as tolerated by patient and patient without balance loss   Assessment   Assessment patient cooperative and motivated and will benefit from increasing functional mobility and PT as needed   Plan   Treatment/Interventions ADL retraining;Functional transfer training;LE strengthening/ROM; Therapeutic exercise; Endurance training;Patient/family training;Equipment eval/education; Bed mobility; Compensatory technique education;Gait training   PT Frequency 5x/wk   Recommendation   Recommendation Home with family support   Licensure   [place] License Number  Frantz Sbiley PT [id number]

## 2020-01-20 NOTE — ASSESSMENT & PLAN NOTE
Flomax was held at the group home for concerns of hypotension  · Oral intake has improved, no further nausea vomiting     · Urology consulted, appreciate recommendations of increasing Flomax from 0 4 mg to 0 8 mg daily with dinner  · Urinary retention protocol was followed, patient required insertion of Navarro catheter on morning of 1/18  · Discussed with Urology removal of Navarro catheter today, as patient had large bowel movement yesterday and today  Okay to remove Navarro and [place] voiding trial   · Discussed with nursing  · Monitor

## 2020-01-20 NOTE — ASSESSMENT & PLAN NOTE
CT abdomen pelvis revealed an extremely distended bladder with diffuse bladder wall thickening likely due to cystitis or outlet obstruction  Urinalysis abnormal with nitrates, cultures show mixed contaminant x3, 12478-62575 CFU/mL  · Patient received a total of 3 days ceftriaxone  · Patient required insertion of Navarro catheter on 01/18 following urinary retention protocol  · Discussed case with urologist, Dr Tyshawn Larry  Will remove Navarro catheter today as patient had large bowel movement yesterday and today, and will institute voiding trial   · WBCs have decreased to 7 80  · T-max in past 24 hours 98 9  · Monitor for any signs of infection or urinary retention  · Procalcitonin negative  · Repeat labs in a m

## 2020-01-20 NOTE — ASSESSMENT & PLAN NOTE
CT abdomen pelvis revealed mild right hydronephrosis  · Urology was consulted, initially avoided placement of Navarro catheter  · Urinary retention protocol was followed  · Patient required to have Navarro catheter placed on 01/18  · Did discuss case with Urology today, indicated okay to remove Navarro catheter today, continue Flomax 0 8 mg daily, voiding trial today  · Creatinine stable  · Monitor

## 2020-01-20 NOTE — PROGRESS NOTES
Progress Note - Reda Sides 1965, 53 y o  male MRN: [id number]    Unit/Bed#: [address] [place] [address] Encounter: [id number]    Primary Care Provider: Tiffany Ott Ma   Date and time admitted to hospital: 1/16/2020  7:37 PM        * Gastroenteritis  Assessment & Plan  Patient had nausea vomiting and diarrhea in the group home followed by 2 episodes of near-syncope  CT abdomen pelvis revealed small bowel enteritis  · Patient initially was placed on clear liquid diet, vomiting and diarrhea resolved  · Diet advanced, currently is on regular diet and tolerating well  · Continue probiotics and PPI  · As per nursing patient had large bowel movement yesterday afternoon and and other large softly formed bowel movement this morning after receiving milk of magnesia at bedtime last night  · Continue to monitor  Cystitis  Assessment & Plan  CT abdomen pelvis revealed an extremely distended bladder with diffuse bladder wall thickening likely due to cystitis or outlet obstruction  Urinalysis abnormal with nitrates, cultures show mixed contaminant x3, 09886-24810 CFU/mL  · Patient received a total of 3 days ceftriaxone  · Patient required insertion of Navarro catheter on 01/18 following urinary retention protocol  · Discussed case with urologist, Dr Plummer Parent  Will remove Navarro catheter today as patient had large bowel movement yesterday and today, and will institute voiding trial   · WBCs have decreased to 7 80  · T-max in past 24 hours 98 9  · Monitor for any signs of infection or urinary retention  · Procalcitonin negative  · Repeat labs in a m  Sepsis ([address] )  Assessment & Plan  Sepsis, POA, now resolved setting of UTI as evidence by will count of 16 24 on admission with initial temperature 101 6° and heart rate of 108 in ED requiring IV fluid bolus and IV ceftriaxone  Patient has improved, completed 3 days of ceftriaxone  Procalcitonin negative  WBC normalized to 7 80      Hydronephrosis  Assessment & Plan  CT abdomen pelvis revealed mild right hydronephrosis  · Urology was consulted, initially avoided placement of Navarro catheter  · Urinary retention protocol was followed  · Patient required to have Navarro catheter placed on 01/18  · Did discuss case with Urology today, indicated okay to remove Navarro catheter today, continue Flomax 0 8 mg daily, voiding trial today  · Creatinine stable  · Monitor  Orthostatic hypotension  Assessment & Plan  · Acute on chronic  In the setting of nausea and diarrhea from gastroenteritis with a history of Parkinson's disease which can cause dysautonomia  · Orthostatics positive with a drop of more than 20 points on admission  · Patient is taking in adequate oral intake, will discontinue IV fluids at this time  · Continue midodrine 2 5 mg t i d  With meals  · Cardiology had recommended BID dosing, consider titrating midodrine down to BID dosing if needed  · Patient continues with occasional lower blood pressures, most current 117/56       · Continue to monitor  · Continue with fall precautions  · PT/OT evaluation treatment ordered  Dizziness  Assessment & Plan  Probably multifactorial in etiology, likely from orthostatic hypotension and dehydration from gastroenteritis  · Patient had orthostatic hypotension on admission  · Per Neurology, 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 that can be found in patients with Down syndrome  No intervention is necessary  This is likely not the cause of his dizziness  · Patient also has a history of Parkinson's disease which could contribute to orthostatic hypotension  · Tilt-table test performed, negative as per report from Cardiology  · Patient is scheduled for outpatient EEG  · Patient is eating very well, will discontinue IV fluids  · Continue midodrine 2 5 mg t i d  Before meals  · Recent MRI negative    · Patient denies any dizziness, has been ambulating with staff in room to the bathroom  · Monitor  Urinary retention  Assessment & Plan  Flomax was held at the group home for concerns of hypotension  · Oral intake has improved, no further nausea vomiting     · Urology consulted, appreciate recommendations of increasing Flomax from 0 4 mg to 0 8 mg daily with dinner  · Urinary retention protocol was followed, patient required insertion of Navarro catheter on morning of 1/18  · Discussed with Urology removal of Navarro catheter today, as patient had large bowel movement yesterday and today  Okay to remove Navarro and Rainelle voiding trial   · Discussed with nursing  · Monitor  Chronic constipation  Assessment & Plan  · Continue patient's MiraLax and Colace on a daily basis  · Patient did receive 1 dose of milk of magnesia at bedtime last night  · Did have large bowel movement yesterday afternoon and once again this morning as per nursing  · Monitor  Hypogonadism, testicular  Assessment & Plan  · As per urology testosterone currently is being placed on hold as patient has a history of BPH, and testosterone may cause growth of prostate, leading to urinary retention, hydronephrosis  · Monitor  Hypothyroidism  Assessment & Plan  · Will continue with patient's home medication levothyroxine 75 mcg p o  Daily  Parkinson's disease [facility])  Assessment & Plan  · Will continue patient's home medication of Depakote and midodrine  Cerebral ventriculomegaly due to brain atrophy [facility])  Assessment & Plan  · Appreciate neurology input  · Patient is stable  · Patient can follow-up with neurosurgery for outpatient follow-up for moyamoya       Down's syndrome  Assessment & Plan  · Supportive care, fall precautions          VTE Pharmacologic Prophylaxis:   Pharmacologic: Enoxaparin (Lovenox)  Mechanical VTE Prophylaxis in Place: Yes    Patient Centered Rounds: I have performed bedside rounds with nursing staff today      Discussions with Specialists or Other Care Team Provider:  Urology and multi disciplinary team     Education and Discussions with Family / Patient:  I spoke with the patient at the bedside, I have answered all questions to the best of my abilities  I spoke with the caregiver Leigh Ann Ball at the group home and gave her an update, I have answered all questions to the best of my abilities  Time Spent for Care: 30 minutes  More than 50% of total time spent on counseling and coordination of care as described above  Current Length of Stay: 3 day(s)    Current Patient Status: Inpatient   Certification Statement: The patient will continue to require additional inpatient hospital stay due to Removal of Navarro catheter and voiding trial     Discharge Plan:  Most likely stable for discharge in the next 24 hours pending patient's progress  Code Status: Level 1 - Full Code      Subjective:   Patient seen sitting comfortably in chair, reports that he had a good breakfast   Reports that he had a large bowel movement this morning  He said he would like to have his Navarro catheter removed and wants to go home  He denies headache, dizziness, shortness of breath, chest pain, abdominal pain, nausea, vomiting or diarrhea  Objective:     Vitals:   Temp (24hrs), Av 2 °F (36 8 °C), Min:97 6 °F (36 4 °C), Max:98 9 °F (37 2 °C)    Temp:  [97 6 °F (36 4 °C)-98 9 °F (37 2 °C)] 98 °F (36 7 °C)  HR:  [71-85] 84  Resp:  [18-20] 20  BP: ()/(52-62) 117/56  SpO2:  [94 %-97 %] 97 %  Body mass index is 27 99 kg/m²  Input and Output Summary (last 24 hours): Intake/Output Summary (Last 24 hours) at 2020 1254  Last data filed at 2020 0900  Gross per 24 hour   Intake 1522 5 ml   Output 4450 ml   Net -2927 5 ml       Physical Exam:     Physical Exam   Constitutional: He is oriented to person, place, and time  He appears well-nourished  No distress  HENT:   Head: Normocephalic  Eyes: Conjunctivae and EOM are normal    Neck: Normal range of motion     Cardiovascular: Normal rate and regular rhythm  Pulmonary/Chest: Effort normal and breath sounds normal  No respiratory distress  Abdominal: Soft  Bowel sounds are normal  He exhibits no distension  There is no tenderness  Genitourinary:   Genitourinary Comments: Navarro catheter present, draining clear yellow urine  Will be discontinued and voiding trial will be instituted  Discussed with nursing  Musculoskeletal: Normal range of motion  He exhibits no edema  Neurological: He is alert and oriented to person, place, and time  Skin: Skin is warm and dry  Capillary refill takes less than 2 seconds  Psychiatric: He has a normal mood and affect  His behavior is normal  Judgment and thought content normal    Nursing note and vitals reviewed  Additional Data:     Labs:    Results from last 7 days   Lab Units 01/20/20  0535 01/19/20  0606   WBC Thousand/uL 7 80 8 83   HEMOGLOBIN g/dL 13 3 13 6   HEMATOCRIT % 39 4 40 0   PLATELETS Thousands/uL 293 269   NEUTROS PCT %  --  74   LYMPHS PCT %  --  15   MONOS PCT %  --  7   EOS PCT %  --  1     Results from last 7 days   Lab Units 01/20/20  0531  01/17/20  0536   POTASSIUM mmol/L 3 9   < > 3 7   CHLORIDE mmol/L 99*   < > 104   CO2 mmol/L 31   < > 27   BUN mg/dL 5   < > 10   CREATININE mg/dL 0 83   < > 0 87   CALCIUM mg/dL 7 8*   < > 7 0*   ALK PHOS U/L  --   --  44*   ALT U/L  --   --  16   AST U/L  --   --  21    < > = values in this interval not displayed  * I Have Reviewed All Lab Data Listed Above  * Additional Pertinent Lab Tests Reviewed: All Labs Within Last 24 Hours Reviewed    Imaging:    Imaging Reports Reviewed Today Include:  None  Imaging Personally Reviewed by Myself Includes:  None      Recent Cultures (last 7 days):     Results from last 7 days   Lab Units 01/16/20  2231   URINE CULTURE  80,000-89,000 cfu/ml        Last 24 Hours Medication List:     Current Facility-Administered Medications:  acetaminophen 650 mg Oral Q6H PRN Elodia Triplett MD   aspirin 81 mg Oral Daily Zaina Nolan MD   atorvastatin 20 mg Oral Daily Zaina Nolan MD   divalproex sodium 500 mg Oral HS Zaina Nolan MD   docusate sodium 100 mg Oral BID BJ Fleming   enoxaparin 40 mg Subcutaneous Q24H [facility] Zaina Nolan MD   levothyroxine 75 mcg Oral Early Morning Zaina Nolan MD   loratadine 10 mg Oral Daily Zaina Nolan MD   midodrine 2 5 mg Oral TID BJ Potter   ondansetron 4 mg Intravenous Q4H PRN Zaina Nolan MD   pantoprazole 40 mg Oral BID AC Zaina Nolan MD   polyethylene glycol 17 g Oral Daily BJ Fleming   saccharomyces boulardii 250 mg Oral BID Zaina Nolan MD   tamsulosin 0 8 mg Oral Daily With Maxi Vila MD        Today, Patient Was Seen By: BJ Fleming    ** Please Note: Dictation voice to text software may have been used in the creation of this document   **

## 2020-01-21 VITALS
SYSTOLIC BLOOD PRESSURE: 109 MMHG | OXYGEN SATURATION: 97 % | WEIGHT: 143.3 LBS | TEMPERATURE: 98.1 F | DIASTOLIC BLOOD PRESSURE: 58 MMHG | HEIGHT: 60 IN | BODY MASS INDEX: 28.13 KG/M2 | RESPIRATION RATE: 18 BRPM | HEART RATE: 78 BPM

## 2020-01-21 PROBLEM — N17.9 ACUTE KIDNEY INJURY (HCC): Status: ACTIVE | Noted: 2020-01-21

## 2020-01-21 PROCEDURE — 97110 THERAPEUTIC EXERCISES: CPT

## 2020-01-21 PROCEDURE — 99239 HOSP IP/OBS DSCHRG MGMT >30: CPT | Performed by: NURSE PRACTITIONER

## 2020-01-21 RX ORDER — TAMSULOSIN HYDROCHLORIDE 0.4 MG/1
0.8 CAPSULE ORAL
Qty: 60 CAPSULE | Refills: 0 | Status: SHIPPED | OUTPATIENT
Start: 2020-01-22 | End: 2020-02-14

## 2020-01-21 RX ADMIN — Medication 250 MG: at 17:25

## 2020-01-21 RX ADMIN — LORATADINE 10 MG: 10 TABLET ORAL at 08:54

## 2020-01-21 RX ADMIN — Medication 250 MG: at 08:54

## 2020-01-21 RX ADMIN — DOCUSATE SODIUM 100 MG: 100 CAPSULE, LIQUID FILLED ORAL at 17:26

## 2020-01-21 RX ADMIN — ATORVASTATIN CALCIUM 20 MG: 20 TABLET, FILM COATED ORAL at 16:00

## 2020-01-21 RX ADMIN — MIDODRINE HYDROCHLORIDE 2.5 MG: 5 TABLET ORAL at 12:30

## 2020-01-21 RX ADMIN — ASPIRIN 81 MG: 81 TABLET, COATED ORAL at 08:54

## 2020-01-21 RX ADMIN — PANTOPRAZOLE SODIUM 40 MG: 40 TABLET, DELAYED RELEASE ORAL at 06:03

## 2020-01-21 RX ADMIN — DOCUSATE SODIUM 100 MG: 100 CAPSULE, LIQUID FILLED ORAL at 08:53

## 2020-01-21 RX ADMIN — MIDODRINE HYDROCHLORIDE 2.5 MG: 5 TABLET ORAL at 06:04

## 2020-01-21 RX ADMIN — ENOXAPARIN SODIUM 40 MG: 40 INJECTION SUBCUTANEOUS at 08:54

## 2020-01-21 RX ADMIN — ACETAMINOPHEN 650 MG: 325 TABLET, FILM COATED ORAL at 06:03

## 2020-01-21 RX ADMIN — MIDODRINE HYDROCHLORIDE 2.5 MG: 5 TABLET ORAL at 16:00

## 2020-01-21 RX ADMIN — TAMSULOSIN HYDROCHLORIDE 0.8 MG: 0.4 CAPSULE ORAL at 15:57

## 2020-01-21 RX ADMIN — LEVOTHYROXINE SODIUM 75 MCG: 75 TABLET ORAL at 06:03

## 2020-01-21 RX ADMIN — PANTOPRAZOLE SODIUM 40 MG: 40 TABLET, DELAYED RELEASE ORAL at 15:57

## 2020-01-21 NOTE — PROGRESS NOTES
Progress Note - urology      Patient: Mariah Navarro   : 1965 Sex: male   MRN: [id number]     CSN: [id number]  Unit/Bed#: [address] [place] [address]     SUBJECTIVE:   No complaints  Navarro out  On tamsulosin  Voiding trial      Objective   Vitals: /58 (BP Location: Right arm)   Pulse 77   Temp 97 6 °F (36 4 °C) (Tympanic)   Resp 18   Ht 5' (1 524 m)   Wt 65 kg (143 lb 4 8 oz)   SpO2 97%   BMI 27 99 kg/m²     I/O last 24 hours:   In: 500 [P O :450; IV Piggyback:50]  Out: 2700 [Urine:2700]      Physical Exam:   General Alert awake   Normocephalic atraumatic PERRLA  Lungs clear bilaterally  Cardiac normal S1 normal S2  Abdomen soft, flank pain  Extremities no edema      Lab Results: CBC:   Lab Results   Component Value Date    WBC 7 80 2020    HGB 13 3 2020    HCT 39 4 2020    MCV 99 (H) 2020     2020    ADJUSTEDWBC 3 60 (L) 10/03/2016    MCH 33 3 2020    MCHC 33 8 2020    RDW 13 1 2020    MPV 9 8 2020    NRBC 0 2020     CMP:   Lab Results   Component Value Date     (L) 2015    CL 99 (L) 2020    CL 99 2015    CO2 31 2020    CO2 33 (H) 2015    ANIONGAP 7 7 (L) 2015    BUN 5 2020    BUN 10 2015    CREATININE 0 83 2020    CREATININE 0 7 2015    GLUCOSE 96 2015    CALCIUM 7 8 (L) 2020    CALCIUM 8 5 2016    AST 21 2020    ALT 16 2020    ALKPHOS 44 (L) 2020    EGFR 99 2020     Urinalysis:   Lab Results   Component Value Date    COLORU Light Yellow 2020    COLORU Yellow 2017    CLARITYU Clear 2020    CLARITYU Transparent 2017    SPECGRAV <=1 005 2020    SPECGRAV 1 005 2017    PHUR 6 5 2020    PHUR 7 5 2017    PHUR 7 2017    LEUKOCYTESUR Negative 2020    LEUKOCYTESUR neg 2017    NITRITE Positive (A) 2020    NITRITE neg 2017    PROTEINUA neg 2017    GLUCOSEU Negative 01/16/2020    KETONESU Negative 01/16/2020    KETONESU neg 02/23/2017    BILIRUBINUR Negative 01/16/2020    BILIRUBINUR neg 02/23/2017    BLOODU Negative 01/16/2020    BLOODU neg 02/23/2017     Urine Culture:   Lab Results   Component Value Date    URINECX 80,000-89,000 cfu/ml  01/16/2020     PSA:   Lab Results   Component Value Date    PSA <0 1 09/12/2019    PSA <0 1 07/26/2019    PSA <0 1 11/10/2018    PSA <0 1 06/08/2018    PSA <0 1 07/15/2017    PSA <0 1 10/03/2016    PSA <0 1 04/16/2016         Assessment/ Plan:  Urinary tension  On tamsulosin  Melanie MINOR the 3:00 pm Today  Voiding trial  Patient to return to group home on tamsulosin 0 4 mg  If voiding  Was scheduled office follow-up in a few weeks check PVR          Oumou Denny MD

## 2020-01-21 NOTE — ASSESSMENT & PLAN NOTE
CT abdomen pelvis revealed an extremely distended bladder with diffuse bladder wall thickening likely due to cystitis or outlet obstruction  Urinalysis abnormal with nitrates, cultures show mixed contaminant x3, 98230-26288 CFU/mL  · Patient received a total of 3 days ceftriaxone  · Patient required insertion of Navarro catheter on 01/18 following urinary retention protocol  · Voiding trial performed, patient was retaining between 200 and 300 cc; discussed with Urology  Okay to discharge since PVR less than 400  · Afebrile  · WBCs 7 80  · Patient asymptomatic  · Patient to follow-up with primary care physician in 1-2 weeks  · Patient to follow up with Urology in 2 weeks

## 2020-01-21 NOTE — ASSESSMENT & PLAN NOTE
Flomax was held at the group home for concerns of hypotension  · Oral intake has improved, no further nausea vomiting     · Urology consulted, appreciate recommendations of increasing Flomax from 0 4 mg to 0 8 mg daily with dinner  · Urinary retention protocol was followed, patient required insertion of Navarro catheter on morning of 1/18  · Navarro catheter removed on 01/20 with voiding trial as noted above  · Patient will be discharged to group [place] today  · He will follow up with Urology in 2 weeks  · Continue with Flomax 0 8 mg daily with dinner  · Prescription provided to group home staff

## 2020-01-21 NOTE — ASSESSMENT & PLAN NOTE
· Continue patient's MiraLax and Colace on a daily basis  · Encourage high-fiber and adequate hydration  · Follow-up with primary care physician in 1-2 weeks

## 2020-01-21 NOTE — NURSING NOTE
Attempted to bladder scan the pt, but pt refused  Pt was slightly agitated, but was able to be redirected to sit back in the chair  TAMMY Recio attempted as well  Will continue to monitor the pt

## 2020-01-21 NOTE — PHYSICAL THERAPY NOTE
PT TREATMENT  Patient does not require skilled OT services, he is able to toilet independently and dress  Patient will be followed by PT for strength and endurance training 2-3xeekly     01/21/20 1350   Pain Assessment   Pain Assessment No/denies pain   Restrictions/Precautions   Other Precautions Cognitive   General   Chart Reviewed Yes   Family/Caregiver Present No   Cognition   Arousal/Participation Cooperative   Subjective   Subjective patient without complaints   Bed Mobility   Supine to Sit 7  Independent   Sit to Supine 7  Independent   Transfers   Sit to Stand 7  Independent   Stand to Sit 7  Independent   Additional Comments patient requires guidance at times due to cognition/developmentla delay   Ambulation/Elevation   Gait Assistance 7  Independent   Assistive Device   (none)   Distance 200 feet with multiple changes in direction and no balance loss   Stair Management Assistance 7  Independent   Stair Management Technique One rail R;Alternating pattern   Number of Stairs 6   Balance   Static Sitting Good   Dynamic Sitting Good   Static Standing Good   Dynamic Standing Good   Ambulatory Good   Exercises   Balance training  sidestepping and backward walking completed for balance and strengthening without balance loss   Assessment   Assessment patient cooperative and demonstrating improvement in gait and transfers and now ambulating independently in hallway and room and will benefit from increasing functional mobility  Patient does not have OT needs at this time, he is demonstrating independence for toileting and donning socks  Patient will continue to be followed by PT 2-3xweekly for strengthening and endurance  Plan   Treatment/Interventions ADL retraining;Functional transfer training;LE strengthening/ROM; Elevations; Therapeutic exercise; Endurance training;Patient/family training;Gait training; Compensatory technique education   PT Frequency 2-3x/wk   Recommendation   Recommendation Home with family support  (return to group home with support)   Licensure   [place] License Number  Frantz Sibley  [id number]

## 2020-01-21 NOTE — SOCIAL WORK
Per post void residuals with the bladder scanner, pt has been having urine retention  Nurse in process of inserting patel catheter to determine if bladder scanner is accurate  Discussed situation with the [address], ph  [phone]  She states there are concerns if pt returns with a catheter  States staff would have to be trained which would take some time  The other concern is that pt does have periods of aggressiveness, and are concerned pt might pull out his catheter  Erica Patel states there have been many patients that were permitted to have urine retention due to needs of pt and the group home  Will follow further, and see what the urine amount is with the patel insertion

## 2020-01-21 NOTE — ASSESSMENT & PLAN NOTE
Probably multifactorial in etiology, likely from orthostatic hypotension and dehydration from gastroenteritis  · Patient had orthostatic hypotension on admission  · Per Neurology, 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 that can be found in patients with Down syndrome  No intervention is necessary  This is likely not the cause of his dizziness  · Patient also has a history of Parkinson's disease which could contribute to orthostatic hypotension  · Tilt-table test performed, negative as per report from Cardiology  · Patient is scheduled for outpatient EEG     · Continue midodrine 2 5 mg t i d  Before meals  · Recent MRI negative  · Patient denies any dizziness, has been ambulating with staff in room and in hallway  · Patient to follow-up with primary care physician 1-2 weeks  · Patient will be discharged to group home today

## 2020-01-21 NOTE — DISCHARGE SUMMARY
Discharge- Mira Camarillo 1965, 53 y o  male MRN: [id number]    Unit/Bed#: [address] [place] [address] Encounter: [id number]    Primary Care Provider: Tiffany Sheppard   Date and time admitted to hospital: 1/16/2020  7:37 PM        * Gastroenteritis  Assessment & Plan  Patient had nausea vomiting and diarrhea in the group home followed by 2 episodes of near-syncope  CT abdomen pelvis revealed small bowel enteritis  · Patient initially was placed on clear liquid diet, vomiting and diarrhea resolved  · Diet advanced, currently is on regular diet and tolerating well  · Continue probiotics and PPI  · Resolved  · Continue with diet high in fiber and adequate fluids  · Patient will be discharged to group home today  Cystitis  Assessment & Plan  CT abdomen pelvis revealed an extremely distended bladder with diffuse bladder wall thickening likely due to cystitis or outlet obstruction  Urinalysis abnormal with nitrates, cultures show mixed contaminant x3, 27614-33059 CFU/mL  · Patient received a total of 3 days ceftriaxone  · Patient required insertion of Navarro catheter on 01/18 following urinary retention protocol  · Voiding trial performed, patient was retaining between 200 and 300 cc; discussed with Urology  Okay to discharge since PVR less than 400  · Afebrile  · WBCs 7 80  · Patient asymptomatic  · Patient to follow-up with primary care physician in 1-2 weeks  · Patient to follow up with Urology in 2 weeks  Sepsis ([facility] [address] )  Assessment & Plan  Sepsis, POA, now resolved setting of UTI as evidence by will count of 16 24 on admission with initial temperature 101 6° and heart rate of 108 in ED requiring IV fluid bolus and IV ceftriaxone  Patient has improved, completed 3 days of ceftriaxone  Procalcitonin negative  WBC normalized to 7 80  Patient will follow-up with his primary care physician in 1-2 weeks  He will follow-up with his urologist in 2 weeks      Hydronephrosis  Assessment & Plan  CT abdomen pelvis revealed mild right hydronephrosis  · Urology was consulted, initially avoided placement of Navarro catheter  · Urinary retention protocol was followed  · Patient required to have Navarro catheter placed on 01/18  · Navarro catheter removed on 01/20 with voiding trial   · As noted above patient does have PVR 2-300 cc, discussed with Urology okay to discharge since below 400  · Creatinine stable  · Patient will follow-up with primary care physician in 1-2 weeks  · Patient to follow up with Urology in 2 weeks  · Discharge back to group home today  Orthostatic hypotension  Assessment & Plan  · Acute on chronic  In the setting of nausea and diarrhea from gastroenteritis with a history of Parkinson's disease which can cause dysautonomia  · Orthostatics positive with a drop of more than 20 points on admission  · Patient is taking in adequate oral intake, encouraged him to continue doing this on discharge  · Continue midodrine 2 5 mg t i d  With meals  · Cardiology had recommended BID dosing, consider titrating midodrine down to BID dosing if needed  · Blood pressure stable  · Patient will be discharged back to group home today  · He will follow up with his primary care physician 1-2 weeks       Dizziness  Assessment & Plan  Probably multifactorial in etiology, likely from orthostatic hypotension and dehydration from gastroenteritis  · Patient had orthostatic hypotension on admission  · Per Neurology, 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 that can be found in patients with Down syndrome  No intervention is necessary  This is likely not the cause of his dizziness  · Patient also has a history of Parkinson's disease which could contribute to orthostatic hypotension  · Tilt-table test performed, negative as per report from Cardiology  · Patient is scheduled for outpatient EEG     · Continue midodrine 2 5 mg t i d  Before meals    · Recent MRI negative  · Patient denies any dizziness, has been ambulating with staff in room and in hallway  · Patient to follow-up with primary care physician 1-2 weeks  · Patient will be discharged to group home today  Urinary retention  Assessment & Plan  Flomax was held at the group home for concerns of hypotension  · Oral intake has improved, no further nausea vomiting     · Urology consulted, appreciate recommendations of increasing Flomax from 0 4 mg to 0 8 mg daily with dinner  · Urinary retention protocol was followed, patient required insertion of Navarro catheter on morning of 1/18  · Navarro catheter removed on 01/20 with voiding trial as noted above  · Patient will be discharged to group home today  · He will follow up with Urology in 2 weeks  · Continue with Flomax 0 8 mg daily with dinner  · Prescription provided to group home staff  Chronic constipation  Assessment & Plan  · Continue patient's MiraLax and Colace on a daily basis  · Encourage high-fiber and adequate hydration  · Follow-up with primary care physician in 1-2 weeks  Hypogonadism, testicular  Assessment & Plan  · As per urology testosterone currently is being placed on hold as patient has a history of BPH, and testosterone may cause growth of prostate, leading to urinary retention, hydronephrosis  · Hold until re-evaluated by primary care physician/urology  Hypothyroidism  Assessment & Plan  · Continue with patient's home medication levothyroxine 75 mcg p o  Daily  · Follow-up primary care physician 1-2 weeks  Parkinson's disease ([facility] [address] )  Assessment & Plan  · Continue home medication of Depakote and midodrine  Cerebral ventriculomegaly due to brain atrophy [facility])  Assessment & Plan  · Appreciate neurology input  · Patient is stable    · Patient can follow-up with neurosurgery for outpatient follow-up for moyamoya       Down's syndrome  Assessment & Plan  · Supportive care, fall precautions  · Discharge back to patient's group home today  Acute kidney injury [facility])  Assessment & Plan  Patient had acute kidney injury present on admission in setting of dehydration from gastroenteritis as evidence by a 0 3 mg/dL drop in creatinine from 1 23-0 83 requiring IV fluids and monitoring of creatinine  This has resolved prior to discharge  Patient will follow-up with primary care physician in 1-2 weeks  Discharging Physician / Practitioner: BJ Roman  PCP: Tiffany Domínguez  Admission Date:   Admission Orders (From admission, onward)     Ordered        01/17/20 1559  Inpatient Admission  Once         01/16/20 2307  Place in Observation  Once                   Discharge Date: 01/21/20    Resolved Problems  Date Reviewed: 1/21/2020    None          Consultations During Hospital Stay:  · Urology  · Neurology  Procedures Performed:     · None  Significant Findings / Test Results:       · Chest x-ray performed 1/16 showed no acute cardiopulmonary disease  · CT of abdomen and pelvis performed 1/16 showed distended bladder with wall thickening related to cystitis or outlet obstruction  Small-bowel enteritis, small hiatal hernia and trace bilateral pleural effusions  · CT of head was performed on 01/16 which showed no evidence of large acute intracranial hemorrhage  Incidental Findings:   · See above  Test Results Pending at Discharge (will require follow up): · None  Outpatient Tests Requested:  · Follow-up outpatient with Dr Yuki Rodriguez as per Neurology  Complications:  None  Reason for Admission:  Nausea/vomiting/diarrhea  Hospital Course:     Harjeet Alvarado is a 53 y o  male patient with a past medical history of CHF, anxiety, dementia, depression, hypothyroidism, Down syndrome, GERD, hyperlipidemia, hypogonadism, Parkinson disease and constipation who originally presented to the hospital on 1/16/2020 due to nausea/vomiting/diarrhea    Patient was seen in his primary care physician's office for nausea/vomiting/diarrhea and was diagnosed with gastroenteritis  When patient was standing up to put his coat on to leave he lowered himself to the floor secondary to generalized weakness  PCP then advised patient to present to emergency department for further evaluation and treatment  Patient did not have any further episodes of vomiting and diarrhea had subsided  However patient was noted to have urinary retention  Urology was consulted, and initially Navarro catheter was avoided as patient was able to void and urinary retention protocol was followed  On morning of 1/18 patient was found to fail urinary retention protocol and Navarro catheter was placed  Flomax was increased from 0 4-0 8 mg and patient's testosterone gel was put on hold as per urology since this could cause increase in size in patient has prostate contributing to urinary retention  Of note patient had been evaluated in the emergency department a few days prior to admission for dizziness, seen by both Cardiology and Neurology there  He had an MRI which showed persistent lateral ventricular dilatation which most likely is related to central atrophy  Neurology recommended neurosurgery follow-up outpatient  A tilt-table was also performed by Cardiology which was negative  On that day patient had been cleared by both Cardiology and Neurology and was discharged back to the group home  Patient has Navarro catheter was removed on 01/20 and voiding trial was performed prior to discharge  Patient had variable amounts of urine retention, with strong encouragement to empty his bladder out from staff patient was able to achieve PVR of 200-300 cc  This was discussed with Urology and it was indicated that as long as PVRs less than 400 patient is okay to go back to group home and follow-up outpatient for additional PVR with Urology in 2 weeks  He will continue with his Flomax increased to 0 8 mg p o  Daily with dinner    Patient does have chronic constipation as per record, encourage strong bowel regimen continuing with Colace and MiraLax daily and insuring patient is following a high-fiber diet and adequate oral intake  As the patient had experienced some dizziness neurology was consulted, there is a finding of moyamoya on imaging, which as per neurology note is not an uncommon finding in down syndrome patient has, and patient can follow-up outpatient with Neurosurgery as this is most likely not contributing to dizziness  His thought that the dizziness was related more to dehydration secondary to the gastroenteritis  Patient's brother was at the bedside on day of discharge, all questions were answered  The plan was explained to the brother and the patient, understanding was verbalized  Patient will be discharged to group home this evening with staff  Please see above list of diagnoses and related plan for additional information  Condition at Discharge: good     Discharge Day Visit / Exam:     Subjective:  I want to go home today please  Patient reports that he slept well, does not have any pain, is urinating without difficulty  Reports that he had a good appetite and had a good breakfast     Vitals: Blood Pressure: 109/58 (01/21/20 1600)  Pulse: 78 (01/21/20 1600)  Temperature: 98 1 °F (36 7 °C) (01/21/20 1600)  Temp Source: Tympanic (01/21/20 1600)  Respirations: 18 (01/21/20 1600)  Height: 5' (152 4 cm) (01/17/20 0836)  Weight - Scale: 65 kg (143 lb 4 8 oz) (01/17/20 0028)  SpO2: 97 % (01/21/20 1600)     Exam:   Physical Exam   Constitutional: He is oriented to person, place, and time  He appears well-nourished  No distress  HENT:   Head: Normocephalic  Eyes: Pupils are equal, round, and reactive to light  Conjunctivae and EOM are normal    Neck: Normal range of motion  Cardiovascular: Normal rate, regular rhythm and normal heart sounds  Pulmonary/Chest: Effort normal and breath sounds normal  No respiratory distress  Abdominal: Soft  Bowel sounds are normal  He exhibits no distension  There is no tenderness  Genitourinary:   Genitourinary Comments: Voiding spontaneously  Musculoskeletal: Normal range of motion  He exhibits no edema  Neurological: He is alert and oriented to person, place, and time  Patient has intellectual disabilities  Skin: Skin is warm and dry  Capillary refill takes less than 2 seconds  Psychiatric: He has a normal mood and affect  His behavior is normal  Judgment and thought content normal    Nursing note and vitals reviewed  Discussion with Family:  Patient's brother Kaela Tristan was at the bedside, I have spoken with him and answered all questions to the best of my abilities  Discharge instructions/Information to patient and family:   See after visit summary for information provided to patient and family  Provisions for Follow-Up Care:  See after visit summary for information related to follow-up care and any pertinent home health orders  Disposition:     Other: Group home  For Discharges to [facility] SNF:   · Not Applicable to this Patient - Not Applicable to this Patient    Planned Readmission:  No      Discharge Statement:  I spent greater than 30 minutes discharging the patient  This time was spent on the day of discharge  I had direct contact with the patient on the day of discharge  Greater than 50% of the total time was spent examining patient, answering all patient questions, arranging and discussing plan of care with patient as well as directly providing post-discharge instructions  Additional time then spent on discharge activities  Discharge Medications:  See after visit summary for reconciled discharge medications provided to patient and family        ** Please Note: This note has been constructed using a voice recognition system **

## 2020-01-21 NOTE — SOCIAL WORK
Pt being d/c'd without a catheter  Will be followed up with urologist as an outpatient  Spoke to [facility] at the group home  Pt is able to return today  Brother here, can transport  [facility] unsure of there policy, but will be in touch with pt's brother  No other d/c needs

## 2020-01-21 NOTE — ASSESSMENT & PLAN NOTE
CT abdomen pelvis revealed mild right hydronephrosis  · Urology was consulted, initially avoided placement of Navarro catheter  · Urinary retention protocol was followed  · Patient required to have Navarro catheter placed on 01/18  · Navarro catheter removed on 01/20 with voiding trial   · As noted above patient does have PVR 2-300 cc, discussed with Urology okay to discharge since below 400  · Creatinine stable  · Patient will follow-up with primary care physician in 1-2 weeks  · Patient to follow up with Urology in 2 weeks  · Discharge back to group home today

## 2020-01-21 NOTE — ASSESSMENT & PLAN NOTE
· As per urology testosterone currently is being placed on hold as patient has a history of BPH, and testosterone may cause growth of prostate, leading to urinary retention, hydronephrosis  · Hold until re-evaluated by primary care physician/urology

## 2020-01-21 NOTE — ASSESSMENT & PLAN NOTE
· Acute on chronic  In the setting of nausea and diarrhea from gastroenteritis with a history of Parkinson's disease which can cause dysautonomia  · Orthostatics positive with a drop of more than 20 points on admission  · Patient is taking in adequate oral intake, encouraged him to continue doing this on discharge  · Continue midodrine 2 5 mg t i d  With meals  · Cardiology had recommended BID dosing, consider titrating midodrine down to BID dosing if needed  · Blood pressure stable  · Patient will be discharged back to group home today  · He will follow up with his primary care physician 1-2 weeks  Abhilash Carrillo

## 2020-01-21 NOTE — ASSESSMENT & PLAN NOTE
· Continue with patient's home medication levothyroxine 75 mcg p o  Daily  · Follow-up primary care physician 1-2 weeks

## 2020-01-21 NOTE — DISCHARGE INSTRUCTIONS
Constipation   WHAT YOU NEED TO KNOW:   Constipation is when you have hard, dry bowel movements, or you go longer than usual between bowel movements  DISCHARGE INSTRUCTIONS:   Seek care immediately if:   · You have blood in your bowel movements  · You have a fever and abdominal pain with the constipation  Contact your healthcare provider if:   · Your constipation gets worse  · You start to vomit  · You have questions or concerns about your condition or care  Medicines:   · Medicine or a fiber supplement  may help make your bowel movement softer  A laxative may help relax and loosen your intestines to help you have a bowel movement  You may also be given medicine to increase fluid in your intestines  The fluid may help move bowel movements through your intestines  · Take your medicine as directed  Contact your healthcare provider if you think your medicine is not helping or if you have side effects  Tell him of her if you are allergic to any medicine  Keep a list of the medicines, vitamins, and herbs you take  Include the amounts, and when and why you take them  Bring the list or the pill bottles to follow-up visits  Carry your medicine list with you in case of an emergency  Manage your constipation:   · Drink liquids as directed  You may need to drink extra liquids to help soften and move your bowels  Ask how much liquid to drink each day and which liquids are best for you  · Eat high-fiber foods  This may help decrease constipation by adding bulk to your bowel movements  High-fiber foods include fruit, vegetables, whole-grain breads and cereals, and beans  Your healthcare provider or dietitian can help you create a high-fiber meal plan  · Exercise regularly  Regular physical activity can help stimulate your intestines  Ask which exercises are best for you  · Schedule a time each day to have a bowel movement  This may help train your body to have regular bowel movements  Bend forward while you are on the toilet to help move the bowel movement out  Sit on the toilet for at least 10 minutes, even if you do not have a bowel movement  Follow up with your healthcare provider as directed:  Write down your questions so you remember to ask them during your visits  © 2017 [employer] Information is for End User's use only and may not be sold, redistributed or otherwise used for commercial purposes  All illustrations and images included in CareNotes® are the copyrighted property of [employer]  or [employer]  The above information is an  only  It is not intended as medical advice for individual conditions or treatments  Talk to your doctor, nurse or pharmacist before following any medical regimen to see if it is safe and effective for you  Urinary Retention in Men   WHAT YOU NEED TO KNOW:   Urinary retention is a condition that develops when your bladder does not empty completely when you urinate  DISCHARGE INSTRUCTIONS:   Medicines:   · Medicines  can help decrease the size of your prostate, fight infection, and help you urinate more easily  · Take your medicine as directed  Contact your healthcare provider if you think your medicine is not helping or if you have side effects  Tell him or her if you are allergic to any medicine  Keep a list of the medicines, vitamins, and herbs you take  Include the amounts, and when and why you take them  Bring the list or the pill bottles to follow-up visits  Carry your medicine list with you in case of an emergency  Navarro catheter care: You may need a Navarro catheter for up to 2 weeks at home  Healthcare providers will give you a smaller leg bag to collect urine  Keep the bag below your waist  This will prevent urine from flowing back into your bladder and causing an infection or other problems  Also, keep the tube free of kinks so the urine will drain properly   Do not pull on the catheter  This can cause pain and bleeding, and may cause the catheter to come out  Ask your healthcare provider or urologist for more information on Navarro catheter care  Urinate regularly:  When your catheter is removed, do not let your bladder become too full before you urinate  Set regular times each day to urinate  Urinate as soon as you feel the need or at least every 3 hours while you are awake  Do not drink liquids before you go to bed  Urinate right before you go to bed  Follow up with your healthcare provider or urologist as directed:  Write down your questions so you remember to ask them during your visits  Contact your healthcare provider or urologist if:   · You have a fever  · You have pain when you urinate  · You have blood in your urine  · You have problems with your catheter  · You have questions or concerns about your condition or care  Return to the emergency department if:   · You have severe abdominal pain  · You are breathing faster than usual     · Your heartbeat is faster than usual     · Your face, hands, feet, or ankles are swollen  © 2017 [address] [facility] Information is for End User's use only and may not be sold, redistributed or otherwise used for commercial purposes  All illustrations and images included in CareNotes® are the copyrighted property of [employer]  or [employer] Lewis  The above information is an  only  It is not intended as medical advice for individual conditions or treatments  Talk to your doctor, nurse or pharmacist before following any medical regimen to see if it is safe and effective for you

## 2020-01-21 NOTE — ASSESSMENT & PLAN NOTE
Patient had nausea vomiting and diarrhea in the group home followed by 2 episodes of near-syncope  CT abdomen pelvis revealed small bowel enteritis  · Patient initially was placed on clear liquid diet, vomiting and diarrhea resolved  · Diet advanced, currently is on regular diet and tolerating well  · Continue probiotics and PPI  · Resolved  · Continue with diet high in fiber and adequate fluids  · Patient will be discharged to group home today

## 2020-01-21 NOTE — PLAN OF CARE
Problem: Potential for Falls  Goal: Patient will remain free of falls  Description  INTERVENTIONS:  - Assess patient frequently for physical needs  -  Identify cognitive and physical deficits and behaviors that affect risk of falls    -  [place] fall precautions as indicated by assessment   - Educate patient/family on patient safety including physical limitations  - Instruct patient to call for assistance with activity based on assessment  - Modify environment to reduce risk of injury  - Consider OT/PT consult to assist with strengthening/mobility  Outcome: Progressing     Problem: METABOLIC, FLUID AND ELECTROLYTES - ADULT  Goal: Electrolytes maintained within normal limits  Description  INTERVENTIONS:  - Monitor labs and assess patient for signs and symptoms of electrolyte imbalances  - Administer electrolyte replacement as ordered  - Monitor response to electrolyte replacements, including repeat lab results as appropriate  - Instruct patient on fluid and nutrition as appropriate  Outcome: Progressing  Goal: Fluid balance maintained  Description  INTERVENTIONS:  - Monitor labs   - Monitor I/O and WT  - Instruct patient on fluid and nutrition as appropriate  - Assess for signs & symptoms of volume excess or deficit  Outcome: Progressing  Goal: Glucose maintained within target range  Description  INTERVENTIONS:  - Monitor Blood Glucose as ordered  - Assess for signs and symptoms of hyperglycemia and hypoglycemia  - Administer ordered medications to maintain glucose within target range  - Assess nutritional intake and initiate nutrition service referral as needed  Outcome: Progressing     Problem: MUSCULOSKELETAL - ADULT  Goal: Maintain or return mobility to safest level of function  Description  INTERVENTIONS:  - Assess patient's ability to carry out ADLs; assess patient's baseline for ADL function and identify physical deficits which impact ability to perform ADLs (bathing, care of mouth/teeth, toileting, grooming, dressing, etc )  - Assess/evaluate cause of self-care deficits   - Assess range of motion  - Assess patient's mobility  - Assess patient's need for assistive devices and provide as appropriate  - Encourage maximum independence but intervene and supervise when necessary  - Involve family in performance of ADLs  - Assess for home care needs following discharge   - Consider OT consult to assist with ADL evaluation and planning for discharge  - Provide patient education as appropriate  Outcome: Progressing  Goal: Maintain proper alignment of affected body part  Description  INTERVENTIONS:  - Support, maintain and protect limb and body alignment  - Provide patient/ family with appropriate education  Outcome: Progressing

## 2020-01-22 ENCOUNTER — HOSPITAL ENCOUNTER (INPATIENT)
Facility: HOSPITAL | Age: 55
LOS: 2 days | Discharge: NURSING, REHAB OR LONG-TERM CARE | DRG: 696 | End: 2020-01-24
Attending: EMERGENCY MEDICINE | Admitting: INTERNAL MEDICINE
Payer: MEDICARE

## 2020-01-22 ENCOUNTER — TRANSITIONAL CARE MANAGEMENT (OUTPATIENT)
Dept: FAMILY MEDICINE CLINIC | Facility: CLINIC | Age: 55
End: 2020-01-22

## 2020-01-22 DIAGNOSIS — R42 DIZZINESS: ICD-10-CM

## 2020-01-22 DIAGNOSIS — I95.9 HYPOTENSION, UNSPECIFIED HYPOTENSION TYPE: ICD-10-CM

## 2020-01-22 DIAGNOSIS — R33.9 URINARY RETENTION: Primary | ICD-10-CM

## 2020-01-22 LAB
ALBUMIN SERPL BCP-MCNC: 2.5 G/DL (ref 3.5–5)
ALP SERPL-CCNC: 64 U/L (ref 46–116)
ALT SERPL W P-5'-P-CCNC: 41 U/L (ref 12–78)
ANION GAP SERPL CALCULATED.3IONS-SCNC: 0 MMOL/L (ref 4–13)
ANION GAP SERPL CALCULATED.3IONS-SCNC: 3 MMOL/L (ref 4–13)
AST SERPL W P-5'-P-CCNC: 64 U/L (ref 5–45)
BASOPHILS # BLD MANUAL: 0.11 THOUSAND/UL (ref 0–0.1)
BASOPHILS NFR MAR MANUAL: 2 % (ref 0–1)
BILIRUB SERPL-MCNC: 0.4 MG/DL (ref 0.2–1)
BILIRUB UR QL STRIP: NEGATIVE
BUN SERPL-MCNC: 8 MG/DL (ref 5–25)
BUN SERPL-MCNC: 8 MG/DL (ref 5–25)
CALCIUM SERPL-MCNC: 8.3 MG/DL (ref 8.3–10.1)
CALCIUM SERPL-MCNC: 8.5 MG/DL (ref 8.3–10.1)
CHLORIDE SERPL-SCNC: 100 MMOL/L (ref 100–108)
CHLORIDE SERPL-SCNC: 101 MMOL/L (ref 100–108)
CLARITY UR: CLEAR
CO2 SERPL-SCNC: 33 MMOL/L (ref 21–32)
CO2 SERPL-SCNC: 33 MMOL/L (ref 21–32)
COLOR UR: YELLOW
CREAT SERPL-MCNC: 0.76 MG/DL (ref 0.6–1.3)
CREAT SERPL-MCNC: 0.79 MG/DL (ref 0.6–1.3)
EOSINOPHIL # BLD MANUAL: 0 THOUSAND/UL (ref 0–0.4)
EOSINOPHIL NFR BLD MANUAL: 0 % (ref 0–6)
ERYTHROCYTE [DISTWIDTH] IN BLOOD BY AUTOMATED COUNT: 13.9 % (ref 11.6–15.1)
GFR SERPL CREATININE-BSD FRML MDRD: 101 ML/MIN/1.73SQ M
GFR SERPL CREATININE-BSD FRML MDRD: 103 ML/MIN/1.73SQ M
GLUCOSE SERPL-MCNC: 90 MG/DL (ref 65–140)
GLUCOSE SERPL-MCNC: 96 MG/DL (ref 65–140)
GLUCOSE UR STRIP-MCNC: NEGATIVE MG/DL
HCT VFR BLD AUTO: 41.8 % (ref 36.5–49.3)
HGB BLD-MCNC: 14.1 G/DL (ref 12–17)
HGB UR QL STRIP.AUTO: NEGATIVE
KETONES UR STRIP-MCNC: NEGATIVE MG/DL
LEUKOCYTE ESTERASE UR QL STRIP: NEGATIVE
LYMPHOCYTES # BLD AUTO: 0.84 THOUSAND/UL (ref 0.6–4.47)
LYMPHOCYTES # BLD AUTO: 15 % (ref 14–44)
MCH RBC QN AUTO: 33.7 PG (ref 26.8–34.3)
MCHC RBC AUTO-ENTMCNC: 33.7 G/DL (ref 31.4–37.4)
MCV RBC AUTO: 100 FL (ref 82–98)
MONOCYTES # BLD AUTO: 0.45 THOUSAND/UL (ref 0–1.22)
MONOCYTES NFR BLD: 8 % (ref 4–12)
MYELOCYTES NFR BLD MANUAL: 1 % (ref 0–1)
NEUTROPHILS # BLD MANUAL: 4.15 THOUSAND/UL (ref 1.85–7.62)
NEUTS BAND NFR BLD MANUAL: 16 % (ref 0–8)
NEUTS SEG NFR BLD AUTO: 58 % (ref 43–75)
NITRITE UR QL STRIP: NEGATIVE
NRBC BLD AUTO-RTO: 0 /100 WBCS
PH UR STRIP.AUTO: 7.5 [PH]
PLATELET # BLD AUTO: 362 THOUSANDS/UL (ref 149–390)
PLATELET BLD QL SMEAR: ADEQUATE
PMV BLD AUTO: 10 FL (ref 8.9–12.7)
POTASSIUM SERPL-SCNC: 4.3 MMOL/L (ref 3.5–5.3)
POTASSIUM SERPL-SCNC: 6.4 MMOL/L (ref 3.5–5.3)
PROT SERPL-MCNC: 7.3 G/DL (ref 6.4–8.2)
PROT UR STRIP-MCNC: NEGATIVE MG/DL
RBC # BLD AUTO: 4.19 MILLION/UL (ref 3.88–5.62)
RBC MORPH BLD: NORMAL
SODIUM SERPL-SCNC: 133 MMOL/L (ref 136–145)
SODIUM SERPL-SCNC: 137 MMOL/L (ref 136–145)
SP GR UR STRIP.AUTO: 1.01 (ref 1–1.03)
TOTAL CELLS COUNTED SPEC: 100
UROBILINOGEN UR QL STRIP.AUTO: 0.2 E.U./DL
WBC # BLD AUTO: 5.61 THOUSAND/UL (ref 4.31–10.16)

## 2020-01-22 PROCEDURE — 81003 URINALYSIS AUTO W/O SCOPE: CPT | Performed by: PHYSICIAN ASSISTANT

## 2020-01-22 PROCEDURE — 80053 COMPREHEN METABOLIC PANEL: CPT | Performed by: PHYSICIAN ASSISTANT

## 2020-01-22 PROCEDURE — 85007 BL SMEAR W/DIFF WBC COUNT: CPT | Performed by: PHYSICIAN ASSISTANT

## 2020-01-22 PROCEDURE — 80048 BASIC METABOLIC PNL TOTAL CA: CPT | Performed by: PHYSICIAN ASSISTANT

## 2020-01-22 PROCEDURE — 0T9B70Z DRAINAGE OF BLADDER WITH DRAINAGE DEVICE, VIA NATURAL OR ARTIFICIAL OPENING: ICD-10-PCS | Performed by: EMERGENCY MEDICINE

## 2020-01-22 PROCEDURE — 99223 1ST HOSP IP/OBS HIGH 75: CPT | Performed by: STUDENT IN AN ORGANIZED HEALTH CARE EDUCATION/TRAINING PROGRAM

## 2020-01-22 PROCEDURE — 36415 COLL VENOUS BLD VENIPUNCTURE: CPT | Performed by: PHYSICIAN ASSISTANT

## 2020-01-22 PROCEDURE — 99285 EMERGENCY DEPT VISIT HI MDM: CPT

## 2020-01-22 PROCEDURE — 85027 COMPLETE CBC AUTOMATED: CPT | Performed by: PHYSICIAN ASSISTANT

## 2020-01-22 PROCEDURE — 87081 CULTURE SCREEN ONLY: CPT | Performed by: NURSE PRACTITIONER

## 2020-01-22 PROCEDURE — 99284 EMERGENCY DEPT VISIT MOD MDM: CPT | Performed by: PHYSICIAN ASSISTANT

## 2020-01-22 RX ORDER — ATORVASTATIN CALCIUM 20 MG/1
20 TABLET, FILM COATED ORAL
Status: DISCONTINUED | OUTPATIENT
Start: 2020-01-22 | End: 2020-01-24 | Stop reason: HOSPADM

## 2020-01-22 RX ORDER — ASPIRIN 81 MG/1
81 TABLET ORAL DAILY
Status: DISCONTINUED | OUTPATIENT
Start: 2020-01-22 | End: 2020-01-24 | Stop reason: HOSPADM

## 2020-01-22 RX ORDER — MULTIVITAMIN/IRON/FOLIC ACID 18MG-0.4MG
1 TABLET ORAL DAILY
Status: DISCONTINUED | OUTPATIENT
Start: 2020-01-22 | End: 2020-01-24 | Stop reason: HOSPADM

## 2020-01-22 RX ORDER — ONDANSETRON 2 MG/ML
4 INJECTION INTRAMUSCULAR; INTRAVENOUS EVERY 6 HOURS PRN
Status: DISCONTINUED | OUTPATIENT
Start: 2020-01-22 | End: 2020-01-24 | Stop reason: HOSPADM

## 2020-01-22 RX ORDER — POLYETHYLENE GLYCOL 3350 17 G/17G
17 POWDER, FOR SOLUTION ORAL DAILY
Status: DISCONTINUED | OUTPATIENT
Start: 2020-01-22 | End: 2020-01-22

## 2020-01-22 RX ORDER — LEVOTHYROXINE SODIUM 0.07 MG/1
75 TABLET ORAL
Status: DISCONTINUED | OUTPATIENT
Start: 2020-01-23 | End: 2020-01-24 | Stop reason: HOSPADM

## 2020-01-22 RX ORDER — MELATONIN
1000 2 TIMES DAILY
Status: DISCONTINUED | OUTPATIENT
Start: 2020-01-22 | End: 2020-01-24 | Stop reason: HOSPADM

## 2020-01-22 RX ORDER — PANTOPRAZOLE SODIUM 40 MG/1
40 TABLET, DELAYED RELEASE ORAL
Status: DISCONTINUED | OUTPATIENT
Start: 2020-01-23 | End: 2020-01-24 | Stop reason: HOSPADM

## 2020-01-22 RX ORDER — DOCUSATE SODIUM 100 MG/1
100 CAPSULE, LIQUID FILLED ORAL 2 TIMES DAILY
Status: DISCONTINUED | OUTPATIENT
Start: 2020-01-22 | End: 2020-01-24 | Stop reason: HOSPADM

## 2020-01-22 RX ORDER — TESTOSTERONE 20.25 MG/1.25G
40.5 GEL TOPICAL DAILY
COMMUNITY
End: 2020-01-24 | Stop reason: HOSPADM

## 2020-01-22 RX ORDER — MIDODRINE HYDROCHLORIDE 5 MG/1
2.5 TABLET ORAL 2 TIMES DAILY PRN
Status: DISCONTINUED | OUTPATIENT
Start: 2020-01-22 | End: 2020-01-24 | Stop reason: HOSPADM

## 2020-01-22 RX ORDER — DIVALPROEX SODIUM 500 MG/1
500 TABLET, DELAYED RELEASE ORAL DAILY
Status: DISCONTINUED | OUTPATIENT
Start: 2020-01-22 | End: 2020-01-24 | Stop reason: HOSPADM

## 2020-01-22 RX ORDER — TAMSULOSIN HYDROCHLORIDE 0.4 MG/1
0.8 CAPSULE ORAL
Status: DISCONTINUED | OUTPATIENT
Start: 2020-01-22 | End: 2020-01-24 | Stop reason: HOSPADM

## 2020-01-22 RX ORDER — B-COMPLEX WITH VITAMIN C
1 TABLET ORAL 2 TIMES DAILY
Status: DISCONTINUED | OUTPATIENT
Start: 2020-01-22 | End: 2020-01-24 | Stop reason: HOSPADM

## 2020-01-22 RX ORDER — ACETAMINOPHEN 325 MG/1
650 TABLET ORAL EVERY 6 HOURS PRN
Status: DISCONTINUED | OUTPATIENT
Start: 2020-01-22 | End: 2020-01-24 | Stop reason: HOSPADM

## 2020-01-22 RX ORDER — POLYETHYLENE GLYCOL 3350 17 G/17G
17 POWDER, FOR SOLUTION ORAL DAILY
Status: DISCONTINUED | OUTPATIENT
Start: 2020-01-22 | End: 2020-01-24 | Stop reason: HOSPADM

## 2020-01-22 RX ORDER — POLYETHYLENE GLYCOL 3350 17 G/17G
17 POWDER, FOR SOLUTION ORAL DAILY
Status: DISCONTINUED | OUTPATIENT
Start: 2020-01-22 | End: 2020-01-22 | Stop reason: CLARIF

## 2020-01-22 RX ORDER — OLANZAPINE 2.5 MG/1
2.5 TABLET ORAL DAILY
Status: DISCONTINUED | OUTPATIENT
Start: 2020-01-22 | End: 2020-01-24 | Stop reason: HOSPADM

## 2020-01-22 RX ADMIN — ATORVASTATIN CALCIUM 20 MG: 20 TABLET, FILM COATED ORAL at 17:10

## 2020-01-22 RX ADMIN — Medication 1 TABLET: at 14:22

## 2020-01-22 RX ADMIN — DIVALPROEX SODIUM 500 MG: 500 TABLET, DELAYED RELEASE ORAL at 14:22

## 2020-01-22 RX ADMIN — VITAMIN D, TAB 1000IU (100/BT) 1000 UNITS: 25 TAB at 17:12

## 2020-01-22 RX ADMIN — POLYETHYLENE GLYCOL 3350 17 G: 17 POWDER, FOR SOLUTION ORAL at 21:17

## 2020-01-22 RX ADMIN — DOCUSATE SODIUM 100 MG: 100 CAPSULE, LIQUID FILLED ORAL at 17:12

## 2020-01-22 RX ADMIN — OYSTER SHELL CALCIUM WITH VITAMIN D 1 TABLET: 500; 200 TABLET, FILM COATED ORAL at 17:12

## 2020-01-22 RX ADMIN — OLANZAPINE 2.5 MG: 2.5 TABLET, FILM COATED ORAL at 14:22

## 2020-01-22 RX ADMIN — TAMSULOSIN HYDROCHLORIDE 0.8 MG: 0.4 CAPSULE ORAL at 17:10

## 2020-01-22 RX ADMIN — ASPIRIN 81 MG: 81 TABLET, COATED ORAL at 14:22

## 2020-01-22 RX ADMIN — MAGNESIUM HYDROXIDE 30 ML: 400 SUSPENSION ORAL at 21:04

## 2020-01-22 NOTE — H&P
H&P- Jasmeet Daniel 1965, 53 y o  male MRN: [id number]    Unit/Bed#: [address] [place] [address] Encounter: [id number]    Primary Care Provider: Tiffany Hicks   Date and time admitted to hospital: 1/22/2020  7:58 AM        * Urinary retention  Assessment & Plan  Patient presented to emergency department after awakening this morning with abdominal discomfort  His bladder scan in found to have greater than 700 cc in his bladder  Of note patient had been discharged evening before after being admitted for urinary retention, hydronephrosis, acute kidney injury for which patient had Navarro catheter placed and his Flomax had been increased from 0 4 mg to 0 8 mg, followed by urology  Navarro catheter had been removed day prior to discharge, PVRs were 200-300 cc prior to discharge  Discussed with Urology okay to discharge since PVR less than 400  Navarro catheter reinserted in emergency department, 1000 cc urine returned  Patient is in a group home, they cannot accommodate patient with Navarro catheter  Patient is being admitted for urinary retention, Navarro catheter management  All labs are unremarkable  Discussed with case management  Chronic constipation  Assessment & Plan  Patient has a history of chronic constipation, is currently taking Colace and MiraLax on a daily basis  Will increase patient's Colace to b i d   Monitor bowel movements, may consider adding milk of magnesia if patient does not have BM during the day today  Staff from group home report that he had 1 very small hard BM this morning  Monitor  Hypothyroidism  Assessment & Plan  Patient has a history of hypothyroidism  Will continue levothyroxine 75 mcg p o  Daily  Last TSH is 3 29 from January 2020  GERD (gastroesophageal reflux disease)  Assessment & Plan  Patient has a history of GERD  Continue PPI  Down's syndrome  Assessment & Plan  History of down syndrome  Patient lives in group home  Supportive care      Parkinson's disease [facility])  Assessment & Plan  As per review of medical record patient has been following with Dr Evans Kruse, neurology outpatient every 6 months for mild Parkinson's disease  Supportive care  Orthostatic hypotension  Assessment & Plan  Patient has a history of orthostatic hypotension  Orthostatics ordered  Midodrine 2 5 mg b i d  P r n     Vital signs as per unit routine  Monitor  VTE Prophylaxis: Low risk, no pharmacological agent   / reason for no mechanical VTE prophylaxis Low risk, encourage ambulation  Code Status:  Full code  POLST: POLST is not applicable to this patient  Discussion with family:  I spoke with the staff from the group home at the bedside and the patient  I have answered all questions to the best of my abilities  Anticipated Length of Stay:  Patient will be admitted on an Inpatient basis with an anticipated length of stay of  greater than 2 midnights  Justification for Hospital Stay:  Urinary retention requiring insertion of Navarro catheter, group home unable to accommodate patient with Navarro catheter  Total Time for Visit, including Counseling / Coordination of Care: 1 hour  Greater than 50% of this total time spent on direct patient counseling and coordination of care  Chief Complaint:   Abdominal discomfort  History of Present Illness:    Puneet Seen is a 53 y o  male past medical history of CHF, anxiety, dementia, depression, hypothyroidism, Down syndrome, GERD, hyperlipidemia, hypogonadism, Parkinson's disease and constipation who presents with abdominal discomfort    Patient had been discharged the previous evening for hospitalization of urinary retention, hydronephrosis, acute kidney injury and urinary tract infection which had been treated with Navarro catheter, increasing Flomax dosage, IV antibiotics and successful voiding trial   Patient was discharged to group [facility] in the evening time, in the morning staff reported that patient was more difficult to wake up, and was complaining of abdominal discomfort  They checked his temperature with a forehead thermometer which showed to be 103, however patient was under blankets and the house was very warm  He was brought to the emergency department for further evaluation treatment  There the patient was noted to be afebrile  Labs were drawn which were unremarkable  Patient was bladder scan to so find over 700 cc in bladder  Navarro catheter was inserted in the emergency department with 1000 cc of yellow urine returned  Staff from the [facility] indicated that they cannot take patient back home with Navarro catheter, patient was admitted for urinary retention and Navarro catheter management  Patient will continue with his increased dosage of Flomax 0 8 mg p o  Daily with dinner  Will also ensure adequate bowel regimen to prevent constipation during hospitalization  Will consult urology  Case discussed with case management as patient is unable to return to the group [facility], and may require transition to short-term rehab until successful voiding trial     Review of Systems:    Review of Systems   Constitutional: Negative for activity change, chills and fever  HENT: Negative  Eyes: Negative  Respiratory: Negative for cough and shortness of breath  Cardiovascular: Negative for chest pain  Gastrointestinal:        Staff reports that patient had only a very small hard BM this morning  Patient reported that he had abdominal discomfort, however since the Navarro catheter is in it feels better  Endocrine: Negative  Genitourinary: Positive for difficulty urinating  Negative for flank pain, hematuria, penile pain and testicular pain  Musculoskeletal: Negative  Allergic/Immunologic: Negative  Neurological: Negative for dizziness and headaches  Hematological: Negative  Psychiatric/Behavioral: Negative          Past Medical and Surgical History:     Past Medical History:   Diagnosis Date    Abnormal weight gain     last assessed 4/18/16; resolved 1/25/17    Acute CHF (congestive heart failure) ([facility] )     Anxiety     Dementia ([facility] )     Depression     Disease of thyroid gland     Down's syndrome     Fatigue     last assessed 2/23/17; resolved 6/1/17    GERD (gastroesophageal reflux disease)     Gluten free diet     Hernia of abdominal cavity     Hyperlipidemia     Hypogonadism in male     Impulse control disorder     OCD (obsessive compulsive disorder)     Parkinson disease ([facility] )     Parkinson disease ([facility] )     resolved 2/23/17    Pleural effusion     Sinus tachycardia     Vitamin D deficiency     last assessed 9/1/16; resolved 6/1/17       Past Surgical History:   Procedure Laterality Date    COLONOSCOPY      ESOPHAGOGASTRODUODENOSCOPY      with biopsy    PERICARDIOCENTESIS  4/29/2016            Meds/Allergies:    Prior to Admission medications    Medication Sig Start Date End Date Taking?  Authorizing Provider   aspirin (ECOTRIN LOW STRENGTH) 81 mg EC tablet Take 1 tablet (81 mg total) by mouth daily 1/2/20  Yes Esme Tabares MD   atorvastatin (LIPITOR) 20 mg tablet Take 1 tablet (20 mg total) by mouth daily At 8pm 10/8/19  Yes BJ Larsen   Calcium Carbonate-Vitamin D 600-400 MG-UNIT per tablet Take 1 tablet by mouth 2 (two) times a day At 8 am and 5 pm 10/8/19  Yes BJ Larsen   cholecalciferol (VITAMIN D3) 1,000 units tablet Take 1 tablet (1,000 Units total) by mouth 2 (two) times a day At 8am and 8pm 10/8/19  Yes BJ Larsen   divalproex sodium (DEPAKOTE) 500 mg EC tablet Take 500 mg by mouth    Yes Historical Provider, MD   docusate sodium (COLACE) 100 mg capsule Take one capsule at 8am 10/8/19  Yes BJ Larsen   esomeprazole (NexIUM) 40 MG capsule Take one capsule daily 30 minutes prior to breakfast 10/8/19  Yes BJ Larsen   lanolin-mineral oil (ARMAAN) LOTN Apply topically 2 (two) times a day Apply to hands daily at 8 am and 8 pm 10/8/19  Yes BJ Larsen   levothyroxine 75 mcg tablet TAKE 1 TABLET BY MOUTH IN THE EVENING (8PM) 3/26/19  Yes Demond Marin PA-C   multivitamin-minerals (CENTRUM ADULTS) tablet Take 1 tablet by mouth daily At 8am 10/8/19  Yes BJ Larsen   OLANZapine (ZyPREXA) 2 5 mg tablet Take 1 tablet by mouth daily 8 pm 2/28/18  Yes Historical Provider, MD   polyethylene glycol (GLYCOLAX) powder Take 17 grams in 8 oz of water by mouth daily at 8 pm 10/8/19  Yes BJ Larsen   Testosterone 1 62 % GEL Place 40 5 mg on the skin daily   Yes Historical Provider, MD   loratadine (CLARITIN) 10 mg tablet Take 1 tablet (10 mg total) by mouth daily As needed for allergies  Do not exceed 1 dose per 24 hours 11/19/19   BJ Larsen   midodrine (PROAMATINE) 2 5 mg tablet Take 1 tablet (2 5 mg total) by mouth 2 (two) times a day as needed (If systolic blood pressure less than 100 and patient is symptomatic) 1/6/20   Anisha Long MD   [facility]) 0 4 mg Take 2 capsules (0 8 mg total) by mouth daily with dinner 1/22/20 2/21/20  BJ Dunlap     I have reviewed home medications with patient family member  Allergies:    Allergies   Allergen Reactions    Advil [Ibuprofen]     Gluten Meal        Social History:     Marital Status: Single   Occupation:  Disabled  Patient Pre-hospital Living Situation:  From a group home  Patient Pre-hospital Level of Mobility:  Independent  Patient Pre-hospital Diet Restrictions:  Regular  Substance Use History:   Social History     Substance and Sexual Activity   Alcohol Use Never    Frequency: Never     Social History     Tobacco Use   Smoking Status Never Smoker   Smokeless Tobacco Never Used     Social History     Substance and Sexual Activity   Drug Use Never       Family History:    Family History   Problem Relation Age of Onset    No Known Problems Mother     Parkinsonism Father         symtomatic       Physical Exam:     Vitals: Blood Pressure: 110/55 (01/22/20 1323)  Pulse: 92 (01/22/20 1323)  Temperature: 98 7 °F (37 1 °C) (01/22/20 1208)  Temp Source: Oral (01/22/20 1208)  Respirations: 18 (01/22/20 1319)  Weight - Scale: 65 3 kg (144 lb) (01/22/20 0802)  SpO2: 99 % (01/22/20 0804)    Physical Exam   Constitutional: He is oriented to person, place, and time  He appears well-nourished  No distress  HENT:   Head: Normocephalic  Eyes: Conjunctivae and EOM are normal    Neck: Normal range of motion  Cardiovascular: Normal rate, regular rhythm and normal heart sounds  Pulmonary/Chest: Effort normal and breath sounds normal  No respiratory distress  Abdominal: Soft  Bowel sounds are normal  He exhibits no distension  There is no tenderness  Genitourinary:   Genitourinary Comments: Navarro catheter present, draining clear yellow urine  Musculoskeletal: Normal range of motion  He exhibits no edema  Neurological: He is alert and oriented to person, place, and time  Patient has intellectual disabilities  Skin: Skin is warm and dry  Capillary refill takes less than 2 seconds  Psychiatric: He has a normal mood and affect  His behavior is normal  Judgment and thought content normal    Nursing note and vitals reviewed  Additional Data:     Lab Results: I have personally reviewed pertinent reports  Results from last 7 days   Lab Units 01/22/20  0851  01/19/20  0606   WBC Thousand/uL 5 61   < > 8 83   HEMOGLOBIN g/dL 14 1   < > 13 6   HEMATOCRIT % 41 8   < > 40 0   PLATELETS Thousands/uL 362   < > 269   NEUTROS PCT %  --   --  74   LYMPHS PCT %  --   --  15   LYMPHO PCT % 15  --   --    MONOS PCT %  --   --  7   MONO PCT % 8  --   --    EOS PCT % 0  --  1    < > = values in this interval not displayed       Results from last 7 days   Lab Units 01/22/20  0949 01/22/20  0851   POTASSIUM mmol/L 4 3 6 4*   CHLORIDE mmol/L 101 100   CO2 mmol/L 33* 33*   BUN mg/dL 8 8   CREATININE mg/dL 0 79 0 76   CALCIUM mg/dL 8 3 8 5 ALK PHOS U/L  --  64   ALT U/L  --  41   AST U/L  --  64*                   Imaging: None  No orders to display       EKG, Pathology, and Other Studies Reviewed on Admission:   · EKG:  None  Allscripts / Epic Records Reviewed: Yes     ** Please Note: This note has been constructed using a voice recognition system   **

## 2020-01-22 NOTE — ED PROVIDER NOTES
History  Chief Complaint   Patient presents with    Abdominal Pain     Pt arrived via squad from [facility]  Sts he's unsure when it started  D/C'd from hospital last night after urinary retention  54 y/o male, h/o HTN/HLD/Parkinson's/Urinary retention, presenting with lower abdominal pain upon waking up this morning  Patient was discharged from [facility] last night after a patel was removed and he was able to urinate  Patient chronically retained approximately 300 cc of urine in his bladder at all times however recently has been retaining closer to a L of urine  According to [facility] staff patient also seemed very tired this morning and had difficulty waking the patient up  He is now more alert and oriented acting his normal self however slightly more fatigued  Patient's main concern is his lower abdominal pain which he points to suprapubic region, he is not complaining of testicular or penile pain  According to [facility] staff, they took patient's temperature this morning which was a forehead thermometer and it was 103, however he was under blankets, it was very warm in the house etc  He is afebrile here  Denies nausea vomiting, diarrhea, hematochezia, cough, congestion, fevers, shortness of breath  1/16/2020: admitted for urinary retention, with a CT abdomen pelvis showing small-bowel enteritis which has resolved, hiatal hernia with GERD for which he is taking PPI  Urine culture shows mixed contaminants  Patient cannot be discharged to [facility] with indwelling patel catheter  He also becomes occasionally aggravated when patel needs insertion and there was some concern for patient ripping this out      12/12 and 1/13: seen here for falls  Has had ongoing issues with this was well as orthostatic hypotension  Patient has been on and off Flomax and started on Midodrine for hypotension  Prior to Admission Medications   Prescriptions Last Dose Informant Patient Reported? Taking?    Calcium Carbonate-Vitamin D 600-400 MG-UNIT per tablet 1/21/2020 at Unknown time Outside Facility ([address] [place] [address]) No Yes   Sig: Take 1 tablet by mouth 2 (two) times a day At 8 am and 5 pm   OLANZapine (ZyPREXA) 2 5 mg tablet 1/21/2020 at Unknown time Outside Facility ([address] [place] [address]) Yes Yes   Sig: Take 1 tablet by mouth daily   Testosterone 1 62 % GEL   Yes Yes   Sig: Place 40 5 mg on the skin daily   aspirin (ECOTRIN LOW STRENGTH) 81 mg EC tablet 1/21/2020 at Unknown time Outside Facility (Specify) No Yes   Sig: Take 1 tablet (81 mg total) by mouth daily   atorvastatin (LIPITOR) 20 mg tablet 1/21/2020 at Unknown time Outside Facility (Specify) No Yes   Sig: Take 1 tablet (20 mg total) by mouth daily At 8pm   cholecalciferol (VITAMIN D3) 1,000 units tablet 1/21/2020 at Unknown time Outside Facility (Specify) No Yes   Sig: Take 1 tablet (1,000 Units total) by mouth 2 (two) times a day At 8am and 8pm   divalproex sodium (DEPAKOTE) 500 mg EC tablet 1/21/2020 at Unknown time Outside Facility ([address] [place] [address]) Yes Yes   Sig: Take 500 mg by mouth    docusate sodium (COLACE) 100 mg capsule 1/21/2020 at Unknown time Outside Facility ([address] [place] [address]) No Yes   Sig: Take one capsule at 8am   esomeprazole (NexIUM) 40 MG capsule 1/22/2020 at Unknown time Outside Facility (Specify) No Yes   Sig: Take one capsule daily 30 minutes prior to breakfast   lanolin-mineral oil (ARMAAN) [address] (Specify) No No   Sig: Apply topically 2 (two) times a day Apply to hands daily at 8 am and 8 pm   levothyroxine 75 mcg tablet 1/21/2020 at Unknown time Outside Facility (Specify) No Yes   Sig: TAKE 1 TABLET BY MOUTH IN THE EVENING (8PM)   loratadine (CLARITIN) 10 mg tablet Unknown at Unknown time Outside Facility (Specify) No No   Sig: Take 1 tablet (10 mg total) by mouth daily As needed for allergies   Do not exceed 1 dose per 24 hours   midodrine (PROAMATINE) 2 5 mg tablet Unknown at Unknown time  No No   Sig: Take 1 tablet (2 5 mg total) by mouth 2 (two) times a day as needed (If systolic blood pressure less than 100 and patient is symptomatic)   multivitamin-minerals (CENTRUM ADULTS) tablet 1/21/2020 at Unknown time Outside Facility [facility]) No Yes   Sig: Take 1 tablet by mouth daily At 8am   polyethylene glycol (GLYCOLAX) powder 1/21/2020 at Unknown time Outside Facility ([facility]) No Yes   Sig: Take 17 grams in 8 oz of water by mouth daily at 8 pm   tamsulosin (FLOMAX) 0 4 mg Not Taking at Unknown time  No No   Sig: Take 2 capsules (0 8 mg total) by mouth daily with dinner   Patient not taking: Reported on 1/22/2020      Facility-Administered Medications: None       Past Medical History:   Diagnosis Date    Abnormal weight gain     last assessed 4/18/16; resolved 1/25/17    [facility]estive heart failure) [facility]     Anxiety     Dementia ([facility] )     Depression     Disease of thyroid gland     Down's syndrome     Fatigue     last assessed 2/23/17; resolved 6/1/17    GERD (gastroesophageal reflux disease)     Gluten free diet     Hernia of abdominal cavity     Hyperlipidemia     Hypogonadism in male     Impulse control disorder     OCD (obsessive compulsive disorder)     Parkinson disease ([facility] )     Parkinson disease ([facility] )     resolved 2/23/17    Pleural effusion     Sinus tachycardia     Vitamin D deficiency     last assessed 9/1/16; resolved 6/1/17       Past Surgical History:   Procedure Laterality Date    COLONOSCOPY      ESOPHAGOGASTRODUODENOSCOPY      with biopsy    PERICARDIOCENTESIS  4/29/2016            Family History   Problem Relation Age of Onset    No Known Problems Mother     Parkinsonism Father         symtomatic     I have reviewed and agree with the history as documented  Social History     Tobacco Use    Smoking status: Never Smoker    Smokeless tobacco: Never Used   Substance Use Topics    Alcohol use: Never     Frequency: Never    Drug use: Never        Review of Systems   Constitutional: Negative  HENT: Negative      Eyes: Negative  Respiratory: Negative  Cardiovascular: Negative  Gastrointestinal: Positive for abdominal pain  Negative for abdominal distention  Genitourinary: Positive for difficulty urinating and dysuria  Negative for decreased urine volume, discharge, enuresis, flank pain, frequency, genital sores, hematuria, penile pain, penile swelling, scrotal swelling, testicular pain and urgency  Musculoskeletal: Negative  Skin: Negative  Neurological: Negative  All other systems reviewed and are negative  Physical Exam  Physical Exam   Constitutional: He is oriented to person, place, and time  He appears well-developed and well-nourished  HENT:   Head: Normocephalic and atraumatic  Nose: Nose normal    Eyes: Pupils are equal, round, and reactive to light  Conjunctivae are normal    Cardiovascular: Normal rate, regular rhythm, normal heart sounds and intact distal pulses  Exam reveals no gallop and no friction rub  No murmur heard  Pulmonary/Chest: Effort normal and breath sounds normal  No stridor  No respiratory distress  He has no wheezes  He has no rales  He exhibits no tenderness  spo2 is 99% indicating adequate oxygenation    Abdominal: Soft  Bowel sounds are normal  He exhibits no distension and no mass  There is tenderness  There is no rebound and no guarding  No hernia  Genitourinary: Penis normal  Circumcised  Neurological: He is alert and oriented to person, place, and time  Skin: Skin is warm and dry  Capillary refill takes less than 2 seconds  Nursing note and vitals reviewed        Vital Signs  ED Triage Vitals [01/22/20 0804]   Temperature Pulse Respirations Blood Pressure SpO2   98 4 °F (36 9 °C) 97 18 139/68 99 %      Temp src Heart Rate Source Patient Position - Orthostatic VS BP Location FiO2 (%)   -- -- -- -- --      Pain Score       5           Vitals:    01/22/20 0804   BP: 139/68   Pulse: 97         Visual Acuity      ED Medications  Medications - No data to display    Diagnostic Studies  Results Reviewed     Procedure Component Value Units Date/Time    Basic metabolic panel [[id number]]  (Abnormal) Collected:  01/22/20 0949    Lab Status:  Final result Specimen:  Blood from Arm, Right Updated:  01/22/20 1007     Sodium 137 mmol/L      Potassium 4 3 mmol/L      Chloride 101 mmol/L      CO2 33 mmol/L      ANION GAP 3 mmol/L      BUN 8 mg/dL      Creatinine 0 79 mg/dL      Glucose 96 mg/dL      Calcium 8 3 mg/dL      eGFR 101 ml/min/1 73sq m     Narrative:       National Kidney Disease Foundation guidelines for Chronic Kidney Disease (CKD):     Stage 1 with normal or high GFR (GFR > 90 mL/min/1 73 square meters)    Stage 2 Mild CKD (GFR = 60-89 mL/min/1 73 square meters)    Stage 3A Moderate CKD (GFR = 45-59 mL/min/1 73 square meters)    Stage 3B Moderate CKD (GFR = 30-44 mL/min/1 73 square meters)    Stage 4 Severe CKD (GFR = 15-29 mL/min/1 73 square meters)    Stage 5 End Stage CKD (GFR <15 mL/min/1 73 square meters)  Note: GFR calculation is accurate only with a steady state creatinine    Comprehensive metabolic panel [[id number]]  (Abnormal) Collected:  01/22/20 0851    Lab Status:  Final result Specimen:  Blood from Arm, Right Updated:  01/22/20 0942     Sodium 133 mmol/L      Potassium 6 4 mmol/L      Chloride 100 mmol/L      CO2 33 mmol/L      ANION GAP 0 mmol/L      BUN 8 mg/dL      Creatinine 0 76 mg/dL      Glucose 90 mg/dL      Calcium 8 5 mg/dL      AST 64 U/L      ALT 41 U/L      Alkaline Phosphatase 64 U/L      Total Protein 7 3 g/dL      Albumin 2 5 g/dL      Total Bilirubin 0 40 mg/dL      eGFR 103 ml/min/1 73sq m     Narrative:       [place] guidelines for Chronic Kidney Disease (CKD):     Stage 1 with normal or high GFR (GFR > 90 mL/min/1 73 square meters)    Stage 2 Mild CKD (GFR = 60-89 mL/min/1 73 square meters)    Stage 3A Moderate CKD (GFR = 45-59 mL/min/1 73 square meters)    Stage 3B Moderate CKD (GFR = 30-44 mL/min/1 73 square meters)    Stage 4 Severe CKD (GFR = 15-29 mL/min/1 73 square meters)    Stage 5 End Stage CKD (GFR <15 mL/min/1 73 square meters)  Note: GFR calculation is accurate only with a steady state creatinine    CBC and differential [[id number]]  (Abnormal) Collected:  01/22/20 0851    Lab Status:  Final result Specimen:  Blood from Arm, Right Updated:  01/22/20 0930     WBC 5 61 Thousand/uL      RBC 4 19 Million/uL      Hemoglobin 14 1 g/dL      Hematocrit 41 8 %       fL      MCH 33 7 pg      MCHC 33 7 g/dL      RDW 13 9 %      MPV 10 0 fL      Platelets 150 Thousands/uL      nRBC 0 /100 WBCs     Narrative: This is an appended report  These results have been appended to a previously verified report  UA w Reflex to Microscopic w Reflex to Culture [[id number]] Collected:  01/22/20 0908    Lab Status:  Final result Specimen:  Urine, Indwelling Navarro Catheter Updated:  01/22/20 0914     Color, UA Yellow     Clarity, UA Clear     Specific Gravity, UA 1 010     pH, UA 7 5     Leukocytes, UA Negative     Nitrite, UA Negative     Protein, UA Negative mg/dl      Glucose, UA Negative mg/dl      Ketones, UA Negative mg/dl      Urobilinogen, UA 0 2 E U /dl      Bilirubin, UA Negative     Blood, UA Negative                 No orders to display              Procedures  Procedures         ED Course  ED Course as of Jan 22 1058 Wed Jan 22, 2020   1009 LM with case management Jf Cortez      1037 Paged hospitalist  Unable to place patient from the ED after speaking with case management  1041 Repeated through BMP and is normal  CMP was hemolyzed  Potassium(!!): 6 4                               MDM  Number of Diagnoses or Management Options  Urinary retention:   Diagnosis management comments: Patient was once again found to be in urinary retention at 900 cc(more than his usual, chronically retains at 300c)  Patient had good relief  I am concerned with his current regimen of Midodrine and Flomax   He is predisposed for dysuria and neurogenic bladder given history of Down's and Parkinson's disease  According to [facility] staff, patient is to be taking Midodrine as needed for systolic BP < 331 and he was also placed back on Flomax 0 8  When he was here at [facility] he was getting Midodrine regularly TID  Will consider medication side effect as well as neurogenic bladder etc  He is followed by a urology Dr Myrtle Horton?) out of [facility]  Spoke with case management Madhuchrisanna Ketty and patient unable to be placed from ED  Will admit for retention  Amount and/or Complexity of Data Reviewed  Clinical lab tests: reviewed and ordered  Review and summarize past medical records: yes  Independent visualization of images, tracings, or specimens: yes          Disposition  Final diagnoses:   Urinary retention     Time reflects when diagnosis was documented in both MDM as applicable and the Disposition within this note     Time User Action Codes Description Comment    1/22/2020 10:53 AM Ayad Hoguepe Add [R33 9] Urinary retention       ED Disposition     ED Disposition Condition Date/Time Comment    Admit Stable Wed Jan 22, 2020 10:53 AM Case was discussed with Dr Morro Martin and the patient's admission status was agreed to be Admission Status: inpatient status to the service of Dr Morro Martin   Follow-up Information    None         Patient's Medications   Discharge Prescriptions    No medications on file     No discharge procedures on file      ED Provider  Electronically Signed by           Terrie Parikh PA-C  01/22/20 6588

## 2020-01-23 LAB
ANION GAP SERPL CALCULATED.3IONS-SCNC: 4 MMOL/L (ref 4–13)
BUN SERPL-MCNC: 12 MG/DL (ref 5–25)
CALCIUM SERPL-MCNC: 8.1 MG/DL (ref 8.3–10.1)
CHLORIDE SERPL-SCNC: 100 MMOL/L (ref 100–108)
CO2 SERPL-SCNC: 31 MMOL/L (ref 21–32)
CREAT SERPL-MCNC: 0.86 MG/DL (ref 0.6–1.3)
ERYTHROCYTE [DISTWIDTH] IN BLOOD BY AUTOMATED COUNT: 13.9 % (ref 11.6–15.1)
GFR SERPL CREATININE-BSD FRML MDRD: 98 ML/MIN/1.73SQ M
GLUCOSE SERPL-MCNC: 103 MG/DL (ref 65–140)
HCT VFR BLD AUTO: 40.3 % (ref 36.5–49.3)
HGB BLD-MCNC: 13.1 G/DL (ref 12–17)
MCH RBC QN AUTO: 33.7 PG (ref 26.8–34.3)
MCHC RBC AUTO-ENTMCNC: 32.5 G/DL (ref 31.4–37.4)
MCV RBC AUTO: 104 FL (ref 82–98)
PLATELET # BLD AUTO: 353 THOUSANDS/UL (ref 149–390)
PMV BLD AUTO: 9.6 FL (ref 8.9–12.7)
POTASSIUM SERPL-SCNC: 4.6 MMOL/L (ref 3.5–5.3)
RBC # BLD AUTO: 3.89 MILLION/UL (ref 3.88–5.62)
SODIUM SERPL-SCNC: 135 MMOL/L (ref 136–145)
WBC # BLD AUTO: 11.08 THOUSAND/UL (ref 4.31–10.16)

## 2020-01-23 PROCEDURE — 80048 BASIC METABOLIC PNL TOTAL CA: CPT | Performed by: NURSE PRACTITIONER

## 2020-01-23 PROCEDURE — 97162 PT EVAL MOD COMPLEX 30 MIN: CPT

## 2020-01-23 PROCEDURE — 97530 THERAPEUTIC ACTIVITIES: CPT

## 2020-01-23 PROCEDURE — 99232 SBSQ HOSP IP/OBS MODERATE 35: CPT | Performed by: NURSE PRACTITIONER

## 2020-01-23 PROCEDURE — 85027 COMPLETE CBC AUTOMATED: CPT | Performed by: NURSE PRACTITIONER

## 2020-01-23 RX ORDER — LIDOCAINE HYDROCHLORIDE 20 MG/ML
1 JELLY TOPICAL DAILY PRN
Status: DISCONTINUED | OUTPATIENT
Start: 2020-01-23 | End: 2020-01-24 | Stop reason: HOSPADM

## 2020-01-23 RX ORDER — GINSENG 100 MG
1 CAPSULE ORAL 2 TIMES DAILY
Status: DISCONTINUED | OUTPATIENT
Start: 2020-01-23 | End: 2020-01-24 | Stop reason: HOSPADM

## 2020-01-23 RX ADMIN — LEVOTHYROXINE SODIUM 75 MCG: 75 TABLET ORAL at 05:54

## 2020-01-23 RX ADMIN — ACETAMINOPHEN 650 MG: 325 TABLET, FILM COATED ORAL at 10:29

## 2020-01-23 RX ADMIN — DIVALPROEX SODIUM 500 MG: 500 TABLET, DELAYED RELEASE ORAL at 10:22

## 2020-01-23 RX ADMIN — ASPIRIN 81 MG: 81 TABLET, COATED ORAL at 10:20

## 2020-01-23 RX ADMIN — VITAMIN D, TAB 1000IU (100/BT) 1000 UNITS: 25 TAB at 17:05

## 2020-01-23 RX ADMIN — BACITRACIN 1 SMALL APPLICATION: 500 OINTMENT TOPICAL at 20:42

## 2020-01-23 RX ADMIN — VITAMIN D, TAB 1000IU (100/BT) 1000 UNITS: 25 TAB at 10:22

## 2020-01-23 RX ADMIN — DOCUSATE SODIUM 100 MG: 100 CAPSULE, LIQUID FILLED ORAL at 10:20

## 2020-01-23 RX ADMIN — OYSTER SHELL CALCIUM WITH VITAMIN D 1 TABLET: 500; 200 TABLET, FILM COATED ORAL at 17:05

## 2020-01-23 RX ADMIN — OLANZAPINE 2.5 MG: 2.5 TABLET, FILM COATED ORAL at 10:20

## 2020-01-23 RX ADMIN — BACITRACIN 1 SMALL APPLICATION: 500 OINTMENT TOPICAL at 12:18

## 2020-01-23 RX ADMIN — OYSTER SHELL CALCIUM WITH VITAMIN D 1 TABLET: 500; 200 TABLET, FILM COATED ORAL at 10:21

## 2020-01-23 RX ADMIN — DOCUSATE SODIUM 100 MG: 100 CAPSULE, LIQUID FILLED ORAL at 17:05

## 2020-01-23 RX ADMIN — Medication 1 TABLET: at 10:21

## 2020-01-23 RX ADMIN — PANTOPRAZOLE SODIUM 40 MG: 40 TABLET, DELAYED RELEASE ORAL at 06:26

## 2020-01-23 RX ADMIN — POLYETHYLENE GLYCOL 3350 17 G: 17 POWDER, FOR SOLUTION ORAL at 19:24

## 2020-01-23 RX ADMIN — ATORVASTATIN CALCIUM 20 MG: 20 TABLET, FILM COATED ORAL at 16:53

## 2020-01-23 RX ADMIN — TAMSULOSIN HYDROCHLORIDE 0.8 MG: 0.4 CAPSULE ORAL at 16:53

## 2020-01-23 RX ADMIN — LIDOCAINE HYDROCHLORIDE 1 APPLICATION: 20 JELLY TOPICAL at 12:15

## 2020-01-23 NOTE — PHYSICAL THERAPY NOTE
PT EVALUATION       01/23/20 9392   Pain Assessment   Pain Assessment Merritt-Baker FACES   Merritt-Baker FACES Pain Rating 6  (RN aware pt would like Tylenol)   Pain Type Acute pain   Pain Location Penis   Home Living   Type of [address] [facility] Two level  (few steps to enter; bedroom upstairs)   Home Equipment   (none)   Prior Function   Level of [place] Independent with ADLs and functional mobility   Lives With Facility staff   Receives Help From Personal care attendant   ADL Assistance Independent   Vocational   (attends a day program)   Restrictions/Precautions   Other Precautions Fall Risk;Pain   General   Additional Pertinent History Pt recently discharged then readmitted the next day with urinary retention  Pt now has patel catheter  Cognition   Attention Within functional limits   Orientation Level Oriented to person;Oriented to place;Oriented to time; Appropriate for developmental age   RLE Assessment   RLE Assessment WFL   LLE Assessment   LLE Assessment WFL   Transfers   Sit to Stand 5  Supervision   Stand to Sit 5  Supervision   Stand pivot 5  Supervision   Ambulation/Elevation   Gait pattern   (wide SUNDAY, feet ER)   Gait Assistance   (supervision/min assist)   Assistive Device   (occasional hand held)   Distance 2x15 feet   Balance   Static Sitting Good   Dynamic Sitting Fair +   Static Standing Fair +   Dynamic Standing Fair +   Activity Tolerance   Activity Tolerance Patient tolerated treatment well  (c/o pain in penia)   Assessment   Prognosis Good   Problem List Decreased strength;Decreased endurance; Impaired balance;Decreased mobility;Pain   Assessment Patient seen for Physical Therapy evaluation  Patient admitted with Urinary retention  Comorbidities affecting patient's physical performance include: Downs syndrome    Personal factors affecting patient at time of initial evaluation include: lives in 2 story house, stairs to enter home, inability to navigate level surfaces without external assistance and inability to live alone  Prior to admission, patient was independent with functional mobility without assistive device and independent with ADLS  Please find objective findings from Physical Therapy assessment regarding body systems outlined above with impairments and limitations including weakness, decreased endurance, gait deviations and pain  The Barthel Index was used as a functional outcome tool presenting with a score of 60 today indicating moderate limitations of functional mobility and ADLS  Patient's clinical presentation is currently evolving as seen in patient's presentation of changing level of pain, new onset of impairment of functional mobility, decreased endurance and new onset of weakness  Pt would benefit from continued Physical Therapy treatment to address deficits as defined above and maximize level of functional mobility  As demonstrated by objective findings, the assigned level of complexity for this evaluation is moderate  Goals   Patient Goals "go home"    STG Expiration Date 01/30/20   Short Term Goal #1 independent transfers, pt will ambulate 200 feet independently, independent up and down 10 steps so pt can get to his bedroom at home   LTG Expiration Date 02/06/20   Long Term Goal #1 independent ambulation outdoor surfaces 400 feet   Plan   Treatment/Interventions Elevations; Therapeutic exercise; Endurance training;Gait training;Spoke to nursing;LE strengthening/ROM   PT Frequency   (3-5x/week)   Recommendation   Recommendation Home with family support   Barthel Index   Feeding 10   Bathing 0   Grooming Score 5   Dressing Score 5   Bladder Score 0   Bowels Score 10   Toilet Use Score 5   Transfers (Bed/Chair) Score 10   Mobility (Level Surface) Score 10   Stairs Score 5   Barthel Index Score 61   Licensure   [place] License Number  Claus Vines PT [id number]       Time QK:1921  Time XBR:7444  Total Time: 10      S:  "I want to brush my teeth"  O:  Supervision to brush teeth in bathroom  Pt ambulate 2x100 feet with supervision/occasional hand held assist in hallway  A:  Pt appears uncomfortable due to patel, RN aware  P:  Continue PT, encourage pt to ambulate with staff in hallway      Tyshawn Campos PT

## 2020-01-23 NOTE — PLAN OF CARE
Problem: Potential for Falls  Goal: Patient will remain free of falls  Description  INTERVENTIONS:  - Assess patient frequently for physical needs  -  Identify cognitive and physical deficits and behaviors that affect risk of falls    -  [place] fall precautions as indicated by assessment   - Educate patient/family on patient safety including physical limitations  - Instruct patient to call for assistance with activity based on assessment  - Modify environment to reduce risk of injury  - Consider OT/PT consult to assist with strengthening/mobility  Outcome: Progressing     Problem: PAIN - ADULT  Goal: Verbalizes/displays adequate comfort level or baseline comfort level  Description  Interventions:  - Encourage patient to monitor pain and request assistance  - Assess pain using appropriate pain scale  - Administer analgesics based on type and severity of pain and evaluate response  - Implement non-pharmacological measures as appropriate and evaluate response  - Consider cultural and social influences on pain and pain management  - Notify physician/advanced practitioner if interventions unsuccessful or patient reports new pain  Outcome: Progressing     Problem: INFECTION - ADULT  Goal: Absence or prevention of progression during hospitalization  Description  INTERVENTIONS:  - Assess and monitor for signs and symptoms of infection  - Monitor lab/diagnostic results  - Monitor all insertion sites, i e  indwelling lines, tubes, and drains  - Monitor endotracheal if appropriate and nasal secretions for changes in amount and color  - [place] appropriate cooling/warming therapies per order  - Administer medications as ordered  - Instruct and encourage patient and family to use good hand hygiene technique     Outcome: Progressing     Problem: SAFETY ADULT  Goal: Maintain or return to baseline ADL function  Description  INTERVENTIONS:  -  Assess patient's ability to carry out ADLs; assess patient's baseline for ADL function and identify physical deficits which impact ability to perform ADLs (bathing, care of mouth/teeth, toileting, grooming, dressing, etc )  - Assess/evaluate cause of self-care deficits   - Assess range of motion  - Assess patient's mobility; develop plan if impaired  - Assess patient's need for assistive devices and provide as appropriate  - Encourage maximum independence but intervene and supervise when necessary  - Involve family in performance of ADLs  - Assess for home care needs following discharge   - Consider OT consult to assist with ADL evaluation and planning for discharge  - Provide patient education as appropriate  Outcome: Progressing  Goal: Maintain or return mobility status to optimal level  Description  INTERVENTIONS:  - Assess patient's baseline mobility status (ambulation, transfers, stairs, etc )    - Identify cognitive and physical deficits and behaviors that affect mobility  - Identify mobility aids required to assist with transfers and/or ambulation (gait belt, sit-to-stand, lift, walker, cane, etc )  - [place] fall precautions as indicated by assessment  - Record patient progress and toleration of activity level on Mobility SBAR; progress patient to next Phase/Stage  - Instruct patient to call for assistance with activity based on assessment  - Consider rehabilitation consult to assist with strengthening/weightbearing, etc   Outcome: Progressing     Problem: DISCHARGE PLANNING  Goal: Discharge to home or other facility with appropriate resources  Description  INTERVENTIONS:  - Identify barriers to discharge w/patient and caregiver  - Arrange for needed discharge resources and transportation as appropriate  - Identify discharge learning needs (meds, wound care, etc )  - Arrange for interpretive services to assist at discharge as needed  - Refer to Case Management Department for coordinating discharge planning if the patient needs post-hospital services based on physician/advanced practitioner order or complex needs related to functional status, cognitive ability, or social support system  Outcome: Progressing     Problem: Knowledge Deficit  Goal: Patient/family/caregiver demonstrates understanding of disease process, treatment plan, medications, and discharge instructions  Description  Complete learning assessment and assess knowledge base    Interventions:  - Provide teaching at level of understanding  - Provide teaching via preferred learning methods  Outcome: Progressing

## 2020-01-23 NOTE — SOCIAL WORK
LOS day 1  Pt is a 30 day readmission  Pt is from a group home in [place], [place]  Pt's brother, Hussein Rizzo, is [facility] spoke with Yang via phone due to pt with Down's Syndrome  PTA, pt used no DME  Pt now has a patel catheter  Pt's [facility] nurse - Romeo Dunne [phone]  DCP:  Per Dr Jen Ovalle, pt will need patel catheter at discharge back to [facility] dept will follow pt's hospital course and see if rehab is ordered for chronic patel  Pt's brother chose [facility], CCL and [facility] in case rehab will be needed before returning to [address] [place] [address]  Referrals sent to [facility] rehabs with Level II [facility] via [facility]

## 2020-01-23 NOTE — PROGRESS NOTES
Progress Note - Noni Sandhuprudi 1965, 53 y o  male MRN: [id number]    Unit/Bed#: [address] [place] [address] Encounter: [id number]    Primary Care Provider: BJ Soliman   Date and time admitted to hospital: 1/22/2020  7:58 AM        * Urinary retention  Assessment & Plan  · Maintain patel catheter, apply neosporin and lidocaine ointment to urethra for discomfort  · Continue Flomax 0 8 mg daily with dinner  · Monitor I & O's      Orthostatic hypotension  Assessment & Plan  · Continue orthostatic BP's  · Midodrine 2 5 mg b i d  P r n  Jennifer Aspen · Vital signs as per unit routine  · Monitor  Parkinson's disease [facility])  Assessment & Plan  · Following with Dr Last Lewis, neurology outpatient every 6 months for mild Parkinson's disease  · Supportive care  Chronic constipation  Assessment & Plan  Continue Colace and  MiraLax, on a daily basis  Monitor  Down's syndrome  Assessment & Plan  · Patient lives in group home  · Supportive care  GERD (gastroesophageal reflux disease)  Assessment & Plan  · Continue PPI  Hypothyroidism  Assessment & Plan  · Continue levothyroxine 75 mcg p o  Daily  · Last TSH is 3 29 from January 2020  VTE Pharmacologic Prophylaxis:   Pharmacologic: Lovenox  Mechanical VTE Prophylaxis in Place: No    Patient Centered Rounds: I have performed bedside rounds with nursing staff today  Discussions with Specialists or Other Care Team Provider: Nursing, Case management, Group Home RN    Education and Discussions with Family / Patient: Discussed with patient his aptel catheter care and treatments for pain at insertion site    Time Spent for Care: 20 minutes  More than 50% of total time spent on counseling and coordination of care as described above      Current Length of Stay: 1 day(s)    Current Patient Status: Inpatient   Certification Statement: The patient will continue to require additional inpatient hospital stay due to patel catheter placement and patients group home living situation, will go to short term rehabiitation  Discharge Plan: Patient is not ready for discharge at this time  Code Status: Level 1 - Full Code      Subjective:   Sitting up in chair, very pleasant upon seeing him  He has complaint of  Pain from patel catheter at the insertion site(urethra)    Objective:     Vitals:   Temp (24hrs), Av 7 °F (37 1 °C), Min:98 4 °F (36 9 °C), Max:99 5 °F (37 5 °C)    Temp:  [98 4 °F (36 9 °C)-99 5 °F (37 5 °C)] 98 7 °F (37 1 °C)  HR:  [76-89] 76  Resp:  [16-18] 18  BP: (107-134)/(53-64) 134/61  SpO2:  [95 %-99 %] 95 %  Body mass index is 26 34 kg/m²  Input and Output Summary (last 24 hours): Intake/Output Summary (Last 24 hours) at 2020 1759  Last data filed at 2020 1709  Gross per 24 hour   Intake 1440 ml   Output 3450 ml   Net -2010 ml       Physical Exam:     Physical Exam   Constitutional: He appears well-developed and well-nourished  HENT:   Downs Syndrome   Cardiovascular: Normal rate and regular rhythm  Pulmonary/Chest: Effort normal and breath sounds normal    Abdominal: Soft  Bowel sounds are normal    Genitourinary: Penis normal    Genitourinary Comments: Indwelling catheter draining clear yellow urine   Musculoskeletal: Normal range of motion  Neurological: He is alert  Skin: Skin is warm and dry  Psychiatric: He has a normal mood and affect  Additional Data:     Labs:    Results from last 7 days   Lab Units 20  0627 20  0851  20  0606   WBC Thousand/uL 11 08* 5 61   < > 8 83   HEMOGLOBIN g/dL 13 1 14 1   < > 13 6   HEMATOCRIT % 40 3 41 8   < > 40 0   PLATELETS Thousands/uL 353 362   < > 269   NEUTROS PCT %  --   --   --  74   LYMPHS PCT %  --   --   --  15   LYMPHO PCT %  --  15  --   --    MONOS PCT %  --   --   --  7   MONO PCT %  --  8  --   --    EOS PCT %  --  0  --  1    < > = values in this interval not displayed       Results from last 7 days   Lab Units 20  0627  20  0851   POTASSIUM mmol/L 4 6   < > 6 4*   CHLORIDE mmol/L 100   < > 100   CO2 mmol/L 31   < > 33*   BUN mg/dL 12   < > 8   CREATININE mg/dL 0 86   < > 0 76   CALCIUM mg/dL 8 1*   < > 8 5   ALK PHOS U/L  --   --  64   ALT U/L  --   --  41   AST U/L  --   --  64*    < > = values in this interval not displayed  * I Have Reviewed All Lab Data Listed Above  * Additional Pertinent Lab Tests Reviewed:  Veronica 66 Admission Reviewed    Imaging:    Imaging Reports Reviewed Today Include: none  Imaging Personally Reviewed by Myself Includes:  none    Recent Cultures (last 7 days):     Results from last 7 days   Lab Units 01/16/20  2231   URINE CULTURE  80,000-89,000 cfu/ml        Last 24 Hours Medication List:     Current Facility-Administered Medications:  acetaminophen 650 mg Oral Q6H PRN Ezio Saint Stephen, CRNP   aspirin 81 mg Oral Daily Ezio Saint Stephen, CRNP   atorvastatin 20 mg Oral Daily With [employer] Em Condon, BJ   bacitracin 1 small application Topical BID Renelda Muster, CRNP   calcium carbonate-vitamin D 1 tablet Oral BID Ezio Saint Stephen, CRNP   cholecalciferol 1,000 Units Oral BID Ezio Saint Stephen, CRNP   divalproex sodium 500 mg Oral Daily Ezio Saint Stephen, CRNP   docusate sodium 100 mg Oral BID Ezio Saint Stephen, CRNP   levothyroxine 75 mcg Oral Early Morning Ezio Saint Stephen, CRNP   lidocaine 1 application Urethral Daily PRN Renelda Muster, CRNP   midodrine 2 5 mg Oral BID PRN Ezio Saint Stephen, CRNP   multivitamin-minerals 1 tablet Oral Daily Ezio Saint Stephen, CRNP   OLANZapine 2 5 mg Oral Daily Ezio Saint Stephen, CRNP   ondansetron 4 mg Intravenous Q6H PRN Ezio Saint Stephen, CRNP   pantoprazole 40 mg Oral Daily Before Breakfast Ezio Saint Stephen, CRNP   polyethylene glycol 17 g Oral Daily Ezio Saint Stephen, CRNP   tamsulosin 0 8 mg Oral Daily With [address] [place], [place] [id number]        Today, Patient Was Seen By: Keith Verma Naya Boone    ** Please Note: Dictation voice to text software may have been used in the creation of this document   **

## 2020-01-24 VITALS
HEART RATE: 82 BPM | HEIGHT: 62 IN | OXYGEN SATURATION: 100 % | BODY MASS INDEX: 26.5 KG/M2 | WEIGHT: 144 LBS | SYSTOLIC BLOOD PRESSURE: 116 MMHG | RESPIRATION RATE: 18 BRPM | DIASTOLIC BLOOD PRESSURE: 54 MMHG | TEMPERATURE: 98.2 F

## 2020-01-24 LAB
ERYTHROCYTE [DISTWIDTH] IN BLOOD BY AUTOMATED COUNT: 13.8 % (ref 11.6–15.1)
HCT VFR BLD AUTO: 45.4 % (ref 36.5–49.3)
HGB BLD-MCNC: 14.8 G/DL (ref 12–17)
MCH RBC QN AUTO: 33.9 PG (ref 26.8–34.3)
MCHC RBC AUTO-ENTMCNC: 32.6 G/DL (ref 31.4–37.4)
MCV RBC AUTO: 104 FL (ref 82–98)
MRSA NOSE QL CULT: NORMAL
PLATELET # BLD AUTO: 426 THOUSANDS/UL (ref 149–390)
PMV BLD AUTO: 10 FL (ref 8.9–12.7)
RBC # BLD AUTO: 4.36 MILLION/UL (ref 3.88–5.62)
WBC # BLD AUTO: 8.13 THOUSAND/UL (ref 4.31–10.16)

## 2020-01-24 PROCEDURE — 85027 COMPLETE CBC AUTOMATED: CPT | Performed by: NURSE PRACTITIONER

## 2020-01-24 PROCEDURE — 99239 HOSP IP/OBS DSCHRG MGMT >30: CPT | Performed by: NURSE PRACTITIONER

## 2020-01-24 RX ORDER — IBUPROFEN 200 MG
TABLET ORAL 3 TIMES DAILY
Qty: 28.3 G | Refills: 0
Start: 2020-01-24 | End: 2022-01-05 | Stop reason: HOSPADM

## 2020-01-24 RX ORDER — LIDOCAINE HYDROCHLORIDE 20 MG/ML
1 JELLY TOPICAL DAILY PRN
Refills: 0
Start: 2020-01-24 | End: 2022-01-05 | Stop reason: HOSPADM

## 2020-01-24 RX ADMIN — PANTOPRAZOLE SODIUM 40 MG: 40 TABLET, DELAYED RELEASE ORAL at 06:00

## 2020-01-24 RX ADMIN — ASPIRIN 81 MG: 81 TABLET, COATED ORAL at 09:09

## 2020-01-24 RX ADMIN — BACITRACIN 1 SMALL APPLICATION: 500 OINTMENT TOPICAL at 09:10

## 2020-01-24 RX ADMIN — Medication 1 TABLET: at 09:10

## 2020-01-24 RX ADMIN — DOCUSATE SODIUM 100 MG: 100 CAPSULE, LIQUID FILLED ORAL at 09:09

## 2020-01-24 RX ADMIN — OLANZAPINE 2.5 MG: 2.5 TABLET, FILM COATED ORAL at 09:09

## 2020-01-24 RX ADMIN — ENOXAPARIN SODIUM 40 MG: 40 INJECTION SUBCUTANEOUS at 09:09

## 2020-01-24 RX ADMIN — LEVOTHYROXINE SODIUM 75 MCG: 75 TABLET ORAL at 05:44

## 2020-01-24 RX ADMIN — OYSTER SHELL CALCIUM WITH VITAMIN D 1 TABLET: 500; 200 TABLET, FILM COATED ORAL at 09:10

## 2020-01-24 RX ADMIN — DIVALPROEX SODIUM 500 MG: 500 TABLET, DELAYED RELEASE ORAL at 09:10

## 2020-01-24 RX ADMIN — VITAMIN D, TAB 1000IU (100/BT) 1000 UNITS: 25 TAB at 09:09

## 2020-01-24 NOTE — DISCHARGE INSTRUCTIONS
Attempt a voiding trial in 1 week  Follow-up with urology  Continue Flomax 0 8 mg daily  May use Midodrine PRN for hypotension

## 2020-01-24 NOTE — TRANSPORTATION MEDICAL NECESSITY
Section I - General Information    Name of Patient: Concepcion Simpson                 : 1965    Medicare #: [id number]  Transport Date: 20 (PCS is valid for round trips on this date and for all repetitive trips in the 60-day range as noted below )  Origin: [address]:   [facility]  Is the pt's stay covered under Medicare Part A (PPS/DRG)   [x]     Closest appropriate facility? If no, why is transport to more distant facility required? Yes  If hospice pt, is this transport related to pt's terminal illness? No       Section II - Medical Necessity Questionnaire  Ambulance transportation is medically necessary only if other means of transport are contraindicated or would be potentially harmful to the patient  To meet this requirement, the patient must either be "bed confined" or suffer from a condition such that transport by means other than ambulance is contraindicated by the patient's condition  The following questions must be answered by the medical professional signing below for this form to be valid:    1)  Describe the MEDICAL CONDITION (physical and/or mental) of this patient AT [address] [place] [address] that requires the patient to be transported in an ambulance and why transport by other means is contraindicated by the patient's condition:   Down's Syndrome, Parkinson's dis, urinary retention with chronic patel catheter    2) Is the patient "bed confined" as defined below? Yes  To be "be confined" the patient must satisfy all three of the following conditions: (1) unable to get up from bed without Assistance; AND (2) unable to ambulate; AND (3) unable to sit in a chair or wheelchair  3) Can this patient safely be transported by car or wheelchair van (i e , seated during transport without a medical attendant or monitoring)?    No    4) In addition to completing questions 1-3 above, please check any of the following conditions that apply*:   *Note: supporting documentation for any boxes checked must be maintained in the patient's medical records  If hosp-hosp transfer, describe services needed at 2nd facility not available at 1st facility? Patient is confused  Danger to self/others  Medical attendant required   Unable to tolerate seated position for time needed to transport   Unable to sit in a chair or wheelchair due to decubitus ulcers or other wounds       Section III - Signature of Physician or Healthcare Professional  I certify that the above information is true and correct based on my evaluation of this patient, and represent that the patient requires transport by ambulance and that other forms of transport are contraindicated  I understand that this information will be used by the Centers for Medicare and Medicaid Services (CMS) to support the determination of medical necessity for ambulance services, and I represent that I have personal knowledge of the patient's condition at time of transport  []  If this box is checked, I also certify that the patient is physically or mentally incapable of signing the ambulance service's claim and that the institution with which I am affiliated has furnished care, services, or assistance to the patient  My signature below is made on behalf of the patient pursuant to 42 CFR §424 36(b)(4)   In accordance with 42 CFR §424 37, the specific reason(s) that the patient is physically or mentally incapable of signing the claim form is as follows:        Signature of Physician* or Healthcare Professional_Marcia Mooney_____________________________________________________________  Signature Date 01/24/20 (For scheduled repetitive transports, this form is not valid for transports performed more than 60 days after this date)    Printed Name & Credentials of Physician or Healthcare Professional (MD, DO, RN, etc )________________________________  *Form must be signed by patient's attending physician for scheduled, repetitive transports   For non-repetitive, unscheduled ambulance transports, if unable to obtain the signature of the attending physician, any of the following may sign (choose appropriate option below)  [] Physician Assistant []  Clinical Nurse Specialist []  Registered Nurse  []  Nurse Practitioner  [x] Discharge Planner

## 2020-01-24 NOTE — [place] UNIVERSAL TRANSFER FORM
[place] UNIV[place] TRANSFER FORM  (ALL ITEMS MUST BE COMPLETED)    1  TRANSFER FROM: [address] [place] [address] Street: [facility]    2  DATE OF TRANSFER: 1/24/2020                        TIME OF TRANSFER: 12:30 pm    3  PATIENT NAME: Andrew Marinelli      YOB: 1965                             GENDER: male    4  LANGUAGE:   English    5  PHYSICIAN NAME:  Nela Hernandez MD                   PHONE: [phone]    6  CODE STATUS: Level 1 - Full Code        Out of Hospital DNR Attached: No    7  :                                      :  Extended Emergency Contact Information  Primary Emergency Contact: Yang Ramirez  Address: [address] [place] [address] Trevor Pretty [address] [place] Phone: [phone]  Relation[id number] Representative/Proxy:  No           Legal Guardian:  No             NAME OF:           HEALTH CARE REPRESENTATIVE/PROXY:                                         OR           LEGAL GUARDIAN, IF NOT :                                               PHONE:  (Day)           (Night)                        (Cell)    8  REASON FOR TRANSFER:  stable condition,  53 y o  male patient with a past medical history including down syndrome, dementia, urinary retention, orthostatic hypotension, CHF, dementia, hypothyroidism, GERD, anxiety, depression, hyperlipidemia, Parkinson's diseas  Melanie is on  V/S: /54 (BP Location: Right arm)   Pulse 82   Temp 98 2 °F (36 8 °C) (Tympanic)   Resp 18   Ht 5' 2" (1 575 m)   Wt 65 3 kg (144 lb)   SpO2 100%   BMI 26 34 kg/m²           PAIN: no    9  PRIMARY DIAGNOSIS: Urinary retention      Secondary Diagnosis:         Pacemaker: No      Internal Defib: No          Mental Health Diagnosis (if Applicable):    10  RESTRAINTS: No     11  RESPIRATORY NEEDS: None    12  ISOLATION/PRECAUTION: None    13  ALLERGY: Advil [ibuprofen] and Gluten meal    14  SENSORY:       Vision Good    15  SKIN CONDITION: No Wounds    16  DIET: Special (describe)gluten free    17  IV ACCESS: None    18  PERSONAL ITEMS SENT WITH PATIENT: None    19  ATTACHED DOCUMENTS: MUST ATTACH CURRENT MEDICATION INFORMATION Face Sheet    20  AT RISK ALERTS:Falls        HARM TO: N/A    21  WEIGHT BEARING STATUS:         Left Leg: Limited        Right Leg: Limited    22  MENTAL STATUS:Otherpt is with Down Syndrome    23  FUNCTION:        Walk: With Help        Transfer: With Help        Toilet: With Help        Feed: With Help    24  IMMUNIZATIONS/SCREENING:     Immunization History   Administered Date(s) Administered    Hep B, adult 09/25/2008, 11/05/2008, 04/06/2009    INFLUENZA 09/05/2019    Influenza Quadrivalent Preservative Free 3 years and older IM 09/18/2014    Influenza Quadrivalent, 6-35 Months IM 10/02/2017    Influenza TIV (IM) 11/11/2011, 11/30/2012, 09/17/2013, 09/29/2016    Influenza, recombinant, quadrivalent,injectable, preservative free 10/04/2018    Tdap 10/17/2006, 02/23/2017    Tuberculin Skin Test-PPD Intradermal 05/20/2005, 05/20/2006, 09/22/2014, 09/21/2015       25  BOWEL: Date Last BM1/24    32  BLADDER: Navarro Catheter    27   SENDING FACILITY CONTACT: [id number]                  Title: RN        Unit: 3N        Phone: [address] (if known):        Title:        Unit:         Phone:         FORM PREFILLED BY (if applicable)       Title:       Unit:        Phone:         FORM COMPLETED BY Kierra Baker RN      Title: RN      Phone: [phone]

## 2020-01-24 NOTE — SOCIAL WORK
Spoke with Reino Burkitt at [place] [address] and bed is available at [facility] for pt  Pt will be transferred with the patel cath  Left phone message for pt's brother, Ashely Joshi, to let him know that bed is available at [facility] and no bed available at [facility] or [facility]

## 2020-01-24 NOTE — DISCHARGE SUMMARY
Discharge- Snow Anton 1965, 53 y o  male MRN: [id number]    Unit/Bed#: [address] [place] [address] Encounter: [id number]    Primary Care Provider: Clarence Nissen, [address]   Date and time admitted to hospital: 1/22/2020  7:58 AM        * Urinary retention  Assessment & Plan  Recently discharged from hospital with urinary retention  Had successfully underwent a voiding trial but when he returned to the group home developed urinary retention again  Patient return to the ED for evaluation  Bladder scan in the emergency department should greater than 700 mL  A Navarro catheter was inserted with 1000 mL urine return  · Continue Navarro catheter for 1 week then attempt a voiding trial  · Follow-up with urologist in 1 week  · Continue Flomax 0 8 mg daily with dinner (recently re-started)  · Only use Midodrine if patient has systolic blood pressure less than 100 and is symptomatic  · Apply neosporin and lidocaine ointment to urethra for discomfort  · Monitor I & O's  · Minimize use of midodrine as it can cause urinary retention    Orthostatic hypotension  Assessment & Plan  · Midodrine 2 5 mg BID PRN is SBP < 100s and symptomatic  · Will try to minimize use of midodrine as it can cause urinary retention    Chronic constipation  Assessment & Plan  Continue Colace and  MiraLax  Monitor  Parkinson's disease [facility])  Assessment & Plan  · Following with Dr Damien Prather, neurology outpatient every 6 months for mild Parkinson's disease  · Supportive care      Down's syndrome  Assessment & Plan  · Patient lives in group home  · Supportive care    GERD (gastroesophageal reflux disease)  Assessment & Plan  · Continue PPI    Hypothyroidism  Assessment & Plan  · Continue levothyroxine 75 mcg   · Last TSH is 3 29 from January 2020      Discharging Physician / Practitioner: BJ Abbasi  PCP: Clarence Nissen, [address]  Admission Date:   Admission Orders (From admission, onward)     Ordered        01/22/20 1053  Inpatient Admission Once                   Discharge Date: 01/24/20    Resolved Problems  Date Reviewed: 1/24/2020    None          Consultations During Hospital Stay:  · None    Procedures Performed:   · None    Significant Findings / Test Results:   · Urinary retention with 1,000 mL output with patel catheter insertion    Incidental Findings:   · None     Test Results Pending at Discharge (will require follow up): · None     Outpatient Tests Requested:  · None    Complications:  None    Reason for Admission: Urinary retention     Hospital Course:     Tova Avalos is a 53 y o  male patient with a past medical history including down syndrome, dementia, urinary retention, orthostatic hypotension, CHF, dementia, hypothyroidism, GERD, anxiety, depression, hyperlipidemia, Parkinson's disease, hypogonadism, and chronic constipation who originally presented to the hospital on 1/22/2020 due to urinary retention  Patient was recently hospitalized for urinary retention, SHARIF, hydronephrosis, and UTI  At that time was treated with IV antibiotics and had a Patel catheter placed  He underwent a successful voiding trial prior to discharge  However, he return to the emergency room the next day with complaints of inability to void  Bladder scan showed greater than 700 mL of fluid  A Patel catheter was placed in the ED and 1000 mL of urine output was returned  Patient was admitted for further evaluation and treatment  Patient well known to urologist, Dr Anjelica Nickerson  He has a history of urinary retention  His Flomax was held in December due to hypotension  At this point, we have resumed Flomax 0 8 mg daily (this was held in December due to hypotension in the setting of UTI and gastroenteritis)  We will leave the patel catheter in place for 1 week then attempt voiding trial   He will follow-up with his urologist     Please see above list of diagnoses and related plan for additional information       Condition at Discharge: stable Discharge Day Visit / Exam:     Subjective:  Resting comfortably out of bed in chair  Tolerated breakfast without coughing or dysphagia  Tolerating Navarro catheter  Does have urethral discomfort  Feels better with lidocaine jelly  Denies abdominal pain, vomiting, diarrhea  Had a bowel movement this morning  Vitals: Blood Pressure: 116/54 (01/24/20 0906)  Pulse: 82 (01/24/20 0906)  Temperature: 98 2 °F (36 8 °C) (01/24/20 0906)  Temp Source: Tympanic (01/24/20 0906)  Respirations: 18 (01/24/20 0906)  Height: 5' 2" (157 5 cm) (01/22/20 2100)  Weight - Scale: 65 3 kg (144 lb) (01/22/20 0802)  SpO2: 100 % (01/24/20 0906)  Exam:   Physical Exam   Constitutional: He appears well-developed  No distress  Pleasant gentleman with Down syndrome resting comfortably out of bed in chair on room air   HENT:   Head: Normocephalic  Neck: Normal range of motion  Cardiovascular: Normal rate  Pulmonary/Chest: Effort normal and breath sounds normal  No respiratory distress  He has no rhonchi  Abdominal: Soft  Bowel sounds are normal  He exhibits no distension  There is no tenderness  Genitourinary:   Genitourinary Comments: Indwelling Navarro catheter draining clear yellow urine  Small scab to urethral type  Musculoskeletal: Normal range of motion  He exhibits no edema  Neurological: He is alert  He has normal reflexes  Alert to person and place, disoriented to time   Skin: Skin is warm and dry  He is not diaphoretic  Psychiatric: He has a normal mood and affect  Nursing note and vitals reviewed  Discussion with Family: Group home nurse    Discharge instructions/Information to patient and family:   See after visit summary for information provided to patient and family  Provisions for Follow-Up Care:  See after visit summary for information related to follow-up care and any pertinent home health orders        Disposition:     [facility] 78 [facility]:   · Not Applicable to this Patient - Not Applicable to this Patient    Planned Readmission: None     Discharge Statement:  I spent > 30 minutes discharging the patient  This time was spent on the day of discharge  I had direct contact with the patient on the day of discharge  Greater than 50% of the total time was spent examining patient, answering all patient questions, arranging and discussing plan of care with patient as well as directly providing post-discharge instructions  Additional time then spent on discharge activities  Discharge Medications:  See after visit summary for reconciled discharge medications provided to patient and family        ** Please Note: This note has been constructed using a voice recognition system **

## 2020-01-29 ENCOUNTER — TELEPHONE (OUTPATIENT)
Dept: NEUROSURGERY | Facility: CLINIC | Age: 55
End: 2020-01-29

## 2020-02-04 ENCOUNTER — TELEPHONE (OUTPATIENT)
Dept: FAMILY MEDICINE CLINIC | Facility: CLINIC | Age: 55
End: 2020-02-04

## 2020-02-04 NOTE — TELEPHONE ENCOUNTER
lacy Maldonado: pt was in hospital and weighed 144 on 1/22 and is now in care center and weighs 162 6

## 2020-02-06 DIAGNOSIS — Z71.89 COMPLEX CARE COORDINATION: Primary | ICD-10-CM

## 2020-02-07 ENCOUNTER — PATIENT OUTREACH (OUTPATIENT)
Dept: CASE MANAGEMENT | Facility: OTHER | Age: 55
End: 2020-02-07

## 2020-02-10 ENCOUNTER — HOSPITAL ENCOUNTER (OUTPATIENT)
Dept: NON INVASIVE DIAGNOSTICS | Facility: HOSPITAL | Age: 55
Discharge: HOME | End: 2020-02-10
Attending: INTERNAL MEDICINE
Payer: MEDICARE

## 2020-02-10 ENCOUNTER — HOSPITAL ENCOUNTER (OUTPATIENT)
Dept: RADIOLOGY | Facility: HOSPITAL | Age: 55
Discharge: HOME | End: 2020-02-10
Attending: INTERNAL MEDICINE
Payer: MEDICARE

## 2020-02-10 ENCOUNTER — PATIENT OUTREACH (OUTPATIENT)
Dept: CASE MANAGEMENT | Facility: OTHER | Age: 55
End: 2020-02-10

## 2020-02-10 DIAGNOSIS — R42 DIZZINESS: ICD-10-CM

## 2020-02-10 DIAGNOSIS — M81.0 OSTEOPOROSIS WITHOUT CURRENT PATHOLOGICAL FRACTURE, UNSPECIFIED OSTEOPOROSIS TYPE: ICD-10-CM

## 2020-02-10 DIAGNOSIS — E29.1 HYPOGONADISM, TESTICULAR: ICD-10-CM

## 2020-02-10 DIAGNOSIS — R55 NEAR SYNCOPE: ICD-10-CM

## 2020-02-10 PROCEDURE — 93226 XTRNL ECG REC<48 HR SCAN A/R: CPT

## 2020-02-10 PROCEDURE — 93225 XTRNL ECG REC<48 HRS REC: CPT

## 2020-02-10 NOTE — PROGRESS NOTES
Email received from [facility] indicating patient discharge from [facility] 2/7/20        -------------------------------------------------------------------------------------------    Spoke with facility  who reports patient confirmed patient discharge on 2/7/20  Patient returned to [facility]  BPCI form and Care Coordination note updated  Removed self from care team and sent Inbabbelet message to DENISHA Riggs regarding patient handoff

## 2020-02-11 ENCOUNTER — PATIENT OUTREACH (OUTPATIENT)
Dept: CASE MANAGEMENT | Facility: OTHER | Age: 55
End: 2020-02-11

## 2020-02-11 NOTE — PROGRESS NOTES
This CM spoke to Fatmata Said, group home Release Mgr who declined routine [facility] service but accepted CM contact number and agreed to call with any concerns  Fatmata Said reported patient is doing better now that he is back at group home and back on gluten free diet  She said patient had gained 20lbs while in rehab, not on gluten-free diet and eating whatever

## 2020-02-13 ENCOUNTER — TELEPHONE (OUTPATIENT)
Dept: OTHER | Facility: OTHER | Age: 55
End: 2020-02-13

## 2020-02-14 ENCOUNTER — OFFICE VISIT (OUTPATIENT)
Dept: FAMILY MEDICINE CLINIC | Facility: CLINIC | Age: 55
End: 2020-02-14
Payer: MEDICARE

## 2020-02-14 VITALS
SYSTOLIC BLOOD PRESSURE: 110 MMHG | HEIGHT: 62 IN | TEMPERATURE: 97.4 F | DIASTOLIC BLOOD PRESSURE: 64 MMHG | BODY MASS INDEX: 29.85 KG/M2 | HEART RATE: 66 BPM | WEIGHT: 162.2 LBS

## 2020-02-14 DIAGNOSIS — R33.9 URINARY RETENTION: ICD-10-CM

## 2020-02-14 DIAGNOSIS — Z09 HOSPITAL DISCHARGE FOLLOW-UP: Primary | ICD-10-CM

## 2020-02-14 DIAGNOSIS — I95.1 ORTHOSTATIC HYPOTENSION: ICD-10-CM

## 2020-02-14 PROCEDURE — 3074F SYST BP LT 130 MM HG: CPT | Performed by: NURSE PRACTITIONER

## 2020-02-14 PROCEDURE — 99213 OFFICE O/P EST LOW 20 MIN: CPT | Performed by: NURSE PRACTITIONER

## 2020-02-14 PROCEDURE — 1036F TOBACCO NON-USER: CPT | Performed by: NURSE PRACTITIONER

## 2020-02-14 PROCEDURE — 3008F BODY MASS INDEX DOCD: CPT | Performed by: NURSE PRACTITIONER

## 2020-02-14 PROCEDURE — 3078F DIAST BP <80 MM HG: CPT | Performed by: NURSE PRACTITIONER

## 2020-02-14 PROCEDURE — 1111F DSCHRG MED/CURRENT MED MERGE: CPT | Performed by: NURSE PRACTITIONER

## 2020-02-14 RX ORDER — TESTOSTERONE 16.2 MG/G
GEL TRANSDERMAL
COMMUNITY
Start: 2020-01-24 | End: 2020-02-19

## 2020-02-14 RX ORDER — TAMSULOSIN HYDROCHLORIDE 0.4 MG/1
0.4 CAPSULE ORAL
COMMUNITY
End: 2022-01-05 | Stop reason: HOSPADM

## 2020-02-14 NOTE — PROGRESS NOTES
TRANSITION OF CARE OFFICE VISIT  [facility]    NAME: Lanette Ramierz  AGE: 53 y o  SEX: male  : 1965     DATE: 2020     Assessment and Plan:     Problem List Items Addressed This Visit        Cardiovascular and Mediastinum    Orthostatic hypotension       Genitourinary    Urinary retention    Relevant Medications    tamsulosin (FLOMAX) 0 4 mg      Other Visit Diagnoses     Hospital discharge follow-up    -  Primary    Admitted to [facility] - for urinary retention after bout of gastroenteritis  Required patel catheter  Spent approx 2 weeks at [facility] for patel care        Clinically stable  Cont plan of care  Follow up with specialists as scheduled  Call any time with any concerns       HPI:     Gordon Brought brought by Mateo Wu, care giver, for hospital follow up  Was admitted - for urinary retention following a bout a gastroenteritis  Additionally had orthostasis  meds were changed  He required a patel  Spent approx 2 weeks at [facility] for patel care  He has been home since the   He is re-adjusting nightly  He has been urinating with ques every 2-3 hours per staff  [facility] is coming 2x/week  Staff is checking bps 2x/day with PRN midodrine  No concerns from staff  He did gain 15 pounds at [facility] center due to unlimited nature of free feeding  Records reviewed    The following portions of the patient's history were reviewed and updated as appropriate: allergies, current medications, past family history, past medical history, past social history, past surgical history and problem list      Review of Systems:     Review of Systems   Unable to perform ROS: Psychiatric disorder   Constitutional: Negative for fever  Genitourinary:        Per staff is urinating when reminded     Neurological: Negative for dizziness, syncope and weakness          Problem List:     Patient Active Problem List   Diagnosis    Essential hypertension  Hyperlipidemia    Hypothyroidism    GERD (gastroesophageal reflux disease)    Down's syndrome    [facility]    Anemia    CHELSIE (iron deficiency anemia)    Chronic constipation    Hypogonadism, testicular    Hypotension    Osteoporosis without current pathological fracture    Mixed incontinence    Parkinson's disease ([facility]    Serositis [facility] )    Bladder distention    Hydronephrosis    Abdominal pain    Idiopathic pericardial effusion    Cardiac murmur    Xerodermia    Sinus bradycardia    Vitamin D deficiency    Near syncope    Dizziness    Gastroenteritis    Orthostatic hypotension    Cystitis    Urinary retention    Cerebral ventriculomegaly due to brain atrophy ([facility])    Acute kidney injury ([facility] )        Objective:     /64   Pulse 66   Temp (!) 97 4 °F (36 3 °C) (Tympanic)   Ht 5' 2" (1 575 m)   Wt 73 6 kg (162 lb 3 2 oz)   BMI 29 67 kg/m²     Physical Exam   Constitutional: Vital signs are normal  He appears well-developed and well-nourished  No distress  Cardiovascular: Normal rate, regular rhythm, normal heart sounds and intact distal pulses  Pulmonary/Chest: Effort normal and breath sounds normal    Abdominal: Soft  Bowel sounds are normal  He exhibits no distension  There is no tenderness  Neurological: He is alert  He exhibits normal muscle tone  Coordination normal    Skin: Skin is warm and dry  No pallor  Nursing note and vitals reviewed  Laboratory Results: I have personally reviewed the pertinent laboratory results/reports     Radiology/Other Diagnostic Testing Results: I have personally reviewed pertinent reports  No results found       Current Medications:     Outpatient Medications Prior to Visit   Medication Sig Dispense Refill    aspirin (ECOTRIN LOW STRENGTH) 81 mg EC tablet Take 1 tablet (81 mg total) by mouth daily 30 tablet 2    atorvastatin (LIPITOR) 20 mg tablet Take 1 tablet (20 mg total) by mouth daily At 8pm 30 tablet 11    Calcium Carbonate-Vitamin D 600-400 MG-UNIT per tablet Take 1 tablet by mouth 2 (two) times a day At 8 am and 5 pm 60 tablet 10    cholecalciferol (VITAMIN D3) 1,000 units tablet Take 1 tablet (1,000 Units total) by mouth 2 (two) times a day At 8am and 8pm 60 tablet 11    divalproex sodium (DEPAKOTE) 500 mg EC tablet Take 500 mg by mouth       docusate sodium (COLACE) 100 mg capsule Take one capsule at 8am 30 capsule 11    esomeprazole (NexIUM) 40 MG capsule Take one capsule daily 30 minutes prior to breakfast 30 capsule 11    lanolin-mineral oil (ARMAAN) LOTN Apply topically 2 (two) times a day Apply to hands daily at 8 am and 8 pm 1 Bottle 11    levothyroxine 75 mcg tablet TAKE 1 TABLET BY MOUTH IN THE EVENING (8PM) 30 tablet 10    lidocaine (URO-JET) 2 % urethral/mucosal gel Insert 1 application into the urethra daily as needed (urethral pain 2/2 catheter)  0    loratadine (CLARITIN) 10 mg tablet Take 1 tablet (10 mg total) by mouth daily As needed for allergies  Do not exceed 1 dose per 24 hours 30 tablet 11    midodrine (PROAMATINE) 2 5 mg tablet Take 1 tablet (2 5 mg total) by mouth 2 (two) times a day as needed (If systolic blood pressure less than 100 and patient is symptomatic) 60 tablet 2    multivitamin-minerals (CENTRUM ADULTS) tablet Take 1 tablet by mouth daily At 8am 30 tablet 11    neomycin-bacitracin-polymyxin (NEOSPORIN) 5-400-5,000 ointment Apply topically 3 (three) times a day Apply to urethra for lubrication 28 3 g 0    OLANZapine (ZyPREXA) 2 5 mg tablet Take 1 tablet by mouth daily 8 pm      polyethylene glycol (GLYCOLAX) powder Take 17 grams in 8 oz of water by mouth daily at 8 pm 527 g 11    tamsulosin (FLOMAX) 0 4 mg Take 0 4 mg by mouth daily with dinner      testosterone (ANDROGEL) 1 62 % TD gel pump       tamsulosin (FLOMAX) 0 4 mg Take 2 capsules (0 8 mg total) by mouth daily with dinner 60 capsule 0     No facility-administered medications prior to visit          [facility] [place] [address] [facility] [address] [place]

## 2020-02-19 ENCOUNTER — OFFICE VISIT (OUTPATIENT)
Dept: NEUROLOGY | Facility: CLINIC | Age: 55
End: 2020-02-19
Payer: MEDICARE

## 2020-02-19 ENCOUNTER — OFFICE VISIT (OUTPATIENT)
Dept: NEUROSURGERY | Facility: CLINIC | Age: 55
End: 2020-02-19
Payer: MEDICARE

## 2020-02-19 VITALS
DIASTOLIC BLOOD PRESSURE: 60 MMHG | TEMPERATURE: 97.6 F | BODY MASS INDEX: 29.08 KG/M2 | WEIGHT: 158 LBS | HEART RATE: 72 BPM | RESPIRATION RATE: 16 BRPM | HEIGHT: 62 IN | SYSTOLIC BLOOD PRESSURE: 100 MMHG

## 2020-02-19 VITALS
WEIGHT: 158 LBS | HEIGHT: 62 IN | SYSTOLIC BLOOD PRESSURE: 102 MMHG | BODY MASS INDEX: 29.08 KG/M2 | DIASTOLIC BLOOD PRESSURE: 64 MMHG

## 2020-02-19 DIAGNOSIS — I67.5 MOYA MOYA DISEASE: ICD-10-CM

## 2020-02-19 DIAGNOSIS — G20 PARKINSON'S DISEASE (HCC): Primary | ICD-10-CM

## 2020-02-19 PROCEDURE — 3074F SYST BP LT 130 MM HG: CPT | Performed by: PSYCHIATRY & NEUROLOGY

## 2020-02-19 PROCEDURE — 99214 OFFICE O/P EST MOD 30 MIN: CPT | Performed by: PSYCHIATRY & NEUROLOGY

## 2020-02-19 PROCEDURE — 99203 OFFICE O/P NEW LOW 30 MIN: CPT | Performed by: NEUROLOGICAL SURGERY

## 2020-02-19 PROCEDURE — 1036F TOBACCO NON-USER: CPT | Performed by: PSYCHIATRY & NEUROLOGY

## 2020-02-19 PROCEDURE — 1111F DSCHRG MED/CURRENT MED MERGE: CPT | Performed by: PSYCHIATRY & NEUROLOGY

## 2020-02-19 PROCEDURE — 3008F BODY MASS INDEX DOCD: CPT | Performed by: PSYCHIATRY & NEUROLOGY

## 2020-02-19 PROCEDURE — 3078F DIAST BP <80 MM HG: CPT | Performed by: PSYCHIATRY & NEUROLOGY

## 2020-02-19 NOTE — PATIENT INSTRUCTIONS
· At this time, we do not recommend any neurosurgical intervention  · Continue follow up with your primary care provider for general health and we recommend healthy choices  · Continue follow up with neurology, we recommend follow up with a neurovascular/stroke neurologist for the concern for moyamoya disease  · No follow up is required by us, you can follow up with us as needed should the symptoms progress or worsen  · Please contact us with any questions or concerns

## 2020-02-19 NOTE — PROGRESS NOTES
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    · Exam: GCS 15, AAO X 3, GUERRERO, strength 4/5 throughout, sensation intact to LT X 4, Reflexes 2+ and symmetric, no sosa's or clonus, no drift bilaterally  · Imaging reviewed personally and by attending  Final results below discussed with the patient  · CTA head and  Neck w wo 12/23/2020: Tapering of the bilateral supraclinoid segments of the internal carotid artery with subsequent occlusion at the level of the carotid terminus as well as M1 and A1 segments  Multiple prominent collateral vessels and lenticulostriate vasculature is noted  Imaging appearance is compatible with a moyamoya type pattern of disease in this patient with a history of Down syndrome  No mass effect, acute intracranial hemorrhage or CT evidence for a large acute vascular distribution infarct  Redemonstration of ventriculomegaly, overall slightly increased in size since prior examination from 2014 allowing for the differences in technique  Degree of volume loss also appears slightly progressed    Plan  · Continue ASA 81 mg daily as prescribed  · Maintain normal blood pressure, avoid hypotension or hypertension  · Discussed with patient and caregiver about the bilateral ICA occlusion with multiple collateral vessels concerning for  Moyamoya disease  Discussed with patient and caregiver that with the bilateral ICA occlusion pt is at risk of stroke/TIAs and hemorrhage  At this time, patient has mild symptoms  At this time, no neurosurgical intervention is anticipated   No follow up required by neurosurgery at this time, pt to follow up with neurosurgery as needed should symptoms progress or worsen  · We recommend pt continue to follow up with neurology, we recommend follow up with neurovascular/stroke neurologist due to risk of stroke with moyamoya disease  · Discussed plan of care with patient and his caregiver  · Patient/caregiver made aware to contact neurosurgery with any questions or concerns  Subjective:      Patient ID: Last Rangel is a 53 y o  male  HPI    Pt is a 54 yo male with PMHx of Down Syndrome, mild parkinson's and recent history of syncope who presents to the neurosurgery office for consultation for abnormal results on CTA concerning for padilla padilla disease  Pt reportedly about 6 months ago and recently had syncope and fell and was seen by Dr Alisha eDnt of neurology  Pt had a CTA of head and neck w wo that showed stenosis and occlusion of bilateral ICA in the supraclinoid segments with multiple prominent collateral vessels concerning for padilla  padilla disease for which neurosurgery was consulted  Patient presented to the office with his caregiver who has known him for about the last 10 years  Patient reports that he still occasionally gets dizzy/lightheaded  He denies changes in vision  Pt denies any new weakness, numbness, tingling or paralysis  Pt and caregiver denie any known history of TIA or CVA  Pt's caregiver reported thatrecently had increased urinary retention  He was noted to have > 500cc post residual volume and a patel placed a few months ago which was since removed  As of his last visit with urology pt's post void residual was about 300cc and did not required catherization  Pt continues to follow up with urology  Patient was recently started on ASA 81 mg daily when he was found to have bilateral ICA occlusion         The following portions of the patient's history were reviewed and updated as appropriate: allergies, current medications, past family history, past medical history, past social history, past surgical history and problem list     Review of Systems   Constitutional: Negative  HENT: Positive for hearing loss (bilateral hearing aids)  Eyes: Negative  Gastrointestinal: Negative  Endocrine: Negative  Genitourinary:        Having trouble emptying his bladder   Musculoskeletal: Negative  Allergic/Immunologic: Negative  Neurological: Positive for dizziness (at times) and syncope  Hematological: Negative  Psychiatric/Behavioral: Negative  Objective:      /60 (BP Location: Left arm)   Pulse 72   Temp 97 6 °F (36 4 °C) (Tympanic)   Resp 16   Ht 5' 2" (1 575 m)   Wt 71 7 kg (158 lb)   BMI 28 90 kg/m²          Physical Exam   Constitutional: He is oriented to person, place, and time  He appears well-nourished  HENT:   Head: Normocephalic and atraumatic  Eyes: Pupils are equal, round, and reactive to light  Conjunctivae and EOM are normal  No scleral icterus  Neck: Normal range of motion  Neck supple  No tracheal deviation present  Cardiovascular: Normal rate  Pulmonary/Chest: Effort normal    Abdominal: Soft  There is no tenderness  There is no guarding  Musculoskeletal: He exhibits no edema  Neurological: He is alert and oriented to person, place, and time  54 yo male with classic symptoms of Down Syndrome, GCS 15, AAO X 3, followed most commands accurately though seemed to have mild cognitive issues likely 2/2 Down Syndrome, GUERRERO, strength 4/5 throughout, sensation intact to LT X 4, Reflexes 2+ and symmetric, no sosa's or clonus, no drift bilaterally  Skin: Skin is warm and dry  No rash noted  No pallor  Psychiatric: He has a normal mood and affect

## 2020-02-19 NOTE — PROGRESS NOTES
Patient ID: Caio Etienne is a 53 y o  male  Assessment/Plan:    Minimal symptoms of early Parkinson disease in a patient with a Down syndrome  Plan no indication for any treatment at this point  Patient is going to be followed by neurosurgeon for the abnormal CTA in near future  Continue to be followed by urologist for his the lower motor neuron neurogenic bladder  Will re-examine the patient in 6 months  Subjective:    53-year-old male followed in the office for mild resting tremor and being slow with diagnosis of early mild Parkinson disease  However patient has not progressed in many years to full-blown Parkinson in symptoms  Patient is bone with a Down syndrome but he is at high functioning level  Patient is able to care for himself such as the showering, dressing, tying his own Kristy Moder laces, brushing his teeth  Also take the garbage can in and out of the house and makes his own bed, puts away his laundry  He does fold his own clothes and put it away  Patient also can use the iPad looking for whether  He watches YouTube video on his iPad  Caretaker have not seen any regression in his mentation  He is able to continue to do all the activities that he has learned through his life  Patient does walk very slow and do all his activities very slowly  It is difficult to ask patient question about the stiffness in his extremities  He claims that he can smell definitely perfume and perhaps may smell coffee  Unable to ask any other neurological symptoms as patient is mentally challenged  Patient has been recently diagnosed with the neurogenic bladder predominantly on able to void bladder fully with the keeping 300 cc of for residual urine even after the catheterization  Patient had CT scan of the brain done because he was having some syncopal attack, found to have moyamoya disease  MRI of the brain revealed a frontal microangiopathy changes    CTA revealed imaging consistent with moyamoya disease            Past Medical History:   Diagnosis Date    Abnormal weight gain     last assessed 4/18/16; resolved 1/25/17    [facility]est[facility] failure) ([facility] )     Anxiety     Dementia ([place] [address] )     Depression     Disease of thyroid gland     Down's syndrome     Fatigue     last assessed 2/23/17; resolved 6/1/17    GERD (gastroesophageal reflux disease)     Gluten free diet     H/O urinary retention     Hernia of abdominal cavity     Hyperlipidemia     Hypogonadism in male     Impulse control disorder     OCD (obsessive compulsive disorder)     Parkinson disease ([facility] )     Parkinson disease ([facility] )     resolved 2/23/17    Pleural effusion     Sinus tachycardia     Vitamin D deficiency     last assessed 9/1/16; resolved 6/1/17       Family History   Problem Relation Age of Onset    No Known Problems Mother     Parkinsonism Father         symtomatic   ,    Social History     Socioeconomic History    Marital status: Single     Spouse name: Not on file    Number of children: Not on file    Years of education: Not on file    Highest education level: Not on file   Occupational History    Not on file   Social Needs    Financial resource strain: Not on file    Food insecurity:     Worry: Not on file     Inability: Not on file    Transportation needs:     Medical: Not on file     Non-medical: Not on file   Tobacco Use    Smoking status: Never Smoker    Smokeless tobacco: Never Used   Substance and Sexual Activity    Alcohol use: Never     Frequency: Never    Drug use: Never    Sexual activity: Not on file   Lifestyle    Physical activity:     Days per week: Not on file     Minutes per session: Not on file    Stress: Not on file   Relationships    Social connections:     Talks on phone: Not on file     Gets together: Not on file     Attends Caodaism service: Not on file     Active member of club or organization: Not on file     Attends meetings of clubs or organizations: Not on file     Relationship status: Not on file    Intimate partner violence:     Fear of current or ex partner: Not on file     Emotionally abused: Not on file     Physically abused: Not on file     Forced sexual activity: Not on file   Other Topics Concern    Not on file   Social History Narrative    Caffeine use        Current Outpatient Medications on File Prior to Visit   Medication Sig Dispense Refill    aspirin (ECOTRIN LOW STRENGTH) 81 mg EC tablet Take 1 tablet (81 mg total) by mouth daily 30 tablet 2    atorvastatin (LIPITOR) 20 mg tablet Take 1 tablet (20 mg total) by mouth daily At 8pm 30 tablet 11    Calcium Carbonate-Vitamin D 600-400 MG-UNIT per tablet Take 1 tablet by mouth 2 (two) times a day At 8 am and 5 pm 60 tablet 10    cholecalciferol (VITAMIN D3) 1,000 units tablet Take 1 tablet (1,000 Units total) by mouth 2 (two) times a day At 8am and 8pm 60 tablet 11    divalproex sodium (DEPAKOTE) 500 mg EC tablet Take 500 mg by mouth       docusate sodium (COLACE) 100 mg capsule Take one capsule at 8am 30 capsule 11    esomeprazole (NexIUM) 40 MG capsule Take one capsule daily 30 minutes prior to breakfast 30 capsule 11    lanolin-mineral oil (ARMAAN) LOTN Apply topically 2 (two) times a day Apply to hands daily at 8 am and 8 pm 1 Bottle 11    levothyroxine 75 mcg tablet TAKE 1 TABLET BY MOUTH IN THE EVENING (8PM) 30 tablet 10    loratadine (CLARITIN) 10 mg tablet Take 1 tablet (10 mg total) by mouth daily As needed for allergies   Do not exceed 1 dose per 24 hours 30 tablet 11    midodrine (PROAMATINE) 2 5 mg tablet Take 1 tablet (2 5 mg total) by mouth 2 (two) times a day as needed (If systolic blood pressure less than 100 and patient is symptomatic) 60 tablet 2    multivitamin-minerals (CENTRUM ADULTS) tablet Take 1 tablet by mouth daily At 8am 30 tablet 11    OLANZapine (ZyPREXA) 2 5 mg tablet Take 1 tablet by mouth daily 8 pm      polyethylene glycol (GLYCOLAX) powder Take 17 grams in 8 oz of water by mouth daily at 8 pm 527 g 11    tamsulosin (FLOMAX) 0 4 mg Take 0 4 mg by mouth daily with dinner      lidocaine (URO-JET) 2 % urethral/mucosal gel Insert 1 application into the urethra daily as needed (urethral pain 2/2 catheter) (Patient not taking: Reported on 2/19/2020)  0    neomycin-bacitracin-polymyxin (NEOSPORIN) 5-400-5,000 ointment Apply topically 3 (three) times a day Apply to urethra for lubrication (Patient not taking: Reported on 2/19/2020) 28 3 g 0    testosterone (ANDROGEL) 1 62 % TD gel pump        No current facility-administered medications on file prior to visit  Objective:    Blood pressure 102/64, height 5' 2" (1 575 m), weight 71 7 kg (158 lb)  Physical Exam   Neck: Normal carotid pulses present  Carotid bruit is not present  Neurological Exam  Mental Status   Oriented to person, place, time and situation  Recent and remote memory are intact  Speech is normal  Language is fluent with no aphasia      Cranial Nerves  CN II: Visual fields full to confrontation  CN III, IV, VI: Extraocular movements intact bilaterally  Pupils equal round and reactive to light bilaterally  CN V: Facial sensation is normal   CN VII: Full and symmetric facial movement  CN VIII: Hearing is normal   CN IX, X: Palate elevates symmetrically  Normal gag reflex  CN XI: Shoulder shrug strength is normal   CN XII: Tongue midline without atrophy or fasciculations      Motor  Normal muscle bulk throughout  No fasciculations present  Strength is 5/5 throughout all four extremities  Patient does have very slow rapid movement with finger tapping and also clapping  Patient is unable to perform rapid alternating movement  There is no resting tremor appreciated in the office        Sensory  Sensation is intact to light touch, pinprick, vibration and proprioception in all four extremities  Light touch is normal in upper and lower extremities   Pinprick is normal in upper and lower extremities  Proprioception is normal in upper and lower extremities       Reflexes  Deep tendon reflexes are 2+ and symmetric in all four extremities with downgoing toes bilaterally      Coordination  Right: Finger-to-nose normal  Heel-to-shin normal   Left: Finger-to-nose normal  Heel-to-shin normal      Gait  Patient take baby step and has a no arm swing upon walking              ROS:     Review of Systems   Constitutional: Negative  HENT: Negative  Eyes: Negative  Respiratory: Negative  Cardiovascular: Negative  Gastrointestinal: Negative  Endocrine: Negative  Genitourinary: Negative  Musculoskeletal: Negative  Skin: Negative  Allergic/Immunologic: Negative  Neurological: Negative  Hematological: Negative      Psychiatric/Behavioral: Negative                              Electronically signed by Susan Ribeiro MD at 8/14/2019  8:56 AM

## 2020-02-21 ENCOUNTER — HOSPITAL ENCOUNTER (OUTPATIENT)
Dept: RADIOLOGY | Facility: HOSPITAL | Age: 55
Discharge: HOME | End: 2020-02-21
Attending: INTERNAL MEDICINE
Payer: MEDICARE

## 2020-02-21 PROCEDURE — 77080 DXA BONE DENSITY AXIAL: CPT

## 2020-02-23 PROCEDURE — 93227 XTRNL ECG REC<48 HR R&I: CPT | Performed by: INTERNAL MEDICINE

## 2020-02-24 NOTE — RESULT ENCOUNTER NOTE
Patient's device was interrogated in our office clinic  It shows device function      Please call patient

## 2020-02-25 ENCOUNTER — TELEPHONE (OUTPATIENT)
Dept: CARDIOLOGY CLINIC | Facility: CLINIC | Age: 55
End: 2020-02-25

## 2020-02-25 DIAGNOSIS — E03.4 HYPOTHYROIDISM DUE TO ACQUIRED ATROPHY OF THYROID: ICD-10-CM

## 2020-02-25 RX ORDER — LEVOTHYROXINE SODIUM 0.07 MG/1
TABLET ORAL
Qty: 30 TABLET | Refills: 4 | Status: SHIPPED | OUTPATIENT
Start: 2020-02-25 | End: 2020-08-26

## 2020-02-25 NOTE — TELEPHONE ENCOUNTER
----- Message from Kayy Campos MD sent at 2/25/2020 10:23 AM EST -----  Patient's Holter monitor shows no evidence of significant tachy-shiv arrhythmias  Please call patient

## 2020-03-06 ENCOUNTER — OFFICE VISIT (OUTPATIENT)
Dept: ENDOCRINOLOGY | Facility: CLINIC | Age: 55
End: 2020-03-06
Payer: MEDICARE

## 2020-03-06 VITALS
DIASTOLIC BLOOD PRESSURE: 62 MMHG | HEART RATE: 76 BPM | HEIGHT: 62 IN | WEIGHT: 159 LBS | SYSTOLIC BLOOD PRESSURE: 110 MMHG | BODY MASS INDEX: 29.26 KG/M2

## 2020-03-06 DIAGNOSIS — E29.1 HYPOGONADISM, TESTICULAR: ICD-10-CM

## 2020-03-06 DIAGNOSIS — E55.9 VITAMIN D DEFICIENCY: ICD-10-CM

## 2020-03-06 DIAGNOSIS — M81.0 OSTEOPOROSIS WITHOUT CURRENT PATHOLOGICAL FRACTURE, UNSPECIFIED OSTEOPOROSIS TYPE: ICD-10-CM

## 2020-03-06 DIAGNOSIS — E03.4 HYPOTHYROIDISM DUE TO ACQUIRED ATROPHY OF THYROID: Primary | ICD-10-CM

## 2020-03-06 PROCEDURE — 3074F SYST BP LT 130 MM HG: CPT | Performed by: INTERNAL MEDICINE

## 2020-03-06 PROCEDURE — 3078F DIAST BP <80 MM HG: CPT | Performed by: INTERNAL MEDICINE

## 2020-03-06 PROCEDURE — 1111F DSCHRG MED/CURRENT MED MERGE: CPT | Performed by: INTERNAL MEDICINE

## 2020-03-06 PROCEDURE — 99214 OFFICE O/P EST MOD 30 MIN: CPT | Performed by: INTERNAL MEDICINE

## 2020-03-06 PROCEDURE — 1036F TOBACCO NON-USER: CPT | Performed by: INTERNAL MEDICINE

## 2020-03-06 PROCEDURE — 3008F BODY MASS INDEX DOCD: CPT | Performed by: INTERNAL MEDICINE

## 2020-03-06 NOTE — PATIENT INSTRUCTIONS
Using continue levothyroxine 75 mcg orally daily  Continue vitamin-D, calcium supplementation  Please add labs done as ordered now, in 3 months  Follow up in 4-6 months      Instructions on taking levothyroxine (thyroid medication)     Please take the medication on an empty stomach, at least 30 min before breakfast  If you are taking it at night, please take it a minimum of 4 hr after your last meal    Separate your thyroid medication form any calcium or iron products by at least 4 hours      Certain medications can interfere with the absorption and metabolism of thyroid hormone, this can alter your thyroid levels, make sure you check with your pharmacy about any drug interactions,  before starting any new medications       If you miss a dose of thyroid hormone therapy, take it immediately upon noticing or take 2 pills the next day to make up the missed dose

## 2020-03-06 NOTE — PROGRESS NOTES
Chelsey Alegre 53 y o  male MRN: [id number]    Encounter: [id number]      Assessment/Plan     Assessment: This is a 53y o -year-old male with Down syndrome, Parkinson's, hypothyroidism, hypogonadism, osteoporosis    Plan:  1  Hypothyroidism  Clinically and biochemically euthyroid  Continue levothyroxine 75 mcg orally daily  Repeat TSH, free T4 in 3 months  Recommended ideally taking levothyroxine on an empty stomach at least 30 minutes before food or 4 hours after the last meal    2  Osteoporosis  Last DEXA scan-BMD suggestive of osteopenia, no significant change in BMD   Stable based on impression of DEXA scan  Recent vitamin-D level within normal limits  Has had low calcium on labs in the past  -continue calcium and vitamin-D supplementation  - repeat calcium level  Continue weight-bearing exercise as tolerated  -repeat BMP, vitamin-D in 6 months    3  Hypogonadism  Currently off testosterone  -check total, free testosterone levels  On chart review, it does not appear that there is any contraindication to resuming testosterone  Discussed concerns of continuing testosterone during admission, if patient had heart failure, very high testosterone levels etc  Once cardiology workup has been completed, will resume testosterone replacement      History of Present Illness     HPI:  Chelsey Alegre is a 53 y o  male who is here for a follow-up of hypothyroidism, hypogonadism, osteoporosis  Patient has a past medical history of Down syndrome, iron deficiency, hyperlipidemia, Parkinson's, GERD  Accompanied by caretaker to the visit    Jan 2010 - admitted for syncope and orthostatic hypotension  He is having investigations for atherosclerosis  For hypothyroidism, currently on levothyroxine 75 mcg orally daily  Takes it at 8 pm at night; Compliant     Hypogonadism  Was previously on AndroGel 2 pumps transdermally daily, dose was decreased 01/2020 in view of high testosterone levels    However this was discontinued during the hopsital stay and the patient is currently not taking it    Osteoporosis  On calcium, vitamin-D supplementation 600-400 mg twice a day    Vit D3 2000 orally daily  Last DEXA scan 02/2020-osteopenia    Overall feels well, occasionally complains of flatulence which may be food related  Energy levels are good  No heat or cold intolerance  No diarrhea or constipation   No mood changes/aggression  Appetite is good  No change in behavior as noticed by caretaker    All other systems were reviewed and are negative         Review of Systems    Historical Information   Past Medical History:   Diagnosis Date    Abnormal weight gain     last assessed 4/18/16; resolved 1/25/17    [facility]     Anxiety     D[facility] ([facility] )     Depression     Disease of thyroid gland     Down's syndrome     Fatigue     last assessed 2/23/17; resolved 6/1/17    GERD (gastroesophageal reflux disease)     Gluten free diet     H/O urinary retention     Hernia of abdominal cavity     Hyperlipidemia     Hypogonadism in male     Impulse control disorder     OCD (obsessive compulsive disorder)     [facility]     Parkinson disease ([facility] )     resolved 2/23/17    Pleural effusion     Sinus tachycardia     Vitamin D deficiency     last assessed 9/1/16; resolved 6/1/17     Past Surgical History:   Procedure Laterality Date    COLONOSCOPY      ESOPHAGOGASTRODUODENOSCOPY      with biopsy    PERICARDIOCENTESIS  4/29/2016          Social History   Social History     Substance and Sexual Activity   Alcohol Use Never    Frequency: Never     Social History     Substance and Sexual Activity   Drug Use Never     Social History     Tobacco Use   Smoking Status Never Smoker   Smokeless Tobacco Never Used     Family History:   Family History   Problem Relation Age of Onset    No Known Problems Mother     Parkinsonism Father         symtomatic       Meds/Allergies   Current Outpatient Medications   Medication Sig Dispense Refill    aspirin (ECOTRIN LOW STRENGTH) 81 mg EC tablet Take 1 tablet (81 mg total) by mouth daily 30 tablet 2    atorvastatin (LIPITOR) 20 mg tablet Take 1 tablet (20 mg total) by mouth daily At 8pm 30 tablet 11    Calcium Carbonate-Vitamin D 600-400 MG-UNIT per tablet Take 1 tablet by mouth 2 (two) times a day At 8 am and 5 pm 60 tablet 10    cholecalciferol (VITAMIN D3) 1,000 units tablet Take 1 tablet (1,000 Units total) by mouth 2 (two) times a day At 8am and 8pm 60 tablet 11    divalproex sodium (DEPAKOTE) 500 mg EC tablet Take 500 mg by mouth       docusate sodium (COLACE) 100 mg capsule Take one capsule at 8am 30 capsule 11    esomeprazole (NexIUM) 40 MG capsule Take one capsule daily 30 minutes prior to breakfast 30 capsule 11    lanolin-mineral oil (ARMAAN) LOTN Apply topically 2 (two) times a day Apply to hands daily at 8 am and 8 pm 1 Bottle 11    levothyroxine 75 mcg tablet TAKE 1 TABLET BY MOUTH IN THE EVENING (8PM) 30 tablet 4    loratadine (CLARITIN) 10 mg tablet Take 1 tablet (10 mg total) by mouth daily As needed for allergies   Do not exceed 1 dose per 24 hours 30 tablet 11    midodrine (PROAMATINE) 2 5 mg tablet Take 1 tablet (2 5 mg total) by mouth 2 (two) times a day as needed (If systolic blood pressure less than 100 and patient is symptomatic) 60 tablet 2    multivitamin-minerals (CENTRUM ADULTS) tablet Take 1 tablet by mouth daily At 8am 30 tablet 11    OLANZapine (ZyPREXA) 2 5 mg tablet Take 1 tablet by mouth daily 8 pm      polyethylene glycol (GLYCOLAX) powder Take 17 grams in 8 oz of water by mouth daily at 8 pm 527 g 11    tamsulosin (FLOMAX) 0 4 mg Take 0 4 mg by mouth daily with dinner      lidocaine (URO-JET) 2 % urethral/mucosal gel Insert 1 application into the urethra daily as needed (urethral pain 2/2 catheter) (Patient not taking: Reported on 2/19/2020)  0    neomycin-bacitracin-polymyxin (NEOSPORIN) 5-400-5,000 ointment Apply topically 3 (three) times a day Apply to urethra for lubrication (Patient not taking: Reported on 2/19/2020) 28 3 g 0     No current facility-administered medications for this visit  Allergies   Allergen Reactions    Advil [Ibuprofen]     Gluten Meal     Other      SEASONAL       Objective   Vitals: Blood pressure 110/62, pulse 76, height 5' 2" (1 575 m), weight 72 1 kg (159 lb)  Physical Exam   Constitutional: He is oriented to person, place, and time  He appears well-developed and well-nourished  No distress  HENT:   Head: Normocephalic and atraumatic  Eyes: Pupils are equal, round, and reactive to light  Conjunctivae are normal    Neck: Normal range of motion  Neck supple  Cardiovascular: Normal rate, regular rhythm and normal heart sounds  No murmur heard  Pulmonary/Chest: Effort normal and breath sounds normal  No respiratory distress  He has no wheezes  Abdominal: Soft  He exhibits no distension  There is no tenderness  There is no guarding  Musculoskeletal: He exhibits no edema  Neurological: He is alert and oriented to person, place, and time  Skin: Skin is warm and dry  No rash noted  He is not diaphoretic  No erythema  Psychiatric: He has a normal mood and affect  His behavior is normal  Thought content normal    Vitals reviewed  The history was obtained from the review of the chart, caretaker, patient      Lab Results:   Lab Results   Component Value Date/Time    TSH 3RD TARATON 1 731 01/17/2020 05:36 AM    TSH 3RD GENERATON 2 156 01/13/2020 07:29 AM    TSH 3RD GENERATON 1 216 09/12/2019 03:49 PM    Free T4 1 05 01/13/2020 07:29 AM    Free T4 0 91 09/12/2019 03:49 PM    Free T4 0 94 07/26/2019 07:07 AM     Lab Results   Component Value Date    WBC 8 13 01/24/2020    HGB 14 8 01/24/2020    HCT 45 4 01/24/2020     (H) 01/24/2020     (H) 01/24/2020     Lab Results   Component Value Date    CREATININE 0 86 01/23/2020    BUN 12 01/23/2020    NA 135 (L) 11/20/2015    K 4 6 01/23/2020     01/23/2020    CO2 31 01/23/2020     No results found for: HGBA1C      Imaging Studies:        I have personally reviewed pertinent reports  Portions of the record may have been created with voice recognition software  Occasional wrong word or "sound a like" substitutions may have occurred due to the inherent limitations of voice recognition software  Read the chart carefully and recognize, using context, where substitutions have occurred

## 2020-03-09 ENCOUNTER — TRANSCRIBE ORDERS (OUTPATIENT)
Dept: ADMINISTRATIVE | Facility: HOSPITAL | Age: 55
End: 2020-03-09

## 2020-03-09 ENCOUNTER — APPOINTMENT (OUTPATIENT)
Dept: LAB | Facility: HOSPITAL | Age: 55
End: 2020-03-09
Attending: INTERNAL MEDICINE
Payer: MEDICARE

## 2020-03-09 DIAGNOSIS — Z51.81 ENCOUNTER FOR THERAPEUTIC DRUG MONITORING: ICD-10-CM

## 2020-03-09 DIAGNOSIS — I10 ESSENTIAL HYPERTENSION, MALIGNANT: ICD-10-CM

## 2020-03-09 DIAGNOSIS — E11.9 DIABETES MELLITUS WITHOUT COMPLICATION (HCC): ICD-10-CM

## 2020-03-09 DIAGNOSIS — E11.9 DIABETES MELLITUS WITHOUT COMPLICATION (HCC): Primary | ICD-10-CM

## 2020-03-09 DIAGNOSIS — E29.1 HYPOGONADISM, TESTICULAR: ICD-10-CM

## 2020-03-09 LAB
ALBUMIN SERPL BCP-MCNC: 3.4 G/DL (ref 3.5–5)
ALP SERPL-CCNC: 69 U/L (ref 46–116)
ALT SERPL W P-5'-P-CCNC: 34 U/L (ref 12–78)
ANION GAP SERPL CALCULATED.3IONS-SCNC: 6 MMOL/L (ref 4–13)
AST SERPL W P-5'-P-CCNC: 22 U/L (ref 5–45)
BASOPHILS # BLD AUTO: 0.05 THOUSANDS/ΜL (ref 0–0.1)
BASOPHILS NFR BLD AUTO: 1 % (ref 0–1)
BILIRUB DIRECT SERPL-MCNC: 0.11 MG/DL (ref 0–0.2)
BILIRUB SERPL-MCNC: 0.4 MG/DL (ref 0.2–1)
BUN SERPL-MCNC: 11 MG/DL (ref 5–25)
CALCIUM SERPL-MCNC: 8.7 MG/DL (ref 8.3–10.1)
CHLORIDE SERPL-SCNC: 101 MMOL/L (ref 100–108)
CO2 SERPL-SCNC: 32 MMOL/L (ref 21–32)
CREAT SERPL-MCNC: 0.91 MG/DL (ref 0.6–1.3)
EOSINOPHIL # BLD AUTO: 0.04 THOUSAND/ΜL (ref 0–0.61)
EOSINOPHIL NFR BLD AUTO: 1 % (ref 0–6)
ERYTHROCYTE [DISTWIDTH] IN BLOOD BY AUTOMATED COUNT: 13.9 % (ref 11.6–15.1)
GFR SERPL CREATININE-BSD FRML MDRD: 95 ML/MIN/1.73SQ M
GLUCOSE P FAST SERPL-MCNC: 86 MG/DL (ref 65–99)
HCT VFR BLD AUTO: 44.1 % (ref 36.5–49.3)
HGB BLD-MCNC: 14.8 G/DL (ref 12–17)
IMM GRANULOCYTES # BLD AUTO: 0.03 THOUSAND/UL (ref 0–0.2)
IMM GRANULOCYTES NFR BLD AUTO: 0 % (ref 0–2)
LYMPHOCYTES # BLD AUTO: 0.81 THOUSANDS/ΜL (ref 0.6–4.47)
LYMPHOCYTES NFR BLD AUTO: 12 % (ref 14–44)
MCH RBC QN AUTO: 34 PG (ref 26.8–34.3)
MCHC RBC AUTO-ENTMCNC: 33.6 G/DL (ref 31.4–37.4)
MCV RBC AUTO: 101 FL (ref 82–98)
MONOCYTES # BLD AUTO: 0.54 THOUSAND/ΜL (ref 0.17–1.22)
MONOCYTES NFR BLD AUTO: 8 % (ref 4–12)
NEUTROPHILS # BLD AUTO: 5.47 THOUSANDS/ΜL (ref 1.85–7.62)
NEUTS SEG NFR BLD AUTO: 78 % (ref 43–75)
NRBC BLD AUTO-RTO: 0 /100 WBCS
PLATELET # BLD AUTO: 250 THOUSANDS/UL (ref 149–390)
PMV BLD AUTO: 9.7 FL (ref 8.9–12.7)
POTASSIUM SERPL-SCNC: 4.1 MMOL/L (ref 3.5–5.3)
PROT SERPL-MCNC: 7.1 G/DL (ref 6.4–8.2)
RBC # BLD AUTO: 4.35 MILLION/UL (ref 3.88–5.62)
SODIUM SERPL-SCNC: 139 MMOL/L (ref 136–145)
VALPROATE SERPL-MCNC: 37 UG/ML (ref 50–100)
WBC # BLD AUTO: 6.94 THOUSAND/UL (ref 4.31–10.16)

## 2020-03-09 PROCEDURE — 80076 HEPATIC FUNCTION PANEL: CPT

## 2020-03-09 PROCEDURE — 85025 COMPLETE CBC W/AUTO DIFF WBC: CPT | Performed by: PSYCHIATRY & NEUROLOGY

## 2020-03-09 PROCEDURE — 84402 ASSAY OF FREE TESTOSTERONE: CPT

## 2020-03-09 PROCEDURE — 80048 BASIC METABOLIC PNL TOTAL CA: CPT

## 2020-03-09 PROCEDURE — 84403 ASSAY OF TOTAL TESTOSTERONE: CPT

## 2020-03-09 PROCEDURE — 80164 ASSAY DIPROPYLACETIC ACD TOT: CPT

## 2020-03-09 PROCEDURE — 36415 COLL VENOUS BLD VENIPUNCTURE: CPT

## 2020-03-10 LAB
TESTOST FREE SERPL-MCNC: 4.5 PG/ML (ref 7.2–24)
TESTOST SERPL-MCNC: 318 NG/DL (ref 264–916)

## 2020-03-12 ENCOUNTER — TELEPHONE (OUTPATIENT)
Dept: ENDOCRINOLOGY | Facility: CLINIC | Age: 55
End: 2020-03-12

## 2020-03-12 ENCOUNTER — OFFICE VISIT (OUTPATIENT)
Dept: PODIATRY | Facility: CLINIC | Age: 55
End: 2020-03-12
Payer: MEDICARE

## 2020-03-12 VITALS — WEIGHT: 159 LBS | BODY MASS INDEX: 29.26 KG/M2 | HEIGHT: 62 IN

## 2020-03-12 DIAGNOSIS — M79.671 PAIN IN BOTH FEET: ICD-10-CM

## 2020-03-12 DIAGNOSIS — Z73.6 LIMITATION DUE TO DISABILITY: ICD-10-CM

## 2020-03-12 DIAGNOSIS — L60.0 INGROWING NAIL: ICD-10-CM

## 2020-03-12 DIAGNOSIS — B35.1 DERMATOPHYTOSIS OF NAIL: Primary | ICD-10-CM

## 2020-03-12 DIAGNOSIS — M79.672 PAIN IN BOTH FEET: ICD-10-CM

## 2020-03-12 PROCEDURE — 99212 OFFICE O/P EST SF 10 MIN: CPT | Performed by: PODIATRIST

## 2020-03-12 NOTE — TELEPHONE ENCOUNTER
----- Message from Paxton Chong MD sent at 3/11/2020  4:12 PM EDT -----  Total testosterone level within normal limits, free testosterone mildly low    Please advised caregiver to let us know when workup for a cardiology standpoint/ the heart has been completed, will plan to resume testosterone therapy at that time

## 2020-03-12 NOTE — TELEPHONE ENCOUNTER
Spoke to Bam Kuhn @ [address] [place] [address]  Provided lab results and asked to please call and let us know when he has completed the workup from a cardiology  Bam Kuhn stated she thinks he may be done with the workup, she stated she will call me back later on today once she speaks to the patient's nurse

## 2020-03-26 NOTE — PROGRESS NOTES
Assessment/Plan:  Aseptic debridement and planning of nails x10 and manually and mechanically  Partial resection of ingrown nail right hallux  Patient will call if any redness or signs of infection   Follow-up 2 months     Diagnoses and all orders for this visit:    Dermatophytosis of nail    Ingrowing nail    Pain in both feet    Limitation due to disability          Subjective:      Patient ID: Elizabeth Schilling is a 53 y o  male  Patient pain in big toenails right worse than left  Has had pain for the past few weeks  Has not noticed any redness or drainage    Patient is special needs, facility resident, accompanied by his aide      Past Medical History:   Diagnosis Date    Abnormal weight gain     last assessed 4/18/16; resolved 1/25/17    [facility] ([facility] )     Anxiety     Dementia ([place] [address] )     Depression     Disease of thyroid gland     Down's syndrome     Fatigue     last assessed 2/23/17; resolved 6/1/17    GERD (gastroesophageal reflux disease)     Gluten free diet     H/O urinary retention     Hernia of abdominal cavity     Hyperlipidemia     Hypogonadism in male     Impulse control disorder     OCD (obsessive compulsive disorder)     Parkinson disease ([facility] )     Parkinson disease ([facility] )     resolved 2/23/17    Pleural effusion     Sinus tachycardia     Vitamin D deficiency     last assessed 9/1/16; resolved 6/1/17         Current Outpatient Medications:     aspirin (ECOTRIN LOW STRENGTH) 81 mg EC tablet, Take 1 tablet (81 mg total) by mouth daily, Disp: 30 tablet, Rfl: 2    atorvastatin (LIPITOR) 20 mg tablet, Take 1 tablet (20 mg total) by mouth daily At 8pm, Disp: 30 tablet, Rfl: 11    Calcium Carbonate-Vitamin D 600-400 MG-UNIT per tablet, Take 1 tablet by mouth 2 (two) times a day At 8 am and 5 pm, Disp: 60 tablet, Rfl: 10    cholecalciferol (VITAMIN D3) 1,000 units tablet, Take 1 tablet (1,000 Units total) by mouth 2 (two) times a day At 8am and 8pm, Disp: 60 tablet, Rfl: 11    divalproex sodium (DEPAKOTE) 500 mg EC tablet, Take 500 mg by mouth , Disp: , Rfl:     docusate sodium (COLACE) 100 mg capsule, Take one capsule at 8am, Disp: 30 capsule, Rfl: 11    esomeprazole (NexIUM) 40 MG capsule, Take one capsule daily 30 minutes prior to breakfast, Disp: 30 capsule, Rfl: 11    lanolin-mineral oil (ARMAAN) LOTN, Apply topically 2 (two) times a day Apply to hands daily at 8 am and 8 pm, Disp: 1 Bottle, Rfl: 11    levothyroxine 75 mcg tablet, TAKE 1 TABLET BY MOUTH IN THE EVENING (8PM), Disp: 30 tablet, Rfl: 4    lidocaine (URO-JET) 2 % urethral/mucosal gel, Insert 1 application into the urethra daily as needed (urethral pain 2/2 catheter) (Patient not taking: Reported on 2/19/2020), Disp: , Rfl: 0    loratadine (CLARITIN) 10 mg tablet, Take 1 tablet (10 mg total) by mouth daily As needed for allergies   Do not exceed 1 dose per 24 hours, Disp: 30 tablet, Rfl: 11    midodrine (PROAMATINE) 2 5 mg tablet, Take 1 tablet (2 5 mg total) by mouth 2 (two) times a day as needed (If systolic blood pressure less than 100 and patient is symptomatic), Disp: 60 tablet, Rfl: 2    multivitamin-minerals (CENTRUM ADULTS) tablet, Take 1 tablet by mouth daily At 8am, Disp: 30 tablet, Rfl: 11    neomycin-bacitracin-polymyxin (NEOSPORIN) 5-400-5,000 ointment, Apply topically 3 (three) times a day Apply to urethra for lubrication (Patient not taking: Reported on 2/19/2020), Disp: 28 3 g, Rfl: 0    OLANZapine (ZyPREXA) 2 5 mg tablet, Take 1 tablet by mouth daily 8 pm, Disp: , Rfl:     polyethylene glycol (GLYCOLAX) powder, Take 17 grams in 8 oz of water by mouth daily at 8 pm, Disp: 527 g, Rfl: 11    tamsulosin (FLOMAX) 0 4 mg, Take 0 4 mg by mouth daily with dinner, Disp: , Rfl:     Past Surgical History:   Procedure Laterality Date    COLONOSCOPY      ESOPHAGOGASTRODUODENOSCOPY      with biopsy    PERICARDIOCENTESIS  4/29/2016            Allergies   Allergen Reactions    Advil [Ibuprofen]     Gluten Meal     Other      SEASONAL       Patient Active Problem List   Diagnosis    Essential hypertension    Hyperlipidemia    Hypothyroidism    GERD (gastroesophageal reflux disease)    Down's syndrome    Sepsis ([facility] )    Anemia    CHELSIE (iron deficiency anemia)    Chronic constipation    Hypogonadism, testicular    Hypotension    Osteoporosis without current pathological fracture    Mixed incontinence    Parkinson's disease (HCC)    [facility]    Bladder distention    Hydronephrosis    Abdominal pain    Idiopathic pericardial effusion    Cardiac murmur    Xerodermia    Sinus bradycardia    Vitamin D deficiency    Near syncope    Dizziness    Gastroenteritis    Orthostatic hypotension    Cystitis    Urinary retention    Cerebral ventriculomegaly due to brain atrophy ([facility] )    Acute kidney injury ([facility] )       Review of Systems   Constitutional: Negative  HENT: Negative  Eyes: Negative  Respiratory: Negative  Cardiovascular: Negative  Gastrointestinal: Negative  Endocrine: Negative  Genitourinary: Negative  Musculoskeletal: Negative  Skin: Negative  Allergic/Immunologic: Negative  Neurological: Negative  Hematological: Negative  Psychiatric/Behavioral: Negative  Objective:      Ht 5' 2" (1 575 m)   Wt 72 1 kg (159 lb)   BMI 29 08 kg/m²          Physical Exam    Constitutional: no acute distress, well appearing and well nourished   Orthopedic/Biomechanical: abnormal foot type,-- hammertoe(s),-- bunion(s),-- abnormal MPJ ROM,-- discolored nails,-- subungual debris-- and-- nail tenderness   Skin: keratoses  Left Foot: Appearance: a deformity  Great toe deformities include a bunion  Evaluation of the great toe nail demonstrates an ingrown nail, but-- no paronychia  Decreased range of motion first MPJ bilateral positive pain on palpation and range of motion right worse than left  Ingrown bilateral big toe   No signs of infection no erythema no exudate  Special Tests: significant bunion bilateral decreased range of motion first MPJ no pain on range of motion,no sign of infection  Right Foot: Appearance: Normal except as noted: a deformity  Great toe deformities include a bunion  Evaluation of the great toe nail demonstrates paronychia-- and-- an ingrown nail  Mild ingrown tibial border  Rigid hammertoes, 2 through 5, bilateral, moderate xerosis, fissures, bilateral heels, midfoot, bilateral, negative erythema  Negative exudate  Negative ulceration  Special Tests: Mild xerosis, no signs of infection  Neurological Exam: Light touch was decreased bilaterally  Vibratory sensation was decreased in both first metatarsophalangeal joints  Response to monofilament test was intact bilaterally  Vascular Exam: performed Dorsalis pedis pulses were 1/4 bilaterally  Posterior tibial pulses were 1/4 bilaterally  Capillary refill time was between 1-3 seconds bilaterally  Toenails: All of the toenails were elongated,-- hypertrophied,-- discolored,-- shown to have subungual debris-- and-- tender  Both first toenails were ingrown  Hyperkeratosis: present on both first sub metatarsals-- and-- present on both fifth sub metatarsals  Moderate bunion bilateral   Rigid hammertoes 2 through 5 bilateral   Significant equinus bilateral   Negative erythema no signs of infection  Large bunion right worse than left  Decreased range of motion 1st MPJ  Right 1st MPJ positive pain on end range of motion no crepitus no hypermobility of the 1st ray      All nails thick discolored dystrophic  Ingrown bilateral hallux  No open wounds or signs of infection  Painful hyperkeratotic lesions medial 1st metatarsal head bilateral  Significant hallux limitus bilateral hammered hallux with hallux valgus bilateral    Ingrown nail bilateral hallux  Right hallux medial border, mild swelling, positive pain on palpation negative exudate no purulence no signs of infection

## 2020-04-20 ENCOUNTER — PATIENT OUTREACH (OUTPATIENT)
Dept: CASE MANAGEMENT | Facility: OTHER | Age: 55
End: 2020-04-20

## 2020-05-01 ENCOUNTER — TELEMEDICINE (OUTPATIENT)
Dept: CARDIOLOGY CLINIC | Facility: CLINIC | Age: 55
End: 2020-05-01
Payer: MEDICARE

## 2020-05-01 VITALS — SYSTOLIC BLOOD PRESSURE: 92 MMHG | WEIGHT: 165 LBS | DIASTOLIC BLOOD PRESSURE: 68 MMHG | BODY MASS INDEX: 30.18 KG/M2

## 2020-05-01 DIAGNOSIS — E03.4 HYPOTHYROIDISM DUE TO ACQUIRED ATROPHY OF THYROID: ICD-10-CM

## 2020-05-01 DIAGNOSIS — E78.5 HYPERLIPIDEMIA, UNSPECIFIED HYPERLIPIDEMIA TYPE: ICD-10-CM

## 2020-05-01 DIAGNOSIS — Q90.9 DOWN'S SYNDROME: ICD-10-CM

## 2020-05-01 DIAGNOSIS — I95.1 ORTHOSTATIC HYPOTENSION: ICD-10-CM

## 2020-05-01 DIAGNOSIS — I31.3 IDIOPATHIC PERICARDIAL EFFUSION: ICD-10-CM

## 2020-05-01 DIAGNOSIS — R01.1 CARDIAC MURMUR: ICD-10-CM

## 2020-05-01 DIAGNOSIS — G20 PARKINSON'S DISEASE (HCC): ICD-10-CM

## 2020-05-01 DIAGNOSIS — R00.1 SINUS BRADYCARDIA: ICD-10-CM

## 2020-05-01 PROCEDURE — 99214 OFFICE O/P EST MOD 30 MIN: CPT | Performed by: INTERNAL MEDICINE

## 2020-05-05 DIAGNOSIS — Z12.5 SCREENING PSA (PROSTATE SPECIFIC ANTIGEN): ICD-10-CM

## 2020-05-05 DIAGNOSIS — E55.9 VITAMIN D DEFICIENCY: ICD-10-CM

## 2020-05-05 DIAGNOSIS — M81.0 OSTEOPOROSIS WITHOUT CURRENT PATHOLOGICAL FRACTURE, UNSPECIFIED OSTEOPOROSIS TYPE: ICD-10-CM

## 2020-05-05 DIAGNOSIS — E29.1 HYPOGONADISM, TESTICULAR: Primary | ICD-10-CM

## 2020-05-06 DIAGNOSIS — E29.1 HYPOGONADISM, TESTICULAR: Primary | ICD-10-CM

## 2020-05-06 RX ORDER — TESTOSTERONE 16.2 MG/G
GEL TRANSDERMAL
Qty: 75 G | Refills: 3 | Status: SHIPPED | OUTPATIENT
Start: 2020-05-06 | End: 2022-01-05 | Stop reason: HOSPADM

## 2020-05-07 ENCOUNTER — TELEPHONE (OUTPATIENT)
Dept: ENDOCRINOLOGY | Facility: CLINIC | Age: 55
End: 2020-05-07

## 2020-05-08 ENCOUNTER — TELEMEDICINE (OUTPATIENT)
Dept: ENDOCRINOLOGY | Facility: CLINIC | Age: 55
End: 2020-05-08
Payer: MEDICARE

## 2020-05-08 DIAGNOSIS — Z79.890 ENCOUNTER FOR MONITORING TESTOSTERONE REPLACEMENT THERAPY: ICD-10-CM

## 2020-05-08 DIAGNOSIS — M81.0 OSTEOPOROSIS WITHOUT CURRENT PATHOLOGICAL FRACTURE, UNSPECIFIED OSTEOPOROSIS TYPE: ICD-10-CM

## 2020-05-08 DIAGNOSIS — E29.1 HYPOGONADISM, TESTICULAR: Primary | ICD-10-CM

## 2020-05-08 DIAGNOSIS — E55.9 VITAMIN D DEFICIENCY: ICD-10-CM

## 2020-05-08 DIAGNOSIS — Z51.81 ENCOUNTER FOR MONITORING TESTOSTERONE REPLACEMENT THERAPY: ICD-10-CM

## 2020-05-08 DIAGNOSIS — E03.4 HYPOTHYROIDISM DUE TO ACQUIRED ATROPHY OF THYROID: ICD-10-CM

## 2020-05-08 PROCEDURE — 99214 OFFICE O/P EST MOD 30 MIN: CPT | Performed by: INTERNAL MEDICINE

## 2020-05-21 ENCOUNTER — TELEMEDICINE (OUTPATIENT)
Dept: FAMILY MEDICINE CLINIC | Facility: CLINIC | Age: 55
End: 2020-05-21
Payer: MEDICARE

## 2020-05-21 ENCOUNTER — TELEPHONE (OUTPATIENT)
Dept: FAMILY MEDICINE CLINIC | Facility: CLINIC | Age: 55
End: 2020-05-21

## 2020-05-21 DIAGNOSIS — H00.015 HORDEOLUM EXTERNUM LEFT LOWER EYELID: Primary | ICD-10-CM

## 2020-05-21 PROCEDURE — 99213 OFFICE O/P EST LOW 20 MIN: CPT | Performed by: NURSE PRACTITIONER

## 2020-05-21 RX ORDER — ERYTHROMYCIN 5 MG/G
0.5 OINTMENT OPHTHALMIC EVERY 12 HOURS SCHEDULED
Qty: 3.5 G | Refills: 0 | Status: SHIPPED | OUTPATIENT
Start: 2020-05-21 | End: 2022-01-05 | Stop reason: HOSPADM

## 2020-06-02 ENCOUNTER — TELEMEDICINE (OUTPATIENT)
Dept: NEUROLOGY | Facility: CLINIC | Age: 55
End: 2020-06-02
Payer: MEDICARE

## 2020-06-02 VITALS
SYSTOLIC BLOOD PRESSURE: 113 MMHG | DIASTOLIC BLOOD PRESSURE: 98 MMHG | WEIGHT: 165 LBS | BODY MASS INDEX: 30.36 KG/M2 | HEIGHT: 62 IN

## 2020-06-02 DIAGNOSIS — I67.5 MOYA MOYA DISEASE: Primary | ICD-10-CM

## 2020-06-02 DIAGNOSIS — Q90.9 DOWN'S SYNDROME: ICD-10-CM

## 2020-06-02 DIAGNOSIS — G20 PARKINSON'S DISEASE (HCC): ICD-10-CM

## 2020-06-02 DIAGNOSIS — R42 DIZZINESS: ICD-10-CM

## 2020-06-02 PROCEDURE — 99214 OFFICE O/P EST MOD 30 MIN: CPT | Performed by: NURSE PRACTITIONER

## 2020-06-17 ENCOUNTER — TELEPHONE (OUTPATIENT)
Dept: ENDOCRINOLOGY | Facility: CLINIC | Age: 55
End: 2020-06-17

## 2020-06-18 ENCOUNTER — APPOINTMENT (OUTPATIENT)
Dept: LAB | Facility: HOSPITAL | Age: 55
End: 2020-06-18
Payer: MEDICARE

## 2020-06-18 ENCOUNTER — TELEMEDICINE (OUTPATIENT)
Dept: FAMILY MEDICINE CLINIC | Facility: CLINIC | Age: 55
End: 2020-06-18
Payer: MEDICARE

## 2020-06-18 DIAGNOSIS — Z20.828 EXPOSURE TO SARS-ASSOCIATED CORONAVIRUS: Primary | ICD-10-CM

## 2020-06-18 PROCEDURE — U0003 INFECTIOUS AGENT DETECTION BY NUCLEIC ACID (DNA OR RNA); SEVERE ACUTE RESPIRATORY SYNDROME CORONAVIRUS 2 (SARS-COV-2) (CORONAVIRUS DISEASE [COVID-19]), AMPLIFIED PROBE TECHNIQUE, MAKING USE OF HIGH THROUGHPUT TECHNOLOGIES AS DESCRIBED BY CMS-2020-01-R: HCPCS | Performed by: NURSE PRACTITIONER

## 2020-06-18 PROCEDURE — 99214 OFFICE O/P EST MOD 30 MIN: CPT | Performed by: NURSE PRACTITIONER

## 2020-06-19 ENCOUNTER — TELEMEDICINE (OUTPATIENT)
Dept: FAMILY MEDICINE CLINIC | Facility: CLINIC | Age: 55
End: 2020-06-19
Payer: MEDICARE

## 2020-06-19 ENCOUNTER — TELEPHONE (OUTPATIENT)
Dept: OTHER | Facility: OTHER | Age: 55
End: 2020-06-19

## 2020-06-19 DIAGNOSIS — U07.1 COVID-19 VIRUS INFECTION: Primary | ICD-10-CM

## 2020-06-19 LAB — SARS-COV-2 N GENE RESP QL NAA+PROBE: POSITIVE

## 2020-06-19 PROCEDURE — 99214 OFFICE O/P EST MOD 30 MIN: CPT | Performed by: NURSE PRACTITIONER

## 2020-06-20 ENCOUNTER — TELEPHONE (OUTPATIENT)
Dept: FAMILY MEDICINE CLINIC | Facility: CLINIC | Age: 55
End: 2020-06-20

## 2020-06-23 ENCOUNTER — TELEPHONE (OUTPATIENT)
Dept: OTHER | Facility: OTHER | Age: 55
End: 2020-06-23

## 2020-06-24 ENCOUNTER — TELEPHONE (OUTPATIENT)
Dept: FAMILY MEDICINE CLINIC | Facility: CLINIC | Age: 55
End: 2020-06-24

## 2020-06-25 ENCOUNTER — TELEPHONE (OUTPATIENT)
Dept: FAMILY MEDICINE CLINIC | Facility: CLINIC | Age: 55
End: 2020-06-25

## 2020-06-25 DIAGNOSIS — U07.1 COVID-19 VIRUS INFECTION: Primary | ICD-10-CM

## 2020-06-25 RX ORDER — AZITHROMYCIN 250 MG/1
TABLET, FILM COATED ORAL
Qty: 6 TABLET | Refills: 0 | Status: SHIPPED | OUTPATIENT
Start: 2020-06-25 | End: 2020-06-29

## 2020-07-23 ENCOUNTER — OFFICE VISIT (OUTPATIENT)
Dept: PODIATRY | Facility: CLINIC | Age: 55
End: 2020-07-23
Payer: MEDICARE

## 2020-07-23 VITALS — RESPIRATION RATE: 16 BRPM

## 2020-07-23 DIAGNOSIS — L60.0 INGROWING NAIL: ICD-10-CM

## 2020-07-23 DIAGNOSIS — M21.962 DEFORMITY OF BOTH FEET: ICD-10-CM

## 2020-07-23 DIAGNOSIS — B35.3 TINEA PEDIS OF BOTH FEET: ICD-10-CM

## 2020-07-23 DIAGNOSIS — M79.675 PAIN IN TOES OF BOTH FEET: ICD-10-CM

## 2020-07-23 DIAGNOSIS — M79.671 PAIN IN BOTH FEET: ICD-10-CM

## 2020-07-23 DIAGNOSIS — B35.1 DERMATOPHYTOSIS OF NAIL: ICD-10-CM

## 2020-07-23 DIAGNOSIS — M21.961 DEFORMITY OF BOTH FEET: ICD-10-CM

## 2020-07-23 DIAGNOSIS — S90.821A BLISTER OF RIGHT FOOT, INITIAL ENCOUNTER: Primary | ICD-10-CM

## 2020-07-23 DIAGNOSIS — M79.674 PAIN IN TOES OF BOTH FEET: ICD-10-CM

## 2020-07-23 DIAGNOSIS — Z73.6 LIMITATION DUE TO DISABILITY: ICD-10-CM

## 2020-07-23 DIAGNOSIS — M79.672 PAIN IN BOTH FEET: ICD-10-CM

## 2020-07-23 PROCEDURE — 99212 OFFICE O/P EST SF 10 MIN: CPT | Performed by: PODIATRIST

## 2020-08-17 ENCOUNTER — TRANSCRIBE ORDERS (OUTPATIENT)
Dept: LAB | Facility: CLINIC | Age: 55
End: 2020-08-17

## 2020-08-17 ENCOUNTER — APPOINTMENT (OUTPATIENT)
Dept: LAB | Facility: CLINIC | Age: 55
End: 2020-08-17
Payer: MEDICARE

## 2020-08-17 DIAGNOSIS — E55.9 VITAMIN D DEFICIENCY: ICD-10-CM

## 2020-08-17 DIAGNOSIS — E29.1 HYPOGONADISM, TESTICULAR: ICD-10-CM

## 2020-08-17 DIAGNOSIS — Z51.81 ENCOUNTER FOR MONITORING TESTOSTERONE REPLACEMENT THERAPY: ICD-10-CM

## 2020-08-17 DIAGNOSIS — R35.1 NOCTURIA: ICD-10-CM

## 2020-08-17 DIAGNOSIS — E03.4 HYPOTHYROIDISM DUE TO ACQUIRED ATROPHY OF THYROID: ICD-10-CM

## 2020-08-17 DIAGNOSIS — R35.1 NOCTURIA: Primary | ICD-10-CM

## 2020-08-17 DIAGNOSIS — M81.0 OSTEOPOROSIS WITHOUT CURRENT PATHOLOGICAL FRACTURE, UNSPECIFIED OSTEOPOROSIS TYPE: ICD-10-CM

## 2020-08-17 DIAGNOSIS — Z79.890 ENCOUNTER FOR MONITORING TESTOSTERONE REPLACEMENT THERAPY: ICD-10-CM

## 2020-08-17 DIAGNOSIS — I67.5 MOYA MOYA DISEASE: ICD-10-CM

## 2020-08-17 DIAGNOSIS — Z12.5 SCREENING PSA (PROSTATE SPECIFIC ANTIGEN): ICD-10-CM

## 2020-08-17 LAB
25(OH)D3 SERPL-MCNC: 59.6 NG/ML (ref 30–100)
BASOPHILS # BLD AUTO: 0.07 THOUSANDS/ΜL (ref 0–0.1)
BASOPHILS NFR BLD AUTO: 2 % (ref 0–1)
CHOLEST SERPL-MCNC: 212 MG/DL (ref 50–200)
EOSINOPHIL # BLD AUTO: 0.04 THOUSAND/ΜL (ref 0–0.61)
EOSINOPHIL NFR BLD AUTO: 1 % (ref 0–6)
ERYTHROCYTE [DISTWIDTH] IN BLOOD BY AUTOMATED COUNT: 14.6 % (ref 11.6–15.1)
HCT VFR BLD AUTO: 43.6 % (ref 36.5–49.3)
HDLC SERPL-MCNC: 63 MG/DL
HGB BLD-MCNC: 14.2 G/DL (ref 12–17)
IMM GRANULOCYTES # BLD AUTO: 0.02 THOUSAND/UL (ref 0–0.2)
IMM GRANULOCYTES NFR BLD AUTO: 1 % (ref 0–2)
LDLC SERPL CALC-MCNC: 132 MG/DL (ref 0–100)
LYMPHOCYTES # BLD AUTO: 1.04 THOUSANDS/ΜL (ref 0.6–4.47)
LYMPHOCYTES NFR BLD AUTO: 27 % (ref 14–44)
MCH RBC QN AUTO: 33.4 PG (ref 26.8–34.3)
MCHC RBC AUTO-ENTMCNC: 32.6 G/DL (ref 31.4–37.4)
MCV RBC AUTO: 103 FL (ref 82–98)
MONOCYTES # BLD AUTO: 0.39 THOUSAND/ΜL (ref 0.17–1.22)
MONOCYTES NFR BLD AUTO: 10 % (ref 4–12)
NEUTROPHILS # BLD AUTO: 2.31 THOUSANDS/ΜL (ref 1.85–7.62)
NEUTS SEG NFR BLD AUTO: 59 % (ref 43–75)
NONHDLC SERPL-MCNC: 149 MG/DL
NRBC BLD AUTO-RTO: 0 /100 WBCS
PLATELET # BLD AUTO: 269 THOUSANDS/UL (ref 149–390)
PMV BLD AUTO: 10.7 FL (ref 8.9–12.7)
PSA SERPL-MCNC: <0.1 NG/ML (ref 0–4)
RBC # BLD AUTO: 4.25 MILLION/UL (ref 3.88–5.62)
T4 FREE SERPL-MCNC: 1.04 NG/DL (ref 0.76–1.46)
TRIGL SERPL-MCNC: 85 MG/DL
TSH SERPL DL<=0.05 MIU/L-ACNC: 2.06 UIU/ML (ref 0.36–3.74)
WBC # BLD AUTO: 3.87 THOUSAND/UL (ref 4.31–10.16)

## 2020-08-17 PROCEDURE — 84443 ASSAY THYROID STIM HORMONE: CPT

## 2020-08-17 PROCEDURE — G0103 PSA SCREENING: HCPCS

## 2020-08-17 PROCEDURE — 80061 LIPID PANEL: CPT

## 2020-08-17 PROCEDURE — 82306 VITAMIN D 25 HYDROXY: CPT

## 2020-08-17 PROCEDURE — 84439 ASSAY OF FREE THYROXINE: CPT

## 2020-08-17 PROCEDURE — 85025 COMPLETE CBC W/AUTO DIFF WBC: CPT

## 2020-08-17 PROCEDURE — 36415 COLL VENOUS BLD VENIPUNCTURE: CPT

## 2020-08-18 DIAGNOSIS — E78.5 HYPERLIPIDEMIA, UNSPECIFIED HYPERLIPIDEMIA TYPE: Primary | ICD-10-CM

## 2020-08-18 DIAGNOSIS — I67.5 MOYAMOYA: ICD-10-CM

## 2020-08-18 NOTE — PROGRESS NOTES
Received Lipid panel back with LDL >130 which is higher than last year  Pt is currently on Lipitor 20mg daily  Last LFT done in January, AST was elevated  Will add LFT to pt current labdraw from yesterday, if WNL, will increase Lipitor to 40mg daily

## 2020-08-19 DIAGNOSIS — E78.5 HYPERLIPIDEMIA, UNSPECIFIED HYPERLIPIDEMIA TYPE: ICD-10-CM

## 2020-08-19 RX ORDER — ATORVASTATIN CALCIUM 40 MG/1
40 TABLET, FILM COATED ORAL DAILY
Qty: 30 TABLET | Refills: 5 | Status: SHIPPED | OUTPATIENT
Start: 2020-08-19 | End: 2020-08-20 | Stop reason: SDUPTHER

## 2020-08-20 ENCOUNTER — TELEPHONE (OUTPATIENT)
Dept: NEUROLOGY | Facility: CLINIC | Age: 55
End: 2020-08-20

## 2020-08-20 DIAGNOSIS — E78.5 HYPERLIPIDEMIA, UNSPECIFIED HYPERLIPIDEMIA TYPE: ICD-10-CM

## 2020-08-20 RX ORDER — ATORVASTATIN CALCIUM 40 MG/1
40 TABLET, FILM COATED ORAL DAILY
Qty: 30 TABLET | Refills: 0 | Status: SHIPPED | OUTPATIENT
Start: 2020-08-20 | End: 2020-08-21 | Stop reason: SDUPTHER

## 2020-08-20 NOTE — TELEPHONE ENCOUNTER
Faxed labs and script to Susana at fax # provided  Jeromy Irby resent the script to the [facility]  Karo, can you please scan in the papers tomorrow when you get into the office? I am off tomorrow      Thank you!!

## 2020-08-20 NOTE — TELEPHONE ENCOUNTER
----- Message from Tamela Patel, [address] sent at 8/19/2020  9:29 AM EDT -----  Loy Garrett,   Can you call the group home and let them know that Michelle Santoro LDL is too elevated at 132 despite being on his statin so I am going to increase his dose to 40mg daily  I will send a prescription to the pharmacy for him  Preferrably his goal range should be <100    Thanks  [employer]

## 2020-08-20 NOTE — TELEPHONE ENCOUNTER
Called and spoke with pt's caregiver Susana - informed him of the lab results and recommendation  Pt states the script  Needs to be sent to [facility] pharmacy in [address]  Rosario Kenan: [phone]  F: 1991 [facility] states that they need a written D/C for the old order, a copy of the new current order, and results faxed to them at [phone]  Michaela Lennox, I have a blank script pad ready for you when you arrive in [place] today to fill out  Thanks!

## 2020-08-20 NOTE — PROGRESS NOTES
Assessment/Plan:  De shekhar the blister, revealed mild serous drainage, no open wound underneath, topical Betadine and antibiotic applied with a Band-Aid, aide educated on proper shoe gear and the signs of infection    Aseptic debridement and planning of nails x10 and manually and mechanically  Partial resection of ingrown nail right hallux  Patient will call if any redness or signs of infection   Follow-up 2 months     Diagnoses and all orders for this visit:    Blister of right foot, initial encounter    Dermatophytosis of nail    Pain in both feet    Limitation due to disability    Ingrowing nail    Pain in toes of both feet    Tinea pedis of both feet    Deformity of both feet          Subjective:      Patient ID: Brittney Rg is a 63 y o  male  Patient reports pain in big toenails right worse than left  Has had pain for the past few weeks  Has not noticed any redness or drainage    Patient is special needs, facility resident, accompanied by his aide          Past Medical History:   Diagnosis Date    Abnormal weight gain     last assessed 4/18/16; resolved 1/25/17    [facility]estive [facility]) [facility]     Anxiety     [facility]     Depression     Disease of thyroid gland     Down's syndrome     Fatigue     last assessed 2/23/17; resolved 6/1/17    GERD (gastroesophageal reflux disease)     Gluten free diet     H/O urinary retention     Hernia of abdominal cavity     Hyperlipidemia     Hypogonadism in male     Impulse control disorder     OCD (obsessive compulsive disorder)     Orthostatic hypotension     Parkinson disease (HCC)     Parkinson disease ([facility] )     resolved 2/23/17    Pleural effusion     Sinus tachycardia     Vitamin D deficiency     last assessed 9/1/16; resolved 6/1/17         Current Outpatient Medications:     aspirin (ECOTRIN LOW STRENGTH) 81 mg EC tablet, Take 1 tablet (81 mg total) by mouth daily, Disp: 30 tablet, Rfl: 2    atorvastatin (LIPITOR) 40 mg tablet, Take 1 tablet (40 mg total) by mouth daily At 8pm, Disp: 30 tablet, Rfl: 5    Calcium Carbonate-Vitamin D 600-400 MG-UNIT per tablet, Take 1 tablet by mouth 2 (two) times a day At 8 am and 5 pm, Disp: 60 tablet, Rfl: 10    cholecalciferol (VITAMIN D3) 1,000 units tablet, Take 1 tablet (1,000 Units total) by mouth 2 (two) times a day At 8am and 8pm, Disp: 60 tablet, Rfl: 11    divalproex sodium (DEPAKOTE) 500 mg EC tablet, Take 500 mg by mouth , Disp: , Rfl:     docusate sodium (COLACE) 100 mg capsule, Take one capsule at 8am, Disp: 30 capsule, Rfl: 11    erythromycin (ILOTYCIN) ophthalmic ointment, Administer 0 5 inches into the left eye every 12 (twelve) hours At 8 am and 8 pm  D/c on 5/27/20 (Patient not taking: Reported on 6/2/2020), Disp: 3 5 g, Rfl: 0    esomeprazole (NexIUM) 40 MG capsule, Take one capsule daily 30 minutes prior to breakfast, Disp: 30 capsule, Rfl: 11    lanolin-mineral oil (ARMAAN) LOTN, Apply topically 2 (two) times a day Apply to hands daily at 8 am and 8 pm, Disp: 1 Bottle, Rfl: 11    levothyroxine 75 mcg tablet, TAKE 1 TABLET BY MOUTH IN THE EVENING (8PM), Disp: 30 tablet, Rfl: 4    lidocaine (URO-JET) 2 % urethral/mucosal gel, Insert 1 application into the urethra daily as needed (urethral pain 2/2 catheter) (Patient not taking: Reported on 2/19/2020), Disp: , Rfl: 0    loratadine (CLARITIN) 10 mg tablet, Take 1 tablet (10 mg total) by mouth daily As needed for allergies   Do not exceed 1 dose per 24 hours, Disp: 30 tablet, Rfl: 11    midodrine (PROAMATINE) 2 5 mg tablet, Take 1 tablet (2 5 mg total) by mouth 2 (two) times a day as needed (If systolic blood pressure less than 100 and patient is symptomatic), Disp: 60 tablet, Rfl: 2    multivitamin-minerals (CENTRUM ADULTS) tablet, Take 1 tablet by mouth daily At 8am, Disp: 30 tablet, Rfl: 11    neomycin-bacitracin-polymyxin (NEOSPORIN) 5-400-5,000 ointment, Apply topically 3 (three) times a day Apply to urethra for lubrication (Patient not taking: Reported on 2/19/2020), Disp: 28 3 g, Rfl: 0    OLANZapine (ZyPREXA) 2 5 mg tablet, Take 1 tablet by mouth daily 8 pm, Disp: , Rfl:     polyethylene glycol (GLYCOLAX) powder, Take 17 grams in 8 oz of water by mouth daily at 8 pm, Disp: 527 g, Rfl: 11    tamsulosin (FLOMAX) 0 4 mg, Take 0 4 mg by mouth daily with dinner , Disp: , Rfl:     testosterone (ANDROGEL) 1 62 % TD gel pump, Apply 3 pumps at 8 AM to shoulders/chest daily (Patient not taking: Reported on 6/2/2020), Disp: 75 g, Rfl: 3    Past Surgical History:   Procedure Laterality Date    COLONOSCOPY      ESOPHAGOGASTRODUODENOSCOPY      with biopsy    PERICARDIOCENTESIS  4/29/2016            Allergies   Allergen Reactions    Advil [Ibuprofen]     Gluten Meal     Other      SEASONAL       Patient Active Problem List   Diagnosis    Essential hypertension    Hyperlipidemia    Hypothyroidism    GERD (gastroesophageal reflux disease)    Down's syndrome    [facility]eps[facility]    Anemia    CHELSIE (iron deficiency anemia)    Chronic constipation    Hypogonadism, testicular    Hypotension    Osteoporosis without current pathological fracture    Mixed incontinence    Parkinson's disease (HCC)    Serositis ([facility]    Bladder distention    Hydronephrosis    Abdominal pain    Idiopathic pericardial effusion    Cardiac murmur    Xerodermia    Sinus bradycardia    Vitamin D deficiency    Near syncope    Dizziness    Gastroenteritis    Orthostatic hypotension    Cystitis    Urinary retention    Cerebral ventriculomegaly due to brain atrophy ([facility]    Acute kidney injury ([facility]    Encounter for monitoring testosterone replacement therapy       Review of Systems   Constitutional: Negative  HENT: Negative  Eyes: Negative  Respiratory: Negative  Cardiovascular: Negative  Gastrointestinal: Negative  Endocrine: Negative  Genitourinary: Negative  Musculoskeletal: Negative  Skin: Negative  Allergic/Immunologic: Negative  Neurological: Negative  Hematological: Negative  Psychiatric/Behavioral: Negative  Objective:             Physical Exam   Skin:   Small blister plantar right hallux, most likely fraction secondary to tight shoe gear no erythema no edema no clinical signs of infection       Constitutional: no acute distress, well appearing and well nourished   Orthopedic/Biomechanical: abnormal foot type,-- hammertoe(s),-- bunion(s),-- abnormal MPJ ROM,-- discolored nails,-- subungual debris-- and-- nail tenderness   Skin: keratoses  Left Foot: Appearance: a deformity  Great toe deformities include a bunion  Evaluation of the great toe nail demonstrates an ingrown nail, but-- no paronychia  Decreased range of motion first MPJ bilateral positive pain on palpation and range of motion right worse than left  Ingrown bilateral big toe  No signs of infection no erythema no exudate  Special Tests: significant bunion bilateral decreased range of motion first MPJ no pain on range of motion,no sign of infection  Right Foot: Appearance: Normal except as noted: a deformity  Great toe deformities include a bunion  Evaluation of the great toe nail demonstrates paronychia-- and-- an ingrown nail  Mild ingrown tibial border  Rigid hammertoes, 2 through 5, bilateral, moderate xerosis, fissures, bilateral heels, midfoot, bilateral, negative erythema  Negative exudate  Negative ulceration  Special Tests: Mild xerosis, no signs of infection  Neurological Exam: Light touch was decreased bilaterally  Vibratory sensation was decreased in both first metatarsophalangeal joints  Response to monofilament test was intact bilaterally  Vascular Exam: performed Dorsalis pedis pulses were 1/4 bilaterally  Posterior tibial pulses were 1/4 bilaterally  Capillary refill time was between 1-3 seconds bilaterally  Toenails:  All of the toenails were elongated,-- hypertrophied,-- discolored,-- shown to have subungual debris-- and-- tender  Both first toenails were ingrown  Hyperkeratosis: present on both first sub metatarsals-- and-- present on both fifth sub metatarsals  Moderate bunion bilateral   Rigid hammertoes 2 through 5 bilateral   Significant equinus bilateral   Negative erythema no signs of infection  Large bunion right worse than left  Decreased range of motion 1st MPJ  Right 1st MPJ positive pain on end range of motion no crepitus no hypermobility of the 1st ray      All nails thick discolored dystrophic  Ingrown bilateral hallux  No open wounds or signs of infection  Painful hyperkeratotic lesions medial 1st metatarsal head bilateral  Significant hallux limitus bilateral hammered hallux with hallux valgus bilateral    Ingrown nail bilateral hallux  Right hallux medial border, mild swelling, positive pain on palpation negative exudate no purulence no signs of infection

## 2020-08-21 DIAGNOSIS — E78.5 HYPERLIPIDEMIA, UNSPECIFIED HYPERLIPIDEMIA TYPE: ICD-10-CM

## 2020-08-21 RX ORDER — ATORVASTATIN CALCIUM 40 MG/1
40 TABLET, FILM COATED ORAL DAILY
Qty: 30 TABLET | Refills: 0 | Status: SHIPPED | OUTPATIENT
Start: 2020-08-21 | End: 2020-09-21

## 2020-08-21 RX ORDER — ATORVASTATIN CALCIUM 40 MG/1
40 TABLET, FILM COATED ORAL DAILY
Qty: 30 TABLET | Refills: 0 | Status: SHIPPED | OUTPATIENT
Start: 2020-08-21 | End: 2020-08-21 | Stop reason: SDUPTHER

## 2020-08-21 NOTE — TELEPHONE ENCOUNTER
Susana calling in  He states when the atorvastatin script came through fax, it was wrinkly and unable to read  Can you refax this please to  [phone]

## 2020-08-24 ENCOUNTER — TELEPHONE (OUTPATIENT)
Dept: NEUROLOGY | Facility: CLINIC | Age: 55
End: 2020-08-24

## 2020-08-24 NOTE — TELEPHONE ENCOUNTER
Patient's group home calling to say that they received patient's script for atorvastatin  Asking if the instructions are still the same and to be given at 8 pm  Confirmed with group home  Asking if a copy of the instructions can be faxed to [phone]   Faxed

## 2020-08-25 DIAGNOSIS — E03.4 HYPOTHYROIDISM DUE TO ACQUIRED ATROPHY OF THYROID: ICD-10-CM

## 2020-08-26 RX ORDER — LEVOTHYROXINE SODIUM 0.07 MG/1
TABLET ORAL
Qty: 30 TABLET | Refills: 0 | Status: SHIPPED | OUTPATIENT
Start: 2020-08-26 | End: 2020-10-26

## 2020-08-27 ENCOUNTER — OFFICE VISIT (OUTPATIENT)
Dept: ENDOCRINOLOGY | Facility: CLINIC | Age: 55
End: 2020-08-27
Payer: MEDICARE

## 2020-08-27 VITALS
SYSTOLIC BLOOD PRESSURE: 116 MMHG | DIASTOLIC BLOOD PRESSURE: 70 MMHG | WEIGHT: 164 LBS | HEART RATE: 77 BPM | TEMPERATURE: 98.6 F | BODY MASS INDEX: 30.18 KG/M2 | HEIGHT: 62 IN

## 2020-08-27 DIAGNOSIS — E29.1 TESTICULAR HYPOGONADISM: ICD-10-CM

## 2020-08-27 DIAGNOSIS — E55.9 VITAMIN D DEFICIENCY: ICD-10-CM

## 2020-08-27 DIAGNOSIS — E03.4 HYPOTHYROIDISM DUE TO ACQUIRED ATROPHY OF THYROID: Primary | ICD-10-CM

## 2020-08-27 DIAGNOSIS — M81.0 OSTEOPOROSIS WITHOUT CURRENT PATHOLOGICAL FRACTURE, UNSPECIFIED OSTEOPOROSIS TYPE: ICD-10-CM

## 2020-08-27 PROCEDURE — 3078F DIAST BP <80 MM HG: CPT | Performed by: INTERNAL MEDICINE

## 2020-08-27 PROCEDURE — 99214 OFFICE O/P EST MOD 30 MIN: CPT | Performed by: INTERNAL MEDICINE

## 2020-08-27 PROCEDURE — 3008F BODY MASS INDEX DOCD: CPT | Performed by: INTERNAL MEDICINE

## 2020-08-27 PROCEDURE — 3066F NEPHROPATHY DOC TX: CPT | Performed by: INTERNAL MEDICINE

## 2020-08-27 PROCEDURE — 1036F TOBACCO NON-USER: CPT | Performed by: INTERNAL MEDICINE

## 2020-08-27 PROCEDURE — 3074F SYST BP LT 130 MM HG: CPT | Performed by: INTERNAL MEDICINE

## 2020-08-27 NOTE — PROGRESS NOTES
ENDOCRINOLOGY  FOLLOW UP VISIT      Reason for Endocrine Consult/Chief Complaint:     Referring Provider: BJ Beaver        ? Medical Decision Making:     Impression  1  Hx of low testosterone  2  Hypothyroidism  3  Osteopenia   4  Down's syndrome, parkinson's disease      Recommendations:    Hx of low testosterone- total testosterone 8AM 468 acceptable, no need for replacement right now     Hypothyroidism-?TFTs normal Aug 2020 continue same dose levothyroxine 75mcg/day    Osteopenia-vitamin D level 59 replete Aug 2020 continue same calcium carbonate and D3 replacement regimen for now, repeat DEXA 2/2022    RTC 6 months     Sheeba ISAACS  Endocrinology        History of Present Illness:   Mr Rush Haji is a 63year old male who presents for follow up  Was seeing Dr Brissa Ruiz last seen 5/2020  At that time testosterone replacement was stopped due to concern for worsening urinary symptoms  Has osteopenia on calcium/vitamin D supplementation  On levothyroxine for hypothyroidism  ? Events since last visit:    Remains on levothyroxine 75mcg/day    On calcium carb-vitamin D 600-400 MG-Unit BID along with separate D3 1000 IU daily      ? Review of Systems:     Review of Systems   Constitutional: Negative for appetite change, chills, diaphoresis, fatigue, fever and unexpected weight change  HENT: Negative for congestion, ear pain, hearing loss, rhinorrhea, sinus pressure, sinus pain, sore throat, trouble swallowing and voice change  Eyes: Negative for photophobia, redness and visual disturbance  Respiratory: Negative for apnea, cough, chest tightness, shortness of breath, wheezing and stridor  Cardiovascular: Negative for chest pain, palpitations and leg swelling  Gastrointestinal: Negative for abdominal distention, abdominal pain, constipation, diarrhea, nausea and vomiting  Endocrine: Negative for cold intolerance, heat intolerance, polydipsia, polyphagia and polyuria  Genitourinary: Negative for difficulty urinating, dysuria, flank pain, frequency, hematuria and urgency  Musculoskeletal: Negative for arthralgias, back pain, gait problem, joint swelling and myalgias  Skin: Negative for color change, pallor, rash and wound  Allergic/Immunologic: Negative for immunocompromised state  Neurological: Negative for dizziness, tremors, syncope, weakness, light-headedness and headaches  Hematological: Negative for adenopathy  Does not bruise/bleed easily  Psychiatric/Behavioral: Negative for confusion and sleep disturbance  The patient is not nervous/anxious  ?   Patient History:     Past Medical History:   Diagnosis Date    Abnormal weight gain     last assessed 4/18/16; resolved 1/25/17    Acute [facility]congestive [facility] failure) ([facility]     Anxiety     Dementia ([facility] )     Depression     Disease of thyroid gland     Down's syndrome     Fatigue     last assessed 2/23/17; resolved 6/1/17    GERD (gastroesophageal reflux disease)     Gluten free diet     H/O urinary retention     Hernia of abdominal cavity     Hyperlipidemia     Hypogonadism in male     Impulse control disorder     OCD (obsessive compulsive disorder)     Orthostatic hypotension     Parkinson disease (HCC)     Parkinson disease ([facility] )     resolved 2/23/17    Pleural effusion     Sinus tachycardia     Vitamin D deficiency     last assessed 9/1/16; resolved 6/1/17     Past Surgical History:   Procedure Laterality Date    COLONOSCOPY      ESOPHAGOGASTRODUODENOSCOPY      with biopsy    PERICARDIOCENTESIS  4/29/2016          Social History     Socioeconomic History    Marital status: Single     Spouse name: Not on file    Number of children: Not on file    Years of education: Not on file    Highest education level: Not on file   Occupational History    Not on file   Social Needs    Financial resource strain: Not on file    Food insecurity     Worry: Not on file     Inability: Not on file  Transportation needs     Medical: Not on file     Non-medical: Not on file   Tobacco Use    Smoking status: Never Smoker    Smokeless tobacco: Never Used   Substance and Sexual Activity    Alcohol use: Never     Frequency: Never    Drug use: Never    Sexual activity: Not on file   Lifestyle    Physical activity     Days per week: Not on file     Minutes per session: Not on file    Stress: Not on file   Relationships    Social connections     Talks on phone: Not on file     Gets together: Not on file     Attends [employer] service: Not on file     Active member of club or organization: Not on file     Attends meetings of clubs or organizations: Not on file     Relationship status: Not on file    Intimate partner violence     Fear of current or ex partner: Not on file     Emotionally abused: Not on file     Physically abused: Not on file     Forced sexual activity: Not on file   Other Topics Concern    Not on file   Social History Narrative    Caffeine use      Family History   Problem Relation Age of Onset    No Known Problems Mother     Parkinsonism Father         symtomatic       Current Medications: At the time this note was written these were the medications the patient was on    Current Outpatient Medications   Medication Sig Dispense Refill    aspirin (ECOTRIN LOW STRENGTH) 81 mg EC tablet Take 1 tablet (81 mg total) by mouth daily 30 tablet 2    atorvastatin (LIPITOR) 40 mg tablet Take 1 tablet (40 mg total) by mouth daily At 8pm 30 tablet 0    Calcium Carbonate-Vitamin D 600-400 MG-UNIT per tablet Take 1 tablet by mouth 2 (two) times a day At 8 am and 5 pm 60 tablet 10    cholecalciferol (VITAMIN D3) 1,000 units tablet Take 1 tablet (1,000 Units total) by mouth 2 (two) times a day At 8am and 8pm 60 tablet 11    divalproex sodium (DEPAKOTE) 500 mg EC tablet Take 500 mg by mouth       docusate sodium (COLACE) 100 mg capsule Take one capsule at 8am 30 capsule 11    erythromycin (ILOTYCIN) ophthalmic ointment Administer 0 5 inches into the left eye every 12 (twelve) hours At 8 am and 8 pm  D/c on 5/27/20 (Patient not taking: Reported on 6/2/2020) 3 5 g 0    esomeprazole (NexIUM) 40 MG capsule Take one capsule daily 30 minutes prior to breakfast 30 capsule 11    lanolin-mineral oil (ARMAAN) LOTN Apply topically 2 (two) times a day Apply to hands daily at 8 am and 8 pm 1 Bottle 11    levothyroxine 75 mcg tablet TAKE 1 TABLET BY MOUTH IN THE EVENING (8PM) 30 tablet 0    lidocaine (URO-JET) 2 % urethral/mucosal gel Insert 1 application into the urethra daily as needed (urethral pain 2/2 catheter) (Patient not taking: Reported on 2/19/2020)  0    loratadine (CLARITIN) 10 mg tablet Take 1 tablet (10 mg total) by mouth daily As needed for allergies  Do not exceed 1 dose per 24 hours 30 tablet 11    midodrine (PROAMATINE) 2 5 mg tablet Take 1 tablet (2 5 mg total) by mouth 2 (two) times a day as needed (If systolic blood pressure less than 100 and patient is symptomatic) 60 tablet 2    multivitamin-minerals (CENTRUM ADULTS) tablet Take 1 tablet by mouth daily At 8am 30 tablet 11    neomycin-bacitracin-polymyxin (NEOSPORIN) 5-400-5,000 ointment Apply topically 3 (three) times a day Apply to urethra for lubrication (Patient not taking: Reported on 2/19/2020) 28 3 g 0    OLANZapine (ZyPREXA) 2 5 mg tablet Take 1 tablet by mouth daily 8 pm      polyethylene glycol (GLYCOLAX) powder Take 17 grams in 8 oz of water by mouth daily at 8 pm 527 g 11    tamsulosin (FLOMAX) 0 4 mg Take 0 4 mg by mouth daily with dinner       testosterone (ANDROGEL) 1 62 % TD gel pump Apply 3 pumps at 8 AM to shoulders/chest daily (Patient not taking: Reported on 6/2/2020) 75 g 3     No current facility-administered medications for this visit  Allergies: Advil [ibuprofen];  Gluten meal; and Other    Physical Exam:   Vital Signs:   /70   Pulse 77   Temp 98 6 °F (37 °C)   Ht 5' 2" (1 575 m)   Wt 74 4 kg (164 lb) BMI 30 00 kg/m²     Physical Exam  Vitals signs reviewed  Constitutional:       General: He is not in acute distress  Appearance: Normal appearance  He is not ill-appearing, toxic-appearing or diaphoretic  HENT:      Head: Normocephalic and atraumatic  Right Ear: External ear normal       Left Ear: External ear normal    Eyes:      General: No scleral icterus  Extraocular Movements: Extraocular movements intact  Conjunctiva/sclera: Conjunctivae normal       Pupils: Pupils are equal, round, and reactive to light  Neck:      Musculoskeletal: Normal range of motion and neck supple  No muscular tenderness  Cardiovascular:      Rate and Rhythm: Normal rate and regular rhythm  Heart sounds: No murmur  No friction rub  No gallop  Pulmonary:      Effort: Pulmonary effort is normal  No respiratory distress  Breath sounds: Normal breath sounds  No stridor  No wheezing, rhonchi or rales  Abdominal:      General: Bowel sounds are normal  There is no distension  Palpations: Abdomen is soft  There is no mass  Tenderness: There is no abdominal tenderness  There is no guarding or rebound  Hernia: No hernia is present  Musculoskeletal: Normal range of motion  General: No swelling  Lymphadenopathy:      Cervical: No cervical adenopathy  Skin:     General: Skin is warm and dry  Coloration: Skin is not pale  Findings: No erythema or rash  Neurological:      General: No focal deficit present  Mental Status: He is alert and oriented to person, place, and time  Psychiatric:         Mood and Affect: Mood normal          Behavior: Behavior normal          Thought Content:  Thought content normal          Judgment: Judgment normal           Labs and Imaging:      Component      Latest Ref Rng & Units 8/17/2020   WBC      4 31 - 10 16 Thousand/uL 3 87 (L)   Red Blood Cell Count      3 88 - 5 62 Million/uL 4 25   Hemoglobin      12 0 - 17 0 g/dL 14 2 HCT      36 5 - 49 3 % 43 6   MCV      82 - 98 fL 103 (H)   MCH      26 8 - 34 3 pg 33 4   MCHC      31 4 - 37 4 g/dL 32 6   RDW      11 6 - 15 1 % 14 6   MPV      8 9 - 12 7 fL 10 7   Platelet Count      885 - 390 Thousands/uL 269   nRBC      /100 WBCs 0   Neutrophils %      43 - 75 % 59   Immat GRANS %      0 - 2 % 1   Lymphocytes Relative      14 - 44 % 27   Monocytes Relative      4 - 12 % 10   Eosinophils      0 - 6 % 1   Basophils Relative      0 - 1 % 2 (H)   Absolute Neutrophils      1 85 - 7 62 Thousands/µL 2 31   Immature Grans Absolute      0 00 - 0 20 Thousand/uL 0 02   Lymphocytes Absolute      0 60 - 4 47 Thousands/µL 1 04   Absolute Monocytes      0 17 - 1 22 Thousand/µL 0 39   Absolute Eosinophils      0 00 - 0 61 Thousand/µL 0 04   Basophils Absolute      0 00 - 0 10 Thousands/µL 0 07   Sodium      136 - 145 mmol/L 140   Potassium      3 5 - 5 3 mmol/L 4 1   Chloride      100 - 108 mmol/L 105   CO2      21 - 32 mmol/L 29   Anion Gap      4 - 13 mmol/L 6   BUN      5 - 25 mg/dL 11   Creatinine      0 60 - 1 30 mg/dL 0 93   GLUCOSE FASTING      65 - 99 mg/dL 86   Calcium      8 3 - 10 1 mg/dL 9 0   eGFR      ml/min/1 73sq m 91   TOTAL BILIRUBIN      0 20 - 1 00 mg/dL 0 46   BILIRUBIN DIRECT      0 00 - 0 20 mg/dL 0 09   Alkaline Phosphatase      46 - 116 U/L 65   AST      5 - 45 U/L 22   ALT      12 - 78 U/L 25   Total Protein      6 4 - 8 2 g/dL 7 6   Albumin      3 5 - 5 0 g/dL 3 5   Cholesterol      50 - 200 mg/dL 212 (H)   Triglycerides      <=150 mg/dL 85   HDL      >=40 mg/dL 63   LDL Calculated      0 - 100 mg/dL 132 (H)   Non-HDL Cholesterol      mg/dl 149   TESTOSTERONE FREE      7 2 - 24 0 pg/mL 6 7 (L)   Testosterone, Total, LC/MS      264 - 916 ng/dL 468   PSA, Total      0 0 - 4 0 ng/mL <0 1   Vit D, 25-Hydroxy      30 0 - 100 0 ng/mL 59 6   Free T4      0 76 - 1 46 ng/dL 1 04   TSH 3RD GENERATON      0 358 - 3 740 uIU/mL 2 060

## 2020-08-31 ENCOUNTER — TELEPHONE (OUTPATIENT)
Dept: FAMILY MEDICINE CLINIC | Facility: CLINIC | Age: 55
End: 2020-08-31

## 2020-08-31 NOTE — LETTER
Date: 8/31/2020    To whom it may concern: This is to certify that Lashawn Bartholomew has been under my care for the following diagnosis: downs syndrome, intellectual disability, lacks capacity  I feel that he is unable to serve on Jury Duty at this time for the above mentioned medical reasons                    Sincerely,   Mely Lunsford, [address]

## 2020-09-01 ENCOUNTER — TELEPHONE (OUTPATIENT)
Dept: NEUROLOGY | Facility: CLINIC | Age: 55
End: 2020-09-01

## 2020-09-01 ENCOUNTER — OFFICE VISIT (OUTPATIENT)
Dept: NEUROLOGY | Facility: CLINIC | Age: 55
End: 2020-09-01
Payer: MEDICARE

## 2020-09-01 VITALS
HEART RATE: 85 BPM | TEMPERATURE: 97.5 F | WEIGHT: 164 LBS | BODY MASS INDEX: 30.18 KG/M2 | DIASTOLIC BLOOD PRESSURE: 62 MMHG | SYSTOLIC BLOOD PRESSURE: 136 MMHG | HEIGHT: 62 IN

## 2020-09-01 DIAGNOSIS — I67.5 MOYA MOYA DISEASE: Primary | ICD-10-CM

## 2020-09-01 DIAGNOSIS — E78.5 HYPERLIPIDEMIA, UNSPECIFIED HYPERLIPIDEMIA TYPE: ICD-10-CM

## 2020-09-01 DIAGNOSIS — Q90.9 DOWN'S SYNDROME: ICD-10-CM

## 2020-09-01 DIAGNOSIS — G20 PARKINSON'S DISEASE (HCC): ICD-10-CM

## 2020-09-01 PROCEDURE — 99214 OFFICE O/P EST MOD 30 MIN: CPT | Performed by: NURSE PRACTITIONER

## 2020-09-01 NOTE — TELEPHONE ENCOUNTER
Patient's caregiver calling to see if it was necessary for patient to have an appointment today since he just increased his statin  Advised that the last office note states to follow up in 3 months  States understanding and will keep today's appointment

## 2020-09-01 NOTE — PATIENT INSTRUCTIONS
Continue on baby aspirin 81 mg daily  Continue on Lipitor 40 mg daily  Can continue midodrine 2 5 mg p r n   Low blood pressure with dizziness  Maintain LDL below 100 as goal, repeat lipid profile in mid December  Maintain normal blood pressure 120-140/60-80  Follow up with outpatient Neurology in 5 months

## 2020-09-01 NOTE — PROGRESS NOTES
Patient ID: Bipin Skelton is a 63 y o  male  Assessment/Plan:     Diagnoses and all orders for this visit:    Apoorva Hack disease  -     Lipid panel; Future    Hyperlipidemia, unspecified hyperlipidemia type  -     Lipid panel; Future    Parkinson's disease ([facility] )    Down's syndrome       Continue on baby aspirin 81 mg daily  Continue on Lipitor 40 mg daily  Can continue midodrine 2 5 mg p r n  Low blood pressure with dizziness  Maintain LDL below 100 as goal, repeat lipid profile in mid December  Maintain normal blood pressure 120-140/60-80  Follow up with outpatient Neurology in 5 months    Subjective/HPI: Bipin Skelton a 59-year-old male patient with Down syndrome who follows with outpatient neurology for medical management and treatment of moyamoya as well as Parkinson's like symptoms  Patient was followed by Neurosurgery to confirm the moyamoya on CTA, recommendations for normotensive blood pressure as well as ongoing anti-platelet therapies and continued stroke risk reduction  Pt is accompanied by his care giver, attendant is Susana  Pt has mild Parkinson with effects of orthostatic hypotension, on midodrine prn  Pt has not had any Midodrine since about June of 2020  Current /62, remains normotensive  According to Susana, his BP is generally runs lower  Pt denies having any further episodes of dizziness  Explain to patient's caregiver, treatment plan for moyamoya it to be stroke risk reduction  Advise of normotensive blood pressure regulations as well as goal of LDL being less than 100  Pt last LDL was 132 therefore his Lipitor was increased to 40mg, will repeat levels in 4 months  Advise would like to continue to follow patient fairly closely at this time until management of risk factors have been optimized      Currently patient denies any headache, dizziness, lightheadedness, visual disturbances, speech or swallowing difficulties, gait or ambulation dysfunctions, falls, chest pain, shortness of breath, abdominal pain, nausea vomiting or diarrhea         The following portions of the patient's history were reviewed and updated as appropriate: allergies, current medications, past family history, past medical history, past social history, past surgical history and problem list         Past Medical History:   Diagnosis Date    Abnormal weight gain     last assessed 4/18/16; resolved 1/25/17    Acute [facility]congestive [facility] failure) ([facility] )     Anxiety     Dementia ([facility] )     Depression     Disease of thyroid gland     Down's syndrome     Fatigue     last assessed 2/23/17; resolved 6/1/17    GERD (gastroesophageal reflux disease)     Gluten free diet     H/O urinary retention     Hernia of abdominal cavity     Hyperlipidemia     Hypogonadism in male     Impulse control disorder     OCD (obsessive compulsive disorder)     Orthostatic hypotension     Parkinson disease (HCC)     Parkinson disease ([facility] )     resolved 2/23/17    Pleural effusion     Sinus tachycardia     Vitamin D deficiency     last assessed 9/1/16; resolved 6/1/17       Past Surgical History:   Procedure Laterality Date    COLONOSCOPY      ESOPHAGOGASTRODUODENOSCOPY      with biopsy    PERICARDIOCENTESIS  4/29/2016            Social History     Socioeconomic History    Marital status: Single     Spouse name: None    Number of children: None    Years of education: None    Highest education level: None   Occupational History    None   Social Needs    Financial resource strain: None    Food insecurity     Worry: None     Inability: None    Transportation needs     Medical: None     Non-medical: None   Tobacco Use    Smoking status: Never Smoker    Smokeless tobacco: Never Used   Substance and Sexual Activity    Alcohol use: Never     Frequency: Never    Drug use: Never    Sexual activity: None   Lifestyle    Physical activity     Days per week: None     Minutes per session: None    Stress: None Relationships    Social connections     Talks on phone: None     Gets together: None     Attends Confucianism service: None     Active member of club or organization: None     Attends meetings of clubs or organizations: None     Relationship status: None    Intimate partner violence     Fear of current or ex partner: None     Emotionally abused: None     Physically abused: None     Forced sexual activity: None   Other Topics Concern    None   Social History Narrative    Caffeine use        Family History   Problem Relation Age of Onset    No Known Problems Mother     Parkinsonism Father         symtomatic         Current Outpatient Medications:     aspirin (ECOTRIN LOW STRENGTH) 81 mg EC tablet, Take 1 tablet (81 mg total) by mouth daily, Disp: 30 tablet, Rfl: 2    atorvastatin (LIPITOR) 40 mg tablet, Take 1 tablet (40 mg total) by mouth daily At 8pm, Disp: 30 tablet, Rfl: 0    Calcium Carbonate-Vitamin D 600-400 MG-UNIT per tablet, Take 1 tablet by mouth 2 (two) times a day At 8 am and 5 pm, Disp: 60 tablet, Rfl: 10    cholecalciferol (VITAMIN D3) 1,000 units tablet, Take 1 tablet (1,000 Units total) by mouth 2 (two) times a day At 8am and 8pm, Disp: 60 tablet, Rfl: 11    divalproex sodium (DEPAKOTE) 500 mg EC tablet, Take 500 mg by mouth , Disp: , Rfl:     docusate sodium (COLACE) 100 mg capsule, Take one capsule at 8am, Disp: 30 capsule, Rfl: 11    esomeprazole (NexIUM) 40 MG capsule, Take one capsule daily 30 minutes prior to breakfast, Disp: 30 capsule, Rfl: 11    lanolin-mineral oil (ARMAAN) LOTN, Apply topically 2 (two) times a day Apply to hands daily at 8 am and 8 pm, Disp: 1 Bottle, Rfl: 11    levothyroxine 75 mcg tablet, TAKE 1 TABLET BY MOUTH IN THE EVENING (8PM), Disp: 30 tablet, Rfl: 0    loratadine (CLARITIN) 10 mg tablet, Take 1 tablet (10 mg total) by mouth daily As needed for allergies   Do not exceed 1 dose per 24 hours, Disp: 30 tablet, Rfl: 11    midodrine (PROAMATINE) 2 5 mg tablet, Take 1 tablet (2 5 mg total) by mouth 2 (two) times a day as needed (If systolic blood pressure less than 100 and patient is symptomatic), Disp: 60 tablet, Rfl: 2    multivitamin-minerals (CENTRUM ADULTS) tablet, Take 1 tablet by mouth daily At 8am, Disp: 30 tablet, Rfl: 11    OLANZapine (ZyPREXA) 2 5 mg tablet, Take 1 tablet by mouth daily 8 pm, Disp: , Rfl:     polyethylene glycol (GLYCOLAX) powder, Take 17 grams in 8 oz of water by mouth daily at 8 pm, Disp: 527 g, Rfl: 11    tamsulosin (FLOMAX) 0 4 mg, Take 0 4 mg by mouth Two tablet @ 8 am 0 8 mg daily, Disp: , Rfl:     erythromycin (ILOTYCIN) ophthalmic ointment, Administer 0 5 inches into the left eye every 12 (twelve) hours At 8 am and 8 pm  D/c on 5/27/20 (Patient not taking: Reported on 6/2/2020), Disp: 3 5 g, Rfl: 0    lidocaine (URO-JET) 2 % urethral/mucosal gel, Insert 1 application into the urethra daily as needed (urethral pain 2/2 catheter) (Patient not taking: Reported on 2/19/2020), Disp: , Rfl: 0    neomycin-bacitracin-polymyxin (NEOSPORIN) 5-400-5,000 ointment, Apply topically 3 (three) times a day Apply to urethra for lubrication (Patient not taking: Reported on 2/19/2020), Disp: 28 3 g, Rfl: 0    testosterone (ANDROGEL) 1 62 % TD gel pump, Apply 3 pumps at 8 AM to shoulders/chest daily (Patient not taking: Reported on 6/2/2020), Disp: 75 g, Rfl: 3    Allergies   Allergen Reactions    Advil [Ibuprofen]     Gluten Meal     Other      SEASONAL        Blood pressure 136/62, pulse 85, temperature 97 5 °F (36 4 °C), height 5' 2" (1 575 m), weight 74 4 kg (164 lb)  Objective:    Blood pressure 136/62, pulse 85, temperature 97 5 °F (36 4 °C), height 5' 2" (1 575 m), weight 74 4 kg (164 lb)  Physical Exam  Constitutional:       Appearance: He is well-developed  HENT:      Head: Normocephalic        Right Ear: Hearing normal       Left Ear: Hearing normal       Nose: Nose normal    Eyes:      General: Lids are normal       Extraocular Movements: Extraocular movements intact  Conjunctiva/sclera: Conjunctivae normal       Pupils: Pupils are equal, round, and reactive to light  Neck:      Musculoskeletal: Normal range of motion  Cardiovascular:      Rate and Rhythm: Normal rate and regular rhythm  Heart sounds: Normal heart sounds  Pulmonary:      Effort: Pulmonary effort is normal  No respiratory distress  Breath sounds: Normal breath sounds  Abdominal:      General: Bowel sounds are normal       Palpations: Abdomen is soft  Tenderness: There is no abdominal tenderness  Musculoskeletal: Normal range of motion  Skin:     General: Skin is warm and dry  Neurological:      Mental Status: He is alert  Coordination: Romberg sign negative  Deep Tendon Reflexes: Strength normal and reflexes are normal and symmetric  Psychiatric:         Speech: Speech normal          Behavior: Behavior normal          Thought Content: Thought content normal          Judgment: Judgment normal          Neurological Exam  Mental Status  Alert  Oriented to person, place, time and situation  Recent and remote memory are intact  Speech is normal  Language is fluent with no aphasia  Attention and concentration are normal  Fund of knowledge is appropriate for level of education  Cranial Nerves  CN II: Visual acuity is normal  Visual fields full to confrontation  CN III, IV, VI: Extraocular movements intact bilaterally  Normal lids and orbits bilaterally  Pupils equal round and reactive to light bilaterally  CN V: Facial sensation is normal   CN VII: Full and symmetric facial movement  CN VIII: Hearing is normal   Right: Hearing is normal   Left: Hearing is normal   CN IX, X: Palate elevates symmetrically  Normal gag reflex  CN XI: Shoulder shrug strength is normal   CN XII: Tongue midline without atrophy or fasciculations  Motor  Normal muscle bulk throughout  Normal muscle tone  No abnormal involuntary movements   Strength is 5/5 throughout all four extremities  Sensory  Light touch is normal in upper and lower extremities  Temperature is normal in upper and lower extremities  Vibration is normal in upper and lower extremities  Proprioception is normal in upper and lower extremities  Reflexes  Deep tendon reflexes are 2+ and symmetric in all four extremities with downgoing toes bilaterally  Right pathological reflexes: Sully's absent  Left pathological reflexes: Sully's absent  Coordination  Right: Finger-to-nose normal  Rapid alternating movement normal  Heel-to-shin normal   Left: Finger-to-nose normal  Rapid alternating movement normal  Heel-to-shin normal     Gait  Casual gait is normal including stance, stride, and arm swing  Normal toe walking  Normal heel walking  Normal tandem gait  Romberg is absent  Able to rise from chair without using arms  ROS:    Review of Systems   Constitutional: Negative  Negative for appetite change and fever  HENT: Negative  Negative for hearing loss, tinnitus, trouble swallowing and voice change  Eyes: Negative  Negative for photophobia and pain  Respiratory: Negative  Negative for shortness of breath  Cardiovascular: Negative  Negative for palpitations  Gastrointestinal: Negative  Negative for nausea and vomiting  Endocrine: Negative  Negative for cold intolerance  Genitourinary: Negative  Negative for dysuria, frequency and urgency  Musculoskeletal: Negative  Negative for myalgias and neck pain  Skin: Negative  Negative for rash  Neurological: Negative  Negative for dizziness, tremors, seizures, syncope, facial asymmetry, speech difficulty, weakness, light-headedness, numbness and headaches  Hematological: Negative  Does not bruise/bleed easily  Psychiatric/Behavioral: Negative  Negative for confusion, hallucinations and sleep disturbance

## 2020-09-20 DIAGNOSIS — E78.5 HYPERLIPIDEMIA, UNSPECIFIED HYPERLIPIDEMIA TYPE: ICD-10-CM

## 2020-09-21 RX ORDER — ATORVASTATIN CALCIUM 40 MG/1
TABLET, FILM COATED ORAL
Qty: 30 TABLET | Refills: 2 | Status: SHIPPED | OUTPATIENT
Start: 2020-09-21 | End: 2021-01-01 | Stop reason: DRUGHIGH

## 2020-09-24 ENCOUNTER — TELEPHONE (OUTPATIENT)
Dept: FAMILY MEDICINE CLINIC | Facility: CLINIC | Age: 55
End: 2020-09-24

## 2020-09-24 DIAGNOSIS — H91.93 DECREASED HEARING OF BOTH EARS: Primary | ICD-10-CM

## 2020-09-24 NOTE — TELEPHONE ENCOUNTER
Lashae Muhammad,  Pt last had hearing test in 2017  Should he have another one done? If so, he will need an order to see [employer]   Please fax to [phone]

## 2020-09-24 NOTE — TELEPHONE ENCOUNTER
Yes he is due  Please print order and fax to [id number]   Let them know on the way ty

## 2020-09-25 DIAGNOSIS — M85.89 OSTEOPENIA OF MULTIPLE SITES: ICD-10-CM

## 2020-10-09 ENCOUNTER — OFFICE VISIT (OUTPATIENT)
Dept: FAMILY MEDICINE CLINIC | Facility: CLINIC | Age: 55
End: 2020-10-09
Payer: MEDICARE

## 2020-10-09 VITALS
BODY MASS INDEX: 28.52 KG/M2 | OXYGEN SATURATION: 98 % | WEIGHT: 155 LBS | DIASTOLIC BLOOD PRESSURE: 62 MMHG | HEIGHT: 62 IN | TEMPERATURE: 98.1 F | HEART RATE: 77 BPM | RESPIRATION RATE: 18 BRPM | SYSTOLIC BLOOD PRESSURE: 116 MMHG

## 2020-10-09 DIAGNOSIS — Q90.9 DOWN'S SYNDROME: ICD-10-CM

## 2020-10-09 DIAGNOSIS — M85.89 OSTEOPENIA OF MULTIPLE SITES: ICD-10-CM

## 2020-10-09 DIAGNOSIS — Q80.9 XERODERMIA: ICD-10-CM

## 2020-10-09 DIAGNOSIS — Z00.00 HEALTH CARE MAINTENANCE: ICD-10-CM

## 2020-10-09 DIAGNOSIS — Z00.00 MEDICARE ANNUAL WELLNESS VISIT, SUBSEQUENT: Primary | ICD-10-CM

## 2020-10-09 DIAGNOSIS — E55.9 VITAMIN D DEFICIENCY: ICD-10-CM

## 2020-10-09 DIAGNOSIS — K21.9 GASTROESOPHAGEAL REFLUX DISEASE WITHOUT ESOPHAGITIS: ICD-10-CM

## 2020-10-09 DIAGNOSIS — K59.09 CHRONIC CONSTIPATION: ICD-10-CM

## 2020-10-09 PROCEDURE — G0439 PPPS, SUBSEQ VISIT: HCPCS | Performed by: NURSE PRACTITIONER

## 2020-10-09 RX ORDER — PETROLATUM,WHITE 41 %
OINTMENT (GRAM) TOPICAL 2 TIMES DAILY
Qty: 1 BOTTLE | Refills: 11 | Status: SHIPPED | OUTPATIENT
Start: 2020-10-09 | End: 2021-01-01 | Stop reason: SDUPTHER

## 2020-10-09 RX ORDER — MELATONIN
1000 2 TIMES DAILY
Qty: 60 TABLET | Refills: 11 | Status: SHIPPED | OUTPATIENT
Start: 2020-10-09 | End: 2021-01-01

## 2020-10-09 RX ORDER — ESOMEPRAZOLE MAGNESIUM 40 MG/1
CAPSULE, DELAYED RELEASE ORAL
Qty: 30 CAPSULE | Refills: 11 | Status: SHIPPED | OUTPATIENT
Start: 2020-10-09 | End: 2021-01-01 | Stop reason: SDUPTHER

## 2020-10-09 RX ORDER — POLYETHYLENE GLYCOL 3350 17 G/17G
POWDER, FOR SOLUTION ORAL
Qty: 527 G | Refills: 11 | Status: SHIPPED | OUTPATIENT
Start: 2020-10-09 | End: 2020-10-09 | Stop reason: SDUPTHER

## 2020-10-09 RX ORDER — PETROLATUM,WHITE 41 %
OINTMENT (GRAM) TOPICAL 2 TIMES DAILY
Qty: 1 BOTTLE | Refills: 11 | Status: SHIPPED | OUTPATIENT
Start: 2020-10-09 | End: 2020-10-09 | Stop reason: SDUPTHER

## 2020-10-09 RX ORDER — MELATONIN
1000 2 TIMES DAILY
Qty: 60 TABLET | Refills: 11 | Status: SHIPPED | OUTPATIENT
Start: 2020-10-09 | End: 2020-10-09 | Stop reason: SDUPTHER

## 2020-10-09 RX ORDER — DOCUSATE SODIUM 100 MG/1
CAPSULE, LIQUID FILLED ORAL
Qty: 30 CAPSULE | Refills: 11 | Status: SHIPPED | OUTPATIENT
Start: 2020-10-09 | End: 2020-10-09 | Stop reason: SDUPTHER

## 2020-10-09 RX ORDER — MULTIVITAMIN/IRON/FOLIC ACID 18MG-0.4MG
1 TABLET ORAL DAILY
Qty: 30 TABLET | Refills: 11 | Status: SHIPPED | OUTPATIENT
Start: 2020-10-09 | End: 2021-01-01 | Stop reason: SDUPTHER

## 2020-10-09 RX ORDER — ESOMEPRAZOLE MAGNESIUM 40 MG/1
CAPSULE, DELAYED RELEASE ORAL
Qty: 30 CAPSULE | Refills: 11 | Status: SHIPPED | OUTPATIENT
Start: 2020-10-09 | End: 2020-10-09 | Stop reason: SDUPTHER

## 2020-10-09 RX ORDER — POLYETHYLENE GLYCOL 3350 17 G/17G
POWDER, FOR SOLUTION ORAL
Qty: 527 G | Refills: 11 | Status: SHIPPED | OUTPATIENT
Start: 2020-10-09 | End: 2021-01-01 | Stop reason: SDUPTHER

## 2020-10-09 RX ORDER — MULTIVITAMIN/IRON/FOLIC ACID 18MG-0.4MG
1 TABLET ORAL DAILY
Qty: 30 TABLET | Refills: 11 | Status: SHIPPED | OUTPATIENT
Start: 2020-10-09 | End: 2020-10-09 | Stop reason: SDUPTHER

## 2020-10-09 RX ORDER — DOCUSATE SODIUM 100 MG/1
CAPSULE, LIQUID FILLED ORAL
Qty: 30 CAPSULE | Refills: 11 | Status: SHIPPED | OUTPATIENT
Start: 2020-10-09 | End: 2021-01-01 | Stop reason: SDUPTHER

## 2020-10-12 DIAGNOSIS — J30.1 SEASONAL ALLERGIC RHINITIS DUE TO POLLEN: ICD-10-CM

## 2020-10-12 RX ORDER — LORATADINE 10 MG/1
TABLET ORAL
Qty: 30 TABLET | Refills: 4 | Status: SHIPPED | OUTPATIENT
Start: 2020-10-12 | End: 2022-01-05 | Stop reason: HOSPADM

## 2020-10-13 ENCOUNTER — TELEPHONE (OUTPATIENT)
Dept: FAMILY MEDICINE CLINIC | Facility: CLINIC | Age: 55
End: 2020-10-13

## 2020-10-15 ENCOUNTER — OFFICE VISIT (OUTPATIENT)
Dept: PODIATRY | Facility: CLINIC | Age: 55
End: 2020-10-15
Payer: MEDICARE

## 2020-10-15 VITALS
WEIGHT: 155 LBS | HEIGHT: 62 IN | DIASTOLIC BLOOD PRESSURE: 62 MMHG | RESPIRATION RATE: 17 BRPM | BODY MASS INDEX: 28.52 KG/M2 | HEART RATE: 77 BPM | SYSTOLIC BLOOD PRESSURE: 116 MMHG

## 2020-10-15 DIAGNOSIS — M79.672 PAIN IN BOTH FEET: ICD-10-CM

## 2020-10-15 DIAGNOSIS — L60.0 INGROWING NAIL: ICD-10-CM

## 2020-10-15 DIAGNOSIS — M79.674 PAIN IN TOES OF BOTH FEET: ICD-10-CM

## 2020-10-15 DIAGNOSIS — M21.961 DEFORMITY OF BOTH FEET: ICD-10-CM

## 2020-10-15 DIAGNOSIS — B35.1 ONYCHOMYCOSIS: Primary | ICD-10-CM

## 2020-10-15 DIAGNOSIS — M79.675 PAIN IN TOES OF BOTH FEET: ICD-10-CM

## 2020-10-15 DIAGNOSIS — M79.671 PAIN IN BOTH FEET: ICD-10-CM

## 2020-10-15 DIAGNOSIS — M21.962 DEFORMITY OF BOTH FEET: ICD-10-CM

## 2020-10-15 DIAGNOSIS — Z73.6 LIMITATION DUE TO DISABILITY: ICD-10-CM

## 2020-10-15 DIAGNOSIS — B35.3 TINEA PEDIS OF BOTH FEET: ICD-10-CM

## 2020-10-15 DIAGNOSIS — L60.0 INGROWN TOENAIL: ICD-10-CM

## 2020-10-15 DIAGNOSIS — B35.1 DERMATOPHYTOSIS OF NAIL: ICD-10-CM

## 2020-10-15 PROCEDURE — 99212 OFFICE O/P EST SF 10 MIN: CPT | Performed by: PODIATRIST

## 2020-10-20 ENCOUNTER — OFFICE VISIT (OUTPATIENT)
Dept: CARDIOLOGY CLINIC | Facility: CLINIC | Age: 55
End: 2020-10-20
Payer: MEDICARE

## 2020-10-20 VITALS
BODY MASS INDEX: 29.26 KG/M2 | HEART RATE: 62 BPM | HEIGHT: 62 IN | DIASTOLIC BLOOD PRESSURE: 60 MMHG | OXYGEN SATURATION: 98 % | WEIGHT: 159 LBS | SYSTOLIC BLOOD PRESSURE: 118 MMHG | TEMPERATURE: 98.9 F

## 2020-10-20 DIAGNOSIS — E78.5 HYPERLIPIDEMIA, UNSPECIFIED HYPERLIPIDEMIA TYPE: ICD-10-CM

## 2020-10-20 DIAGNOSIS — R00.1 SINUS BRADYCARDIA: ICD-10-CM

## 2020-10-20 DIAGNOSIS — I95.1 ORTHOSTATIC HYPOTENSION: ICD-10-CM

## 2020-10-20 DIAGNOSIS — I31.3 IDIOPATHIC PERICARDIAL EFFUSION: ICD-10-CM

## 2020-10-20 DIAGNOSIS — R01.1 CARDIAC MURMUR: ICD-10-CM

## 2020-10-20 DIAGNOSIS — Q90.9 DOWN'S SYNDROME: ICD-10-CM

## 2020-10-20 PROCEDURE — 99214 OFFICE O/P EST MOD 30 MIN: CPT | Performed by: INTERNAL MEDICINE

## 2020-10-20 PROCEDURE — 93000 ELECTROCARDIOGRAM COMPLETE: CPT | Performed by: INTERNAL MEDICINE

## 2020-10-22 ENCOUNTER — OFFICE VISIT (OUTPATIENT)
Dept: AUDIOLOGY | Facility: CLINIC | Age: 55
End: 2020-10-22
Payer: MEDICARE

## 2020-10-22 DIAGNOSIS — H90.3 SENSORINEURAL HEARING LOSS, BILATERAL: Primary | ICD-10-CM

## 2020-10-22 PROCEDURE — 92556 SPEECH AUDIOMETRY COMPLETE: CPT | Performed by: AUDIOLOGIST

## 2020-10-22 PROCEDURE — 92567 TYMPANOMETRY: CPT | Performed by: AUDIOLOGIST

## 2020-10-22 PROCEDURE — 92555 SPEECH THRESHOLD AUDIOMETRY: CPT | Performed by: AUDIOLOGIST

## 2020-10-24 DIAGNOSIS — E03.4 HYPOTHYROIDISM DUE TO ACQUIRED ATROPHY OF THYROID: ICD-10-CM

## 2020-10-26 RX ORDER — LEVOTHYROXINE SODIUM 0.07 MG/1
TABLET ORAL
Qty: 30 TABLET | Refills: 4 | Status: SHIPPED | OUTPATIENT
Start: 2020-10-26 | End: 2021-01-01

## 2020-11-12 ENCOUNTER — OFFICE VISIT (OUTPATIENT)
Dept: AUDIOLOGY | Facility: CLINIC | Age: 55
End: 2020-11-12
Payer: MEDICARE

## 2020-11-12 DIAGNOSIS — H90.3 SENSORINEURAL HEARING LOSS, BILATERAL: Primary | ICD-10-CM

## 2020-11-12 PROCEDURE — 92553 AUDIOMETRY AIR & BONE: CPT | Performed by: AUDIOLOGIST

## 2020-11-20 ENCOUNTER — NURSE TRIAGE (OUTPATIENT)
Dept: OTHER | Facility: OTHER | Age: 55
End: 2020-11-20

## 2020-11-20 ENCOUNTER — TELEPHONE (OUTPATIENT)
Dept: OTHER | Facility: OTHER | Age: 55
End: 2020-11-20

## 2020-11-24 DIAGNOSIS — I10 ESSENTIAL HYPERTENSION: Primary | ICD-10-CM

## 2020-11-30 RX ORDER — ASPIRIN 81 MG
TABLET,CHEWABLE ORAL
Qty: 30 TABLET | Refills: 4 | Status: SHIPPED | OUTPATIENT
Start: 2020-11-30 | End: 2021-01-01

## 2020-12-30 ENCOUNTER — LAB (OUTPATIENT)
Dept: LAB | Facility: HOSPITAL | Age: 55
End: 2020-12-30
Attending: INTERNAL MEDICINE
Payer: MEDICARE

## 2020-12-30 ENCOUNTER — TRANSCRIBE ORDERS (OUTPATIENT)
Dept: ADMINISTRATIVE | Facility: HOSPITAL | Age: 55
End: 2020-12-30

## 2020-12-30 DIAGNOSIS — E03.4 HYPOTHYROIDISM DUE TO ACQUIRED ATROPHY OF THYROID: ICD-10-CM

## 2020-12-30 DIAGNOSIS — E29.1 TESTICULAR HYPOGONADISM: ICD-10-CM

## 2020-12-30 DIAGNOSIS — E55.9 VITAMIN D DEFICIENCY: ICD-10-CM

## 2020-12-30 DIAGNOSIS — M81.0 OSTEOPOROSIS WITHOUT CURRENT PATHOLOGICAL FRACTURE, UNSPECIFIED OSTEOPOROSIS TYPE: ICD-10-CM

## 2020-12-30 DIAGNOSIS — I67.5 MOYA MOYA DISEASE: ICD-10-CM

## 2020-12-30 DIAGNOSIS — E78.5 HYPERLIPIDEMIA, UNSPECIFIED HYPERLIPIDEMIA TYPE: ICD-10-CM

## 2020-12-30 DIAGNOSIS — I10 ESSENTIAL HYPERTENSION, MALIGNANT: ICD-10-CM

## 2020-12-30 DIAGNOSIS — I10 ESSENTIAL HYPERTENSION, MALIGNANT: Primary | ICD-10-CM

## 2020-12-30 LAB
25(OH)D3 SERPL-MCNC: 43.8 NG/ML (ref 30–100)
ALBUMIN SERPL BCP-MCNC: 3 G/DL (ref 3.5–5)
ALP SERPL-CCNC: 65 U/L (ref 46–116)
ALT SERPL W P-5'-P-CCNC: 27 U/L (ref 12–78)
ANION GAP SERPL CALCULATED.3IONS-SCNC: 2 MMOL/L (ref 4–13)
AST SERPL W P-5'-P-CCNC: 23 U/L (ref 5–45)
BASOPHILS # BLD AUTO: 0.07 THOUSANDS/ΜL (ref 0–0.1)
BASOPHILS NFR BLD AUTO: 1 % (ref 0–1)
BILIRUB SERPL-MCNC: 0.4 MG/DL (ref 0.2–1)
BUN SERPL-MCNC: 10 MG/DL (ref 5–25)
CALCIUM ALBUM COR SERPL-MCNC: 9.6 MG/DL (ref 8.3–10.1)
CALCIUM SERPL-MCNC: 8.8 MG/DL (ref 8.3–10.1)
CHLORIDE SERPL-SCNC: 102 MMOL/L (ref 100–108)
CHOLEST SERPL-MCNC: 198 MG/DL (ref 50–200)
CO2 SERPL-SCNC: 32 MMOL/L (ref 21–32)
CREAT SERPL-MCNC: 0.97 MG/DL (ref 0.6–1.3)
EOSINOPHIL # BLD AUTO: 0.04 THOUSAND/ΜL (ref 0–0.61)
EOSINOPHIL NFR BLD AUTO: 1 % (ref 0–6)
ERYTHROCYTE [DISTWIDTH] IN BLOOD BY AUTOMATED COUNT: 13.8 % (ref 11.6–15.1)
GFR SERPL CREATININE-BSD FRML MDRD: 87 ML/MIN/1.73SQ M
GLUCOSE P FAST SERPL-MCNC: 91 MG/DL (ref 65–99)
HCT VFR BLD AUTO: 44.3 % (ref 36.5–49.3)
HDLC SERPL-MCNC: 61 MG/DL
HGB BLD-MCNC: 14.3 G/DL (ref 12–17)
IMM GRANULOCYTES # BLD AUTO: 0.02 THOUSAND/UL (ref 0–0.2)
IMM GRANULOCYTES NFR BLD AUTO: 0 % (ref 0–2)
LDLC SERPL CALC-MCNC: 122 MG/DL (ref 0–100)
LYMPHOCYTES # BLD AUTO: 0.98 THOUSANDS/ΜL (ref 0.6–4.47)
LYMPHOCYTES NFR BLD AUTO: 20 % (ref 14–44)
MCH RBC QN AUTO: 33.1 PG (ref 26.8–34.3)
MCHC RBC AUTO-ENTMCNC: 32.3 G/DL (ref 31.4–37.4)
MCV RBC AUTO: 103 FL (ref 82–98)
MONOCYTES # BLD AUTO: 0.51 THOUSAND/ΜL (ref 0.17–1.22)
MONOCYTES NFR BLD AUTO: 10 % (ref 4–12)
NEUTROPHILS # BLD AUTO: 3.41 THOUSANDS/ΜL (ref 1.85–7.62)
NEUTS SEG NFR BLD AUTO: 68 % (ref 43–75)
NONHDLC SERPL-MCNC: 137 MG/DL
NRBC BLD AUTO-RTO: 0 /100 WBCS
PLATELET # BLD AUTO: 264 THOUSANDS/UL (ref 149–390)
PMV BLD AUTO: 10.2 FL (ref 8.9–12.7)
POTASSIUM SERPL-SCNC: 4.2 MMOL/L (ref 3.5–5.3)
PROT SERPL-MCNC: 7.1 G/DL (ref 6.4–8.2)
RBC # BLD AUTO: 4.32 MILLION/UL (ref 3.88–5.62)
SODIUM SERPL-SCNC: 136 MMOL/L (ref 136–145)
T4 FREE SERPL-MCNC: 0.91 NG/DL (ref 0.76–1.46)
TRIGL SERPL-MCNC: 73 MG/DL
TSH SERPL DL<=0.05 MIU/L-ACNC: 2.58 UIU/ML (ref 0.36–3.74)
VALPROATE SERPL-MCNC: 32 UG/ML (ref 50–100)
WBC # BLD AUTO: 5.03 THOUSAND/UL (ref 4.31–10.16)

## 2020-12-30 PROCEDURE — 80053 COMPREHEN METABOLIC PANEL: CPT

## 2020-12-30 PROCEDURE — 82306 VITAMIN D 25 HYDROXY: CPT

## 2020-12-30 PROCEDURE — 80164 ASSAY DIPROPYLACETIC ACD TOT: CPT

## 2020-12-30 PROCEDURE — 85025 COMPLETE CBC W/AUTO DIFF WBC: CPT | Performed by: PSYCHIATRY & NEUROLOGY

## 2020-12-30 PROCEDURE — 84443 ASSAY THYROID STIM HORMONE: CPT

## 2020-12-30 PROCEDURE — 80061 LIPID PANEL: CPT

## 2020-12-30 PROCEDURE — 84439 ASSAY OF FREE THYROXINE: CPT

## 2021-01-01 ENCOUNTER — OFFICE VISIT (OUTPATIENT)
Dept: PODIATRY | Facility: CLINIC | Age: 56
End: 2021-01-01
Payer: MEDICARE

## 2021-01-01 ENCOUNTER — APPOINTMENT (OUTPATIENT)
Dept: PHYSICAL THERAPY | Facility: CLINIC | Age: 56
End: 2021-01-01
Payer: MEDICARE

## 2021-01-01 ENCOUNTER — TELEPHONE (OUTPATIENT)
Dept: OTHER | Facility: OTHER | Age: 56
End: 2021-01-01

## 2021-01-01 ENCOUNTER — OFFICE VISIT (OUTPATIENT)
Dept: OCCUPATIONAL THERAPY | Facility: CLINIC | Age: 56
End: 2021-01-01
Payer: MEDICARE

## 2021-01-01 ENCOUNTER — APPOINTMENT (OUTPATIENT)
Dept: OCCUPATIONAL THERAPY | Facility: CLINIC | Age: 56
End: 2021-01-01
Payer: MEDICARE

## 2021-01-01 ENCOUNTER — OFFICE VISIT (OUTPATIENT)
Dept: ENDOCRINOLOGY | Facility: CLINIC | Age: 56
End: 2021-01-01
Payer: MEDICARE

## 2021-01-01 ENCOUNTER — OFFICE VISIT (OUTPATIENT)
Dept: PHYSICAL THERAPY | Facility: CLINIC | Age: 56
End: 2021-01-01
Payer: MEDICARE

## 2021-01-01 ENCOUNTER — OFFICE VISIT (OUTPATIENT)
Dept: NEUROLOGY | Facility: CLINIC | Age: 56
End: 2021-01-01
Payer: MEDICARE

## 2021-01-01 ENCOUNTER — TELEPHONE (OUTPATIENT)
Dept: NEUROLOGY | Facility: CLINIC | Age: 56
End: 2021-01-01

## 2021-01-01 ENCOUNTER — OFFICE VISIT (OUTPATIENT)
Dept: CARDIOLOGY CLINIC | Facility: CLINIC | Age: 56
End: 2021-01-01
Payer: MEDICARE

## 2021-01-01 ENCOUNTER — OFFICE VISIT (OUTPATIENT)
Dept: PSYCHIATRY | Facility: CLINIC | Age: 56
End: 2021-01-01
Payer: MEDICARE

## 2021-01-01 ENCOUNTER — OFFICE VISIT (OUTPATIENT)
Dept: FAMILY MEDICINE CLINIC | Facility: CLINIC | Age: 56
End: 2021-01-01
Payer: MEDICARE

## 2021-01-01 ENCOUNTER — EVALUATION (OUTPATIENT)
Dept: PHYSICAL THERAPY | Facility: CLINIC | Age: 56
End: 2021-01-01
Payer: MEDICARE

## 2021-01-01 ENCOUNTER — DOCUMENTATION (OUTPATIENT)
Dept: AUDIOLOGY | Facility: CLINIC | Age: 56
End: 2021-01-01

## 2021-01-01 ENCOUNTER — TELEPHONE (OUTPATIENT)
Dept: FAMILY MEDICINE CLINIC | Facility: CLINIC | Age: 56
End: 2021-01-01

## 2021-01-01 ENCOUNTER — APPOINTMENT (OUTPATIENT)
Dept: LAB | Facility: HOSPITAL | Age: 56
End: 2021-01-01
Payer: MEDICARE

## 2021-01-01 ENCOUNTER — EVALUATION (OUTPATIENT)
Dept: OCCUPATIONAL THERAPY | Facility: CLINIC | Age: 56
End: 2021-01-01
Payer: MEDICARE

## 2021-01-01 ENCOUNTER — TELEPHONE (OUTPATIENT)
Dept: ENDOCRINOLOGY | Facility: CLINIC | Age: 56
End: 2021-01-01

## 2021-01-01 ENCOUNTER — TELEPHONE (OUTPATIENT)
Dept: PSYCHIATRY | Facility: CLINIC | Age: 56
End: 2021-01-01

## 2021-01-01 ENCOUNTER — TELEMEDICINE (OUTPATIENT)
Dept: NEUROLOGY | Facility: CLINIC | Age: 56
End: 2021-01-01
Payer: MEDICARE

## 2021-01-01 ENCOUNTER — TELEMEDICINE (OUTPATIENT)
Dept: FAMILY MEDICINE CLINIC | Facility: CLINIC | Age: 56
End: 2021-01-01
Payer: MEDICARE

## 2021-01-01 ENCOUNTER — APPOINTMENT (OUTPATIENT)
Dept: LAB | Facility: CLINIC | Age: 56
End: 2021-01-01
Payer: MEDICARE

## 2021-01-01 ENCOUNTER — APPOINTMENT (OUTPATIENT)
Dept: LAB | Facility: HOSPITAL | Age: 56
End: 2021-01-01
Attending: INTERNAL MEDICINE
Payer: MEDICARE

## 2021-01-01 VITALS
HEART RATE: 79 BPM | BODY MASS INDEX: 29.85 KG/M2 | DIASTOLIC BLOOD PRESSURE: 60 MMHG | SYSTOLIC BLOOD PRESSURE: 122 MMHG | WEIGHT: 162.2 LBS | OXYGEN SATURATION: 98 % | RESPIRATION RATE: 16 BRPM | HEIGHT: 62 IN | TEMPERATURE: 97.7 F

## 2021-01-01 VITALS — HEIGHT: 60 IN | WEIGHT: 159 LBS | BODY MASS INDEX: 31.22 KG/M2

## 2021-01-01 VITALS
SYSTOLIC BLOOD PRESSURE: 132 MMHG | HEIGHT: 60 IN | DIASTOLIC BLOOD PRESSURE: 80 MMHG | WEIGHT: 157 LBS | HEART RATE: 65 BPM | BODY MASS INDEX: 30.82 KG/M2

## 2021-01-01 VITALS
TEMPERATURE: 98.6 F | BODY MASS INDEX: 32.79 KG/M2 | HEIGHT: 60 IN | SYSTOLIC BLOOD PRESSURE: 120 MMHG | OXYGEN SATURATION: 98 % | WEIGHT: 167 LBS | DIASTOLIC BLOOD PRESSURE: 80 MMHG

## 2021-01-01 VITALS
RESPIRATION RATE: 18 BRPM | WEIGHT: 162.2 LBS | TEMPERATURE: 98.2 F | BODY MASS INDEX: 31.84 KG/M2 | OXYGEN SATURATION: 100 % | HEART RATE: 68 BPM | HEIGHT: 60 IN | SYSTOLIC BLOOD PRESSURE: 130 MMHG | DIASTOLIC BLOOD PRESSURE: 70 MMHG

## 2021-01-01 VITALS
DIASTOLIC BLOOD PRESSURE: 60 MMHG | SYSTOLIC BLOOD PRESSURE: 118 MMHG | BODY MASS INDEX: 29.26 KG/M2 | WEIGHT: 159 LBS | RESPIRATION RATE: 17 BRPM | HEIGHT: 62 IN | HEART RATE: 62 BPM

## 2021-01-01 VITALS
SYSTOLIC BLOOD PRESSURE: 120 MMHG | WEIGHT: 167.7 LBS | HEART RATE: 76 BPM | TEMPERATURE: 99.6 F | HEIGHT: 62 IN | BODY MASS INDEX: 30.86 KG/M2 | DIASTOLIC BLOOD PRESSURE: 76 MMHG

## 2021-01-01 VITALS
SYSTOLIC BLOOD PRESSURE: 120 MMHG | BODY MASS INDEX: 32.79 KG/M2 | DIASTOLIC BLOOD PRESSURE: 80 MMHG | HEIGHT: 60 IN | WEIGHT: 167 LBS | RESPIRATION RATE: 16 BRPM | HEART RATE: 78 BPM

## 2021-01-01 VITALS
HEART RATE: 76 BPM | WEIGHT: 167 LBS | DIASTOLIC BLOOD PRESSURE: 76 MMHG | HEIGHT: 62 IN | SYSTOLIC BLOOD PRESSURE: 120 MMHG | BODY MASS INDEX: 30.73 KG/M2 | RESPIRATION RATE: 17 BRPM

## 2021-01-01 VITALS — HEIGHT: 60 IN | WEIGHT: 157 LBS | BODY MASS INDEX: 30.82 KG/M2 | RESPIRATION RATE: 17 BRPM

## 2021-01-01 VITALS
HEIGHT: 60 IN | WEIGHT: 160 LBS | DIASTOLIC BLOOD PRESSURE: 70 MMHG | TEMPERATURE: 97 F | SYSTOLIC BLOOD PRESSURE: 120 MMHG | HEART RATE: 69 BPM | BODY MASS INDEX: 31.41 KG/M2

## 2021-01-01 VITALS
DIASTOLIC BLOOD PRESSURE: 76 MMHG | WEIGHT: 159 LBS | HEIGHT: 62 IN | BODY MASS INDEX: 29.26 KG/M2 | SYSTOLIC BLOOD PRESSURE: 114 MMHG

## 2021-01-01 VITALS
HEART RATE: 78 BPM | SYSTOLIC BLOOD PRESSURE: 98 MMHG | TEMPERATURE: 97.1 F | OXYGEN SATURATION: 99 % | HEIGHT: 60 IN | BODY MASS INDEX: 31.69 KG/M2 | DIASTOLIC BLOOD PRESSURE: 58 MMHG | RESPIRATION RATE: 16 BRPM | WEIGHT: 161.4 LBS

## 2021-01-01 VITALS
HEIGHT: 60 IN | DIASTOLIC BLOOD PRESSURE: 62 MMHG | HEART RATE: 80 BPM | BODY MASS INDEX: 31.22 KG/M2 | WEIGHT: 159 LBS | TEMPERATURE: 98.7 F | SYSTOLIC BLOOD PRESSURE: 101 MMHG

## 2021-01-01 VITALS
HEIGHT: 62 IN | TEMPERATURE: 98.6 F | SYSTOLIC BLOOD PRESSURE: 118 MMHG | BODY MASS INDEX: 30.73 KG/M2 | WEIGHT: 167 LBS | DIASTOLIC BLOOD PRESSURE: 76 MMHG | HEART RATE: 70 BPM | OXYGEN SATURATION: 95 %

## 2021-01-01 VITALS — DIASTOLIC BLOOD PRESSURE: 69 MMHG | SYSTOLIC BLOOD PRESSURE: 111 MMHG

## 2021-01-01 DIAGNOSIS — I95.9 HYPOTENSION, UNSPECIFIED HYPOTENSION TYPE: ICD-10-CM

## 2021-01-01 DIAGNOSIS — E78.5 HYPERLIPIDEMIA, UNSPECIFIED HYPERLIPIDEMIA TYPE: ICD-10-CM

## 2021-01-01 DIAGNOSIS — Z51.81 MEDICATION MONITORING ENCOUNTER: ICD-10-CM

## 2021-01-01 DIAGNOSIS — E29.1 TESTICULAR HYPOGONADISM: ICD-10-CM

## 2021-01-01 DIAGNOSIS — F63.81 INTERMITTENT EXPLOSIVE DISORDER: ICD-10-CM

## 2021-01-01 DIAGNOSIS — L60.0 INGROWING NAIL: ICD-10-CM

## 2021-01-01 DIAGNOSIS — G31.84 MCI (MILD COGNITIVE IMPAIRMENT): Primary | ICD-10-CM

## 2021-01-01 DIAGNOSIS — I67.5 MOYA MOYA DISEASE: ICD-10-CM

## 2021-01-01 DIAGNOSIS — G20 PARKINSON'S DISEASE (HCC): Primary | ICD-10-CM

## 2021-01-01 DIAGNOSIS — G31.84 MCI (MILD COGNITIVE IMPAIRMENT): ICD-10-CM

## 2021-01-01 DIAGNOSIS — E03.4 HYPOTHYROIDISM DUE TO ACQUIRED ATROPHY OF THYROID: ICD-10-CM

## 2021-01-01 DIAGNOSIS — K21.9 GASTROESOPHAGEAL REFLUX DISEASE WITHOUT ESOPHAGITIS: ICD-10-CM

## 2021-01-01 DIAGNOSIS — B35.3 TINEA PEDIS OF BOTH FEET: ICD-10-CM

## 2021-01-01 DIAGNOSIS — M79.671 PAIN IN BOTH FEET: ICD-10-CM

## 2021-01-01 DIAGNOSIS — Q90.9 DOWN'S SYNDROME: ICD-10-CM

## 2021-01-01 DIAGNOSIS — E78.5 HYPERLIPIDEMIA, UNSPECIFIED HYPERLIPIDEMIA TYPE: Primary | ICD-10-CM

## 2021-01-01 DIAGNOSIS — K59.09 CHRONIC CONSTIPATION: ICD-10-CM

## 2021-01-01 DIAGNOSIS — Z00.00 MEDICARE ANNUAL WELLNESS VISIT, SUBSEQUENT: Primary | ICD-10-CM

## 2021-01-01 DIAGNOSIS — M21.42 PES PLANUS OF BOTH FEET: Primary | ICD-10-CM

## 2021-01-01 DIAGNOSIS — G20 PARKINSON'S DISEASE (HCC): ICD-10-CM

## 2021-01-01 DIAGNOSIS — B35.1 ONYCHOMYCOSIS: ICD-10-CM

## 2021-01-01 DIAGNOSIS — Z73.6 LIMITATION DUE TO DISABILITY: ICD-10-CM

## 2021-01-01 DIAGNOSIS — Q90.9 DOWN SYNDROME: ICD-10-CM

## 2021-01-01 DIAGNOSIS — M79.672 PAIN IN BOTH FEET: ICD-10-CM

## 2021-01-01 DIAGNOSIS — R41.3 MEMORY CHANGE: ICD-10-CM

## 2021-01-01 DIAGNOSIS — Z00.00 HEALTH CARE MAINTENANCE: ICD-10-CM

## 2021-01-01 DIAGNOSIS — M20.11 VALGUS DEFORMITY OF BOTH GREAT TOES: ICD-10-CM

## 2021-01-01 DIAGNOSIS — E55.9 VITAMIN D DEFICIENCY: ICD-10-CM

## 2021-01-01 DIAGNOSIS — F32.9 MAJOR DEPRESSIVE DISORDER, REMISSION STATUS UNSPECIFIED, UNSPECIFIED WHETHER RECURRENT: ICD-10-CM

## 2021-01-01 DIAGNOSIS — K52.9 GASTROENTERITIS: Primary | ICD-10-CM

## 2021-01-01 DIAGNOSIS — Q80.9 XERODERMIA: ICD-10-CM

## 2021-01-01 DIAGNOSIS — I67.5 MOYA MOYA DISEASE: Primary | ICD-10-CM

## 2021-01-01 DIAGNOSIS — R42 DIZZINESS: ICD-10-CM

## 2021-01-01 DIAGNOSIS — F32.9 MAJOR DEPRESSIVE DISORDER, REMISSION STATUS UNSPECIFIED, UNSPECIFIED WHETHER RECURRENT: Primary | ICD-10-CM

## 2021-01-01 DIAGNOSIS — Z12.5 ENCOUNTER FOR SCREENING FOR MALIGNANT NEOPLASM OF PROSTATE: ICD-10-CM

## 2021-01-01 DIAGNOSIS — R00.1 SINUS BRADYCARDIA: ICD-10-CM

## 2021-01-01 DIAGNOSIS — F31.81 BIPOLAR 2 DISORDER (HCC): Primary | ICD-10-CM

## 2021-01-01 DIAGNOSIS — Z51.81 MEDICATION MONITORING ENCOUNTER: Primary | ICD-10-CM

## 2021-01-01 DIAGNOSIS — E03.4 HYPOTHYROIDISM DUE TO ACQUIRED ATROPHY OF THYROID: Primary | ICD-10-CM

## 2021-01-01 DIAGNOSIS — M81.0 OSTEOPOROSIS WITHOUT CURRENT PATHOLOGICAL FRACTURE, UNSPECIFIED OSTEOPOROSIS TYPE: ICD-10-CM

## 2021-01-01 DIAGNOSIS — Z86.16 HISTORY OF COVID-19: ICD-10-CM

## 2021-01-01 DIAGNOSIS — B35.1 ONYCHOMYCOSIS: Primary | ICD-10-CM

## 2021-01-01 DIAGNOSIS — M81.0 OSTEOPOROSIS WITHOUT CURRENT PATHOLOGICAL FRACTURE, UNSPECIFIED OSTEOPOROSIS TYPE: Primary | ICD-10-CM

## 2021-01-01 DIAGNOSIS — L60.0 INGROWING NAIL: Primary | ICD-10-CM

## 2021-01-01 DIAGNOSIS — F42.2 MIXED OBSESSIONAL THOUGHTS AND ACTS: ICD-10-CM

## 2021-01-01 DIAGNOSIS — M20.12 VALGUS DEFORMITY OF BOTH GREAT TOES: ICD-10-CM

## 2021-01-01 DIAGNOSIS — I10 ESSENTIAL HYPERTENSION: ICD-10-CM

## 2021-01-01 DIAGNOSIS — M79.674 PAIN IN TOES OF BOTH FEET: ICD-10-CM

## 2021-01-01 DIAGNOSIS — M79.675 PAIN IN TOES OF BOTH FEET: ICD-10-CM

## 2021-01-01 DIAGNOSIS — I95.1 ORTHOSTATIC HYPOTENSION: ICD-10-CM

## 2021-01-01 DIAGNOSIS — H90.3 SENSORINEURAL HEARING LOSS, BILATERAL: Primary | ICD-10-CM

## 2021-01-01 DIAGNOSIS — S90.829A BLISTER OF FOOT, UNSPECIFIED LATERALITY, INITIAL ENCOUNTER: Primary | ICD-10-CM

## 2021-01-01 DIAGNOSIS — M21.41 PES PLANUS OF BOTH FEET: Primary | ICD-10-CM

## 2021-01-01 LAB
25(OH)D3 SERPL-MCNC: 45.6 NG/ML (ref 30–100)
25(OH)D3 SERPL-MCNC: 49.4 NG/ML (ref 30–100)
ANION GAP SERPL CALCULATED.3IONS-SCNC: 2 MMOL/L (ref 4–13)
ANION GAP SERPL CALCULATED.3IONS-SCNC: 9 MMOL/L (ref 4–13)
BASOPHILS # BLD AUTO: 0.09 THOUSANDS/ΜL (ref 0–0.1)
BASOPHILS NFR BLD AUTO: 1 % (ref 0–1)
BUN SERPL-MCNC: 6 MG/DL (ref 5–25)
BUN SERPL-MCNC: 9 MG/DL (ref 5–25)
CALCIUM SERPL-MCNC: 8.8 MG/DL (ref 8.3–10.1)
CALCIUM SERPL-MCNC: 9.2 MG/DL (ref 8.3–10.1)
CHLORIDE SERPL-SCNC: 102 MMOL/L (ref 100–108)
CHLORIDE SERPL-SCNC: 104 MMOL/L (ref 100–108)
CHOLEST SERPL-MCNC: 179 MG/DL (ref 50–200)
CHOLEST SERPL-MCNC: 189 MG/DL
CO2 SERPL-SCNC: 31 MMOL/L (ref 21–32)
CO2 SERPL-SCNC: 31 MMOL/L (ref 21–32)
CREAT SERPL-MCNC: 0.84 MG/DL (ref 0.6–1.3)
CREAT SERPL-MCNC: 1.05 MG/DL (ref 0.6–1.3)
EOSINOPHIL # BLD AUTO: 0.03 THOUSAND/ΜL (ref 0–0.61)
EOSINOPHIL NFR BLD AUTO: 0 % (ref 0–6)
ERYTHROCYTE [DISTWIDTH] IN BLOOD BY AUTOMATED COUNT: 14.2 % (ref 11.6–15.1)
GFR SERPL CREATININE-BSD FRML MDRD: 78 ML/MIN/1.73SQ M
GFR SERPL CREATININE-BSD FRML MDRD: 98 ML/MIN/1.73SQ M
GLUCOSE P FAST SERPL-MCNC: 86 MG/DL (ref 65–99)
GLUCOSE P FAST SERPL-MCNC: 88 MG/DL (ref 65–99)
HCT VFR BLD AUTO: 43.6 % (ref 36.5–49.3)
HDLC SERPL-MCNC: 53 MG/DL
HDLC SERPL-MCNC: 60 MG/DL
HGB BLD-MCNC: 14.1 G/DL (ref 12–17)
IMM GRANULOCYTES # BLD AUTO: 0.05 THOUSAND/UL (ref 0–0.2)
IMM GRANULOCYTES NFR BLD AUTO: 1 % (ref 0–2)
LDLC SERPL CALC-MCNC: 115 MG/DL (ref 0–100)
LDLC SERPL CALC-MCNC: 98 MG/DL (ref 0–100)
LYMPHOCYTES # BLD AUTO: 1.1 THOUSANDS/ΜL (ref 0.6–4.47)
LYMPHOCYTES NFR BLD AUTO: 14 % (ref 14–44)
Lab: NORMAL
MCH RBC QN AUTO: 32.7 PG (ref 26.8–34.3)
MCHC RBC AUTO-ENTMCNC: 32.3 G/DL (ref 31.4–37.4)
MCV RBC AUTO: 101 FL (ref 82–98)
MISCELLANEOUS LAB TEST RESULT: NORMAL
MONOCYTES # BLD AUTO: 0.54 THOUSAND/ΜL (ref 0.17–1.22)
MONOCYTES NFR BLD AUTO: 7 % (ref 4–12)
NEUTROPHILS # BLD AUTO: 6.04 THOUSANDS/ΜL (ref 1.85–7.62)
NEUTS SEG NFR BLD AUTO: 77 % (ref 43–75)
NONHDLC SERPL-MCNC: 119 MG/DL
NONHDLC SERPL-MCNC: 136 MG/DL
NRBC BLD AUTO-RTO: 0 /100 WBCS
PLATELET # BLD AUTO: 337 THOUSANDS/UL (ref 149–390)
PMV BLD AUTO: 10.2 FL (ref 8.9–12.7)
POTASSIUM SERPL-SCNC: 4.2 MMOL/L (ref 3.5–5.3)
POTASSIUM SERPL-SCNC: 4.2 MMOL/L (ref 3.5–5.3)
PSA SERPL-MCNC: <0.1 NG/ML (ref 0–4)
RBC # BLD AUTO: 4.31 MILLION/UL (ref 3.88–5.62)
SODIUM SERPL-SCNC: 137 MMOL/L (ref 136–145)
SODIUM SERPL-SCNC: 142 MMOL/L (ref 136–145)
T4 FREE SERPL-MCNC: 0.98 NG/DL (ref 0.76–1.46)
T4 FREE SERPL-MCNC: 1 NG/DL (ref 0.76–1.46)
TESTOST FREE SERPL-MCNC: 5.3 PG/ML (ref 7.2–24)
TESTOST SERPL-MCNC: 351 NG/DL (ref 264–916)
TRIGL SERPL-MCNC: 103 MG/DL
TRIGL SERPL-MCNC: 105 MG/DL
TSH SERPL DL<=0.05 MIU/L-ACNC: 2.12 UIU/ML (ref 0.36–3.74)
TSH SERPL DL<=0.05 MIU/L-ACNC: 2.74 UIU/ML (ref 0.36–3.74)
VALPROATE SERPL-MCNC: 42.1 UG/ML (ref 50–100)
VIT B12 SERPL-MCNC: 616 PG/ML (ref 100–900)
WBC # BLD AUTO: 7.85 THOUSAND/UL (ref 4.31–10.16)

## 2021-01-01 PROCEDURE — 80164 ASSAY DIPROPYLACETIC ACD TOT: CPT

## 2021-01-01 PROCEDURE — 99441 PR PHYS/QHP TELEPHONE EVALUATION 5-10 MIN: CPT | Performed by: NURSE PRACTITIONER

## 2021-01-01 PROCEDURE — 97110 THERAPEUTIC EXERCISES: CPT

## 2021-01-01 PROCEDURE — 36415 COLL VENOUS BLD VENIPUNCTURE: CPT

## 2021-01-01 PROCEDURE — 85025 COMPLETE CBC W/AUTO DIFF WBC: CPT

## 2021-01-01 PROCEDURE — 99212 OFFICE O/P EST SF 10 MIN: CPT | Performed by: PODIATRIST

## 2021-01-01 PROCEDURE — 97112 NEUROMUSCULAR REEDUCATION: CPT

## 2021-01-01 PROCEDURE — 97530 THERAPEUTIC ACTIVITIES: CPT

## 2021-01-01 PROCEDURE — 93000 ELECTROCARDIOGRAM COMPLETE: CPT | Performed by: INTERNAL MEDICINE

## 2021-01-01 PROCEDURE — 80048 BASIC METABOLIC PNL TOTAL CA: CPT

## 2021-01-01 PROCEDURE — 97112 NEUROMUSCULAR REEDUCATION: CPT | Performed by: PHYSICAL THERAPIST

## 2021-01-01 PROCEDURE — 84443 ASSAY THYROID STIM HORMONE: CPT

## 2021-01-01 PROCEDURE — 84439 ASSAY OF FREE THYROXINE: CPT

## 2021-01-01 PROCEDURE — G0103 PSA SCREENING: HCPCS

## 2021-01-01 PROCEDURE — 99214 OFFICE O/P EST MOD 30 MIN: CPT | Performed by: NURSE PRACTITIONER

## 2021-01-01 PROCEDURE — 90792 PSYCH DIAG EVAL W/MED SRVCS: CPT | Performed by: NURSE PRACTITIONER

## 2021-01-01 PROCEDURE — 80061 LIPID PANEL: CPT

## 2021-01-01 PROCEDURE — 99213 OFFICE O/P EST LOW 20 MIN: CPT | Performed by: NURSE PRACTITIONER

## 2021-01-01 PROCEDURE — 99214 OFFICE O/P EST MOD 30 MIN: CPT | Performed by: INTERNAL MEDICINE

## 2021-01-01 PROCEDURE — 99213 OFFICE O/P EST LOW 20 MIN: CPT | Performed by: PODIATRIST

## 2021-01-01 PROCEDURE — 84403 ASSAY OF TOTAL TESTOSTERONE: CPT

## 2021-01-01 PROCEDURE — 97164 PT RE-EVAL EST PLAN CARE: CPT

## 2021-01-01 PROCEDURE — G0439 PPPS, SUBSEQ VISIT: HCPCS | Performed by: NURSE PRACTITIONER

## 2021-01-01 PROCEDURE — 97530 THERAPEUTIC ACTIVITIES: CPT | Performed by: PHYSICAL THERAPIST

## 2021-01-01 PROCEDURE — 82306 VITAMIN D 25 HYDROXY: CPT

## 2021-01-01 PROCEDURE — 84402 ASSAY OF FREE TESTOSTERONE: CPT

## 2021-01-01 PROCEDURE — 97166 OT EVAL MOD COMPLEX 45 MIN: CPT

## 2021-01-01 PROCEDURE — 82607 VITAMIN B-12: CPT

## 2021-01-01 PROCEDURE — 97163 PT EVAL HIGH COMPLEX 45 MIN: CPT

## 2021-01-01 RX ORDER — POLYETHYLENE GLYCOL 3350 17 G/17G
POWDER, FOR SOLUTION ORAL
Qty: 527 G | Refills: 11 | Status: SHIPPED | OUTPATIENT
Start: 2021-01-01 | End: 2022-01-05 | Stop reason: HOSPADM

## 2021-01-01 RX ORDER — DONEPEZIL HYDROCHLORIDE 5 MG/1
5 TABLET, FILM COATED ORAL
Qty: 30 TABLET | Refills: 3 | Status: SHIPPED | OUTPATIENT
Start: 2021-01-01 | End: 2021-01-01

## 2021-01-01 RX ORDER — ASPIRIN 81 MG
TABLET,CHEWABLE ORAL
Qty: 30 TABLET | Refills: 4 | Status: SHIPPED | OUTPATIENT
Start: 2021-01-01 | End: 2022-01-05 | Stop reason: HOSPADM

## 2021-01-01 RX ORDER — ONDANSETRON 4 MG/1
4 TABLET, ORALLY DISINTEGRATING ORAL EVERY 8 HOURS PRN
Qty: 20 TABLET | Refills: 0 | Status: SHIPPED | OUTPATIENT
Start: 2021-01-01 | End: 2021-01-01 | Stop reason: SDUPTHER

## 2021-01-01 RX ORDER — MIDODRINE HYDROCHLORIDE 2.5 MG/1
2.5 TABLET ORAL 2 TIMES DAILY PRN
Qty: 60 TABLET | Refills: 2 | Status: SHIPPED | OUTPATIENT
Start: 2021-01-01 | End: 2022-01-05 | Stop reason: HOSPADM

## 2021-01-01 RX ORDER — ESOMEPRAZOLE MAGNESIUM 40 MG/1
CAPSULE, DELAYED RELEASE ORAL
Qty: 30 CAPSULE | Refills: 11 | Status: SHIPPED | OUTPATIENT
Start: 2021-01-01 | End: 2022-01-05 | Stop reason: HOSPADM

## 2021-01-01 RX ORDER — OLANZAPINE 2.5 MG/1
2.5 TABLET ORAL DAILY
Qty: 30 TABLET | Refills: 1 | Status: SHIPPED | OUTPATIENT
Start: 2021-01-01 | End: 2022-01-05 | Stop reason: HOSPADM

## 2021-01-01 RX ORDER — LEVOTHYROXINE SODIUM 0.07 MG/1
TABLET ORAL
Qty: 30 TABLET | Refills: 4 | Status: SHIPPED | OUTPATIENT
Start: 2021-01-01 | End: 2022-01-05 | Stop reason: HOSPADM

## 2021-01-01 RX ORDER — DIVALPROEX SODIUM 500 MG/1
500 TABLET, DELAYED RELEASE ORAL DAILY
Qty: 30 TABLET | Refills: 1 | Status: SHIPPED | OUTPATIENT
Start: 2021-01-01 | End: 2021-01-01

## 2021-01-01 RX ORDER — MELATONIN
Qty: 60 TABLET | Refills: 4 | Status: SHIPPED | OUTPATIENT
Start: 2021-01-01 | End: 2021-01-01 | Stop reason: SDUPTHER

## 2021-01-01 RX ORDER — ATORVASTATIN CALCIUM 80 MG/1
TABLET, FILM COATED ORAL
Qty: 30 TABLET | Refills: 4 | Status: SHIPPED | OUTPATIENT
Start: 2021-01-01 | End: 2021-01-01 | Stop reason: SDUPTHER

## 2021-01-01 RX ORDER — ATORVASTATIN CALCIUM 80 MG/1
80 TABLET, FILM COATED ORAL DAILY
Qty: 30 TABLET | Refills: 3 | Status: SHIPPED | OUTPATIENT
Start: 2021-01-01 | End: 2021-01-01

## 2021-01-01 RX ORDER — PETROLATUM,WHITE 41 %
OINTMENT (GRAM) TOPICAL 2 TIMES DAILY
Qty: 500 ML | Refills: 11 | Status: SHIPPED | OUTPATIENT
Start: 2021-01-01 | End: 2022-01-05 | Stop reason: HOSPADM

## 2021-01-01 RX ORDER — LEVOTHYROXINE SODIUM 0.07 MG/1
TABLET ORAL
Qty: 30 TABLET | Refills: 4 | Status: SHIPPED | OUTPATIENT
Start: 2021-01-01 | End: 2021-01-01

## 2021-01-01 RX ORDER — ONDANSETRON 4 MG/1
4 TABLET, ORALLY DISINTEGRATING ORAL EVERY 8 HOURS PRN
Qty: 20 TABLET | Refills: 0 | Status: SHIPPED | OUTPATIENT
Start: 2021-01-01 | End: 2022-01-05 | Stop reason: HOSPADM

## 2021-01-01 RX ORDER — ATORVASTATIN CALCIUM 80 MG/1
80 TABLET, FILM COATED ORAL DAILY
Qty: 30 TABLET | Refills: 4 | Status: SHIPPED | OUTPATIENT
Start: 2021-01-01 | End: 2022-01-05 | Stop reason: HOSPADM

## 2021-01-01 RX ORDER — ATORVASTATIN CALCIUM 80 MG/1
80 TABLET, FILM COATED ORAL DAILY
Qty: 30 TABLET | Refills: 3 | Status: SHIPPED | OUTPATIENT
Start: 2021-01-01 | End: 2021-01-01 | Stop reason: SDUPTHER

## 2021-01-01 RX ORDER — DOCUSATE SODIUM 100 MG/1
CAPSULE, LIQUID FILLED ORAL
Qty: 30 CAPSULE | Refills: 11 | Status: SHIPPED | OUTPATIENT
Start: 2021-01-01 | End: 2022-01-05 | Stop reason: HOSPADM

## 2021-01-01 RX ORDER — MELATONIN
1000 2 TIMES DAILY
Qty: 60 TABLET | Refills: 11 | Status: SHIPPED | OUTPATIENT
Start: 2021-01-01 | End: 2022-01-05 | Stop reason: HOSPADM

## 2021-01-01 RX ORDER — DIVALPROEX SODIUM 500 MG/1
TABLET, DELAYED RELEASE ORAL
Qty: 30 TABLET | Refills: 4 | Status: SHIPPED | OUTPATIENT
Start: 2021-01-01 | End: 2022-01-05 | Stop reason: HOSPADM

## 2021-01-01 RX ORDER — DIVALPROEX SODIUM 125 MG/1
125 TABLET, DELAYED RELEASE ORAL EVERY 8 HOURS SCHEDULED
Qty: 30 TABLET | Refills: 5 | Status: SHIPPED | OUTPATIENT
Start: 2021-01-01 | End: 2022-01-05 | Stop reason: HOSPADM

## 2021-01-01 RX ORDER — CALCIUM CARBONATE/VITAMIN D3 600 MG-10
TABLET ORAL
Qty: 60 TABLET | Refills: 4 | Status: SHIPPED | OUTPATIENT
Start: 2021-01-01 | End: 2021-01-01 | Stop reason: SDUPTHER

## 2021-01-01 RX ORDER — DONEPEZIL HYDROCHLORIDE 10 MG/1
10 TABLET, FILM COATED ORAL
Qty: 30 TABLET | Refills: 5 | Status: SHIPPED | OUTPATIENT
Start: 2021-01-01 | End: 2022-01-05 | Stop reason: HOSPADM

## 2021-01-01 RX ORDER — MULTIVITAMIN/IRON/FOLIC ACID 18MG-0.4MG
1 TABLET ORAL DAILY
Qty: 30 TABLET | Refills: 11 | Status: SHIPPED | OUTPATIENT
Start: 2021-01-01 | End: 2022-01-05 | Stop reason: HOSPADM

## 2021-01-05 NOTE — TELEPHONE ENCOUNTER
Please let pt know his labs    LDL bad cholesterol mildly elevated 122    Vitamin D level normal 43    Total testosterone normal 369    Thyroid labs normal

## 2021-01-07 NOTE — PROGRESS NOTES
Assessment/Plan:  Aseptic debridement and planning of nails x10 and manually and mechanically  Partial resection of ingrown nail right hallux  Patient will call if any redness or signs of infection   Follow-up 2 months     Diagnoses and all orders for this visit:    Onychomycosis    Tinea pedis of both feet    Ingrowing nail    Pain in both feet    Limitation due to disability    Pain in toes of both feet          Subjective:      Patient ID: Mariah Navarro is a 63 y o  male  Patient pain in big toenails right worse than left  Has had pain for the past few weeks  Has not noticed any redness or drainage    Patient is special needs, facility resident, accompanied by his aide      Past Medical History:   Diagnosis Date    Abnormal weight gain     last assessed 4/18/16; resolved 1/25/17    Acute CHF (congestive heart failure) ([facility] )     Anxiety     Dementia ([facility] )     Depression     Disease of thyroid gland     Down's syndrome     Fatigue     last assessed 2/23/17; resolved 6/1/17    GERD (gastroesophageal reflux disease)     Gluten free diet     H/O urinary retention     Hernia of abdominal cavity     Hyperlipidemia     Hypogonadism in male     Impulse control disorder     OCD (obsessive compulsive disorder)     Orthostatic hypotension     Parkinson disease (HCC)     Parkinson disease ([facility] )     resolved 2/23/17    Pleural effusion     Sinus tachycardia     Vitamin D deficiency     last assessed 9/1/16; resolved 6/1/17         Current Outpatient Medications:     Aspirin Low Dose 81 MG chewable tablet, TAKE 1 TABLET BY MOUTH EVERY MORNING (8AM), Disp: 30 tablet, Rfl: 4    atorvastatin (LIPITOR) 40 mg tablet, TAKE 1 TABLET BY MOUTH EVERY DAY FOR HYPERLIPIDEMIA, Disp: 30 tablet, Rfl: 2    Calcium Carbonate-Vitamin D 600-400 MG-UNIT per tablet, Take 1 tablet by mouth 2 (two) times a day At 8 am and 5 pm, Disp: 60 tablet, Rfl: 11    cholecalciferol (VITAMIN D3) 1,000 units tablet, Take 1 tablet (1,000 Units total) by mouth 2 (two) times a day At 8am and 8 pm, Disp: 60 tablet, Rfl: 11    divalproex sodium (DEPAKOTE) 500 mg EC tablet, Take 500 mg by mouth , Disp: , Rfl:     docusate sodium (COLACE) 100 mg capsule, Take one capsule at 8am, Disp: 30 capsule, Rfl: 11    erythromycin (ILOTYCIN) ophthalmic ointment, Administer 0 5 inches into the left eye every 12 (twelve) hours At 8 am and 8 pm  D/c on 5/27/20, Disp: 3 5 g, Rfl: 0    esomeprazole (NexIUM) 40 MG capsule, Take one capsule daily 30 minutes prior to breakfast, Disp: 30 capsule, Rfl: 11    lanolin-mineral oil (ARMAAN) LOTN, Apply topically 2 (two) times a day Apply to hands daily at 8 am and 8 pm, Disp: 1 Bottle, Rfl: 11    levothyroxine 75 mcg tablet, TAKE 1 TABLET BY MOUTH IN THE EVENING (8PM), Disp: 30 tablet, Rfl: 4    lidocaine (URO-JET) 2 % urethral/mucosal gel, Insert 1 application into the urethra daily as needed (urethral pain 2/2 catheter) (Patient not taking: Reported on 2/19/2020), Disp: , Rfl: 0    loratadine (CLARITIN) 10 mg tablet, TAKE 1 TABLET BY MOUTH EVERY MORNING (8AM) AS NEEDED FOR ALLERGIES -MAX=1TAB/DAY, Disp: 30 tablet, Rfl: 4    midodrine (PROAMATINE) 2 5 mg tablet, Take 1 tablet (2 5 mg total) by mouth 2 (two) times a day as needed (If systolic blood pressure less than 100 and patient is symptomatic), Disp: 60 tablet, Rfl: 2    multivitamin-minerals (CENTRUM ADULTS) tablet, Take 1 tablet by mouth daily At 8am, Disp: 30 tablet, Rfl: 11    neomycin-bacitracin-polymyxin (NEOSPORIN) 5-400-5,000 ointment, Apply topically 3 (three) times a day Apply to urethra for lubrication (Patient not taking: Reported on 2/19/2020), Disp: 28 3 g, Rfl: 0    OLANZapine (ZyPREXA) 2 5 mg tablet, Take 1 tablet by mouth daily 8 pm, Disp: , Rfl:     polyethylene glycol (GLYCOLAX) 17 GM/SCOOP powder, Take 17 grams in 8 oz of water by mouth daily at 8 pm, Disp: 527 g, Rfl: 11    tamsulosin (FLOMAX) 0 4 mg, Take 0 4 mg by mouth Two tablet @ 8 am 0 8 mg daily, Disp: , Rfl:     testosterone (ANDROGEL) 1 62 % TD gel pump, Apply 3 pumps at 8 AM to shoulders/chest daily (Patient not taking: Reported on 6/2/2020), Disp: 75 g, Rfl: 3    Past Surgical History:   Procedure Laterality Date    COLONOSCOPY      ESOPHAGOGASTRODUODENOSCOPY      with biopsy    PERICARDIOCENTESIS  4/29/2016            Allergies   Allergen Reactions    Advil [Ibuprofen]     Gluten Meal     Other      SEASONAL       Patient Active Problem List   Diagnosis    Essential hypertension    Hyperlipidemia    Hypothyroidism    GERD (gastroesophageal reflux disease)    Down's syndrome    Sepsis (HCC)    Anemia    CHELSIE (iron deficiency anemia)    Chronic constipation    Hypogonadism, testicular    Hypotension    Osteoporosis without current pathological fracture    Mixed incontinence    Parkinson's disease (HCC)    Serositis ([address]    Bladder distention    Hydronephrosis    Abdominal pain    Idiopathic pericardial effusion    Cardiac murmur    Xerodermia    Sinus bradycardia    Vitamin D deficiency    Near syncope    Dizziness    Gastroenteritis    Orthostatic hypotension    Cystitis    Urinary retention    Cerebral ventriculomegaly due to brain atrophy ([address] )    Acute kidney injury ([facility]    Encounter for monitoring testosterone replacement therapy       Review of Systems   Constitutional: Negative  HENT: Negative  Eyes: Negative  Respiratory: Negative  Cardiovascular: Negative  Gastrointestinal: Negative  Endocrine: Negative  Genitourinary: Negative  Musculoskeletal: Negative  Skin: Negative  Allergic/Immunologic: Negative  Neurological: Negative  Hematological: Negative  Psychiatric/Behavioral: Negative  Objective:      /60   Pulse 62   Resp 17   Ht 5' 2" (1 575 m)   Wt 72 1 kg (159 lb)   BMI 29 08 kg/m²          Physical Exam    Constitutional: no acute distress, well appearing and well nourished   Orthopedic/Biomechanical: abnormal foot type,-- hammertoe(s),-- bunion(s),-- abnormal MPJ ROM,-- discolored nails,-- subungual debris-- and-- nail tenderness   Skin: keratoses  Left Foot: Appearance: a deformity  Great toe deformities include a bunion  Evaluation of the great toe nail demonstrates an ingrown nail, but-- no paronychia  Decreased range of motion first MPJ bilateral positive pain on palpation and range of motion right worse than left  Ingrown bilateral big toe  No signs of infection no erythema no exudate  Special Tests: significant bunion bilateral decreased range of motion first MPJ no pain on range of motion,no sign of infection  Right Foot: Appearance: Normal except as noted: a deformity  Great toe deformities include a bunion  Evaluation of the great toe nail demonstrates paronychia-- and-- an ingrown nail  Mild ingrown tibial border  Rigid hammertoes, 2 through 5, bilateral, moderate xerosis, fissures, bilateral heels, midfoot, bilateral, negative erythema  Negative exudate  Negative ulceration  Special Tests: Mild xerosis, no signs of infection  Neurological Exam: Light touch was decreased bilaterally  Vibratory sensation was decreased in both first metatarsophalangeal joints  Response to monofilament test was intact bilaterally  Vascular Exam: performed Dorsalis pedis pulses were 1/4 bilaterally  Posterior tibial pulses were 1/4 bilaterally  Capillary refill time was between 1-3 seconds bilaterally  Toenails: All of the toenails were elongated,-- hypertrophied,-- discolored,-- shown to have subungual debris-- and-- tender  Both first toenails were ingrown  Hyperkeratosis: present on both first sub metatarsals-- and-- present on both fifth sub metatarsals  Moderate bunion bilateral   Rigid hammertoes 2 through 5 bilateral   Significant equinus bilateral   Negative erythema no signs of infection  Large bunion right worse than left  Decreased range of motion 1st MPJ    Right 1st MPJ positive pain on end range of motion no crepitus no hypermobility of the 1st ray      All nails thick discolored dystrophic  Ingrown bilateral hallux  No open wounds or signs of infection  Painful hyperkeratotic lesions medial 1st metatarsal head bilateral  Significant hallux limitus bilateral hammered hallux with hallux valgus bilateral    Ingrown nail bilateral hallux  Right hallux medial border, mild swelling, positive pain on palpation negative exudate no purulence no signs of infection

## 2021-01-08 NOTE — TELEPHONE ENCOUNTER
Spoke with Hillary Chandra at [facility] and advised of patient elevates ldl  Per Femi Arriaga, she increased his Lipitor to 80 mg  She is hoping to reduce once patients ldl is back down below 100  Hillary Chandra verbalized understanding  Will mail out new BW script to group home

## 2021-01-08 NOTE — TELEPHONE ENCOUNTER
----- Message from Daniela Monet sent at 1/8/2021  9:24 AM EST -----  Please call the patient regarding his abnormal result  I sent in script for Lipitor 80mg to the pharmacy  I am hoping to be able to reduce back to 40mg daily once his ldl level gets below goal range of 100      Thanks  JUJU Narvar

## 2021-01-08 NOTE — PROGRESS NOTES
Patient with Darrall Branch and Down syndrome noted to have hyperlipidemia, was on Lipitor 40 mg x4 months  Repeat LDL was requested  LDL of 132 in August 2020, was decreased to 122 in December of 2020  Patient at high risk given Darrall Branch, therefore will increase dosage to 80 mg daily times 3 months and will repeat level at that time

## 2021-01-08 NOTE — TELEPHONE ENCOUNTER
Lolita Perkins from group home re: Method Katie of the below  It looks like script was sent to [facility] (wrong pharmacy)  Lolita Perkins is requesting rx be sent to [facility] pharmacy  Rx re-entered  Pls review and sign off    Also requesting lab results be faxed to [facility] at [phone]   Fax sent                  [phone]

## 2021-01-18 NOTE — TELEPHONE ENCOUNTER
Rec'd call from [address] [place] [address] ([address]) requesting refill of Midodrine that pt takes prn  He wanted to confirm specific instructions for the script as noted  He would like this sent to [facility] as well as a paper copy with the specific instructions noted faxed to the [facility] at [phone]  Thank you

## 2021-02-03 NOTE — PATIENT INSTRUCTIONS
Referral for OT/PT for gait/PD features and memory/cognitive therapies  Continue Lipitor 80mg daily  Repeat Lipid panel early May  Continue Aspirin 81mg daily  Maintain normal BP  Follow up with Neurology office in 3 months

## 2021-02-03 NOTE — PROGRESS NOTES
Patient ID: Starr Srinivasan is a 63 y o  male  Assessment/Plan:       Diagnoses and all orders for this visit:    Parkinson's disease [facility])  -     Ambulatory referral to Physical Therapy; Future  -     Ambulatory referral to Occupational Therapy; Future    MCI (mild cognitive impairment)  -     Ambulatory referral to Occupational Therapy; Future    Hyperlipidemia, unspecified hyperlipidemia type    Padilla padilla disease    Down's syndrome       Referral for OT/PT for gait/PD features and memory/cognitive therapies  Continue Lipitor 80mg daily  Repeat Lipid panel early May  Continue Aspirin 81mg daily  Maintain normal BP  Follow up with Neurology office in 3 months    Subjective:  Starr Srinivasan is a  63yr old Male with Down syndrome, moyamoya, Parkinson's and likely dementia  Patient follows with outpatient neurology for medical management and therapies for these syndromes  Memory/Dementia:  Per [facility] staff, noted that the memory is progressively worsen  He has had tremors in his head , hands and at time his legs  Notes that this is increased with his anxiety however at times patient does not have tremors at all  States that when he is anxious and having conversation his head will shake while he talks however he does not notice it  Discussed this with the patient, he denies having any tremors or being aware of having any tremors  He states that this does not bother him, therefore will defer on any medical management of at this time  The group home aid   Alsonoted that he has  Had an increase in forgetfulness  He has had some changes in his patterns and activities, like separation of his laundry and putting his clothes away and noted that he does not do these things any more  He has not paid attention to the cleanliness of his clothes  He is having issues with hygeine and is not wiping away his feces well, has noted remanence in his underwear    The aide has had multiple conversations and went over proper mechanics of doing this however patient reverts back to his original behavior the next day  He reports that he has to repeat conversations with the patient as he forgets and asked repetitive questions with regards to information in that conversation  His caregiver has noticed an increase over the past year and has worsened since his quarantine and pt did have Covid in July  He has generally been a routine oriented person  May recommend OP OT for cognitive/memory therapy  PD symptoms:  Pt stated that he is feeling ok, denies having any shakiness  He stated that he does not notice the shaking at all  Per aid, he has periods when he is not shaking at all and very relaxed  Pt is not bothered by this and is intermittent, will hold on trying medication for now  Pt also has a slowed gait, noted by care giver that his gait has changed by getting slower and is not shuffling  They feel his voice is a little quieter and pepe  Stated that it is softer in tone  PT referral for PD like therapies for mobility and gait training  After therapies are completed will consider use of memory enhancing medication and/or Sinemet pending the outcome of his therapies  Howard  Pt also with Jesse Olmos on AP therapy with ASA 81mg and High dose statin for last LDL of 122  Will have follow up Lipid panel in May  Pt has had Orthostatic Hypotension and is on Midodrine, taking this 2x a day  On exam today, patient not able to comprehend commands  States that he can squeeze hands and assistant muscle exam however patient not willing to participate        The following portions of the patient's history were reviewed and updated as appropriate: allergies, current medications, past family history, past medical history, past social history, past surgical history and problem list         Past Medical History:   Diagnosis Date    Abnormal weight gain     last assessed 4/18/16; resolved 1/25/17    [facility]     Anxiety     Dementia ([facility] )     Depression     Disease of thyroid gland     Down's syndrome     Fatigue     last assessed 2/23/17; resolved 6/1/17    GERD (gastroesophageal reflux disease)     Gluten free diet     H/O urinary retention     Hernia of abdominal cavity     Hyperlipidemia     Hypogonadism in male     Impulse control disorder     OCD (obsessive compulsive disorder)     Orthostatic hypotension     [facility]     Parkinson disease ([facility] )     resolved 2/23/17    Pleural effusion     Sinus tachycardia     Vitamin D deficiency     last assessed 9/1/16; resolved 6/1/17       Past Surgical History:   Procedure Laterality Date    COLONOSCOPY      ESOPHAGOGASTRODUODENOSCOPY      with biopsy    PERICARDIOCENTESIS  4/29/2016            Social History     Socioeconomic History    Marital status: Single     Spouse name: None    Number of children: None    Years of education: None    Highest education level: None   Occupational History    None   Social Needs    Financial resource strain: None    Food insecurity     Worry: None     Inability: None    Transportation needs     Medical: None     Non-medical: None   Tobacco Use    Smoking status: Never Smoker    Smokeless tobacco: Never Used   Substance and Sexual Activity    Alcohol use: Never     Frequency: Never    Drug use: Never    Sexual activity: None   Lifestyle    Physical activity     Days per week: None     Minutes per session: None    Stress: None   Relationships    Social connections     Talks on phone: None     Gets together: None     Attends Oriental orthodox service: None     Active member of club or organization: None     Attends meetings of clubs or organizations: None     Relationship status: None    Intimate partner violence     Fear of current or ex partner: None     Emotionally abused: None     Physically abused: None     Forced sexual activity: None   Other Topics Concern  None   Social History Narrative    Caffeine use        Family History   Problem Relation Age of Onset    No Known Problems Mother     Parkinsonism Father         symtomatic         Current Outpatient Medications:     Aspirin Low Dose 81 MG chewable tablet, TAKE 1 TABLET BY MOUTH EVERY MORNING (8AM), Disp: 30 tablet, Rfl: 4    atorvastatin (LIPITOR) 80 mg tablet, Take 1 tablet (80 mg total) by mouth daily, Disp: 30 tablet, Rfl: 3    Calcium Carbonate-Vitamin D 600-400 MG-UNIT per tablet, Take 1 tablet by mouth 2 (two) times a day At 8 am and 5 pm, Disp: 60 tablet, Rfl: 11    cholecalciferol (VITAMIN D3) 1,000 units tablet, Take 1 tablet (1,000 Units total) by mouth 2 (two) times a day At 8am and 8 pm, Disp: 60 tablet, Rfl: 11    divalproex sodium (DEPAKOTE) 500 mg EC tablet, Take 500 mg by mouth , Disp: , Rfl:     docusate sodium (COLACE) 100 mg capsule, Take one capsule at 8am, Disp: 30 capsule, Rfl: 11    erythromycin (ILOTYCIN) ophthalmic ointment, Administer 0 5 inches into the left eye every 12 (twelve) hours At 8 am and 8 pm  D/c on 5/27/20, Disp: 3 5 g, Rfl: 0    esomeprazole (NexIUM) 40 MG capsule, Take one capsule daily 30 minutes prior to breakfast, Disp: 30 capsule, Rfl: 11    lanolin-mineral oil (ARMAAN) LOTN, Apply topically 2 (two) times a day Apply to hands daily at 8 am and 8 pm, Disp: 1 Bottle, Rfl: 11    levothyroxine 75 mcg tablet, TAKE 1 TABLET BY MOUTH IN THE EVENING (8PM), Disp: 30 tablet, Rfl: 4    loratadine (CLARITIN) 10 mg tablet, TAKE 1 TABLET BY MOUTH EVERY MORNING (8AM) AS NEEDED FOR ALLERGIES -MAX=1TAB/DAY, Disp: 30 tablet, Rfl: 4    midodrine (PROAMATINE) 2 5 mg tablet, Take 1 tablet (2 5 mg total) by mouth 2 (two) times a day as needed (If systolic blood pressure less than 100 and patient is symptomatic), Disp: 60 tablet, Rfl: 2    multivitamin-minerals (CENTRUM ADULTS) tablet, Take 1 tablet by mouth daily At 8am, Disp: 30 tablet, Rfl: 11    OLANZapine (ZyPREXA) 2 5 mg tablet, Take 1 tablet by mouth daily 8 pm, Disp: , Rfl:     polyethylene glycol (GLYCOLAX) 17 GM/SCOOP powder, Take 17 grams in 8 oz of water by mouth daily at 8 pm, Disp: 527 g, Rfl: 11    tamsulosin (FLOMAX) 0 4 mg, Take 0 4 mg by mouth Two tablet @ 8 am 0 8 mg daily, Disp: , Rfl:     lidocaine (URO-JET) 2 % urethral/mucosal gel, Insert 1 application into the urethra daily as needed (urethral pain 2/2 catheter) (Patient not taking: Reported on 2/19/2020), Disp: , Rfl: 0    neomycin-bacitracin-polymyxin (NEOSPORIN) 5-400-5,000 ointment, Apply topically 3 (three) times a day Apply to urethra for lubrication (Patient not taking: Reported on 2/19/2020), Disp: 28 3 g, Rfl: 0    testosterone (ANDROGEL) 1 62 % TD gel pump, Apply 3 pumps at 8 AM to shoulders/chest daily (Patient not taking: Reported on 6/2/2020), Disp: 75 g, Rfl: 3    Allergies   Allergen Reactions    Advil [Ibuprofen]     Gluten Meal     Other      SEASONAL        Blood pressure 114/76, height 5' 2" (1 575 m), weight 72 1 kg (159 lb)  Objective:    Blood pressure 114/76, height 5' 2" (1 575 m), weight 72 1 kg (159 lb)  Physical Exam  Constitutional:       Appearance: He is well-developed  HENT:      Head: Normocephalic  Right Ear: Hearing normal       Left Ear: Hearing normal       Nose: Nose normal    Eyes:      General: Lids are normal       Extraocular Movements: Extraocular movements intact  Conjunctiva/sclera: Conjunctivae normal       Pupils: Pupils are equal, round, and reactive to light  Neck:      Musculoskeletal: Normal range of motion  Cardiovascular:      Rate and Rhythm: Normal rate  Pulmonary:      Effort: Pulmonary effort is normal  No respiratory distress  Abdominal:      Palpations: Abdomen is soft  Tenderness: There is no abdominal tenderness  Musculoskeletal: Normal range of motion  Skin:     General: Skin is warm and dry  Neurological:      Mental Status: He is alert  Coordination: Romberg sign negative  Deep Tendon Reflexes: Reflexes are normal and symmetric  Psychiatric:         Speech: Speech normal          Behavior: Behavior normal          Thought Content: Thought content normal          Judgment: Judgment normal          Neurological Exam  Mental Status  Alert  Orientation: Alert to person and time, knows he is at the doctor's office  Memory: Has poor overall recall of conversation, unable to tolerate memory testing  Speech is normal  Follows three-step commands  Attention and concentration are normal  Fund of knowledge is abnormal     Cranial Nerves  CN II: Visual acuity is normal  Visual fields full to confrontation  CN III, IV, VI: Extraocular movements intact bilaterally  Normal lids and orbits bilaterally  Pupils equal round and reactive to light bilaterally  CN V: Facial sensation is normal   CN VII: Full and symmetric facial movement  CN VIII: Hearing is normal   Right: Hearing is normal   Left: Hearing is normal   CN IX, X: Palate elevates symmetrically  Normal gag reflex  CN XI: Shoulder shrug strength is normal   CN XII: Tongue midline without atrophy or fasciculations  Motor  Decreased muscle bulk throughout  Decreased muscle tone  Patient with little effort in muscle/motor exam  No abnormal involuntary movements   No abnormal tremors noted during this exam                                              Right                     Left   Biceps                                   4                          4  Brachioradialis                      4                          4   Triceps                                  4                          4   Wrist flexor                            4                          4   Wrist extensor                       4                          4   Iliopsoas                               4                          4   Quadriceps                           4                          4   Hamstring 4                          4  Ankle dorsiflexor                   4                          4  Ankle plantar flexor              4                           4    Motor exam limited to patient's ability to participate  Sensory  Light touch is normal in upper and lower extremities  Temperature is normal in upper and lower extremities  Vibration is normal in upper and lower extremities  Proprioception is normal in upper and lower extremities  Reflexes  Deep tendon reflexes are 2+ and symmetric in all four extremities with downgoing toes bilaterally  Right pathological reflexes: Sully's absent  Left pathological reflexes: Sully's absent  Coordination   Unable to follow commands for coordination testing  Gait  Casual gait: Wide stance  Reduced stride length  Shuffling gait  Reduced right arm swing  Reduced left arm swing  Romberg is absent  Able to rise from chair without using arms  Abnormal slow shuffling gait, heel toe walking and tandem gait deferred  ROS:    Review of Systems   Constitutional: Negative  Negative for appetite change and fever  HENT: Negative  Negative for hearing loss, tinnitus, trouble swallowing and voice change  Eyes: Negative  Negative for photophobia and pain  Respiratory: Negative  Negative for shortness of breath  Cardiovascular: Negative  Negative for palpitations  Gastrointestinal: Negative  Negative for nausea and vomiting  Endocrine: Negative  Negative for cold intolerance  Genitourinary: Negative  Negative for dysuria, frequency and urgency  Musculoskeletal: Negative  Negative for myalgias and neck pain  Skin: Negative  Negative for rash  Neurological: Positive for tremors  Negative for dizziness, seizures, syncope, facial asymmetry, speech difficulty, weakness, light-headedness, numbness and headaches  Hematological: Negative  Does not bruise/bleed easily     Psychiatric/Behavioral: Positive for agitation (at times) and confusion  Negative for hallucinations and sleep disturbance

## 2021-02-08 NOTE — PROGRESS NOTES
PT Evaluation     Today's date: 2021  Patient name: Edwardo Gutierrez  : 1965  MRN: [id number]  Referring provider: BJ Baltazar  Dx:   Encounter Diagnosis     ICD-10-CM    1  Parkinson's disease ([facility] )  [address] Ambulatory referral to Physical Therapy       Start Time: 8161  Stop Time: 1700  Total time in clinic (min): 45 minutes    Assessment  Assessment details: Patient is 61year old male presenting to skilled PT due to deconditioning and fatigue due to recent increase in falls and LOB as well as diagnosis of PD  Patient reports near falls occurring 2-3 times a Week  Patient displays Abnormal muscle strength with overall grade of 4/5  Vestibular component of balance decreased per objective measures  Based on outcome measures he is deemed high fall risk per TUG, 10 meter walk test, Gary, mCTSIB with eyes closed  Patient subjective report notes the following functional limitation with ambulation, stairs, floor transfers  Patient demonstrates fear of falling and pre-existing case of knee pain  Patient will benefit from skilled PT to address noted impairments and functional limitations they are causing with overall goal to return patient to highest level possible with reduced risk for falls  Due to patient's intellectual disability, patient may have challenges with a traditional parkinson's ba  Impairments: abnormal coordination, abnormal gait, abnormal muscle firing, abnormal muscle tone, abnormal or restricted ROM, abnormal movement, activity intolerance, impaired balance, impaired physical strength, lacks appropriate home exercise program, safety issue, weight-bearing intolerance and poor body mechanics  Other impairment: Down Syndrome  Barriers to therapy: Down Syndrome and intellectual disability makes formal program challenging   Understanding of Dx/Px/POC: excellent   Prognosis: fair    Goals  Goals  STG 30 days    1   Patient will improve static balance with feet together Eyes Closed Firm surface to 30 seconds indicating reduction in fall risk  2  Patient will display 2 3  second improvement with overall score of less than 12 seconds  with TUG test or lower with noted improvement being Minimal Detectable Change pre current research standards with fall risk  3  Patient will achieve 45/56 NUNEZ score with minimal improve by 6 points or more demonstrating Minimal Detectable change per current research standards for this objective test which assess fall risk  4  Patient will increase gait speed to > 0 70 m/s with 10 Meter Walk test, demonstrating Minimal Detectable change per current research standards for this objective test which assess fall risk  5 Patient will perform  5 x sit to stand test with overall reduction by seconds to less than 13 seconds indicating improvement with functional endurance  6  Patient will increase ABC score to 60% or higher to reduce fall risk and improve balance confidence  LT days   1  Patient will score low risk for falls with 3/4 fall risk measures   2  Patient will be able to perform floor transfer without physical assistance   3  Patient will be able to carry objects without loss of balance  4  Patient will be able to ambulate outdoors without any loss of balance  5  Patient will increase ABC score to 80% confidence to reduce fall risk and improve balance confidence  6  Patient will increase gait speed to 1 1 m/s or higher to reduce fall risk       Cut off score   All date taken from [facility] Neuro Section or Rehab Measures    NUNEZ test: 46/56                                                         5 x STS Test:  MDC: 6 points                                                              MDC: 2 3 seconds   age norms                                                                    Age Norms   61-76 year old = M: 54, F: 53                                       80-80 year old: 11 4 seconds   66-77 year old = M 47,  F: 48 66-77 year old: 12 6 seconds    80-80 year old = M46,   F: 53                                       80-80 year old: 14 8 seconds     TUG test:                                                                     10 Meter Walk Test:  MDC: 4 14 seconds                                                      MDC:  59 ft/sec  Cut off score for Falls                                                  Age Norms  > 13 5 seconds community dwelling adults                20-29; M: 4 56 ft/sec F: 4 62 ft/sec  > 32 2 Frail Elderly                                                     30-39: M 4 76 ft/sec  F: 4 68 ft/sec                                                                                       40-49: M: 4 79 ft/sec  F: 4 62 ft/sec  6 Minute Walk Test                                                      50-59: M: 4 76 ft/sec  F: 4 56 ft/sec  MDC: 190 feet                                                              60-69: M: 4 56 ft/sec  F: 4 26 ft/sec  Age Norms                                                                   70-+    M: 4 36 ft/sec  F: 4 16 ft/sec  60-69:    M: 1876 F: 5104  70-79:    M: 1729 F: 4290  80-89 +: M: 4636 F; 1286         Plan  Referral necessary: Yes  Planned modality interventions: biofeedback, cryotherapy, electrical stimulation/Russian stimulation, TENS and thermotherapy: hydrocollator packs  Planned therapy interventions: abdominal trunk stabilization, IADL retraining, joint mobilization, activity modification, ADL retraining, manual therapy, massage, ADL training, balance, balance/weight bearing training, motor coordination training, muscle pump exercises, neuromuscular re-education, body mechanics training, breathing training, patient education, compression, coordination, strengthening, stretching, fine motor coordination training, therapeutic activities, postural training, canalith repositioning, therapeutic exercise, therapeutic training, transfer training, flexibility, functional ROM exercises, gait training, graded activity, graded exercise, graded motor and home exercise program  Frequency: 2x week  Duration in weeks: 12  Plan of Care beginning date: 2/8/2021  Plan of Care expiration date: 5/3/2021  Treatment plan discussed with: patient and caregiver        Subjective Evaluation    History of Present Illness  Date of onset: 2/8/2021  Mechanism of injury: Patient is a 63year old male reporting to skilled PT with reports of Parkinson Dx reporting from [facility]  Patient arrives with care giver today, caregiver reports a "decline" over the past few months, patient being managed for PD  Patient has an intellecutal disability that requires assisted living  Recurrent probem    Quality of life: excellent    Pain  No pain reported          Objective           Precautions:   Past Medical History:   Diagnosis Date    Abnormal weight gain     last assessed 4/18/16; resolved 1/25/17    Acute [facility]congestive [facility]) [facility]     Anxiety     Dementia ([facility] )     Depression     Disease of thyroid gland     Down's syndrome     Fatigue     last assessed 2/23/17; resolved 6/1/17    GERD (gastroesophageal reflux disease)     Gluten free diet     H/O urinary retention     Hernia of abdominal cavity     Hyperlipidemia     Hypogonadism in male     Impulse control disorder     OCD (obsessive compulsive disorder)     Orthostatic hypotension     Parkinson disease (HCC)     Parkinson disease ([facility] )     resolved 2/23/17    Pleural effusion     Sinus tachycardia     Vitamin D deficiency     last assessed 9/1/16; resolved 6/1/17     Outcome Measures 2/8/21                     5x sit to stand 15 23 seconds no UE                     30 second sit to stand 8 no UE                     TUG 14 23 sec                     Gait Speed   84 m/s                     2 minute walk test 200 feet                     Gary 39/56                     mCTSIB FTEO- 30 sec  FTEC- 30 sec FTEO- 30 sec FTEC-7 5 sec                     2 minute walk teest 150 feet                    Manuals                                                                 Neuro Re-Ed                                                                                                        Ther Ex                                                                                                                     Ther Activity                                       Gait Training                                       Modalities

## 2021-02-11 NOTE — PROGRESS NOTES
Daily Note     Today's date: 2021  Patient name: Mercy Valdez  : 1965  MRN: [id number]  Referring provider: BJ Harrison  Dx:   Encounter Diagnosis     ICD-10-CM    1  Parkinson's disease ([facility] )  G20                   Subjective: Pt arrives w/ caregiver, no new complaints  Objective: See treatment diary below    NMR:  - Forward over 6" hurdles with no UE support (attempted lateral but pt difficulty following these commands)  - Weaving around cones following PT   - Picking up cones off the ground 10 cones, 2 sets  - Ball toss on foam pad 20 throws, CG  - Sit>stand with 5# ball toss, 2 sets 5  - 6" step ups alternating between 1 UE and no UE support       Assessment: Pt tolerated treatment well  Per caregiver pt does well with routine, he will benefit from structured circuit training each session to maintain a routine  He required motivation today t/o session especially to perform exercises without UE support  Patient would benefit from continued PT to reduce fall risk and maximize independence with all mobility  Plan: Continue per plan of care        Precautions:   Past Medical History:   Diagnosis Date    Abnormal weight gain     last assessed 16; resolved 17    Acute CHF (congestive heart failure) ([facility] )     Anxiety     Dementia ([facility] )     Depression     Disease of thyroid gland     Down's syndrome     Fatigue     last assessed 17; resolved 17    GERD (gastroesophageal reflux disease)     Gluten free diet     H/O urinary retention     Hernia of abdominal cavity     Hyperlipidemia     Hypogonadism in male     Impulse control disorder     OCD (obsessive compulsive disorder)     Orthostatic hypotension     Parkinson disease (HCC)     [facility] disease ([facility] )     resolved 17    Pleural effusion     Sinus tachycardia     Vitamin D deficiency     last assessed 16; resolved 17     Outcome Measures 21                     5x sit to stand 15 23 seconds no UE                     30 second sit to stand 8 no UE                     TUG 14 23 sec                     Gait Speed   84 m/s                     2 minute walk test 200 feet                     Gary 39/56                     mCTSIB FTEO- 30 sec  FTEC- 30 sec   FTEO- 30 sec FTEC-7 5 sec                     2 minute walk teest 150 feet                    Manuals                                                                 Neuro Re-Ed                                                                                                        Ther Ex                                                                                                                     Ther Activity                                       Gait Training                                       Modalities

## 2021-02-16 NOTE — PROGRESS NOTES
Daily Note  IE: 2021 (POC: 5-3-2021)    Today's date: 2021  Patient name: Jeremy Salinas  : 1965  MRN: [id number]  Referring provider: BJ Browne  Dx:   Encounter Diagnosis     ICD-10-CM    1  Parkinson's disease ([facility] )  G20                   Subjective: Pt arrives w/ caregiver, no new complaints  Objective: See treatment diary below    NMR:  - Forward over 6" hurdles with no UE support (attempted lateral but pt difficulty following these commands)  - Weaving around cones following PT   - Picking up cones from around the gym and placing in bucket (carrying): 20 reps  - Ball toss fwd/lat (5# med ball), 20 reps  - Sit>stand with 5# ball toss, 2 sets 5  - Reaching outside [place] drumming with boomwhackers to targets on foam pads      Assessment: Patient able to tolerate treatment session well today with focus on ambulation on compliant surfaces while reaching outside [place]  Most difficulty on foam pads with hesitancy noted  Trialed lateral ambulation and patient unable to perform with increased frequency to perform forward instead  Patient will continue to benefit from skilled outpatient PT in order to reduce fall risk and maximize independence with all mobility  Plan: Continue per plan of care        Precautions:   Past Medical History:   Diagnosis Date    Abnormal weight gain     last assessed 16; resolved 17    Acute CH[facility]congestive heart failure) ([facility] )     Anxiety     Dementia ([facility] )     Depression     Disease of thyroid gland     Down's syndrome     Fatigue     last assessed 17; resolved 17    GERD (gastroesophageal reflux disease)     Gluten free diet     H/O urinary retention     Hernia of abdominal cavity     Hyperlipidemia     Hypogonadism in male     Impulse control disorder     OCD (obsessive compulsive disorder)     Orthostatic hypotension     Parkinson disease ([address] )     Parkinson disease ([address] )     resolved 17    Pleural effusion     Sinus tachycardia     Vitamin D deficiency     last assessed 9/1/16; resolved 6/1/17     Outcome Measures 2/8/21                     5x sit to stand 15 23 seconds no UE                     30 second sit to stand 8 no UE                     TUG 14 23 sec                     Gait Speed   84 m/s                     2 minute walk test 200 feet                     Gary 39/56                     mCTSIB FTEO- 30 sec  FTEC- 30 sec   FTEO- 30 sec FTEC-7 5 sec                     2 minute walk teest 150 feet                    Manuals                                                                 Neuro Re-Ed                                                                                                        Ther Ex                                                                                                                     Ther Activity                                       Gait Training                                       Modalities

## 2021-02-19 NOTE — PROGRESS NOTES
Daily Note  IE: 2021 (POC: 5-3-2021)    Today's date: 2021  Patient name: Barrett Leon  : 1965  MRN: [id number]  Referring provider: BJ Silva  Dx:   Encounter Diagnosis     ICD-10-CM    1  Parkinson's disease ([facility] )  G20                   Subjective: Pt arrives w/ caregiver, he slipped while trying to get into the [employer] and fell backwards slowly  He denies any pain right now  Objective: See treatment diary below    NMR:  - Forward over 6" hurdles with no UE support   - Weaving around cones following PT   - Stepping onto blue foam pads   - Picking up cones from around the gym and placing in bucket (carrying): 20 reps  - Ball toss fwd/lat (5# med ball) on foam, 20 reps  - Sit>stand with 5# ball toss, 10 reps   - Step taps from foam to 6" step   - Step up/over 6" step  - Walking on foam beams forward and laterally       Assessment: Patient tolerated treatment well today  Does well with structured circuit style training , but still requires cues to stay on task  Progressed to more foam based challenges today overall requiring CG  Patient will continue to benefit from skilled outpatient PT in order to reduce fall risk and maximize independence with all mobility  Plan: Continue per plan of care        Precautions:   Past Medical History:   Diagnosis Date    Abnormal weight gain     last assessed 16; resolved 17    Acute CHF (congestive heart failure) ([facility] )     Anxiety     [facility]     Depression     Disease of thyroid gland     Down's syndrome     Fatigue     last assessed 17; resolved 17    GERD (gastroesophageal reflux disease)     Gluten free diet     H/O urinary retention     Hernia of abdominal cavity     Hyperlipidemia     Hypogonadism in male     Impulse control disorder     OCD (obsessive compulsive disorder)     Orthostatic hypotension     Parkinson disease ([facility] )     Parkinson disease ([facility] )     resolved 17    Pleural effusion     Sinus tachycardia     Vitamin D deficiency     last assessed 9/1/16; resolved 6/1/17     Outcome Measures 2/8/21                     5x sit to stand 15 23 seconds no UE                     30 second sit to stand 8 no UE                     TUG 14 23 sec                     Gait Speed   84 m/s                     2 minute walk test 200 feet                     Gary 39/56                     mCTSIB FTEO- 30 sec  FTEC- 30 sec   FTEO- 30 sec FTEC-7 5 sec                     2 minute walk teest 150 feet                    Manuals                                                                 Neuro Re-Ed                                                                                                        Ther Ex                                                                                                                     Ther Activity                                       Gait Training                                       Modalities

## 2021-02-22 NOTE — PROGRESS NOTES
Daily Note  IE: 2021 (POC: 5-3-2021)    Today's date: 2021  Patient name: Zafar Menjivar  : 1965  MRN: [id number]  Referring provider: BJ Alicia  Dx:   Encounter Diagnosis     ICD-10-CM    1  Parkinson's disease ([facility] )  G20                   Subjective: Pt arrives w/ caregiver, he slipped while trying to get into the [place] and fell backwards slowly  He denies any pain right now  Objective: See treatment diary below    NMR:  - Forward over 6" hurdles with no UE support: 3 cycles  - Weaving around cones following PT : 10 cones   - Stepping onto blue foam pads: 20 reps FWD and Backwards stepping   - Picking up cones from around the gym and placing in bucket (carrying): 20 reps  - Ball toss fwd/lat (5# med ball) on foam, 20 reps  - Sit>stand with 5# ball toss, 10 reps   - Step taps from foam to 6" step : 8 with L and 6 with R  - Step up/over 6" step  - Walking on foam beams forward and laterally: 1 UE 3 cycles       Assessment: Pt very challenged with staying on tasks and performing exercises this date  Required Max A for directions, single step commands was unsuccessful this date with re-tasking patient  Patient had no loss of balance but was reluctent to attempt exercises without UE support  Plan: Continue per plan of care        Precautions:   Past Medical History:   Diagnosis Date    Abnormal weight gain     last assessed 16; resolved 17    Acute CHF (congestive heart failure) ([facility] )     Anxiety     Dementia ([place] [address] )     Depression     Disease of thyroid gland     Down's syndrome     Fatigue     last assessed 17; resolved 17    GERD (gastroesophageal reflux disease)     Gluten free diet     H/O urinary retention     Hernia of abdominal cavity     Hyperlipidemia     Hypogonadism in male     Impulse control disorder     OCD (obsessive compulsive disorder)     Orthostatic hypotension     Parkinson disease ([facility] )     Parkinson disease ([facility] ) resolved 2/23/17    Pleural effusion     Sinus tachycardia     Vitamin D deficiency     last assessed 9/1/16; resolved 6/1/17     Outcome Measures 2/8/21                     5x sit to stand 15 23 seconds no UE                     30 second sit to stand 8 no UE                     TUG 14 23 sec                     Gait Speed   84 m/s                     2 minute walk test 200 feet                     Gary 39/56                     mCTSIB FTEO- 30 sec  FTEC- 30 sec   FTEO- 30 sec FTEC-7 5 sec                     2 minute walk teest 150 feet                    Manuals                                                                 Neuro Re-Ed                                                                                                        Ther Ex                                                                                                                     Ther Activity                                       Gait Training                                       Modalities

## 2021-02-25 NOTE — PROGRESS NOTES
Assessment/Plan:    1  Pes planus of both feet    2  Valgus deformity of both great toes      Recommend replacement of custom foot orthotics due to wear and tear as evidenced by cracking, wear  Custom foot orthotics medically necessary due to  gait deviations 2/2 b/l hallux valgas and pes planus         Return in about 3 months (around 5/25/2021)  Subjective:      Patient ID: Delon Chou is a 63 y o  male  Chief Complaint   Patient presents with    other     Pt needs orthopedic forms filled out        64 yo male presents for order for new custom foot orthotics  Per physical therapy note, his current orthotic are showing wear and are cracking  Rufina Rashid has gait deviations 2/2 b/l hallux valgas and pes planus and requires the daily use of custom foot orthotics        The following portions of the patient's history were reviewed and updated as appropriate: allergies, current medications, past family history, past medical history, past social history, past surgical history and problem list     Review of Systems   Constitutional: Negative for fatigue, fever and unexpected weight change  Musculoskeletal: Positive for gait problem           Current Outpatient Medications   Medication Sig Dispense Refill    Aspirin Low Dose 81 MG chewable tablet TAKE 1 TABLET BY MOUTH EVERY MORNING (8AM) 30 tablet 4    atorvastatin (LIPITOR) 80 mg tablet Take 1 tablet (80 mg total) by mouth daily 30 tablet 3    Calcium Carbonate-Vitamin D 600-400 MG-UNIT per tablet Take 1 tablet by mouth 2 (two) times a day At 8 am and 5 pm 60 tablet 11    cholecalciferol (VITAMIN D3) 1,000 units tablet Take 1 tablet (1,000 Units total) by mouth 2 (two) times a day At 8am and 8 pm 60 tablet 11    divalproex sodium (DEPAKOTE) 500 mg EC tablet Take 500 mg by mouth       docusate sodium (COLACE) 100 mg capsule Take one capsule at 8am 30 capsule 11    erythromycin (ILOTYCIN) ophthalmic ointment Administer 0 5 inches into the left eye every 12 (twelve) hours At 8 am and 8 pm  D/c on 5/27/20 3 5 g 0    esomeprazole (NexIUM) 40 MG capsule Take one capsule daily 30 minutes prior to breakfast 30 capsule 11    lanolin-mineral oil (ARMAAN) LOTN Apply topically 2 (two) times a day Apply to hands daily at 8 am and 8 pm 1 Bottle 11    levothyroxine 75 mcg tablet TAKE 1 TABLET BY MOUTH IN THE EVENING (8PM) 30 tablet 4    lidocaine (URO-JET) 2 % urethral/mucosal gel Insert 1 application into the urethra daily as needed (urethral pain 2/2 catheter)  0    loratadine (CLARITIN) 10 mg tablet TAKE 1 TABLET BY MOUTH EVERY MORNING (8AM) AS NEEDED FOR ALLERGIES -MAX=1TAB/DAY 30 tablet 4    midodrine (PROAMATINE) 2 5 mg tablet Take 1 tablet (2 5 mg total) by mouth 2 (two) times a day as needed (If systolic blood pressure less than 100 and patient is symptomatic) 60 tablet 2    multivitamin-minerals (CENTRUM ADULTS) tablet Take 1 tablet by mouth daily At 8am 30 tablet 11    neomycin-bacitracin-polymyxin (NEOSPORIN) 5-400-5,000 ointment Apply topically 3 (three) times a day Apply to urethra for lubrication 28 3 g 0    OLANZapine (ZyPREXA) 2 5 mg tablet Take 1 tablet by mouth daily 8 pm      polyethylene glycol (GLYCOLAX) 17 GM/SCOOP powder Take 17 grams in 8 oz of water by mouth daily at 8 pm 527 g 11    tamsulosin (FLOMAX) 0 4 mg Take 0 4 mg by mouth Two tablet @ 8 am 0 8 mg daily      testosterone (ANDROGEL) 1 62 % TD gel pump Apply 3 pumps at 8 AM to shoulders/chest daily 75 g 3     No current facility-administered medications for this visit  Objective:    /60 (BP Location: Left arm, Patient Position: Sitting, Cuff Size: Standard)   Pulse 79   Temp 97 7 °F (36 5 °C)   Resp 16   Ht 5' 2" (1 575 m)   Wt 73 6 kg (162 lb 3 2 oz)   SpO2 98%   BMI 29 67 kg/m²        Physical Exam  Vitals signs and nursing note reviewed  Constitutional:       General: He is not in acute distress  Appearance: Normal appearance     Cardiovascular:      Rate and Rhythm: Normal rate and regular rhythm  Pulses: Normal pulses  Heart sounds: Murmur present  Pulmonary:      Effort: Pulmonary effort is normal       Breath sounds: Normal breath sounds  Musculoskeletal:      Right foot: Bunion present  Left foot: Bunion present  Feet:      Comments: B/l great toes slightly red     + b/l flat foot  Neurological:      General: No focal deficit present  Mental Status: He is alert  Mental status is at baseline        Gait: Gait abnormal    Psychiatric:         Mood and Affect: Mood normal                 BJ Lizama

## 2021-02-26 NOTE — PROGRESS NOTES
Daily Note  IE: 2021 (POC: 5-3-2021)    Today's date: 2021  Patient name: Edwardo Gutierrez  : 1965  MRN: [id number]  Referring provider: BJ Baltazar  Dx:   Encounter Diagnosis     ICD-10-CM    1  Parkinson's disease ([facility] )  Lidia Quijano        Start Time: 5914  Stop Time: 1230  Total time in clinic (min): 45 minutes    Subjective: Pt arrives w/ caregiver, denies any changes  Objective: See treatment diary below    NMR:  - Forward over 6" hurdles with no UE support: 3 cycles  - Weaving around cones following PT : 10 cones   - Stepping onto blue foam pads: 20 reps FWD and Backwards stepping   - Picking up cones from around the gym and placing in bucket (carrying): 20 reps  - Ball toss fwd/lat (5# med ball) on foam, 20 reps  - Sit>stand with 5# ball toss, 10 reps   - Step taps from foam to 8" step : 8 with L and 6 with R  - Step up/over 8" step  - Walking on foam beams forward and laterally: 1 UE 3 cycles       Assessment: Pt very challenged with staying on tasks and performing exercises this date, continues to require max assistance with cuing to stay focused on the task at hand, no falls or LOB, low amplitude  Patient had no loss of balance but was reluctent to attempt exercises without UE support  Plan: Continue per plan of care        Precautions:   Past Medical History:   Diagnosis Date    Abnormal weight gain     last assessed 16; resolved 17    Acute CHF (congestive heart failure) ([facility] )     Anxiety     Dementia ([facility] )     Depression     Disease of thyroid gland     Down's syndrome     Fatigue     last assessed 17; resolved 17    GERD (gastroesophageal reflux disease)     Gluten free diet     H/O urinary retention     Hernia of abdominal cavity     Hyperlipidemia     Hypogonadism in male     Impulse control disorder     OCD (obsessive compulsive disorder)     Orthostatic hypotension     Parkinson disease ([facility] )     Parkinson disease ([facility] ) resolved 2/23/17    Pleural effusion     Sinus tachycardia     Vitamin D deficiency     last assessed 9/1/16; resolved 6/1/17     Outcome Measures 2/8/21                     5x sit to stand 15 23 seconds no UE                     30 second sit to stand 8 no UE                     TUG 14 23 sec                     Gait Speed   84 m/s                     2 minute walk test 200 feet                     Gary 39/56                     mCTSIB FTEO- 30 sec  FTEC- 30 sec   FTEO- 30 sec FTEC-7 5 sec                     2 minute walk teest 150 feet                    Manuals                                                                 Neuro Re-Ed                                                                                                        Ther Ex                                                                                                                     Ther Activity                                       Gait Training                                       Modalities

## 2021-03-01 NOTE — PROGRESS NOTES
Mercy Valdez 63 y o  male MRN: [id number]    Encounter: [id number]      Assessment/Plan     1  Hypothyroidism  Clinically and biochemically euthyroid  Continue levothyroxine syndrome or Crohn's or immediately   Repeat TSH or free T4 in 3 months     2  History of osteoporosis  3  History of hypogonadism  4  History of Down syndrome  Patient has been off testosterone therapy since 01/2020   Recent testosterone levels within normal limits    DEXA scan 02/2020 -  Consistent with osteopenia, no significant change in total bone mineral density of the lumbar spine or hip  Calcium, vitamin-D levels within normal limits  -continue supplementation   Continue weight bearing exercise as tolerated   -repeat DEXA scan 02/2022  -  Check free, total testosterone, BMP, vitamin-D, urinary NTX in 3 months     5  Memory changes - will be following up with neurology   Check vitamin B12 level    CC:  Hypothyroidism, osteoporosis, hypogonadism    History of Present Illness     HPI:  Mercy Valdez is a 63 y o  male who is here for a follow-up of hypogonadism, hypothyroidism,  osteoporosis  Has a history of Down syndrome, Parkinson's  Accompanied by caretaker Janeth Sims in 08/2020     Hypothyroidism   currently on levothyroxine 75 mcg orally daily   Complaint, takes on an empty stomach     For osteoporosis, taking vitamin D3 supplementation 1000 IU daily plus calcium- vitamin-D 600-400 mg twice a day + MVI  Last DXA 2/2020     Has been off testosterone  Since January 2020     Some memory loss over this last year, patient has not realized, caregivers have noticed that he asks same questions multiple times    no other complaints   Overall feels well  No symptoms of hypo or hyperthyroidism   will be following up with Neurology in May     All other systems were reviewed and are negative         Review of Systems    Historical Information   Past Medical History:   Diagnosis Date    Abnormal weight gain     last assessed 4/18/16; resolved 1/25/17    [facility]est[facility]     Anxiety     Dementia ([facility] )     Depression     Disease of thyroid gland     Down's syndrome     Fatigue     last assessed 2/23/17; resolved 6/1/17    GERD (gastroesophageal reflux disease)     Gluten free diet     H/O urinary retention     Hernia of abdominal cavity     Hyperlipidemia     Hypogonadism in male     Impulse control disorder     OCD (obsessive compulsive disorder)     Orthostatic hypotension     Parkinson disease (HCC)     Parkinson disease ([facility] )     resolved 2/23/17    Pleural effusion     Sinus tachycardia     Vitamin D deficiency     last assessed 9/1/16; resolved 6/1/17     Past Surgical History:   Procedure Laterality Date    COLONOSCOPY      ESOPHAGOGASTRODUODENOSCOPY      with biopsy    PERICARDIOCENTESIS  4/29/2016          Social History   Social History     Substance and Sexual Activity   Alcohol Use Never    Frequency: Never     Social History     Substance and Sexual Activity   Drug Use Never     Social History     Tobacco Use   Smoking Status Never Smoker   Smokeless Tobacco Never Used     Family History:   Family History   Problem Relation Age of Onset    No Known Problems Mother     Parkinsonism Father         symtomatic       Meds/Allergies   Current Outpatient Medications   Medication Sig Dispense Refill    Aspirin Low Dose 81 MG chewable tablet TAKE 1 TABLET BY MOUTH EVERY MORNING (8AM) 30 tablet 4    atorvastatin (LIPITOR) 80 mg tablet Take 1 tablet (80 mg total) by mouth daily 30 tablet 3    Calcium Carbonate-Vitamin D 600-400 MG-UNIT per tablet Take 1 tablet by mouth 2 (two) times a day At 8 am and 5 pm 60 tablet 11    cholecalciferol (VITAMIN D3) 1,000 units tablet Take 1 tablet (1,000 Units total) by mouth 2 (two) times a day At 8am and 8 pm 60 tablet 11    divalproex sodium (DEPAKOTE) 500 mg EC tablet Take 500 mg by mouth       docusate sodium (COLACE) 100 mg capsule Take one capsule at 8am 30 capsule 11    erythromycin (ILOTYCIN) ophthalmic ointment Administer 0 5 inches into the left eye every 12 (twelve) hours At 8 am and 8 pm  D/c on 5/27/20 3 5 g 0    esomeprazole (NexIUM) 40 MG capsule Take one capsule daily 30 minutes prior to breakfast 30 capsule 11    lanolin-mineral oil (ARMAAN) LOTN Apply topically 2 (two) times a day Apply to hands daily at 8 am and 8 pm 1 Bottle 11    levothyroxine 75 mcg tablet TAKE 1 TABLET BY MOUTH IN THE EVENING (8PM) 30 tablet 4    lidocaine (URO-JET) 2 % urethral/mucosal gel Insert 1 application into the urethra daily as needed (urethral pain 2/2 catheter)  0    loratadine (CLARITIN) 10 mg tablet TAKE 1 TABLET BY MOUTH EVERY MORNING (8AM) AS NEEDED FOR ALLERGIES -MAX=1TAB/DAY 30 tablet 4    midodrine (PROAMATINE) 2 5 mg tablet Take 1 tablet (2 5 mg total) by mouth 2 (two) times a day as needed (If systolic blood pressure less than 100 and patient is symptomatic) 60 tablet 2    multivitamin-minerals (CENTRUM ADULTS) tablet Take 1 tablet by mouth daily At 8am 30 tablet 11    neomycin-bacitracin-polymyxin (NEOSPORIN) 5-400-5,000 ointment Apply topically 3 (three) times a day Apply to urethra for lubrication 28 3 g 0    OLANZapine (ZyPREXA) 2 5 mg tablet Take 1 tablet by mouth daily 8 pm      polyethylene glycol (GLYCOLAX) 17 GM/SCOOP powder Take 17 grams in 8 oz of water by mouth daily at 8 pm 527 g 11    tamsulosin (FLOMAX) 0 4 mg Take 0 4 mg by mouth Two tablet @ 8 am 0 8 mg daily      testosterone (ANDROGEL) 1 62 % TD gel pump Apply 3 pumps at 8 AM to shoulders/chest daily 75 g 3     No current facility-administered medications for this visit  Allergies   Allergen Reactions    Advil [Ibuprofen]     Gluten Meal     Other      SEASONAL       Objective   Vitals: Blood pressure 120/76, pulse 76, temperature 99 6 °F (37 6 °C), height 5' 2" (1 575 m), weight 76 1 kg (167 lb 11 2 oz)      Physical Exam  Constitutional: General: He is not in acute distress  Appearance: He is well-developed  He is not diaphoretic  HENT:      Head: Normocephalic and atraumatic  Eyes:      Conjunctiva/sclera: Conjunctivae normal       Pupils: Pupils are equal, round, and reactive to light  Neck:      Musculoskeletal: Normal range of motion and neck supple  Cardiovascular:      Rate and Rhythm: Normal rate and regular rhythm  Heart sounds: Normal heart sounds  No murmur  Pulmonary:      Effort: Pulmonary effort is normal  No respiratory distress  Breath sounds: Normal breath sounds  No wheezing  Abdominal:      General: There is no distension  Palpations: Abdomen is soft  Tenderness: There is no abdominal tenderness  There is no guarding  Skin:     General: Skin is warm and dry  Findings: No erythema or rash  Neurological:      Mental Status: He is alert and oriented to person, place, and time  Comments: No tremors on the outstretched arms   Psychiatric:         Behavior: Behavior normal          Thought Content: Thought content normal          The history was obtained from the review of the chart, patient  Lab Results:   Lab Results   Component Value Date/Time    TSH [address] [place] [address] 2 577 12/30/2020 08:11 AM    TSH [address] [place] [address] 2 060 08/17/2020 08:09 AM    Free T4 0 91 12/30/2020 08:11 AM    Free T4 1 04 08/17/2020 08:09 AM     Lab Results   Component Value Date    WBC 5 03 12/30/2020    HGB 14 3 12/30/2020    HCT 44 3 12/30/2020     (H) 12/30/2020     12/30/2020     Lab Results   Component Value Date    CREATININE 0 97 12/30/2020    BUN 10 12/30/2020     (L) 11/20/2015    K 4 2 12/30/2020     12/30/2020    CO2 32 12/30/2020     No results found for: HGBA1C      Imaging Studies:        I have personally reviewed pertinent reports  Portions of the record may have been created with voice recognition software   Occasional wrong word or "sound a like" substitutions may have occurred due to the inherent limitations of voice recognition software  Read the chart carefully and recognize, using context, where substitutions have occurred

## 2021-03-02 NOTE — PROGRESS NOTES
Daily Note  IE: 2021 (POC: 5-3-2021)    Today's date: 3/2/2021  Patient name: Puneet Seen  : 1965  MRN: [id number]  Referring provider: BJ Ramírez  Dx:   Encounter Diagnosis     ICD-10-CM    1  Parkinson's disease ([facility] [address] )  G20                   Subjective: Pt arrives w/ caregiver, denies any changes  Objective: See treatment diary below    NMR:  - Forward over 6" hurdles with no UE support: 3 cycles  - Weaving around cones following PT : 10 cones   - Stepping onto blue foam pads: 20 reps FWD and Backwards stepping   - Picking up cones from around the gym and placing in bucket (carrying): 20 reps  - Ball toss fwd/lat (5# med ball) on foam, 20 reps  - Sit>stand with 5# ball toss, 10 reps   - Step taps from foam to 8" step : 10 with L and 10with R  - Step up/over 8" step  - Walking on foam beams forward and laterally: 1 UE 3 cycles       Assessment:  Pt very challenged with staying on tasks and performing exercises this date  Required Max A for directions, single step commands helped with this session  Noted improvement due to decrease in volume in the clinic  Improvement in loss of balance with foam ball toss  Challenged with step ups from foam this date    Patient had no loss of balance but was reluctent to attempt exercises without UE support    Plan: Continue per plan of care        Precautions:   Past Medical History:   Diagnosis Date    Abnormal weight gain     last assessed 16; resolved 17    [facility]     Anxiety     Dementia ([facility] )     Depression     Disease of thyroid gland     Down's syndrome     Fatigue     last assessed 17; resolved 17    GERD (gastroesophageal reflux disease)     Gluten free diet     H/O urinary retention     Hernia of abdominal cavity     Hyperlipidemia     Hypogonadism in male     Impulse control disorder     OCD (obsessive compulsive disorder)     Orthostatic hypotension     [facility] disease ([facility] )     Parkinson disease ([facility] )     resolved 2/23/17    Pleural effusion     Sinus tachycardia     Vitamin D deficiency     last assessed 9/1/16; resolved 6/1/17     Outcome Measures 2/8/21               5x sit to stand 15 23 seconds no UE               30 second sit to stand 8 no UE               TUG 14 23 sec               Gait Speed   84 m/s               2 minute walk test 200 feet               Gary 39/56               mCTSIB FTEO- 30 sec  FTEC- 30 sec   FTEO- 30 sec FTEC-7 5 sec               2 minute walk teest 150 feet                 Manuals                                                                 Neuro Re-Ed                                                                                                        Ther Ex                                                                                                                     Ther Activity                                       Gait Training                                       Modalities

## 2021-03-02 NOTE — PROGRESS NOTES
OT Evaluation     Today's date: 3/2/2021  Patient name: Brett Chavira  : 1965  MRN: [id number]  Referring provider: BJ Mei  Dx:   Encounter Diagnosis     ICD-10-CM    1  Parkinson's disease ([address] )  [address] Ambulatory referral to Occupational Therapy   2  MCI (mild cognitive impairment)  G31 84 Ambulatory referral to Occupational Therapy                  Assessment  Assessment details: Pt has dx of PD and has down syndrome in which he presents with decreased delayed recall, impaired sustained attentions, and decreased  strength  and seeking OT services  Pt's caregiver reports having difficulty ADL routine, working memory, and attention and would benefit from OT services  Pt is currently demonstrating the following occupational deficits: decreased sustained attention, decreased delayed recall, impaired  strength    Pt's deficits have been limiting pt in completing ADLs and IADLs  Pt's deficits noted Based upon the following assessements MOCA -B and LILA       Impairments: abnormal coordination, abnormal muscle firing, abnormal muscle tone, abnormal or restricted ROM, activity intolerance, difficulty understanding, impaired balance, impaired physical strength, lacks appropriate home exercise program, safety issue, weight-bearing intolerance, poor posture  and poor body mechanics    Goals    STG  1  Pt will increase hand strength by  3lbs (20) to complete IADLs etiolating performance  within 4 weeks  2  Pt will be independent with HEP within 4 weeks  3  Pt will increase sustained attention to follow one step commands with 1-2 verbal cues to complete to decrease care giver burden to complete ADL routine within 4 weeks   4   Pt will improve delayed recall from 0/5  To approximately 20% 1/5 to improve ADL / IADL performance within 4 weeks     LTG   Pt will increase hand strength by 8 lbs (20) to improve performance in ADL/ IADL activities within 8-12 weeks   Pt will increase sustained attention to follow one step commands with independence to decrease care giver burden to complete ADL routine within 8-12 weeks   Pt will improve delayed recall from 0-5 to approximately 2/5 to improve ADL/IADL performance within 8-12 weeks       Plan  Patient would benefit from: OT eval and skilled occupational therapy  Planned therapy interventions: IADL retraining, ADL retraining, ADL training, activity modification, abdominal trunk stabilization, manual therapy, balance/weight bearing training, behavior modification, balance, motor coordination training, neuromuscular re-education, muscle pump exercises, orthotic fitting/training, orthotic management and training, patient education, postural training, cognitive skills, community reintegration, self care, therapeutic activities, therapeutic exercise, therapeutic training, graded activity, graded exercise, flexibility, fine motor coordination training, functional ROM exercises, coordination, strengthening, graded motor and home exercise program  Frequency: 2x week (1-3 times a week)  Plan of Care beginning date: 3/2/2021  Plan of Care expiration date: 6/2/2021  Treatment plan discussed with: patient and caregiver        Subjective     Caregiver reports having trouble with completing posterior hygiene in toileting routine Caregiver manages medicatoin for pt Pt reports having fun walking , watching [employer] videos,  Caregiver reports pt has difficulty with working memory and delayed recall  Pt lives in a home with in a group home, with own room and bathroom in care giving facility   Pt previously worked at facilities closed due to [employer], Pt partakes in education videos Caregiver reports Pt is motivated by monkeys music , eat food, and cleaning areas     Objective      MOCA(MOCA-B)  Executive functoin 0/1  Immediate recall 1st trial 0/5 2nd rial 2/5  Fluence 0/2  Orientation 4/6  calcuatoin 0/3  Abstraction 0/3  Delayed recall /05  visuoperceptoin 1/3  Naming 3/4  Attention 0/3    8/30 indicative of neurological defecits    LILA hand strength     RUE 20lbs LUE 22 Lbs   Normal Age related strength 101 lbs indicative of decreased  strength           Precautions:   Hypotension, Anxiety, tachycardia

## 2021-03-03 NOTE — PROGRESS NOTES
Daily Note     Today's date: 3/3/2021  Patient name: Concepcion Simpson  : 1965  MRN: [id number]  Referring provider: BJ Muniz  Dx:   Encounter Diagnosis     ICD-10-CM    1  Parkinson's disease ([address] )  G20    2  MCI (mild cognitive impairment)  G31 84        Start Time: 1100  Stop Time: 1145  Total time in clinic (min): 45 minutes    Subjective: "I'm done"      Objective: See treatment diary below  9 hole peg test:   LUE: 1 min 12 36 seconds   RUE: 1 min 30 seconds   Patients scores cannot be compared to norms due to directions being non-standardized due to decreased sustained attention and understanding of multi-step directions  Pt picked up pieces between 2nd and 3rd digit using right hand 50% of time  MMT:   3/5 in all planes of movement  Pt demonstrated difficulty with understanding direction for MMT  Pt able to completed elbow flexion with 1lb weight  Pt unable to replicate shoulder flexion and abduction    Tan theraputty hand grasp, thumb press, and lateral pinch completed  Pt refused putty for HEP  Max A to form ball with putty  Pt able to complete exercises with verbal cuing and demonstration     Pt completed magnet board designs x2 with max VCs for near point copy  Pt able to lift pieces with min difficulty due to decreased hand strength  Assessment: Tolerated treatment well  Patient would benefit from continued OT  Pt demonstrated difficulty with sustained attention impacting his ability to demonstrate fine motor and UE strength  However, measurements can be utilized to compare to his baseline during re-evals to determine progress  Plan: Continue per plan of care        Precautions:   Hypotension, Anxiety, tachycardia

## 2021-03-09 NOTE — PROGRESS NOTES
Daily Note     Today's date: 3/9/2021  Patient name: Marco A Villa  : 1965  MRN: [id number]  Referring provider: BJ Gómez  Dx:   Encounter Diagnosis     ICD-10-CM    1  Parkinson's disease ([facility] [address] [address] )  G20                   Subjective: "that goes here "     Objective: See treatment diary below    Pt attempted 2 pile sort of carlos cards by colors stopped secondary to decreased understanding and decreased color recall  Pt  completed CARLOS  Four card color sort into color piles with 6 VCs for problem solving and 10 VCs for initiation pt was externally distracted and internally distracted  Addressing sustained attention and working memory  Pt trialed marble placement  Being told 2 (numbers/letters) from  Memory to place marbles on stopped secondary to decreased understanding and decreased recall   Pt completed pattern  Block placement  Matching blocks to  visual cue card With 10 VCs for problem solving  block placement  And 10 cues for attention, pt was externally distracted by pictures, and internally distracted addressing sustained   addressing sustained attention  Assessment: Tolerated treatment well  Patient would benefit from continued OT    Pt tolerated treatment well, Pt showed deficits in sustained attention during activities and working memory  Pt demonstrated decreased understanding of more complicated tasks  Pt was internally and externally distracted influencing Pts sustained attentoin during activities  Pt would benefit from OT services focusing on deficits  Plan: Continue per plan of care        Precautions:   Hypotension, Anxiety, tachycardia

## 2021-03-10 NOTE — PROGRESS NOTES
Daily Note     Today's date: 3/10/2021  Patient name: Bibi Small  : 1965  MRN: [id number]  Referring provider: BJ Bishop  Dx:   Encounter Diagnosis     ICD-10-CM    1  Parkinson's disease ([address] )  G20    2  MCI (mild cognitive impairment)  G31 84                   Subjective: * that was good"      Objective: See treatment diary below  Pt arrived for therapy at 5:38 secondary to aide first time coming to building     Pt completed name spelling activity 15x times with 6 VCs for spelling and sustained attentoin for carryover to improve IADL performance and handwriting of name for saliance of writing tasks    Pt completed 50x  card sort into 3 piles by color / face with 7 VCs for problem solving card placement and sustained attention from external distraction  Pt trialed spelling name from memory successfully spelling 1/2 names with 3 VCs for sustained attentoin and   with banagram pieces , downgraded to spelling with visual cue secondary to decreased delayed recall of name addressing sustained attention, working memory, and saliant activity  Assessment: Tolerated treatment well  Patient would benefit from continued OT    Pt tolerated treatment well, pt demonstrated decreased sustained attention secondary to external distractors, Pt demonstrated poor working memory  Pt would benefit from OT services focusing on defecits  Plan: Continue per plan of care        Precautions:   Hypotension, Anxiety, tachycardia

## 2021-03-11 NOTE — PROGRESS NOTES
Progress Note    Name:  Jeremy Salinas  :  1965  Age:  63 y o  Date of Evaluation: 21     Patient arrived with caregiver for HA visit  However, patient was just evaluated today by [facility] and they are going to order new devices for him through [facility]  See attached medical form          Janine Kendall , CCC-A, [place]# [id number], Hearing [employer] Dispenser, [place]# [id number]  Clinical Audiologist

## 2021-03-22 NOTE — PROGRESS NOTES
Daily Note     Today's date: 3/22/2021  Patient name: Delphine Russell  : 1965  MRN: [id number]  Referring provider: BJ Garibay  Dx:   Encounter Diagnosis     ICD-10-CM    1  Parkinson's disease ([address] )  G20    2  MCI (mild cognitive impairment)  G31 84        Start Time: 925  Stop Time: 1012  Total time in clinic (min): 47 minutes    Subjective: "its fine"      Objective: See treatment diary below  Pt completed color by number worksheet to work on sustained attention, fine motor, visual perception, and visual motor skills  Pt completed with min VCs for grading pressure to apply enough pressure to utilize crayon  Pt required min A to peel wrapping off crayon  Pt located numbers with mod VCs for sustained attention  Pt asked to name other items that are a specific color to work on divided attention  Pt completed 5/8 colors prior to refusal to complete  Pt provided bananagram pieces to form words of colors to work on mental processing and sustained attention  Pt required mod A to form words  Assessment: Tolerated treatment well  Patient would benefit from continued OT    Pt demonstrated difficulty with sustained attention and visual perception skills to locate numbers within a workhseet  Pt demonstrated decreased fine motor/ hand strength impacting his ability to grade coloring pressure  Plan: Continue per plan of care        Precautions:   Hypotension, Anxiety, tachycardia Problem: Patient Care Overview  Goal: Plan of Care Review  Outcome: Ongoing (interventions implemented as appropriate)  Pt denies Chest pain, SOB or nausea. Fall risk reviewed with pt. No falls, trauma or injury noted. VSS. 3L O2 NC.  Bipap at night. . NPO after MN. Plan is to have pacemaker placement in am. Plan of care reviewed with patient. No further questions at this time. No significant events. Will continue to monitor.

## 2021-03-22 NOTE — PROGRESS NOTES
Daily Note/Progress note  IE: 2021 (POC: 5-3-2021)    Today's date: 3/23/2021  Patient name: Noni Guerra  : 1965  MRN: [id number]  Referring provider: BJ Altamirano  Dx:   Encounter Diagnosis     ICD-10-CM    1  Parkinson's disease ([address] )  Basia Nichols        Start Time: 36  Stop Time: 1100  Total time in clinic (min): 45 minutes    Subjective: Pt arrives w/ caregiver, denies any changes  Objective: See treatment diary below    NMR:  - Forward over 6" hurdles with no UE support: 3 cycles  - Weaving around cones following PT : 10 cones   - Stepping onto blue foam pads: 20 reps FWD and Backwards stepping   - Picking up cones from around the gym and placing in bucket (carrying): 20 reps  - Ball toss fwd/lat (5# med ball) on foam, 20 reps  - Sit>stand with 5# ball toss, 10 reps   - Stepping over hurdles- dot on ground for external cues- 10 cycle sof 10 feet 6" hurdles  -Seated boom whackers to colored dots- 10 sets of 10 reps of hitting color callout      Goals  STG 30 days- Progressing toward all goals  1  Patient will improve static balance with feet together Eyes Closed Firm surface to 30 seconds indicating reduction in fall risk  2  Patient will display 2 3  second improvement with overall score of less than 12 seconds  with TUG test or lower with noted improvement being Minimal Detectable Change pre current research standards with fall risk  3  Patient will achieve 45/56 NUNEZ score with minimal improve by 6 points or more demonstrating Minimal Detectable change per current research standards for this objective test which assess fall risk  4  Patient will increase gait speed to > 0 70 m/s with 10 Meter Walk test, demonstrating Minimal Detectable change per current research standards for this objective test which assess fall risk  5 Patient will perform  5 x sit to stand test with overall reduction by seconds to less than 13 seconds indicating improvement with functional endurance  6  Patient will increase ABC score to 60% or higher to reduce fall risk and improve balance confidence  LT days- Progressing toward all goals  1  Patient will score low risk for falls with 3/4 fall risk measures   2  Patient will be able to perform floor transfer without physical assistance   3  Patient will be able to carry objects without loss of balance  4  Patient will be able to ambulate outdoors without any loss of balance  5  Patient will increase ABC score to 80% confidence to reduce fall risk and improve balance confidence  6  Patient will increase gait speed to 1 1 m/s or higher to reduce fall risk    Assessment:  Pt challenged with overall ability to maintain focus on interventions, progress note reveals slight improvements in outcome measures per objective but continuing to be challenged with amplitude and motions overall  Patient progressing toward goals  Patient had no loss of balance but was reluctent to attempt exercises without UE support    Plan: Continue per plan of care        Precautions:   Past Medical History:   Diagnosis Date    Abnormal weight gain     last assessed 16; resolved 17    [facility]     Anxiety     Dementia [facility]     Depression     Disease of thyroid gland     Down's syndrome     Fatigue     last assessed 17; resolved 17    GERD (gastroesophageal reflux disease)     Gluten free diet     H/O urinary retention     Hernia of abdominal cavity     Hyperlipidemia     Hypogonadism in male     Impulse control disorder     OCD (obsessive compulsive disorder)     Orthostatic hypotension     Parkinson disease (HCC)     Parkinson disease ([facility] )     resolved 17    Pleural effusion     Sinus tachycardia     Vitamin D deficiency     last assessed 16; resolved 17     Outcome Measures 2/8/21  3/22/2021             5x sit to stand 15 23 seconds no UE  14 15 seconds, no UE             30 second sit to stand 8 no UE  9 reps no UE             TUG 14 23 sec  14 02 seconds             Gait Speed   84 m/s   86 m/s             2 minute walk test 200 feet  350 feet             Gary 39/56  41/56             mCTSIB FTEO- 30 sec  FTEC- 30 sec   FTEO- 30 sec FTEC-7 5 sec  FTEO- 30 sec  FTEC- 30 sec   FTEO- 30 sec FTEC-10 sec             2 minute walk teest 150 feet 200 feet                Manuals                                                                 Neuro Re-Ed                                                                                                        Ther Ex                                                                                                                     Ther Activity                                       Gait Training                                       Modalities

## 2021-03-24 NOTE — PROGRESS NOTES
Daily Note     Today's date: 3/24/2021  Patient name: Mariah Navarro  : 1965  MRN: [id number]  Referring provider: BJ Plummer  Dx:   Encounter Diagnosis     ICD-10-CM    1  Parkinson's disease ([address] )  G20    2  MCI (mild cognitive impairment)  G31 84        Start Time: 1200  Stop Time: 1245  Total time in clinic (min): 45 minutes    Subjective: "I don't want to" referring to wearing mask  Pt redirected and able to wear mask with mod VCs  Objective: See treatment diary below  Pt completed complete the tulip worksheet to identify differences in tulip drawings, complete the drawing, and color image  Pt required max VCs to determine subtle difference in tulips and complete the image  He was able to identify missing leaves independently  Pt was able to color 95% of pictures within border  Wrote name independently  Pt completed umbrella craft with simple directions to work on sustained attention, bilateral skills associated with scissors, fine motor, and visual motor skills  Pt was able to cut straight or large curves but required assistance for rotating paper  He was able to color image within borders but required assistance to ensure entire image is colored  He required directions to be reduced to one step to increase carryover  Assessment: Tolerated treatment well  Patient would benefit from continued OT    Pt demonstrated difficulty with sustained attention, visual perception, fine motor, and visual motor skills which impacts his ability to complete ADLs within his home  Plan: Continue per plan of care        Precautions:   Hypotension, Anxiety, tachycardia

## 2021-03-24 NOTE — PROGRESS NOTES
Daily Note  IE: 2021 (POC: 5-3-2021)    Today's date: 3/24/2021  Patient name: Emery Cartwright  : 1965  MRN: [id number]  Referring provider: BJ Briceño  Dx:   Encounter Diagnosis     ICD-10-CM    1  Parkinson's disease ([facility] )  G20                   Subjective: Pt arrives w/ caregiver, denies any changes  Objective: See treatment diary below    NMR:  - Forward over 6" hurdles with no UE support: 3 cycles  - Weaving around cones following PT : 10 cones   - Stepping onto blue foam pads: 20 reps FWD and Backwards stepping   - Picking up cones from around the gym and placing in bucket (carrying): 20 reps  - Ball toss fwd/lat (5# med ball) on foam, 20 reps  - Sit>stand with 5# ball toss, 10 reps   - Step taps from foam to 8" step : 10 with L and 10with R      Assessment: very challenged with remaining on tasks  Ended session early due to patient reporting being hungry and ready for lunch    Patient had no loss of balance but was reluctent to attempt exercises without UE support    Plan: Continue per plan of care        Precautions:   Past Medical History:   Diagnosis Date    Abnormal weight gain     last assessed 16; resolved 17    [facility]) [facility]     Anxiety     Dementia ([facility] )     Depression     Disease of thyroid gland     Down's syndrome     Fatigue     last assessed 17; resolved 17    GERD (gastroesophageal reflux disease)     Gluten free diet     H/O urinary retention     Hernia of abdominal cavity     Hyperlipidemia     Hypogonadism in male     Impulse control disorder     OCD (obsessive compulsive disorder)     Orthostatic hypotension     Parkinson disease (HCC)     Parkinson disease [facility] )     resolved 17    Pleural effusion     Sinus tachycardia     Vitamin D deficiency     last assessed 16; resolved 17     Outcome Measures 21               5x sit to stand 15 23 seconds no UE               30 second sit to stand 8 no UE               TUG 14 23 sec               Gait Speed   84 m/s               2 minute walk test 200 feet               Gary 39/56               mCTSIB FTEO- 30 sec  FTEC- 30 sec   FTEO- 30 sec FTEC-7 5 sec               2 minute walk teest 150 feet                 Manuals                                                                 Neuro Re-Ed                                                                                                        Ther Ex                                                                                                                     Ther Activity                                       Gait Training                                       Modalities

## 2021-03-29 NOTE — PROGRESS NOTES
Daily Note  IE: 2021 (POC: 5-3-2021)    Today's date: 3/29/2021  Patient name: Barrett Leon  : 1965  MRN: [id number]  Referring provider: BJ Silva  Dx:   Encounter Diagnosis     ICD-10-CM    1  Parkinson's disease ([facility] )  G20                   Subjective: Pt arrives w/ caregiver who reports fall on 3/28/21 with no injuries  Objective: See treatment diary below    NMR:  - Forward over 6" hurdles with no UE support: 6 cycles  - Forward over 6" hurdles holding 5# med ball: 6 cycles  - Tandem walking on foam beams: 6 cycles  - Walking over foam pads w/ 5# med ball: 10 cycles w/ 2 foam pads  - STS w/ feet on foam: 10x, HHA  - Step ups: 8" step, HHA, 20x        Assessment:  Patient tolerated treatment fairly today  Patient challenged with focusing on exercises, requiring frequent redirection and verbal cueing  Patient also challenged with foot clearance during hurdles, utilizing hip circumduction to avoid anselmo  Patient will benefit from skilled PT services to improve his functional mobility and reduce risk of falls  Plan: Continue per plan of care        Precautions:   Past Medical History:   Diagnosis Date    Abnormal weight gain     last assessed 16; resolved 17    [facility]     Anxiety     Dementia ([facility] )     Depression     Disease of thyroid gland     Down's syndrome     Fatigue     last assessed 17; resolved 17    GERD (gastroesophageal reflux disease)     Gluten free diet     H/O urinary retention     Hernia of abdominal cavity     Hyperlipidemia     Hypogonadism in male     Impulse control disorder     OCD (obsessive compulsive disorder)     Orthostatic hypotension     Parkinson disease (HCC)     Parkinson disease ([facility] )     resolved 17    Pleural effusion     Sinus tachycardia     Vitamin D deficiency     last assessed 16; resolved 17     Outcome Measures 21               5x sit to stand 15 23 seconds no UE               30 second sit to stand 8 no UE               TUG 14 23 sec               Gait Speed   84 m/s               2 minute walk test 200 feet               Gary 39/56               mCTSIB FTEO- 30 sec  FTEC- 30 sec   FTEO- 30 sec FTEC-7 5 sec               2 minute walk teest 150 feet                 Manuals                                                                 Neuro Re-Ed                                                                                                        Ther Ex                                                                                                                     Ther Activity                                       Gait Training                                       Modalities

## 2021-03-29 NOTE — PROGRESS NOTES
OT Evaluation     Today's date: 3/29/2021  Patient name: Dannielle Vasques  : 1965  MRN: [id number]  Referring provider: BJ Rosales  Dx:   Encounter Diagnosis     ICD-10-CM    1  Parkinson's disease ([address] )  G20    2  MCI (mild cognitive impairment)  G31 84        Start Time: 0115  Stop Time: 215  Total time in clinic (min): 60 minutes      Subjective     Discussed progress with caregiver  He reported Bella Tavarez is still functional but they are seeing increased difficulty with memory and sustained attention  They reported difficulty brushing teeth, resulting in not being thorough and increased cueing  They also rpeorted difficulty with posterior wiping during tolieting  He reports Bella Tavarez tripped the other day  However, he reports it was not due to loss of balance, but because he forgot where he put his shoes after taking them after  Suggested visual timer during teeth brushing for increased thoroughness  Objective      MOCA(MOCA-B)  Executive function 0/1  Immediate recall 1st trial 0/5 2nd trial 0/5  Fluence 0/2  Orientation 4/6  Calculation 0/3  Abstraction 0/3  Delayed recall 0/5  visuoperceptoin 1/3 (n:5)  Naming 3/  Attention 0/3    8/30 indicative of neurological defecits    LILA hand strength   RUE  18 lbs LUE 15Lbs   Normal Age related strength 101 lbs indicative of decreased  strength     9 hole peg test:              LUE: 1 min 12 36 seconds              RUE: 59 seconds              Patients scores cannot be compared to norms due to directions being non-standardized due to decreased sustained attention and understanding of multi-step directions      MMT:              3/5 in all planes of movement  Strength beyond ROM unable to be tested due to difficulty understanding directions  Treatment  Karla color completed to work on sustained attention and fine motor strength  Pt required mod VCs for sustained attention to match colors accurately      Pt completed plastic screw activity to screw 8 screws into board using screwdriver  Working on in hand manipulation, [facility], and sustained attention  1 step directions provided for accuracy  [place]unkey FREDY provided for 3/8 screws to motor plan twisting motion  Precautions:   Hypotension, Anxiety, tachycardia          Assessment  Pt demonstrated improvements in fine motor skills  However, he continues to demonstrate difficulty with hand strength and sustained attention which impacts his functional memory  Pt's caregiver reports requiring A for ADL routine, working memory, and attention  Pt is currently demonstrating the following occupational deficits: decreased sustained attention, delayed recall, fine motor skills, and  strength  Pt recommended to continue OT services 1-3x/wk for 45-60 minutes to work on the deficits impacting his occupational performance  Goals    STG  1  Pt will increase hand strength by  3lbs (20) to complete IADLs etiolating performance  within 4 weeks NOT MET  2  Pt will be independent with HEP within 4 weeks  3  Pt will increase sustained attention to follow one step commands with 1-2 verbal cues to complete to decrease care giver burden to complete ADL routine within 4 weeks  NOT MET  4   Pt will improve delayed recall from 0/5  To approximately 20% 1/5 to improve ADL / IADL performance within 4 weeks NOT MET    LTG   Pt will increase hand strength by 8 lbs (20) to improve performance in ADL/ IADL activities within 8-12 weeks   Pt will increase sustained attention to follow one step commands with independence to decrease care giver burden to complete ADL routine within 8-12 weeks   Pt will improve delayed recall from 0-5 to approximately 2/5 to improve ADL/IADL performance within 8-12 weeks

## 2021-03-31 NOTE — PROGRESS NOTES
Daily Note     Today's date: 3/31/2021  Patient name: Bibi Small  : 1965  MRN: [id number]  Referring provider: BJ Bishop  Dx:   Encounter Diagnosis     ICD-10-CM    1  Parkinson's disease ([address] )  G20    2  MCI (mild cognitive impairment)  G31 84        Start Time: 1145  Stop Time: 1230  Total time in clinic (min): 45 minutes    Subjective: Discussed session with caregiver after session  Instructed on use of brushing teeth schedule  He expressed understanding  Objective: See treatment diary below  Reviewed and completed brushing teeth visual schedule  Pt identify items needed for brushing teeth with min VCs  He identify the correct order of brushing teeth with mod VCs  Pt expressed interest in chart and agreed to hang in bathroom  Rubber band and 1 shape and letters of name  Pt demonstrated min difficulty with hand and finger strength to stretch rubber bands across board  Pt required max VCs to motor plan creation of shape and letters of name  Pt wrote name on paper but was unable to verbally spell name or identify the "next" letter of his name  Pt completed ispy dig in with tongs  Removed x6 squigz to retreive card to work on hand strength  Pt unable to utilize tongs independently due to decreased hand strength  [place] A for placement of fingers on tongs  Removed all colors in ispy dig in except yellow to increase independence of locate items within container  Assessment: Tolerated treatment well  Patient would benefit from continued OT    Pt demonstrated decreased fine motor and hand strength, impacting his ability to engage in home and leisure activities  Pt is write name from muscle memory but is unable to use working memory to spell name verbally  Plan: Continue per plan of care        Precautions:   Hypotension, Anxiety, tachycardia

## 2021-03-31 NOTE — PROGRESS NOTES
Daily Note  IE: 2021 (POC: 5-3-2021)    Today's date: 3/31/2021  Patient name: Fransisco Gordon  : 1965  MRN: [id number]  Referring provider: BJ Lovelace  Dx:   Encounter Diagnosis     ICD-10-CM    1  Parkinson's disease ([facility] )  G20        Start Time: 1100  Stop Time: 1140  Total time in clinic (min): 40 minutes    Subjective: Pt arrives w/ caregiver who does not report any complaints  Objective: See treatment diary below    NMR:  - Forward over 6" hurdles with no UE support: 6 cycles  - Forward over 6" hurdles holding 5# med ball: 6 cycles  - Tandem walking on foam beams: 6 cycles  - Walking over foam pads w/ 5# med ball: 10 cycles w/ 2 foam pads  - STS w/ feet on foam: 10x, HHA  - Step ups: 8" step, HHA, 20x        Assessment:  Patient tolerated treatment fairly well today  Cues for dec use of UE  Cues for exercises reps  Patient will benefit from skilled PT services to improve his functional mobility and reduce risk of falls  Plan: Continue per plan of care        Precautions:   Past Medical History:   Diagnosis Date    Abnormal weight gain     last assessed 16; resolved 17    Acute CHF (congestive heart failure) ([place] [address] )     Anxiety     Dementia ([place] [address] )     Depression     Disease of thyroid gland     Down's syndrome     Fatigue     last assessed 17; resolved 17    GERD (gastroesophageal reflux disease)     Gluten free diet     H/O urinary retention     Hernia of abdominal cavity     Hyperlipidemia     Hypogonadism in male     Impulse control disorder     OCD (obsessive compulsive disorder)     Orthostatic hypotension     Parkinson disease (HCC)     Parkinson disease ([facility] )     resolved 17    Pleural effusion     Sinus tachycardia     Vitamin D deficiency     last assessed 16; resolved 17     Outcome Measures 21               5x sit to stand 15 23 seconds no UE               30 second sit to stand 8 no UE               TUG 14 23 sec               Gait Speed   84 m/s               2 minute walk test 200 feet               Gary 39/56               mCTSIB FTEO- 30 sec  FTEC- 30 sec   FTEO- 30 sec FTEC-7 5 sec               2 minute walk teest 150 feet                 Manuals                                                                 Neuro Re-Ed                                                                                                        Ther Ex                                                                                                                     Ther Activity                                       Gait Training                                       Modalities

## 2021-04-01 NOTE — TELEPHONE ENCOUNTER
Ita guevara stop time on claratin and fax new script with no time to [phone] new script to say every 24 hours as needed

## 2021-04-05 NOTE — PROGRESS NOTES
Daily Note  IE: 2021  Progress Note- 3/22   (POC: 5-3-2021)    Today's date: 2021  Patient name: Suleman Seen  : 1965  MRN: [id number]  Referring provider: BJ Muse  Dx:   Encounter Diagnosis     ICD-10-CM    1  Parkinson's disease ([facility] )  Hever Chahal        Start Time: 36  Stop Time: 1100  Total time in clinic (min): 45 minutes    Subjective: Pt arrives w/ caregiver who does not report any complaints, patient and caregiver report no falls  Objective: See treatment diary below    NMR:  - Forward over 6" hurdles with no UE support: 6 cycles  - Forward over 6" hurdles holding 5# med ball: 6 cycles  - Tandem walking on foam beams: 6 cycles  - Walking over foam pads w/ 5# med ball: 10 cycles w/ 2 foam pads  - STS w/ feet on foam: 10x, HHA  - Step ups: 8" step, HHA, 20x  -Tossing ball with narrow base of support- 20 catches   [employer] between therapist- 5 lb ball, 20 reps side to side pass   -Shooting ball into hoop, picking up ball from floor after miss- 20 reps        Assessment:  Patient tolerated treatment fairly well today, patient challenged with staying on task, patient improving with amplitude but challenged with reaching and promotion of trunk rotation  Patient will benefit from skilled PT services to improve his functional mobility and reduce risk of falls  Plan: Continue per plan of care        Precautions:   Past Medical History:   Diagnosis Date    Abnormal weight gain     last assessed 16; resolved 17    [facility]     Anxiety     Dementia ([facility] )     Depression     Disease of thyroid gland     Down's syndrome     Fatigue     last assessed 17; resolved 17    GERD (gastroesophageal reflux disease)     Gluten free diet     H/O urinary retention     Hernia of abdominal cavity     Hyperlipidemia     Hypogonadism in male     Impulse control disorder     OCD (obsessive compulsive disorder)     Orthostatic hypotension     [facility] disease [facility]     Parkinson disease ([facility] )     resolved 2/23/17    Pleural effusion     Sinus tachycardia     Vitamin D deficiency     last assessed 9/1/16; resolved 6/1/17     Outcome Measures 2/8/21               5x sit to stand 15 23 seconds no UE               30 second sit to stand 8 no UE               TUG 14 23 sec               Gait Speed   84 m/s               2 minute walk test 200 feet               Gary 39/56               mCTSIB FTEO- 30 sec  FTEC- 30 sec   FTEO- 30 sec FTEC-7 5 sec               2 minute walk teest 150 feet                 Manuals                                                                 Neuro Re-Ed                                                                                                        Ther Ex                                                                                                                     Ther Activity                                       Gait Training                                       Modalities

## 2021-04-05 NOTE — PROGRESS NOTES
Daily Note     Today's date: 2021  Patient name: Susan Lombardo  : 1965  MRN: [id number]  Referring provider: BJ Tena  Dx:   Encounter Diagnosis     ICD-10-CM    1  Parkinson's disease ([address] )  G20    2  MCI (mild cognitive impairment)  G31 84                   Subjective: pt was 6 minutes late for therapy session;       Objective:   Performed writing name for easter basket required min VCs for sustained attention secondary to pt was externally distracted   Performed performed magnetic reing task for 3-4 stacks focusig on [facility] and sustained attention  Pt required moderate VCs for problem solving correct color sequence and recall of instructions  Performed simple copying of magentic figures required moderate VCs for problem solving to copy figure and  skills for proper placement  Performed to increase [facility] and sustained attention       Assessment: Tolerated treatment well  Patient would benefit from continued OT  Pt required cues for problem oslivng during session and was externally and internall distracted  continue    Plan: Continue per plan of care        Precautions:   Hypotension, Anxiety, tachycardia

## 2021-04-07 NOTE — PROGRESS NOTES
Daily Note  IE: 2021  Progress Note- 3/22   (POC: 5-3-2021)    Today's date: 2021  Patient name: Shena Bond  : 1965  MRN: [id number]  Referring provider: BJ Jarquin  Dx:   Encounter Diagnosis     ICD-10-CM    1  Parkinson's disease ([facility] )  Amanuel Daleyashlie        Start Time: 12  Stop Time: 0930  Total time in clinic (min): 40 minutes    Subjective: Pt arrives w/ caregiver who does not report any complaints  Objective: See treatment diary below    NMR:  - Forward over 6" hurdles with no UE support: 6 cycles  - Forward over 6" hurdles holding 5# med ball: 6 cycles  - Tandem walking on foam beams: 6 cycles  - Walking over foam pads w/ 5# med ball: 10 cycles w/ 2 foam pads  - STS w/ feet on foam: 10x, HHA  - Step ups: 8" step, HHA, 20x  -Tossing ball with narrow base of support- 20 catches   -Kamelio between therapist- 5 lb ball, 20 reps side to side pass   -Shooting ball into hoop, picking up ball from floor after miss- 20 reps        Assessment:  Patient tolerated treatment fairly well today, patient needed redirection for exercises  Patient will benefit from skilled PT services to improve his functional mobility and reduce risk of falls  Plan: Continue per plan of care        Precautions:   Past Medical History:   Diagnosis Date    Abnormal weight gain     last assessed 16; resolved 17    Acute [facility]F [facility]congestive [facility] failure) ([facility] )     Anxiety     [facility]     Depression     Disease of thyroid gland     Down's syndrome     Fatigue     last assessed 17; resolved 17    GERD (gastroesophageal reflux disease)     Gluten free diet     H/O urinary retention     Hernia of abdominal cavity     Hyperlipidemia     Hypogonadism in male     Impulse control disorder     OCD (obsessive compulsive disorder)     Orthostatic hypotension     Parkinson disease ([facility] )     Parkinson disease ([facility] )     resolved 17    Pleural effusion     Sinus tachycardia     Vitamin D deficiency     last assessed 9/1/16; resolved 6/1/17     Outcome Measures 2/8/21               5x sit to stand 15 23 seconds no UE               30 second sit to stand 8 no UE               TUG 14 23 sec               Gait Speed   84 m/s               2 minute walk test 200 feet               Gary 39/56               mCTSIB FTEO- 30 sec  FTEC- 30 sec   FTEO- 30 sec FTEC-7 5 sec               2 minute walk teest 150 feet                 Manuals                                                                 Neuro Re-Ed                                                                                                        Ther Ex                                                                                                                     Ther Activity                                       Gait Training                                       Modalities

## 2021-04-07 NOTE — PROGRESS NOTES
Daily Note     Today's date: 2021  Patient name: Jarod Raphael  : 1965  MRN: [id number]  Referring provider: BJ Mcneal  Dx:   Encounter Diagnosis     ICD-10-CM    1  Parkinson's disease ([facility] )  G20    2  MCI (mild cognitive impairment)  G31 84                   Subjective:"I got to use the bathroom- pt required se of bathroom for 5-6 minutes  Objective: performed sustained attention task of matching colors and placing into paint color karma focusing on [facility], hand strength, sustianed attnentionn with pt required up to moderate VCs at beginning to perform secondary to external distractions hwoever was able to complete with independence for sustained attention - pt required min VCs for problem solving proper color   Performed simple memory mix focusing on working memory pt required up to moderate VCs for recall of items after se of repeating outloud 5 items- pt required multiple choice to recall items  Pt demonstrating internallydistracted  F/u with alem care giver and reports schelding is going well for brushing theeth however freqires mercedes for timer  Assessment: Tolerated treatment well  Patient would benefit from continued OT  Performed sustained attention task wthi pt externally distracted however was able to carryover     Plan: Continue per plan of care        Precautions:   Past Medical History:   Diagnosis Date    Abnormal weight gain     last assessed 16; resolved 17    Acute CHF (congestive heart failure) ([facility] )     Anxiety     Dementia ([place] [address] )     Depression     Disease of thyroid gland     Down's syndrome     Fatigue     last assessed 17; resolved 17    GERD (gastroesophageal reflux disease)     Gluten free diet     H/O urinary retention     Hernia of abdominal cavity     Hyperlipidemia     Hypogonadism in male     Impulse control disorder     OCD (obsessive compulsive disorder)     Orthostatic hypotension     Parkinson disease ([facility] )  Parkinson disease ([address] )     resolved 2/23/17    Pleural effusion     Sinus tachycardia     Vitamin D deficiency     last assessed 9/1/16; resolved 6/1/17     Outcome Measures 2/8/21               5x sit to stand 15 23 seconds no UE               30 second sit to stand 8 no UE               TUG 14 23 sec               Gait Speed   84 m/s               2 minute walk test 200 feet               Gary 39/56               mCTSIB FTEO- 30 sec  FTEC- 30 sec   FTEO- 30 sec FTEC-7 5 sec               2 minute walk teest 150 feet                 Manuals                                                                 Neuro Re-Ed                                                                                                        Ther Ex                                                                                                                     Ther Activity                                       Gait Training                                       Modalities

## 2021-04-10 NOTE — TELEPHONE ENCOUNTER
TigerText:    [phone]/ Susana/ Antelmo hanks T [facility]/ Pt Kellen Thornton  1965/ Pt is febrile

## 2021-04-12 NOTE — PROGRESS NOTES
Daily Note  IE: 2021  Progress Note- 3/22   (POC: 5-3-2021)    Today's date: 2021  Patient name: Kiah Browning  : 1965  MRN: [id number]  Referring provider: BJ Guzmán  Dx:   Encounter Diagnosis     ICD-10-CM    1  Parkinson's disease ([facility] )  G20                   Subjective: Pt arrives from OT treatment and does not report any complaints, patient and caregiver report no falls  Objective: See treatment diary below    NMR:  - Forward over 6" hurdles holding 5# med ball: 6 cycles  - Tandem walking on foam beams: 6 cycles, 1 UE  - Med ball toss with 5# med ball: 20x  - STS w/ feet on foam: 10x, HHA  - STS w/ 6# medball: 10x  - Step ups: 8" step, HHA, 20x  - Shooting ball into hoop, picking up ball from floor after miss- 20 reps  - Kicking ball into bucket target- 20x        Assessment: Patient tolerated treatment well  Patient challenged with foam beam activity, using 1 UE to maintain balance  Patient required constant verbal cueing to remain on task  Kicking ball to target added today to work on single leg stance and balance  He will benefit from skilled PT services to improve his functional mobility and reduce risk of falls  Plan: Continue per plan of care        Precautions:   Past Medical History:   Diagnosis Date    Abnormal weight gain     last assessed 16; resolved 17    Acute CHF (congestive heart failure) ([facility] )     Anxiety     Dementia ([facility] )     Depression     Disease of thyroid gland     Down's syndrome     Fatigue     last assessed 17; resolved 17    GERD (gastroesophageal reflux disease)     Gluten free diet     H/O urinary retention     Hernia of abdominal cavity     Hyperlipidemia     Hypogonadism in male     Impulse control disorder     OCD (obsessive compulsive disorder)     Orthostatic hypotension     Parkinson disease ([address] )     Parkinson disease ([address] )     resolved 17    Pleural effusion     Sinus tachycardia  Vitamin D deficiency     last assessed 9/1/16; resolved 6/1/17     Outcome Measures 2/8/21               5x sit to stand 15 23 seconds no UE               30 second sit to stand 8 no UE               TUG 14 23 sec               Gait Speed   84 m/s               2 minute walk test 200 feet               Gary 39/56               mCTSIB FTEO- 30 sec  FTEC- 30 sec   FTEO- 30 sec FTEC-7 5 sec               2 minute walk teest 150 feet                 Manuals                                                                 Neuro Re-Ed                                                                                                        Ther Ex                                                                                                                     Ther Activity                                       Gait Training                                       Modalities

## 2021-04-12 NOTE — TELEPHONE ENCOUNTER
S/w Susana second covid vaccine received Fri, pt febrile Sat, Per shola Luo tylenol given, afebrile Sunday  And today, Susana will call Parmjit Hassan if he needs anything

## 2021-04-12 NOTE — PROGRESS NOTES
Daily Note     Today's date: 2021  Patient name: Cata Harris  : 1965  MRN: [id number]  Referring provider: BJ Pedroza  Dx:   Encounter Diagnosis     ICD-10-CM    1  Parkinson's disease ([facility] [address] )  G20    2  MCI (mild cognitive impairment)  G31 84                   Subjective: "[birth year] my birthday" referring to July   Pt was late for session    Objective:   Performed [address] and sustained attention task of sorting 2 colored mini-cards focusing on sustianed attention, and [address] required moderate VCs for redirection and recall of directions and problem solivng colors - required increased time to perform   Performed 1 step command task of coloring task with pt required moderate VCs for problem solving shapes and recall of instructions- ie 1 step commands      Assessment: Tolerated treatment well  Patient would benefit from continued OT  Pt demonstrating difficulty with sustianed attention and required cues for redirection     Plan: Continue per plan of care        Precautions:   Past Medical History:   Diagnosis Date    Abnormal weight gain     last assessed 16; resolved 17    [facility]     Anxiety     Dementia ([facility] )     Depression     Disease of thyroid gland     Down's syndrome     Fatigue     last assessed 17; resolved 17    GERD (gastroesophageal reflux disease)     Gluten free diet     H/O urinary retention     Hernia of abdominal cavity     Hyperlipidemia     Hypogonadism in male     Impulse control disorder     OCD (obsessive compulsive disorder)     Orthostatic hypotension     Parkinson disease (HCC)     Parkinson disease ([facility] )     resolved 17    Pleural effusion     Sinus tachycardia     Vitamin D deficiency     last assessed 16; resolved 17     Outcome Measures 21               5x sit to stand 15 23 seconds no UE               30 second sit to stand 8 no UE               TUG 14 23 sec               Gait Speed  84 m/s               2 minute walk test 200 feet               Gary 39/56               mCTSIB FTEO- 30 sec  FTEC- 30 sec   FTEO- 30 sec FTEC-7 5 sec               2 minute walk teest 150 feet                 Manuals                                                                 Neuro Re-Ed                                                                                                        Ther Ex                                                                                                                     Ther Activity                                       Gait Training                                       Modalities

## 2021-04-14 NOTE — PROGRESS NOTES
Daily Note  IE: 2021  Progress Note- 3/22   (POC: 5-3-2021)    Today's date: 2021  Patient name: Vani Medrano  : 1965  MRN: [id number]  Referring provider: BJ Johnson  Dx:   Encounter Diagnosis     ICD-10-CM    1  Parkinson's disease ([facility] )  G20                   Subjective: Pt arrives to session 10 minutes late  Pt does not report any changes  Objective: See treatment diary below    NMR:  - Forward over 6" hurdles holding 5# med ball: 6 cycles  - Tandem walking on foam beams: 6 cycles, 1 UE  - Med ball toss with 5# med ball: 20x  - STS w/ feet on foam: 10x, HHA  - STS w/ 5# medball: 10x  - Step ups and downs: 6" step, 5# med ball, 1 UE assist, 20x  - Shooting ball into hoop, picking up ball from floor after miss- 25 reps  - Kicking ball into bucket target and to therapist - 25x        Assessment: Patient tolerated treatment well today with good participation throughout however required cueing to remain on task  Pt showed good balance throughout session with picking up objects from floor and with throwing ball  Pt required UE assist for feeling of balance during step ups and hurdles  Pt will benefit from continued skilled therapy to continue to improve balance and increase safety during daily tasks for maximal function  Plan: Continue per plan of care        Precautions:   Past Medical History:   Diagnosis Date    Abnormal weight gain     last assessed 16; resolved 17    Acute CHF (congestive heart failure) ([facility] )     Anxiety     Dementia ([facility] )     Depression     Disease of thyroid gland     Down's syndrome     Fatigue     last assessed 17; resolved 17    GERD (gastroesophageal reflux disease)     Gluten free diet     H/O urinary retention     Hernia of abdominal cavity     Hyperlipidemia     Hypogonadism in male     Impulse control disorder     OCD (obsessive compulsive disorder)     Orthostatic hypotension     Parkinson disease ([facility] )     Parkinson disease ([facility] )     resolved 2/23/17    Pleural effusion     Sinus tachycardia     Vitamin D deficiency     last assessed 9/1/16; resolved 6/1/17     Outcome Measures 2/8/21               5x sit to stand 15 23 seconds no UE               30 second sit to stand 8 no UE               TUG 14 23 sec               Gait Speed   84 m/s               2 minute walk test 200 feet               Gary 39/56               mCTSIB FTEO- 30 sec  FTEC- 30 sec   FTEO- 30 sec FTEC-7 5 sec               2 minute walk teest 150 feet                 Manuals                                                                 Neuro Re-Ed                                                                                                        Ther Ex                                                                                                                     Ther Activity                                       Gait Training                                       Modalities

## 2021-04-14 NOTE — PROGRESS NOTES
Daily Note     Today's date: 2021  Patient name: Katalina Suggs  : 1965  MRN: [id number]  Referring provider: BJ Elizabeth  Dx:   Encounter Diagnosis     ICD-10-CM    1  Parkinson's disease ([facility] )  G20    2  MCI (mild cognitive impairment)  G31 84        Start Time: 0930  Stop Time: 1015  Total time in clinic (min): 45 minutes    Subjective: "Do I have to leave this on?" referring to mask  Objective:   Pt completed 12 piece puzzle to work on problem solving and sustained attention  Pt required mod VCs for redirection to complete task  5 VCs to rotate pieces and locate similar colored pieces  Pt completed read, write, and fine fruits to work on sustained attention, near point copy, memory of letter formation, and categorization skills  Pt required VCs to read through each word to continue with activity  He required A to read words x3  He required VCs to copy words x1  Pt completed [employer] board working on sustained attention to [employer]  Attempted to use yellow resistive clip to work on pinch strength  Pt demonstrated difficulty with motor planning to open and close clip at appropriate times      Assessment: Tolerated treatment well  Patient would benefit from continued OT  Pt requires mod VCs throughout activities for sustained attention and simple problem solving  Plan: Continue per plan of care        Precautions:   Past Medical History:   Diagnosis Date    Abnormal weight gain     last assessed 16; resolved 17    Acute CH[facility]congestive [facility] failure) ([facility] )     Anxiety     Dementia ([facility] )     Depression     Disease of thyroid gland     Down's syndrome     Fatigue     last assessed 17; resolved 17    GERD (gastroesophageal reflux disease)     Gluten free diet     H/O urinary retention     Hernia of abdominal cavity     Hyperlipidemia     Hypogonadism in male     Impulse control disorder     OCD (obsessive compulsive disorder)     Orthostatic hypotension     Parkinson disease (HCC)     Parkinson disease ([facility] )     resolved 2/23/17    Pleural effusion     Sinus tachycardia     Vitamin D deficiency     last assessed 9/1/16; resolved 6/1/17     Outcome Measures 2/8/21               5x sit to stand 15 23 seconds no UE               30 second sit to stand 8 no UE               TUG 14 23 sec               Gait Speed   84 m/s               2 minute walk test 200 feet               Gary 39/56               mCTSIB FTEO- 30 sec  FTEC- 30 sec   FTEO- 30 sec FTEC-7 5 sec               2 minute walk teest 150 feet                 Manuals                                                                 Neuro Re-Ed                                                                                                        Ther Ex                                                                                                                     Ther Activity                                       Gait Training                                       Modalities

## 2021-04-15 NOTE — PROGRESS NOTES
Virtual Brief Visit    Assessment/Plan:    Problem List Items Addressed This Visit        Digestive    Gastroenteritis - Primary    Relevant Medications    ondansetron (ZOFRAN-ODT) 4 mg disintegrating tablet        The case discussed with patient's caregiver using patient centered shared decision making  The patient was counseled regarding instructions for management,-- risk factor reductions,-- prognosis,-- impressions,-- risks and benefits of treatment options,-- importance of compliance with treatment  I have reviewed the instructions with the patient, answering all questions to his satisfaction  Advised Emely-bland diet, clear fluids  Monitor for dehydration  zofran as directed  Reviewed alarm signs that require ER visit  Call arrange for tomorrow at 9 with Susana for recheck  Reason for visit is   Chief Complaint   Patient presents with    Virtual Brief Visit        Encounter provider BJ Valerio    Provider located at [address] [place] [id number]    Recent Visits  No visits were found meeting these conditions  Showing recent visits within past 7 days and meeting all other requirements     Today's Visits  Date Type Provider Dept   04/15/21 Telemedicine Branden Valerio 49 today's visits and meeting all other requirements     Future Appointments  No visits were found meeting these conditions  Showing future appointments within next 150 days and meeting all other requirements        After connecting through telephone, the patient was identified by name and date of birth  Purnima Gutierrez was informed that this is a telemedicine visit and that the visit is being conducted through telephone  My office door was closed  No one else was in the room  He acknowledged consent and understanding of privacy and security of the platform   The patient has agreed to participate and understands he can discontinue the visit at any time  Patient is aware this is a billable service  Subjective      History obtained from caregiver, Blaire Saldaña, and unobtainable from patient due to mental status  Kailee Gutiérrez is a 63 y o  male who presents for evaluation of vomiting, diarrhea  Symptoms started acutely yesterday 2 hours after having a lunch meat sub with velma slaw and potato salad  Blaire Saldaña advised products were fresh  Everyone at group home had same lunch  Silvino Alejandro only one to get ill  Patient denies any pain located in in the entire abdomen, dark urine, fever, heartburn, hematemesis, hematuria and melena  Patient's oral intake has been decreased for liquids and decreased for solids  Patient's urine output has been slightly decreased  Other contacts with similar symptoms include: none  Patient has not recent travel history  Patient has had recent ingestion of possible contaminated food, toxic plants, or inappropriate medications/poisons            Past Medical History:   Diagnosis Date    Abnormal weight gain     last assessed 4/18/16; resolved 1/25/17    [facility]) [facility]     Anxiety     Dementia ([facility]     Depression     Disease of thyroid gland     Down's syndrome     Fatigue     last assessed 2/23/17; resolved 6/1/17    GERD (gastroesophageal reflux disease)     Gluten free diet     H/O urinary retention     Hernia of abdominal cavity     Hyperlipidemia     Hypogonadism in male     Impulse control disorder     OCD (obsessive compulsive disorder)     Orthostatic hypotension     Parkinson disease [facility]     Parkinson disease ([facility]     resolved 2/23/17    Pleural effusion     Sinus tachycardia     Vitamin D deficiency     last assessed 9/1/16; resolved 6/1/17       Past Surgical History:   Procedure Laterality Date    COLONOSCOPY      ESOPHAGOGASTRODUODENOSCOPY      with biopsy    PERICARDIOCENTESIS  4/29/2016            Current Outpatient Medications Medication Sig Dispense Refill    Aspirin Low Dose 81 MG chewable tablet TAKE 1 TABLET BY MOUTH EVERY MORNING (8AM) 30 tablet 4    atorvastatin (LIPITOR) 80 mg tablet Take 1 tablet (80 mg total) by mouth daily 30 tablet 3    Calcium Carbonate-Vitamin D 600-400 MG-UNIT per tablet Take 1 tablet by mouth 2 (two) times a day At 8 am and 5 pm 60 tablet 11    cholecalciferol (VITAMIN D3) 1,000 units tablet Take 1 tablet (1,000 Units total) by mouth 2 (two) times a day At 8am and 8 pm 60 tablet 11    divalproex sodium (DEPAKOTE) 500 mg EC tablet Take 500 mg by mouth       docusate sodium (COLACE) 100 mg capsule Take one capsule at 8am 30 capsule 11    erythromycin (ILOTYCIN) ophthalmic ointment Administer 0 5 inches into the left eye every 12 (twelve) hours At 8 am and 8 pm  D/c on 5/27/20 3 5 g 0    esomeprazole (NexIUM) 40 MG capsule Take one capsule daily 30 minutes prior to breakfast 30 capsule 11    lanolin-mineral oil (ARMAAN) LOTN Apply topically 2 (two) times a day Apply to hands daily at 8 am and 8 pm 1 Bottle 11    levothyroxine 75 mcg tablet TAKE 1 TABLET BY MOUTH IN THE EVENING (8PM) 30 tablet 4    lidocaine (URO-JET) 2 % urethral/mucosal gel Insert 1 application into the urethra daily as needed (urethral pain 2/2 catheter)  0    loratadine (CLARITIN) 10 mg tablet TAKE 1 TABLET BY MOUTH EVERY MORNING (8AM) AS NEEDED FOR ALLERGIES -MAX=1TAB/DAY 30 tablet 4    midodrine (PROAMATINE) 2 5 mg tablet Take 1 tablet (2 5 mg total) by mouth 2 (two) times a day as needed (If systolic blood pressure less than 100 and patient is symptomatic) 60 tablet 2    multivitamin-minerals (CENTRUM ADULTS) tablet Take 1 tablet by mouth daily At 8am 30 tablet 11    neomycin-bacitracin-polymyxin (NEOSPORIN) 5-400-5,000 ointment Apply topically 3 (three) times a day Apply to urethra for lubrication 28 3 g 0    OLANZapine (ZyPREXA) 2 5 mg tablet Take 1 tablet by mouth daily 8 pm      ondansetron (ZOFRAN-ODT) 4 mg disintegrating tablet Take 1 tablet (4 mg total) by mouth every 8 (eight) hours as needed for nausea or vomiting D/c on 4/18/21 20 tablet 0    polyethylene glycol (GLYCOLAX) 17 GM/SCOOP powder Take 17 grams in 8 oz of water by mouth daily at 8 pm 527 g 11    tamsulosin (FLOMAX) 0 4 mg Take 0 4 mg by mouth Two tablet @ 8 am 0 8 mg daily      testosterone (ANDROGEL) 1 62 % TD gel pump Apply 3 pumps at 8 AM to shoulders/chest daily 75 g 3     No current facility-administered medications for this visit  Allergies   Allergen Reactions    Advil [Ibuprofen]     Gluten Meal - Food Allergy     Other      SEASONAL       Review of Systems   Constitutional: Positive for fatigue  Negative for fever  Respiratory: Negative for cough and shortness of breath  Gastrointestinal: Positive for diarrhea and vomiting  Negative for abdominal pain  Neurological: Negative for weakness  There were no vitals filed for this visit  I spent 10 minutes directly with the patient during this visit    VIRTUAL VISIT DISCLAIMER    Cha Austin acknowledges that he has consented to an online visit or consultation  He understands that the online visit is based solely on information provided by him, and that, in the absence of a face-to-face physical evaluation by the physician, the diagnosis he receives is both limited and provisional in terms of accuracy and completeness  This is not intended to replace a full medical face-to-face evaluation by the physician  Cha Austin understands and accepts these terms

## 2021-04-16 NOTE — PROGRESS NOTES
Virtual Brief Visit    Assessment/Plan:    Problem List Items Addressed This Visit        Digestive    Gastroenteritis - Primary        Advised Susana to resume diet, med schedule  Call with relapse of sxs        Reason for visit is   Chief Complaint   Patient presents with    Virtual Brief Visit        Encounter provider BJ Sheppard    Provider located at [address] [place] [phone]    Recent Visits  Date Type Provider Dept   04/15/21 [address], Branden Huntingel 49 recent visits within past 7 days and meeting all other requirements     Today's Visits  Date Type Provider Dept   04/16/21 Telemedicine Branden Sheppard 49 today's visits and meeting all other requirements     Future Appointments  No visits were found meeting these conditions  Showing future appointments within next 150 days and meeting all other requirements    It was my intent to perform this visit via video technology but the patient was not able to do a video connection so the visit was completed via audio telephone only  After connecting through telephone, the patient was identified by name and date of birth  Kailee Gutiérrez was informed that this is a telemedicine visit and that the visit is being conducted through telephone  My office door was closed  No one else was in the room  He acknowledged consent and understanding of privacy and security of the platform  The patient has agreed to participate and understands he can discontinue the visit at any time  Patient is aware this is a billable service       Subjective      telemed visit follow up for gastroenteritis yesterday-see previous note  Susana RN group home caregiver, provided info  He reports all sxs of gastroenteritis resolved since 4 pm yesterday   Jin Catching in his usual state       Past Medical History:   Diagnosis Date    Abnormal weight gain     last assessed 4/18/16; resolved 1/25/17    [facility]estive [facility]) [facility]     Anxiety     Dementia ([facility] )     Depression     Disease of thyroid gland     Down's syndrome     Fatigue     last assessed 2/23/17; resolved 6/1/17    GERD (gastroesophageal reflux disease)     Gluten free diet     H/O urinary retention     Hernia of abdominal cavity     Hyperlipidemia     Hypogonadism in male     Impulse control disorder     OCD (obsessive compulsive disorder)     Orthostatic hypotension     Parkinson disease (HCC)     Parkinson disease ([facility] )     resolved 2/23/17    Pleural effusion     Sinus tachycardia     Vitamin D deficiency     last assessed 9/1/16; resolved 6/1/17       Past Surgical History:   Procedure Laterality Date    COLONOSCOPY      ESOPHAGOGASTRODUODENOSCOPY      with biopsy    PERICARDIOCENTESIS  4/29/2016            Current Outpatient Medications   Medication Sig Dispense Refill    Aspirin Low Dose 81 MG chewable tablet TAKE 1 TABLET BY MOUTH EVERY MORNING (8AM) 30 tablet 4    atorvastatin (LIPITOR) 80 mg tablet Take 1 tablet (80 mg total) by mouth daily 30 tablet 3    Calcium Carbonate-Vitamin D 600-400 MG-UNIT per tablet Take 1 tablet by mouth 2 (two) times a day At 8 am and 5 pm 60 tablet 11    cholecalciferol (VITAMIN D3) 1,000 units tablet Take 1 tablet (1,000 Units total) by mouth 2 (two) times a day At 8am and 8 pm 60 tablet 11    divalproex sodium (DEPAKOTE) 500 mg EC tablet Take 500 mg by mouth       docusate sodium (COLACE) 100 mg capsule Take one capsule at 8am 30 capsule 11    erythromycin (ILOTYCIN) ophthalmic ointment Administer 0 5 inches into the left eye every 12 (twelve) hours At 8 am and 8 pm  D/c on 5/27/20 3 5 g 0    esomeprazole (NexIUM) 40 MG capsule Take one capsule daily 30 minutes prior to breakfast 30 capsule 11    lanolin-mineral oil (ARMAAN) LOTN Apply topically 2 (two) times a day Apply to hands daily at 8 am and 8 pm 1 Bottle 11    levothyroxine 75 mcg tablet TAKE 1 TABLET BY MOUTH IN THE EVENING (8PM) 30 tablet 4    lidocaine (URO-JET) 2 % urethral/mucosal gel Insert 1 application into the urethra daily as needed (urethral pain 2/2 catheter)  0    loratadine (CLARITIN) 10 mg tablet TAKE 1 TABLET BY MOUTH EVERY MORNING (8AM) AS NEEDED FOR ALLERGIES -MAX=1TAB/DAY 30 tablet 4    midodrine (PROAMATINE) 2 5 mg tablet Take 1 tablet (2 5 mg total) by mouth 2 (two) times a day as needed (If systolic blood pressure less than 100 and patient is symptomatic) 60 tablet 2    multivitamin-minerals (CENTRUM ADULTS) tablet Take 1 tablet by mouth daily At 8am 30 tablet 11    neomycin-bacitracin-polymyxin (NEOSPORIN) 5-400-5,000 ointment Apply topically 3 (three) times a day Apply to urethra for lubrication 28 3 g 0    OLANZapine (ZyPREXA) 2 5 mg tablet Take 1 tablet by mouth daily 8 pm      ondansetron (ZOFRAN-ODT) 4 mg disintegrating tablet Take 1 tablet (4 mg total) by mouth every 8 (eight) hours as needed for nausea or vomiting D/c on 4/18/21 20 tablet 0    polyethylene glycol (GLYCOLAX) 17 GM/SCOOP powder Take 17 grams in 8 oz of water by mouth daily at 8 pm 527 g 11    tamsulosin (FLOMAX) 0 4 mg Take 0 4 mg by mouth Two tablet @ 8 am 0 8 mg daily      testosterone (ANDROGEL) 1 62 % TD gel pump Apply 3 pumps at 8 AM to shoulders/chest daily 75 g 3     No current facility-administered medications for this visit  Allergies   Allergen Reactions    Advil [Ibuprofen]     Gluten Meal - Food Allergy     Other      SEASONAL       Review of Systems   Constitutional: Negative for fever  Gastrointestinal: Negative  There were no vitals filed for this visit  I spent 5 minutes directly with the patient during this visit    VIRTUAL VISIT DISCLAIMER    Vani Medrano acknowledges that he has consented to an online visit or consultation   He understands that the online visit is based solely on information provided by him, and that, in the absence of a face-to-face physical evaluation by the physician, the diagnosis he receives is both limited and provisional in terms of accuracy and completeness  This is not intended to replace a full medical face-to-face evaluation by the physician  Ginny Nolan understands and accepts these terms

## 2021-04-19 NOTE — PROGRESS NOTES
Daily Note     Today's date: 2021  Patient name: Herbert Ramirez  : 1965  MRN: [id number]  Referring provider: BJ Eduardo  Dx:   Encounter Diagnosis     ICD-10-CM    1  MCI (mild cognitive impairment)  G31 84    2  Parkinson's disease ([facility] )  G20                   Subjective: "apple"      Objective:   performed using cubes and stacking cubes placing in numerical order (1-10) ontop of cubes- pt able to complete independently for correct number sequence however required min VCs for initiaion and sustained attention  (required cues upt to 6); performed alphebet sequence (A-H) thinking of foods- required moderate/max VCs for recall of instructions- completed using yellow tweezers- trialed with red first however required use of yellow; required 3 VCs for recall of alphabet sequence  Attempted to provide with theraputty HEP, foam block HEP, and girpper HEP  to increase hand strength however pt declined stating he doesn't want it  Provided further information/education however pt continued to decline  Performed beads and pattern cards with simple task required moderate VCs for identifying correct sequence and color focusing on sustained attention and DELTA [facility]        Assessment: Tolerated treatment well  Patient would benefit from continued OT  Requires cues for sustained attention, problem solving, memory and demonstrating decreased hand strength and utilizing yellow clips;     Plan: Continue per plan of care        Precautions:   Past Medical History:   Diagnosis Date    Abnormal weight gain     last assessed 16; resolved 17    Acute CHF [facility]congestive heart failure) ([facility]     Anxiety     Dementia ([facility]     Depression     Disease of thyroid gland     Down's syndrome     Fatigue     last assessed 17; resolved 17    GERD (gastroesophageal reflux disease)     Gluten free diet     H/O urinary retention     Hernia of abdominal cavity     Hyperlipidemia     Hypogonadism in male     Impulse control disorder     OCD (obsessive compulsive disorder)     Orthostatic hypotension     Parkinson disease (HCC)     Parkinson disease ([facility] )     resolved 2/23/17    Pleural effusion     Sinus tachycardia     Vitamin D deficiency     last assessed 9/1/16; resolved 6/1/17     Outcome Measures 2/8/21               5x sit to stand 15 23 seconds no UE               30 second sit to stand 8 no UE               TUG 14 23 sec               Gait Speed   84 m/s               2 minute walk test 200 feet               Gary 39/56               mCTSIB FTEO- 30 sec  FTEC- 30 sec   FTEO- 30 sec FTEC-7 5 sec               2 minute walk teest 150 feet                 Manuals                                                                 Neuro Re-Ed                                                                                                        Ther Ex                                                                                                                     Ther Activity                                       Gait Training                                       Modalities

## 2021-04-19 NOTE — PROGRESS NOTES
Daily Note  IE: 2021  Progress Note- 3/22  RE-Evaluation next session    (POC: 5-3-2021)    Today's date: 2021  Patient name: Nir Berg  : 1965  MRN: [id number]  Referring provider: BJ Chavez  Dx:   Encounter Diagnosis     ICD-10-CM    1  Parkinson's disease ([facility] )  Kari Rezacira        Start Time: 46  Stop Time: 930  Total time in clinic (min): 45 minutes    Subjective: Pt arrives to session with caregiver stating that he was "tired" this morning  Objective: See treatment diary below    NMR:  - Forward over 6" hurdles holding 5# med ball: 6 cycles  - Tandem walking on foam beams: 6 cycles, 1 UE  - Med ball toss with 5# med ball: 20x  - Kicking big physioball today, yellow p ball- 20 reps  - Bouncing big physioball today, yellow p ball- 20 reps   - Shooting ball into hoop, picking up ball from floor after miss- 25 reps  - Kicking ball into bucket target and to therapist - 25x  - reaching into bucket, pick and throw bean bag- 20 reps, 3 second holds        Assessment: Patient tolerated treatment well today with good participation throughout however required cueing to remain on task  Patient challenged with improving ampitude and challenged with unsupported balance today  Patient enjoys kicking today, patient challenged with single leg balance and reciprocal tasks  Pt will benefit from continued skilled therapy to continue to improve balance and increase safety during daily tasks for maximal function  Plan: Continue per plan of care        Precautions:   Past Medical History:   Diagnosis Date    Abnormal weight gain     last assessed 16; resolved 17    Acute CH[facility] (congestive heart failure) ([facility]     Anxiety     Dementia ([facility]     Depression     Disease of thyroid gland     Down's syndrome     Fatigue     last assessed 17; resolved 17    GERD (gastroesophageal reflux disease)     Gluten free diet     H/O urinary retention     Hernia of abdominal cavity     Hyperlipidemia     Hypogonadism in male     Impulse control disorder     OCD (obsessive compulsive disorder)     Orthostatic hypotension     Parkinson disease (HCC)     Parkinson disease ([facility]     resolved 2/23/17    Pleural effusion     Sinus tachycardia     Vitamin D deficiency     last assessed 9/1/16; resolved 6/1/17     Outcome Measures 2/8/21               5x sit to stand 15 23 seconds no UE               30 second sit to stand 8 no UE               TUG 14 23 sec               Gait Speed   84 m/s               2 minute walk test 200 feet               Gary 39/56               mCTSIB FTEO- 30 sec  FTEC- 30 sec   FTEO- 30 sec FTEC-7 5 sec               2 minute walk teest 150 feet                 Manuals                                                                 Neuro Re-Ed                                                                                                        Ther Ex                                                                                                                     Ther Activity                                       Gait Training                                       Modalities

## 2021-04-21 NOTE — PROGRESS NOTES
Progress Note - Cardiology Office  Susan Prescott 63 y o  male MRN: [id number]   Encounter: [id number]     5  Sinus bradycardia    2  Orthostatic hypotension    3  Hypothyroidism due to acquired atrophy of thyroid    4  Parkinson's disease ([address] )    5  Down's syndrome    6  History of COVID-19        Assessment/Plan     1  Near syncope with orthostatic hypotension  No more episodes of near-syncope or syncope  He rarely needed midodrine  We can now monitor his blood pressure once a day  He can uses midodrine at 8:00 a m  Or 4:00 p m  As needed before  His cardiac workup has been negative  Tilt-table test and Holter monitor negative  Patient can go back to his work shift from cardiac point of view  2  Down syndrome with mild intellectual disability and behavior problems  Repeat echo Doppler reviewed no significant change  Echo has no significant valvular disease  EF is normal     3   Dyslipidemia  Continue statins  Patient blood test done December 2020 which were acceptable cholesterol profile is acceptable    4  Hypothyroidism on levothyroxine  Patient is tolerating 75 mg levothyroxine very well  Monitor TSH     5  Parkinson's disease  Patient diagnosed to have Parkinson's disease which can cause also dysautonomia and may be contributing to orthostatic hypotension  He can be encouraged to use Ramón stockings if available    6  Cardiac murmur  Patient's echo reviewed  Most likely etiology is thickened and calcified aortic valve without any stenosis  There is a trace AI  He has normal LV systolic function  Repeat echo Doppler January 2020 shows no changes      7  Sinus bradycardia  Current heart rate is 70 beats per minute Holter shows no significant tachy-shiv arrhythmia    8  History of previous idiopathic pericardial effusion  Repeat echo shows low-normal LV systolic function without any effusion  Echo from January reviewed    Clinically no evidence of any pericardial effusion Follow-up in 6 months  Patient's conditions, other symptoms were reviewed with patient's caretaker at length  He is currently stable  No further cardiac workup needed at this time  HPI :     Mario Barclay is a 63y o  year old male who came for follow up  Patient has past medical history significant for Down syndrome, recently diagnosed Parkinson disease, dyslipidemia, behavior problems, hypothyroidism, idiopathic pulmonary had who came for follow-up  Patient was admitted to [facility] about year ago  Large pericardial effusion  Etiology of pericardial effusion was never known and it was drained and he was close to tamponade  Since then 2 echoes were done and no significant pericardial effusion is seen  He is doing well he did gain weight of about 10-15 pounds  He came with his caretaker  Denies any chest pain or any shortness of breath  He has no fever no chills no nausea no vomiting no other complaint  04/21/2021  Above reviewed  Patient came for follow-up with caretaker  He is doing well  He has gained about 10-15 lb since last visit  He had medical history significant for Down syndrome, history of cardiac murmur, history of pleural and pericardial effusion status post drainage now much better, history of mild valvular disease  Patient also had COVID-19 infection last year and has now recovered  Since then his activity level has decreased and he has gained some weight  He had a cardiac monitor done in February 2020 as well as echo Doppler done in January 2020  Today EKG shows heart rate 70 beats per minute  LVH by voltage no significant changes noted  No nausea no vomiting no fever no chills no other issues at this time  Review of Systems   Constitutional: Negative for activity change, chills, diaphoresis, fever and unexpected weight change  HENT: Positive for hearing loss  Negative for congestion           Recently had upgraded hearing aid   Eyes: Negative for discharge and redness  Respiratory: Negative for cough, chest tightness, shortness of breath and wheezing  Cardiovascular: Negative  Negative for chest pain, palpitations and leg swelling  Gastrointestinal: Negative for abdominal pain, diarrhea and nausea  Endocrine: Negative  Genitourinary: Negative for decreased urine volume and urgency  Musculoskeletal: Negative  Negative for arthralgias, back pain and gait problem  Skin: Negative for rash and wound  Allergic/Immunologic: Negative  Neurological: Negative for dizziness, seizures, syncope, weakness, light-headedness and headaches  Hematological: Negative  Psychiatric/Behavioral: Negative for agitation and confusion  The patient is nervous/anxious          Historical Information   Past Medical History:   Diagnosis Date    Abnormal weight gain     last assessed 4/18/16; resolved 1/25/17    [facility]) [facility]     Anxiety     Dementia ([facility] )     Depression     Disease of thyroid gland     Down's syndrome     Fatigue     last assessed 2/23/17; resolved 6/1/17    GERD (gastroesophageal reflux disease)     Gluten free diet     H/O urinary retention     Hernia of abdominal cavity     Hyperlipidemia     Hypogonadism in male     Impulse control disorder     OCD (obsessive compulsive disorder)     Orthostatic hypotension     Parkinson disease (HCC)     Parkinson disease ([facility] )     resolved 2/23/17    Pleural effusion     Sinus tachycardia     Vitamin D deficiency     last assessed 9/1/16; resolved 6/1/17     Past Surgical History:   Procedure Laterality Date    COLONOSCOPY      ESOPHAGOGASTRODUODENOSCOPY      with biopsy    PERICARDIOCENTESIS  4/29/2016          Social History     Substance and Sexual Activity   Alcohol Use Never    Frequency: Never     Social History     Substance and Sexual Activity   Drug Use Never     Social History     Tobacco Use   Smoking Status Never Smoker   Smokeless Tobacco Never Used Family History:   Family History   Problem Relation Age of Onset    No Known Problems Mother     Parkinsonism Father         symtomatic       Meds/Allergies     Allergies   Allergen Reactions    Advil [Ibuprofen]     Gluten Meal - Food Allergy     Other      SEASONAL       Current Outpatient Medications:     Aspirin Low Dose 81 MG chewable tablet, TAKE 1 TABLET BY MOUTH EVERY MORNING (8AM), Disp: 30 tablet, Rfl: 4    atorvastatin (LIPITOR) 80 mg tablet, Take 1 tablet (80 mg total) by mouth daily, Disp: 30 tablet, Rfl: 3    Calcium Carbonate-Vitamin D 600-400 MG-UNIT per tablet, Take 1 tablet by mouth 2 (two) times a day At 8 am and 5 pm, Disp: 60 tablet, Rfl: 11    cholecalciferol (VITAMIN D3) 1,000 units tablet, Take 1 tablet (1,000 Units total) by mouth 2 (two) times a day At 8am and 8 pm, Disp: 60 tablet, Rfl: 11    divalproex sodium (DEPAKOTE) 500 mg EC tablet, Take 500 mg by mouth , Disp: , Rfl:     docusate sodium (COLACE) 100 mg capsule, Take one capsule at 8am, Disp: 30 capsule, Rfl: 11    erythromycin (ILOTYCIN) ophthalmic ointment, Administer 0 5 inches into the left eye every 12 (twelve) hours At 8 am and 8 pm  D/c on 5/27/20, Disp: 3 5 g, Rfl: 0    esomeprazole (NexIUM) 40 MG capsule, Take one capsule daily 30 minutes prior to breakfast, Disp: 30 capsule, Rfl: 11    lanolin-mineral oil (ARMAAN) LOTN, Apply topically 2 (two) times a day Apply to hands daily at 8 am and 8 pm, Disp: 1 Bottle, Rfl: 11    levothyroxine 75 mcg tablet, TAKE 1 TABLET BY MOUTH IN THE EVENING (8PM), Disp: 30 tablet, Rfl: 4    lidocaine (URO-JET) 2 % urethral/mucosal gel, Insert 1 application into the urethra daily as needed (urethral pain 2/2 catheter), Disp: , Rfl: 0    loratadine (CLARITIN) 10 mg tablet, TAKE 1 TABLET BY MOUTH EVERY MORNING (8AM) AS NEEDED FOR ALLERGIES -MAX=1TAB/DAY, Disp: 30 tablet, Rfl: 4    midodrine (PROAMATINE) 2 5 mg tablet, Take 1 tablet (2 5 mg total) by mouth 2 (two) times a day as needed (If systolic blood pressure less than 100 and patient is symptomatic), Disp: 60 tablet, Rfl: 2    multivitamin-minerals (CENTRUM ADULTS) tablet, Take 1 tablet by mouth daily At 8am, Disp: 30 tablet, Rfl: 11    neomycin-bacitracin-polymyxin (NEOSPORIN) 5-400-5,000 ointment, Apply topically 3 (three) times a day Apply to urethra for lubrication, Disp: 28 3 g, Rfl: 0    OLANZapine (ZyPREXA) 2 5 mg tablet, Take 1 tablet by mouth daily 8 pm, Disp: , Rfl:     ondansetron (ZOFRAN-ODT) 4 mg disintegrating tablet, Take 1 tablet (4 mg total) by mouth every 8 (eight) hours as needed for nausea or vomiting D/c on 4/18/21, Disp: 20 tablet, Rfl: 0    polyethylene glycol (GLYCOLAX) 17 GM/SCOOP powder, Take 17 grams in 8 oz of water by mouth daily at 8 pm, Disp: 527 g, Rfl: 11    tamsulosin (FLOMAX) 0 4 mg, Take 0 4 mg by mouth Two tablet @ 8 am 0 8 mg daily, Disp: , Rfl:     testosterone (ANDROGEL) 1 62 % TD gel pump, Apply 3 pumps at 8 AM to shoulders/chest daily, Disp: 75 g, Rfl: 3    Vitals: Blood pressure 118/76, pulse 70, temperature 98 6 °F (37 °C), height 5' 2" (1 575 m), weight 75 8 kg (167 lb), SpO2 95 %  Body mass index is 30 54 kg/m²  BP Readings from Last 3 Encounters:   04/21/21 118/76   03/25/21 120/76   03/01/21 120/76       Physical Exam:  Physical Exam   Constitutional: He is oriented to person, place, and time  He appears well-developed and well-nourished  No distress  HENT:   Head: Normocephalic and atraumatic  Eyes: Pupils are equal, round, and reactive to light  Neck: Neck supple  No JVD present  No tracheal deviation present  No thyromegaly present  Cardiovascular: Normal rate, regular rhythm, S1 normal and S2 normal  Exam reveals no gallop, no S3, no S4, no distant heart sounds and no friction rub  Murmur heard  Systolic (ejection) murmur is present with a grade of 2/6  S1-S2 regular with 3/6 ejection systolic murmur     Pulmonary/Chest: Effort normal and breath sounds normal  No respiratory distress  He has no wheezes  He has no rales  He exhibits no tenderness  Clear to auscultate   Abdominal: Soft  Bowel sounds are normal  He exhibits no distension  There is no abdominal tenderness  Musculoskeletal:         General: No deformity or edema  Comments: No edema   Neurological: He is alert and oriented to person, place, and time  Skin: Skin is warm and dry  No rash noted  He is not diaphoretic  No pallor  Psychiatric: He has a normal mood and affect  His behavior is normal        [id number]  Twelve lead EKG shows normal sinus rhythm heart rate 57 beats per minute  Baseline artifact  No other significant changes  Twelve lead EKG done 10/15/2019 shows normal sinus rhythm heart rate 59 beats per minute no significant ST changes intervals are within normal range  Twelve lead EKG 10/20/2020 shows normal sinus rhythm small Q-wave in inferior leads, heart rate 62 beats per minute  Nonspecific ST changes in lead 3 and AVF not change from old EKG  Twelve lead EKG done 2021 shows normal sinus rhythm minimal voltage criteria for LVH  No change from previous EKG heart rate 70 beats per minute  Diagnostic Studies Review Cardio:    Stress Test: Nuclear stress test on 10/21/2016 shows no evidence of ischemia  Echocardiogram/PRADEEP: His echo Doppler done in May 2016 and 2016 shows no evidence of any pericardial effusion  EF 65-70%, mild aortic regurgitation, mild MR, mild TR PA pressure 30 mmHg  Repeat echo Doppler done in 2018 shows normal LV systolic function EF 55 88%, left atrium mildly dilated, aortic valve was mildly thickened with mild calcification but no significant stenosis trace AI, no significant pericardial effusion was noted  Repeat echo Doppler done 2020 shows EF around 50-55%, mild aortic regurgitation, trace tricuspid regurgitation  No evidence of any pericardial effusion        Tilt-table test   Tilt-table test done January 2020 was negative for vasovagal or orthostasis or vasodepressor syndrome    Holter monitor  Holter monitor done in January shows no evidence of significant tachy-shiv arrhythmias  Holter monitor done in February 2020 shows few PACs PVCs average heart rate was 72 beats per minute  No significant tachy-shiv arrhythmias  Lab Review   Lab Results   Component Value Date    WBC 5 03 12/30/2020    HGB 14 3 12/30/2020    HCT 44 3 12/30/2020     (H) 12/30/2020     12/30/2020     Lab Results   Component Value Date     (L) 11/20/2015    K 4 2 12/30/2020     12/30/2020    CO2 32 12/30/2020    ANIONGAP 7 7 (L) 11/20/2015    BUN 10 12/30/2020    CREATININE 0 97 12/30/2020    GLUCOSE 96 11/20/2015    GLUF 91 12/30/2020    CALCIUM 8 8 12/30/2020    CORRECTEDCA 9 6 12/30/2020    AST 23 12/30/2020    ALT 27 12/30/2020    ALKPHOS 65 12/30/2020    EGFR 87 12/30/2020     Lab Results   Component Value Date    GLUCOSE 96 11/20/2015    CALCIUM 8 8 12/30/2020     (L) 11/20/2015    K 4 2 12/30/2020    CO2 32 12/30/2020     12/30/2020    BUN 10 12/30/2020    CREATININE 0 97 12/30/2020     Lipid Profile:   Lab Results   Component Value Date    HDL 61 12/30/2020    LDLCALC 122 (H) 12/30/2020    TRIG 73 12/30/2020     No results found for: CHOL  Lab Results   Component Value Date    TROPONINI <0 02 01/16/2020       Dr Keo Encarnacion MD [facility] [place]      "This note has been constructed using a voice recognition system  Therefore there may be syntax, spelling, and/or grammatical errors   Please call if you have any questions  "

## 2021-04-26 NOTE — PROGRESS NOTES
Daily Note     Today's date: 2021  Patient name: Yuki Goyal  : 1965  MRN: [id number]  Referring provider: BJ Gallo  Dx:   Encounter Diagnosis     ICD-10-CM    1  MCI (mild cognitive impairment)  G31 84    2  Parkinson's disease ([facility] )  [place]ading        Start Time: 1020  Stop Time: 1100  Total time in clinic (min): 40 minutes    Subjective: "is today the ?"      Objective:   Pt completed clothing mix up worksheet to identify and color all clothing that would be worn in the summer  Working on sustained attention, memory, and fine motor skills  Pt required VCs throughout activity to recall instructions and what clothing items to color  Pt required min VCs to identify "summer clothes " Pt required mod VC's to monitor page to color "6 items "     Pt completed 3 piece number puzzles to match the word number, write number, and number of items to pictures to work on sustained attention  Pt required mod VCs to identify the correct number of pictures to the correct number  Assessment: Tolerated treatment well  Patient would benefit from continued OT  Pt requires VCs for sustained attention and problem solving  Plan: Continue per plan of care        Precautions:   Past Medical History:   Diagnosis Date    Abnormal weight gain     last assessed 16; resolved 17    Acute CHF (congestive heart failure) ([facility] )     Anxiety     Dementia ([place] [address] )     Depression     Disease of thyroid gland     Down's syndrome     Fatigue     last assessed 17; resolved 17    GERD (gastroesophageal reflux disease)     Gluten free diet     H/O urinary retention     Hernia of abdominal cavity     Hyperlipidemia     Hypogonadism in male     Impulse control disorder     OCD (obsessive compulsive disorder)     Orthostatic hypotension     Parkinson disease (HCC)     Parkinson disease ([facility] )     resolved 17    Pleural effusion     Sinus tachycardia     Vitamin D deficiency     last assessed 9/1/16; resolved 6/1/17     Outcome Measures 2/8/21               5x sit to stand 15 23 seconds no UE               30 second sit to stand 8 no UE               TUG 14 23 sec               Gait Speed   84 m/s               2 minute walk test 200 feet               Gary 39/56               mCTSIB FTEO- 30 sec  FTEC- 30 sec   FTEO- 30 sec FTEC-7 5 sec               2 minute walk teest 150 feet                 Manuals                                                                 Neuro Re-Ed                                                                                                        Ther Ex                                                                                                                     Ther Activity                                       Gait Training                                       Modalities

## 2021-04-26 NOTE — PROGRESS NOTES
RE-EVALUATION/Daily Note  IE- 2021  New POC-     Today's date: 2021  Patient name: Fatemeh Pemberton  : 1965  MRN: [id number]  Referring provider: BJ Tinajero  Dx:   Encounter Diagnosis     ICD-10-CM    1  Parkinson's disease ([facility] )  G20 PT plan of care cert/re-cert       Start Time: 930  Stop Time: 101  Total time in clinic (min): 45 minutes    Subjective: Pt arrives w/ caregiver, denies any changes  Objective: See treatment diary below    Goals  STG 30 days- Progressing toward all goals  1  Patient will improve static balance with feet together Eyes Closed Firm surface to 30 seconds indicating reduction in fall risk (NOT MET)  2  Patient will display 2 3  second improvement with overall score of less than 12 seconds  with TUG test or lower with noted improvement being Minimal Detectable Change pre current research standards with fall risk  (NOT MET)  3  Patient will achieve 45/56 NUNEZ score with minimal improve by 6 points or more demonstrating Minimal Detectable change per current research standards for this objective test which assess fall risk  (NOT MET)  4  Patient will increase gait speed to > 0 70 m/s with 10 Meter Walk test, demonstrating Minimal Detectable change per current research standards for this objective test which assess fall risk  (NOT MET)  5  Patient will perform  5 x sit to stand test with overall reduction by seconds to less than 13 seconds indicating improvement with functional endurance (NOT MET)  6  Patient will increase ABC score to 60% or higher to reduce fall risk and improve balance confidence  (NOT MET)    LT days- Progressing toward all goals  1  Patient will score low risk for falls with 3/4 fall risk measures   2  Patient will be able to perform floor transfer without physical assistance   3  Patient will be able to carry objects without loss of balance  4   Patient will be able to ambulate outdoors without any loss of balance (NOT MET)  5  Patient will increase ABC score to 80% confidence to reduce fall risk and improve balance confidence  6  Patient will increase gait speed to 1 1 m/s or higher to reduce fall risk (NOT MET)    New Goals- 4-  Long Term Goals (12 weeks):  - Patient will be independent in a comprehensive home exercise program  - Patient will improve gait speed by 0 59 ft/sec from 2 92 ft/sec to 3 51 ft/sec to improve safety with community ambulation   - Patient will be able to demonstrate HT in gait without veering  - Patient will report 50% reduction in near falls in order to improve safety with functional tasks and reduce his risk for falls   - Patient will report going on walks at least 3 days per week to promote independence and improved cardiovascular endurance  - Patient will be able to ascend/descend stairs reciprocally without UE assist to promote independence and safety with ADLs  - Patient will report 50% reduction in near falls when ambulating on uneven terrain      Interventions-   -Tossing ball with narrow base of support- 20 catches   -  [employer] between therapist- 5 lb ball, 20 reps side to side pass   -Shooting ball into hoop, picking up ball from floor after miss- 20 reps    Assessment:  Pt tolerated today's session fairly as he was slightly lethargic with exercise  Upon reexamination of test and measures, patient had increased times with tug scores as well as decreases in gait speed  Patient was motivated throughout therapy to try to improve performance  Gary scores and CTSIB scores remained constant from last progress note  Patient is making progress on his goals, but they have not yet been met  He will continue to benefit from therapy in order to improve strength, endurance, gait, and balance  Plan: Continue per plan of care         Start Plan of care date: 4/26/21  End plan of care date: 6/21/21  Precautions:   Past Medical History:   Diagnosis Date    Abnormal weight gain     last assessed 4/18/16; resolved 1/25/17    Acute [facility]congestive heart failure) [facility] )     Anxiety     Dementia ([facility] )     Depression     Disease of thyroid gland     Down's syndrome     Fatigue     last assessed 2/23/17; resolved 6/1/17    GERD (gastroesophageal reflux disease)     Gluten free diet     H/O urinary retention     Hernia of abdominal cavity     Hyperlipidemia     Hypogonadism in male     Impulse control disorder     OCD (obsessive compulsive disorder)     Orthostatic hypotension     Parkinson disease (HCC)     Parkinson disease ([facility] )     resolved 2/23/17    Pleural effusion     Sinus tachycardia     Vitamin D deficiency     last assessed 9/1/16; resolved 6/1/17     Outcome Measures 2/8/21  3/22/2021    4/26/21         5x sit to stand 15 23 seconds no UE  14 15 seconds, no UE    trial 1: 29 6 sec         30 second sit to stand 8 no UE  9 reps no UE    5 reps         TUG 14 23 sec  14 02 seconds    16 26 sec         Gait Speed   84 m/s   86 m/s    0 55 m/sec         2 minute walk test 200 feet  350 feet    NT         Gary 39/56  41/56    37/56         mCTSIB FTEO- 30 sec  FTEC- 30 sec   FTEO- 30 sec FTEC-7 5 sec  FTEO- 30 sec  FTEC- 30 sec   FTEO- 30 sec FTEC-10 sec    FTEO- 30  FTEC- 30  FTEO- 30  FTEC- unable to test         2 minute walk teest 150 feet 200 feet  NT        Plan  Referral necessary: Yes  Planned modality interventions: biofeedback, cryotherapy, electrical stimulation/Russian stimulation, TENS and thermotherapy: hydrocollator packs  Planned therapy interventions: abdominal trunk stabilization, IADL retraining, joint mobilization, activity modification, ADL retraining, manual therapy, massage, ADL training, balance, balance/weight bearing training, motor coordination training, muscle pump exercises, neuromuscular re-education, body mechanics training, breathing training, patient education, compression, coordination, strengthening, stretching, fine motor coordination training, therapeutic activities, postural training, canalith repositioning, therapeutic exercise, therapeutic training, transfer training, flexibility, functional ROM exercises, gait training, graded activity, graded exercise, graded motor and home exercise program  Frequency: 2x week  Duration in weeks: 12  Plan of Care beginning date: 4/26/2021  Plan of Care expiration date: 7/19/2021      Manuals                                                                 Neuro Re-Ed                                                                                                        Ther Ex                                                                                                                     Ther Activity                                       Gait Training                                       Modalities

## 2021-04-28 NOTE — PROGRESS NOTES
OT-reevaluation     Today's date: 2021  Patient name: Kristina Velázquez  : 1965  MRN: [id number]  Referring provider: BJ Zimmer  Dx:   Encounter Diagnosis     ICD-10-CM    1  MCI (mild cognitive impairment)  G31 84    2  Parkinson's disease ([facility] [address] )  G20                     Subjective     "I am good"     Objective      MOCA(MOCA-B)  Executive function 0/1  Immediate recall 1st trial  2nd trial   Fluence 0/2  Orientation   Calculation 03  Abstraction 0/3  Delayed recall 0  visuoperceptoin 2/3 (n:6)  Naming   Attention 0/3    11/30 indicative of neurological defecits    LILA hand strength   RUE  15 lbs LUE 16Lbs   Normal Age related strength 101 lbs indicative of decreased  strength     9 hole peg test:              LUE: 1 min 22 seconds              RUE: 1 minute and 9 seconds              Patients scores cannot be compared to norms due to directions being non-standardized due to decreased sustained attention and understanding of multi-step directions; pt demonstrating decreased command following during task       MMT:              3/5 in all planes of movement  Strength beyond ROM unable to be tested due to difficulty understanding directions  Precautions:   Hypotension, Anxiety, tachycardia          Assessment  Pt's caregiver alem reports pt is able to complete brushing teeth routine with more ease and able to recall steps  Pt has been more cognitively activie with pt able to complete laundry sorting tasks with occasional cues however pt is able to sort shirts and towels independently  Pt demonstrated improvements in LUE hand strength and  skills and orientation  However, he continues to demonstrate difficulty with hand strength and sustained attention which impacts his functional memory  Pt's caregiver reports requiring A for ADL routine, working memory, and attention   Pt is currently demonstrating the following occupational deficits: decreased sustained attention, delayed recall, fine motor skills, and  strength  Pt recommended to continue OT services 1-3x/wk for 45-60 minutes to work on the deficits impacting his occupational performance for 4 more weeks and then possibly transition to maintenance program next month  Goals    STG  1  Pt will increase hand strength by  3lbs (20) to complete IADLs etiolating performance  within 4 weeks NOT MET  2  Pt will be independent with HEP within 4 weeks  3  Pt will increase sustained attention to follow one step commands with 1-2 verbal cues to complete to decrease care giver burden to complete ADL routine within 4 weeks  NOT MET  4  Pt will improve delayed recall from 0/5  To approximately 20% 1/5 to improve ADL / IADL performance within 4 weeks NOT MET    LTG   Pt will increase hand strength by 8 lbs (20) to improve performance in ADL/ IADL activities within 8-12 weeks   Pt will increase sustained attention to follow one step commands with independence to decrease care giver burden to complete ADL routine within 8-12 weeks   Pt will improve delayed recall from 0-5 to approximately 2/5 to improve ADL/IADL performance within 8-12 weeks     NEW GOALS: AS OF 4/28/2021    Pt will improve immediate recall from 1/5  to 2/5 to improve ADL / IADL performance within 4 weeks  Pt will increase sustained attention to perform trail making part A and complete with 6 VCs in 4 weeks

## 2021-04-28 NOTE — PROGRESS NOTES
Daily Note  IE: 2021  Progress Note-    (POC: 2021)    Today's date: 2021  Patient name: Cha Austin  : 1965  MRN: [id number]  Referring provider: BJ Barnett  Dx:   Encounter Diagnosis     ICD-10-CM    1  [facility]'s [facility] ([facility] )  G20                   Subjective: Pt arrives to session with caregiver stating that he was "tired" this morning  Objective: See treatment diary below    NMR:  - STS w/ med ball toss (5#): 10 reps  - Static standing w/ med ball toss (4#): 20 reps  - Fwd/bwd ambulation w/ ball toss  - Kicking ball through target: 20 reps  - Drumming on objects outside [place] with coordinating sequences  - [place] weaving and taps        Assessment: Patient able to tolerate treatment session well today with continued good participation throughout, however required frequent redirecting as he is easily distracted  Challenged with coordination and dual tasks today with inability to perform both tasks simultaneously  Improving balance with ambulation overall today and exercises reaching outside [place]  He will continue to benefit from skilled outpatient PT in order to improve balance and maximize his safety with ADLs  Plan: Continue per plan of care        Precautions:   Past Medical History:   Diagnosis Date    Abnormal weight gain     last assessed 16; resolved 17    [facility]congestive [facility]) [facility] [address] )     Anxiety     [facility]     Depression     Disease of thyroid gland     Down's syndrome     Fatigue     last assessed 17; resolved 17    GERD (gastroesophageal reflux disease)     Gluten free diet     H/O urinary retention     Hernia of abdominal cavity     Hyperlipidemia     Hypogonadism in male     Impulse control disorder     OCD (obsessive compulsive disorder)     Orthostatic hypotension     Parkinson disease ([facility] [facility]     Parkinson disease ([facility] )     resolved 17    Pleural effusion     Sinus tachycardia  Vitamin D deficiency     last assessed 9/1/16; resolved 6/1/17     Outcome Measures 2/8/21               5x sit to stand 15 23 seconds no UE               30 second sit to stand 8 no UE               TUG 14 23 sec               Gait Speed   84 m/s               2 minute walk test 200 feet               [place] 39/56               mCTSIB FTEO- 30 sec  FTEC- 30 sec   FTEO- 30 sec FTEC-7 5 sec               2 minute walk teest 150 feet                 Manuals                                                                 Neuro Re-Ed                                                                                                        Ther Ex                                                                                                                     Ther Activity                                       Gait Training                                       Modalities

## 2021-05-03 NOTE — PROGRESS NOTES
Daily Note  IE: 2021  Progress Note-    (POC: 2021)    Today's date: 5/3/2021  Patient name: Nir Berg  : 1965  MRN: [id number]  Referring provider: BJ Chavez  Dx:   Encounter Diagnosis     ICD-10-CM    1  Parkinson's disease ([facility] [address] )  G20                   Subjective: Patient's caregiver notes he has been only writing "Trilla" on his checks now instead of his entire name  Otherwise, no new changes, complaints, or falls  Objective: See treatment diary below    NMR:  - Marching  - High knee combos to mitts  - Ambulation w/ high knees to mitts  - Hurdles: 12", HHA  - Reaching outside [place] w/ boomwhackers  - Ambulation with dual task (drumming w/ boomwhackers)  - Kicking PBall      Assessment: Patient able to tolerate treatment session well today with continued good participation throughout session  He had difficulty performing activities that required dual tasking, with slight improvements during ambulation with drumming  In order to perform ambulation with high knee combos, he required 1 UE support  He will continue to benefit from skilled outpatient PT in order to improve balance and maximize his safety with ADLs  Plan: Continue per plan of care        Precautions:   Past Medical History:   Diagnosis Date    Abnormal weight gain     last assessed 16; resolved 17    Acute CHF (congestive heart failure) ([facility] [facility] )     Anxiety     [facility]     Depression     Disease of thyroid gland     Down's syndrome     Fatigue     last assessed 17; resolved 17    GERD (gastroesophageal reflux disease)     Gluten free diet     H/O urinary retention     Hernia of abdominal cavity     Hyperlipidemia     Hypogonadism in male     Impulse control disorder     OCD (obsessive compulsive disorder)     Orthostatic hypotension     Parkinson disease ([facility] [facility]     Parkinson disease ([facility] [address] )     resolved 17    Pleural effusion     Sinus tachycardia     Vitamin D deficiency     last assessed 9/1/16; resolved 6/1/17     Outcome Measures 2/8/21               5x sit to stand 15 23 seconds no UE               30 second sit to stand 8 no UE               TUG 14 23 sec               Gait Speed   84 m/s               2 minute walk test 200 feet               [place] 39/56               mCTSIB FTEO- 30 sec  FTEC- 30 sec   FTEO- 30 sec FTEC-7 5 sec               2 minute walk teest 150 feet                 Manuals                                                                 Neuro Re-Ed                                                                                                        Ther Ex                                                                                                                     Ther Activity                                       Gait Training                                       Modalities

## 2021-05-03 NOTE — PROGRESS NOTES
Daily Note     Today's date: 5/3/2021  Patient name: Zeyad Lea  : 1965  MRN: [id number]  Referring provider: BJ Eric  Dx:   Encounter Diagnosis     ICD-10-CM    1  MCI (mild cognitive impairment)  G31 84    2  Parkinson's disease ([facility] [address] )  G20                   Subjective: pts caregiver reports he is having difficulty with recall of name while signing checks      Objective:   performed colored cubes focusing in [address], sustained attention with pt required 5 VC for sutained attention secondary to internally/externally distracted- required door closed to complete  Performed writing name on checks approxiamtley 6-7 trials pt required moderate VCs fro recall of name however with increased practice pt was able to recall to spell name with min VCs; pt required visual and was able to utilize during  Trialed verbally saying letters alloud and trialed for carryover with fair effect  Performed [address] task of spelling name with banagrams focusing on sustained attention, [address] [place] and recall of name spelling with pt continuing to require increased VCs for recall of name  Assessment: Tolerated treatment well  Patient would benefit from continued OT  Pt required increased cues for recall of name and fair carryover between tasks; pt would benefit from visual at home to write checks and discussed with Camacho Bhatti  Plan: Continue per plan of care        Precautions:   Past Medical History:   Diagnosis Date    Abnormal weight gain     last assessed 16; resolved 17    Acute CHF (congestive heart failure) ([facility] [facility] )     Anxiety     Dementia ([facility] )     Depression     Disease of thyroid gland     Down's syndrome     Fatigue     last assessed 17; resolved 17    GERD (gastroesophageal reflux disease)     Gluten free diet     H/O urinary retention     Hernia of abdominal cavity     Hyperlipidemia     Hypogonadism in male     Impulse control disorder     OCD (obsessive compulsive disorder)     Orthostatic hypotension     Parkinson disease (HCC)     Parkinson disease ([facility] [address] )     resolved 2/23/17    Pleural effusion     Sinus tachycardia     Vitamin D deficiency     last assessed 9/1/16; resolved 6/1/17     Outcome Measures 2/8/21               5x sit to stand 15 23 seconds no UE               30 second sit to stand 8 no UE               TUG 14 23 sec               Gait Speed   84 m/s               2 minute walk test 200 feet               [place] 39/56               mCTSIB FTEO- 30 sec  FTEC- 30 sec   FTEO- 30 sec FTEC-7 5 sec               2 minute walk teest 150 feet                 Manuals                                                                 Neuro Re-Ed                                                                                                        Ther Ex                                                                                                                     Ther Activity                                       Gait Training                                       Modalities

## 2021-05-05 NOTE — PROGRESS NOTES
Daily Note     Today's date: 2021  Patient name: Vani Medrano  : 1965  MRN: [id number]  Referring provider: BJ Johnson  Dx:   No diagnosis found  Subjective: "I dont want it"       Objective:   performed magnetic board while copying picu focusing in [facility], sustained attention with pt required 6-7 VC for sutained attention secondary to internally/externally distracted- required door closed to complete and problem solving   Performed writing name on checks and utilized laminated sheet with pt able to refer to name independently- performed 2 times and pt required moderate VCs of encouargement to utilize at home with pt able to perform at home  Performed [facility] task of spelling name with banagrams focusing on sustained attention, [facility] and recall of name spelling with pt continuing to pt required 1 VC for recall of spelling first name and 2-3 VCs for recall of spelling last name required visual  Performed color marble task focusing on [facility] and hand strength  Attempted with red clip however pt required moderate VCs for problem solving clip and required cues for matching proper color      Assessment: Tolerated treatment well  Patient would benefit from continued OT  Pt required increased cues for sustained attention and pt perseverated being done required cues of encouragement to utilize visual for writing name however able to carryover using visual check; educated care giver on use to write check  Plan: Continue per plan of care        Precautions:   Past Medical History:   Diagnosis Date    Abnormal weight gain     last assessed 16; resolved 17    Acute [facility]congestive heart failure) ([facility] [address] [facility]     Anxiety     Dementia ([facility] [facility] )     Depression     Disease of thyroid gland     Down's syndrome     Fatigue     last assessed 17; resolved 17    GERD (gastroesophageal reflux disease)     Gluten free diet     H/O urinary retention     Hernia of abdominal cavity     Hyperlipidemia     Hypogonadism in male     Impulse control disorder     OCD (obsessive compulsive disorder)     Orthostatic hypotension     [facility]     Parkinson disease ([facility] [address] )     resolved 2/23/17    Pleural effusion     Sinus tachycardia     Vitamin D deficiency     last assessed 9/1/16; resolved 6/1/17     Outcome Measures 2/8/21               5x sit to stand 15 23 seconds no UE               30 second sit to stand 8 no UE               TUG 14 23 sec               Gait Speed   84 m/s               2 minute walk test 200 feet               [place] 39/56               mCTSIB FTEO- 30 sec  FTEC- 30 sec   FTEO- 30 sec FTEC-7 5 sec               2 minute walk teest 150 feet                 Manuals                                                                 Neuro Re-Ed                                                                                                        Ther Ex                                                                                                                     Ther Activity                                       Gait Training                                       Modalities

## 2021-05-05 NOTE — PROGRESS NOTES
Daily Note  IE: 2021  Progress Note-    (POC: 2021)    Today's date: 2021  Patient name: Yuki Goyal  : 1965  MRN: [id number]  Referring provider: BJ Gallo  Dx:   Encounter Diagnosis     ICD-10-CM    1  Parkinson's disease ([facility] )  G20                   Subjective: Caregiver reports no new changes or falls       Objective: See treatment diary below    NMR:  - Hurdles: 12", HHA  - Picking up color dots  - Ambulation 150 ft  - Shooting ball into bucket from color dots  - Kicking ball into bucket      Assessment: Patient tolerated treatment fairly today  He was challenged with anselmo exercises, needing verbal and visual cueing to prevent UE support  Utilized visual cueing of color dots to help patient maintain distance with exercises  Patient required increased motivation from PT to continue with exercises  He will benefit from skilled PT services to improve balance and maximize his safety with ADLs          Plan: Continue per plan of care        Precautions:   Past Medical History:   Diagnosis Date    Abnormal weight gain     last assessed 16; resolved 17    Acute CH[facility]congestive heart failure) [facility] [address] )     Anxiety     Dementia ([facility] )     Depression     Disease of thyroid gland     Down's syndrome     Fatigue     last assessed 17; resolved 17    GERD (gastroesophageal reflux disease)     Gluten free diet     H/O urinary retention     Hernia of abdominal cavity     Hyperlipidemia     Hypogonadism in male     Impulse control disorder     OCD (obsessive compulsive disorder)     Orthostatic hypotension     Parkinson disease (HCC)     Parkinson disease ([facility] [address] )     resolved 17    Pleural effusion     Sinus tachycardia     Vitamin D deficiency     last assessed 16; resolved 17     Outcome Measures 21               5x sit to stand 15 23 seconds no UE               30 second sit to stand 8 no UE               TUG 14 23 sec               Gait Speed   84 m/s               2 minute walk test 200 feet               [place] 39/56               mCTSIB FTEO- 30 sec  FTEC- 30 sec   FTEO- 30 sec FTEC-7 5 sec               2 minute walk teest 150 feet                 Manuals                                                                 Neuro Re-Ed                                                                                                        Ther Ex                                                                                                                     Ther Activity                                       Gait Training                                       Modalities

## 2021-05-06 PROBLEM — I67.5 MOYA MOYA DISEASE: Status: ACTIVE | Noted: 2021-01-01

## 2021-05-06 PROBLEM — G31.84 MCI (MILD COGNITIVE IMPAIRMENT): Status: ACTIVE | Noted: 2021-01-01

## 2021-05-06 NOTE — PATIENT INSTRUCTIONS
Continue with baby aspirin 81mg daily  Continue with Lipitor 80mg daily  Add carbidopa-levodopa 1/2 tab 1/2hr before meals, can use 2x a day given his afternoons a the work program  Continue with PT/OT   Continue to keep him active and stimulate his mind  Can use flushable wipes for stool hygeine  Good oral hydration and nutrition    Follow up with Neurology office in 3 months

## 2021-05-06 NOTE — PROGRESS NOTES
Patient ID: Trace Mcallister is a 63 y o  male  Assessment/Plan:     Diagnoses and all orders for this visit:    Parkinson's disease ([address] )  -     carbidopa-levodopa (SINEMET)  mg per tablet; Take 1/2 tablet 2 times a day, 1/2 hour before meals  Padilla padilla disease    MCI (mild cognitive impairment)    Down syndrome       Continue with baby aspirin 81mg daily  Continue with Lipitor 80mg daily  Add carbidopa-levodopa 1/2 tab 1/2hr before meals, can use 2x a day given his afternoons a the work program  Continue with PT/OT   Continue to keep him active and stimulate his mind  Can use flushable wipes for stool hygeine  Good oral hydration and nutrition  Follow up with Neurology office in 3 months    Subjective/HPI: Trace Mcallister is a 63yr old male with Downs Syndrome who follows with OP Neurology for medical management of MoyaMoya, MCI and PD  PD  Pt continues to have his tremoring  Noted that he has periods when he does not shake and noted that he has had no significant changes with this  He is not bothered by this and has had no changes in this  He is able to assist with ADL, no issues with eating or holding objections  He continues to have issues with wiping after stooling  He has had wipes before but tries to flush them  Advised of flushable wipes on the market that they want to try  Care Giver noted that he is slower when he ambulates, notes a shuffling gait and a forward bend to his walk  His voice is quieter, noted that he mumbles more  Pt always has a soft tone but noted that he is quieter in general     Pt is doing PT and OT at this time, recommended continued therapies  Will add Sinemet , 1/2 tab 1/2 before breakfast and dinner  Pt is in work release during the day, so unable to get the 3rd lunch time dose  MoyaMoya:  Pt continue with ASA therapy and statin at 80mg daily  Lipid panel done, last  and follow up LDL is now 98    Pt goal range <100 however ideally less than 70  Will remain on current Lipitor dosing  Pt denies any headaches, dizziness, blurred vision or other vision changes  No changes with swallow or imbalances  Pt has had no falls  Pt does have some history of orthostatic hypotension  Is on midodrine for management, taken bid  Memory/Dementia  Pt continues in OT, making some progress in therapies  However, he continues to have issues with how he writes his name now, no longer writes Alverto Leyva like he used to  Now only write Oumou Terry  He is aware that this is his nickname  He does wander and stares out the windows and does not know why  He does get board at the home, however has resumed some of his work programs and has PT/OT to keep him busy  Pt organizes the paper work in his work release and this has kept him busy, he denies that it helps him think much  Will hold on memory medication at this time  Pt will continue with PT/OT, remains on BASA and Lipitor 80mg  Will add Sinemet 1/2 tab tid 1/2hour before his meals to assist with tremor, voice, gait and shuffling  Follow up with Outpatient Neurology in 3 months         The following portions of the patient's history were reviewed and updated as appropriate: allergies, current medications, past family history, past medical history, past social history, past surgical history and problem list         Past Medical History:   Diagnosis Date    Abnormal weight gain     last assessed 4/18/16; resolved 1/25/17    [facility]     Anxiety     Dementia ([facility] )     Depression     Disease of thyroid gland     Down's syndrome     Fatigue     last assessed 2/23/17; resolved 6/1/17    GERD (gastroesophageal reflux disease)     Gluten free diet     H/O urinary retention     Hernia of abdominal cavity     Hyperlipidemia     Hypogonadism in male     Impulse control disorder     OCD (obsessive compulsive disorder)     Orthostatic hypotension     Parkinson disease ([address] )  Parkinson disease ([address] )     resolved 2/23/17    Pleural effusion     Sinus tachycardia     Vitamin D deficiency     last assessed 9/1/16; resolved 6/1/17       Past Surgical History:   Procedure Laterality Date    COLONOSCOPY      ESOPHAGOGASTRODUODENOSCOPY      with biopsy    PERICARDIOCENTESIS  4/29/2016            Social History     Socioeconomic History    Marital status: Single     Spouse name: None    Number of children: None    Years of education: None    Highest education level: None   Occupational History    None   Social Needs    Financial resource strain: None    Food insecurity     Worry: None     Inability: None    Transportation needs     Medical: None     Non-medical: None   Tobacco Use    Smoking status: Never Smoker    Smokeless tobacco: Never Used   Substance and Sexual Activity    Alcohol use: Never     Frequency: Never    Drug use: Never    Sexual activity: None   Lifestyle    Physical activity     Days per week: None     Minutes per session: None    Stress: None   Relationships    Social connections     Talks on phone: None     Gets together: None     Attends Buddhism service: None     Active member of club or organization: None     Attends meetings of clubs or organizations: None     Relationship status: None    Intimate partner violence     Fear of current or ex partner: None     Emotionally abused: None     Physically abused: None     Forced sexual activity: None   Other Topics Concern    None   Social History Narrative    Caffeine use        Family History   Problem Relation Age of Onset    No Known Problems Mother     Parkinsonism Father         symtomatic    No Known Problems Brother     No Known Problems Sister     No Known Problems Maternal Grandmother     No Known Problems Maternal Grandfather     No Known Problems Paternal Grandmother     No Known Problems Paternal Grandfather     No Known Problems Brother          Current Outpatient Medications:   Aspirin Low Dose 81 MG chewable tablet, TAKE 1 TABLET BY MOUTH EVERY MORNING (8AM), Disp: 30 tablet, Rfl: 4    atorvastatin (LIPITOR) 80 mg tablet, TAKE 1 TABLET BY MOUTH IN THE EVENING (8PM), Disp: 30 tablet, Rfl: 4    Calcium Carbonate-Vitamin D 600-400 MG-UNIT per tablet, Take 1 tablet by mouth 2 (two) times a day At 8 am and 5 pm, Disp: 60 tablet, Rfl: 11    carbidopa-levodopa (SINEMET)  mg per tablet, Take 1/2 tablet 2 times a day, 1/2 hour before meals  , Disp: 30 tablet, Rfl: 2    cholecalciferol (VITAMIN D3) 1,000 units tablet, Take 1 tablet (1,000 Units total) by mouth 2 (two) times a day At 8am and 8 pm, Disp: 60 tablet, Rfl: 11    divalproex sodium (DEPAKOTE) 500 mg EC tablet, Take 500 mg by mouth , Disp: , Rfl:     docusate sodium (COLACE) 100 mg capsule, Take one capsule at 8am, Disp: 30 capsule, Rfl: 11    erythromycin (ILOTYCIN) ophthalmic ointment, Administer 0 5 inches into the left eye every 12 (twelve) hours At 8 am and 8 pm  D/c on 5/27/20, Disp: 3 5 g, Rfl: 0    esomeprazole (NexIUM) 40 MG capsule, Take one capsule daily 30 minutes prior to breakfast, Disp: 30 capsule, Rfl: 11    lanolin-mineral oil (ARMAAN) LOTN, Apply topically 2 (two) times a day Apply to hands daily at 8 am and 8 pm, Disp: 1 Bottle, Rfl: 11    levothyroxine 75 mcg tablet, TAKE 1 TABLET BY MOUTH IN THE EVENING (8PM), Disp: 30 tablet, Rfl: 4    lidocaine (URO-JET) 2 % urethral/mucosal gel, Insert 1 application into the urethra daily as needed (urethral pain 2/2 catheter), Disp: , Rfl: 0    loratadine (CLARITIN) 10 mg tablet, TAKE 1 TABLET BY MOUTH EVERY MORNING (8AM) AS NEEDED FOR ALLERGIES -MAX=1TAB/DAY, Disp: 30 tablet, Rfl: 4    midodrine (PROAMATINE) 2 5 mg tablet, Take 1 tablet (2 5 mg total) by mouth 2 (two) times a day as needed (If systolic blood pressure less than 100 and patient is symptomatic), Disp: 60 tablet, Rfl: 2    multivitamin-minerals (CENTRUM ADULTS) tablet, Take 1 tablet by mouth daily At 8am, Disp: 30 tablet, Rfl: 11    neomycin-bacitracin-polymyxin (NEOSPORIN) 5-400-5,000 ointment, Apply topically 3 (three) times a day Apply to urethra for lubrication, Disp: 28 3 g, Rfl: 0    OLANZapine (ZyPREXA) 2 5 mg tablet, Take 1 tablet by mouth daily 8 pm, Disp: , Rfl:     ondansetron (ZOFRAN-ODT) 4 mg disintegrating tablet, Take 1 tablet (4 mg total) by mouth every 8 (eight) hours as needed for nausea or vomiting D/c on 4/18/21, Disp: 20 tablet, Rfl: 0    polyethylene glycol (GLYCOLAX) 17 GM/SCOOP powder, Take 17 grams in 8 oz of water by mouth daily at 8 pm, Disp: 527 g, Rfl: 11    tamsulosin (FLOMAX) 0 4 mg, Take 0 4 mg by mouth Two tablet @ 8 am 0 8 mg daily, Disp: , Rfl:     testosterone (ANDROGEL) 1 62 % TD gel pump, Apply 3 pumps at 8 AM to shoulders/chest daily, Disp: 75 g, Rfl: 3    Allergies   Allergen Reactions    Advil [Ibuprofen]     Gluten Meal - Food Allergy     Other      SEASONAL        Blood pressure 132/80, pulse 65, height 4' 10" (1 473 m), weight 71 2 kg (157 lb)  Objective:    Blood pressure 132/80, pulse 65, height 4' 10" (1 473 m), weight 71 2 kg (157 lb)  Physical Exam  Vitals signs reviewed  Constitutional:       Appearance: He is well-developed  HENT:      Head: Normocephalic  Right Ear: Decreased hearing noted  Left Ear: Decreased hearing noted  Ears:      Comments: Bilateral hearing aids     Nose: Nose normal       Mouth/Throat:      Mouth: Mucous membranes are moist    Eyes:      General: Lids are normal       Extraocular Movements: Extraocular movements intact  Conjunctiva/sclera: Conjunctivae normal       Pupils: Pupils are equal, round, and reactive to light  Neck:      Musculoskeletal: Normal range of motion  Cardiovascular:      Rate and Rhythm: Normal rate  Pulmonary:      Effort: Pulmonary effort is normal  No respiratory distress  Abdominal:      Palpations: Abdomen is soft  Tenderness:  There is no abdominal tenderness  Musculoskeletal: Normal range of motion  Skin:     General: Skin is warm and dry  Neurological:      Mental Status: He is alert  Coordination: Romberg sign negative  Deep Tendon Reflexes: Strength normal and reflexes are normal and symmetric  Psychiatric:         Speech: Speech normal          Behavior: Behavior normal          Thought Content: Thought content normal          Judgment: Judgment normal          Neurological Exam  Mental Status  Alert  Oriented only to person, place and time  Recent and remote memory are intact  Memory: Poor short term memory and concentration  Speech is normal  Speech: Soft tone  Language is fluent with no aphasia  Attention and concentration are normal  Fund of knowledge is appropriate for level of education  Cranial Nerves  CN II: Visual acuity is normal  Visual fields full to confrontation  CN III, IV, VI: Extraocular movements intact bilaterally  Normal lids and orbits bilaterally  Pupils equal round and reactive to light bilaterally  CN V: Facial sensation is normal   CN VII: Full and symmetric facial movement  CN VIII: Hearing is normal   CN IX, X: Palate elevates symmetrically  Normal gag reflex  CN XI: Shoulder shrug strength is normal   CN XII: Tongue midline without atrophy or fasciculations  Motor  Normal muscle bulk throughout  Normal muscle tone  No significant rigidity noted in UE, mildly rigid in the quads    The following abnormal movements were seen: Fine tremor with finger to nose  Strength is 5/5 throughout all four extremities  Motor and coordination exam limited due to pt concentration and ability to understand  Poss [place] related as well       Sensory  Light touch is normal in upper and lower extremities  Temperature is normal in upper and lower extremities  Vibration is normal in upper and lower extremities  Proprioception is normal in upper and lower extremities       Reflexes  Deep tendon reflexes are 2+ and symmetric in all four extremities with downgoing toes bilaterally  Right pathological reflexes: Sully's absent  Left pathological reflexes: Sully's absent  Coordination  Right: Finger-to-nose abnormality: Heel-to-shin abnormality:  Left: Finger-to-nose abnormality: Heel-to-shin abnormality:  Difficulty following or understanding command       Gait  Casual gait: Wide stance  Reduced stride length  Shuffling gait  Reduced right arm swing  Reduced left arm swing  Romberg is absent  Unable to rise from chair without using arms  Slow slightly shuffling gait with forward flexion           ROS:    Review of Systems   Constitutional: Negative for appetite change, fatigue and fever  HENT: Negative for drooling, ear pain, tinnitus, trouble swallowing and voice change  Eyes: Negative for photophobia, pain and visual disturbance  Respiratory: Negative for chest tightness and shortness of breath  Cardiovascular: Negative for chest pain, palpitations and leg swelling  Gastrointestinal: Negative for abdominal pain, constipation, diarrhea, nausea and vomiting  Endocrine: Negative for cold intolerance and heat intolerance  Genitourinary: Negative for difficulty urinating, frequency and urgency  Musculoskeletal: Negative for back pain, gait problem, joint swelling, myalgias and neck pain  Skin: Negative for rash  Neurological: Positive for tremors  Negative for dizziness, seizures, syncope, facial asymmetry, speech difficulty, weakness, light-headedness, numbness and headaches  Psychiatric/Behavioral: Positive for sleep disturbance  Negative for agitation, behavioral problems, confusion, decreased concentration and dysphoric mood  The patient is not nervous/anxious and is not hyperactive

## 2021-05-06 NOTE — TELEPHONE ENCOUNTER
L/M asking for a c/b re: lab results   Left direct number for [facility] and Arnel Berumen

## 2021-05-06 NOTE — TELEPHONE ENCOUNTER
----- Message from Dawood Mccall, [address] sent at 5/5/2021  7:23 PM EDT -----  Please call the patient regarding his Improved result  LDL is coming down nicely on his current dose of Lipitor, will continue on this dosing  Please check and make sure they dont need a refill  I don't think they do but just in case       Thanks  [place]

## 2021-05-10 NOTE — PROGRESS NOTES
Daily Note     Today's date: 5/10/2021  Patient name: Sanju Reese  : 1965  MRN: [id number]  Referring provider: BJ Tripp  Dx:   Encounter Diagnosis     ICD-10-CM    1  MCI (mild cognitive impairment)  G31 84    2  Parkinson's disease ([facility] )  G20                   Subjective: "is this right"       Objective:  Performed logarithmic plate of numbers focusing on recall of number sequence, [address], and sustained attention  Performed ring matching focusing on [address] and sustained attention pt required mod VCs for determining light/dark colors focusing on sustained attention and [address]   Performed1 step commands of fishing with pt reqired 2 VCs pverall to recall to complete 10 1 step commands overall focusing on memory, sustained attention and UE coordination    trialed memory light up game however pt demonstrating difficulty comprehending instructions after max VCs and task discontinued  Performed beads and pattern cards focusing on [address], sustained attention  Required moderate VCs to attend to card and identify correct items     Pt was able to don jacket independently   COGNITION  Required VCs for sustained attention to task, required VCs for recall  Assessment: Tolerated treatment well  Patient would benefit from continued OT  Pt required frequent cues for sustained attention  Plan: Continue per plan of care        Precautions:   Past Medical History:   Diagnosis Date    Abnormal weight gain     last assessed 16; resolved 17    Acute [facility]congestive heart failure) [facility] )     Anxiety     [facility]     Depression     Disease of thyroid gland     Down's syndrome     Fatigue     last assessed 17; resolved 17    GERD (gastroesophageal reflux disease)     Gluten free diet     H/O urinary retention     Hernia of abdominal cavity     Hyperlipidemia     Hypogonadism in male     Impulse control disorder     OCD (obsessive compulsive disorder)     Orthostatic hypotension     Parkinson disease ([facility] )     Parkinson disease ([facility] )     resolved 2/23/17    Pleural effusion     Sinus tachycardia     Vitamin D deficiency     last assessed 9/1/16; resolved 6/1/17     Outcome Measures 2/8/21               5x sit to stand 15 23 seconds no UE               30 second sit to stand 8 no UE               TUG 14 23 sec               Gait Speed   84 m/s               2 minute walk test 200 feet               Gary 39/56               mCTSIB FTEO- 30 sec  FTEC- 30 sec   FTEO- 30 sec FTEC-7 5 sec               2 minute walk teest 150 feet                 Manuals                                                                 Neuro Re-Ed                                                                                                        Ther Ex                                                                                                                     Ther Activity                                       Gait Training                                       Modalities

## 2021-05-10 NOTE — TELEPHONE ENCOUNTER
----- Message from Hernan Mackay MD sent at 5/7/2021  9:33 AM EDT -----  Thyroid function tests, vitamin d, 12 within normal limits    Hannah testosterone within normal limits, free testosterone mildly low

## 2021-05-10 NOTE — PROGRESS NOTES
Daily Note  IE: 2021  Progress Note-    (POC: 2021)    Today's date: 5/10/2021  Patient name: Susan Prescott  : 1965  MRN: [id number]  Referring provider: BJ Whipple  Dx:   Encounter Diagnosis     ICD-10-CM    1  Parkinson's disease ([facility] )  G20                   Subjective: Patient reports to session 15 minutes late  Pt reports no changes in status  Objective: See treatment diary below    NMR:  - Hurdles: 12", HHA  - Hurdles: 9" Fwd, Lat, HHA  - Picking up color dots  - Shooting ball into bucket   - Catch with ball out of SUNDAY  - Kicking ball into bucket  - Backward walking with HHA  - Step up and over, 4" step       Assessment: Patient tolerated treatment well today  Pt was unable to clear 12" hurdles this date so lowered to 9"  Pt required multiple cues for lateral hurdles with eventual HHA to force into side stepping instead of reverting to forward  Pt showed difficulty with scanning environment for colors of dots  Pt was able to ambulate backward with HHA and took longer steps with cueing  Pt will benefit from continued skilled therapy to improve balance and functional mobility for maximal daily function  Plan: Continue per plan of care        Precautions:   Past Medical History:   Diagnosis Date    Abnormal weight gain     last assessed 16; resolved 17    Acute CHF (congestive heart failure) ([facility] )     Anxiety     [facility]     Depression     Disease of thyroid gland     Down's syndrome     Fatigue     last assessed 17; resolved 17    GERD (gastroesophageal reflux disease)     Gluten free diet     H/O urinary retention     Hernia of abdominal cavity     Hyperlipidemia     Hypogonadism in male     Impulse control disorder     OCD (obsessive compulsive disorder)     Orthostatic hypotension     Parkinson disease (HCC)     Parkinson disease ([facility] )     resolved 17    Pleural effusion     Sinus tachycardia     Vitamin D deficiency     last assessed 9/1/16; resolved 6/1/17     Outcome Measures 2/8/21               5x sit to stand 15 23 seconds no UE               30 second sit to stand 8 no UE               TUG 14 23 sec               Gait Speed   84 m/s               2 minute walk test 200 feet               Gary 39/56               mCTSIB FTEO- 30 sec  FTEC- 30 sec   FTEO- 30 sec FTEC-7 5 sec               2 minute walk teest 150 feet                 Manuals                                                                 Neuro Re-Ed                                                                                                        Ther Ex                                                                                                                     Ther Activity                                       Gait Training                                       Modalities

## 2021-05-12 NOTE — PROGRESS NOTES
Daily Note  IE: 2021  Progress Note-    (POC: 2021)    Today's date: 2021  Patient name: Herbert Ramirez  : 1965  MRN: [id number]  Referring provider: BJ Eduardo  Dx:   Encounter Diagnosis     ICD-10-CM    1  Parkinson's disease ([facility] )  G20                   Subjective: Patient reports from OT stating he is doing good today      Objective: See treatment diary below    NMR:    - Hurdles: 9" Fwd  - STS w/ feet on foam: 10x  - Picking up color dots while holding crate  - Catching tennis ball   - Kicking/dribbling P-ball  - Shooting ball into bucket      Assessment: Patient tolerated treatment well  Patient required frequent verbal cueing and encouragement to complete exercises  During anselmo exercises, patient completed 50% w/ HHA, 50% independently  Patient showed difficulty with visual tracking while catching tennis ball, with min improvement with verbal cueing to watch ball  Patient will benefit from skilled PT services to improve balance and functional mobility for maximal daily function  Plan: Continue per plan of care        Precautions:   Past Medical History:   Diagnosis Date    Abnormal weight gain     last assessed 16; resolved 17    Acute [facility]congestive heart failure) ([facility] )     Anxiety     [facility]     Depression     Disease of thyroid gland     Down's syndrome     Fatigue     last assessed 17; resolved 17    GERD (gastroesophageal reflux disease)     Gluten free diet     H/O urinary retention     Hernia of abdominal cavity     Hyperlipidemia     Hypogonadism in male     Impulse control disorder     OCD (obsessive compulsive disorder)     Orthostatic hypotension     Parkinson disease (HCC)     Parkinson disease ([facility] )     resolved 17    Pleural effusion     Sinus tachycardia     Vitamin D deficiency     last assessed 16; resolved 17     Outcome Measures 21               5x sit to stand 15 23 seconds no UE               30 second sit to stand 8 no UE               TUG 14 23 sec               Gait Speed   84 m/s               2 minute walk test 200 feet               Gary 39/56               mCTSIB FTEO- 30 sec  FTEC- 30 sec   FTEO- 30 sec FTEC-7 5 sec               2 minute walk teest 150 feet                 Manuals                                                                 Neuro Re-Ed                                                                                                        Ther Ex                                                                                                                     Ther Activity                                       Gait Training                                       Modalities

## 2021-05-12 NOTE — PROGRESS NOTES
Daily Note     Today's date: 2021  Patient name: Sara Roy  : 1965  MRN: [id number]  Referring provider: BJ Salvador  Dx:   Encounter Diagnosis     ICD-10-CM    1  MCI (mild cognitive impairment)  G31 84    2  Parkinson's disease ([facility] )  Gary Wood        Start Time: 200  Stop Time: 0928  Total time in clinic (min): 41 minutes    Subjective: "is this right"       Objective:  Pt assembled simple magnetic shape designs x4 to work on sustained attention, visual perception, and finger strength  Pt assembled with mod VCs for locating correct shape and sustained attention  Pt requested to use restroom  Pt required VCs to sequence using restroom for wiping and donning pants and underwear  Pt completed hand washing with supervision      Assessment: Tolerated treatment well  Patient would benefit from continued OT  Pt requires VCs for sustained attention and sequencing ADLs  Plan: Continue per plan of care  Precautions:   Past Medical History:   Diagnosis Date    Abnormal weight gain     last assessed 16; resolved 17    Acute CHF (congestive heart failure) ([facility] )     Anxiety     Dementia ([place] [address] )     Depression     Disease of thyroid gland     Down's syndrome     Fatigue     last assessed 17; resolved 17    GERD (gastroesophageal reflux disease)     Gluten free diet     H/O urinary retention     Hernia of abdominal cavity     Hyperlipidemia     Hypogonadism in male     Impulse control disorder     OCD (obsessive compulsive disorder)     Orthostatic hypotension     Parkinson disease (HCC)     Parkinson disease ([facility] )     resolved 17    Pleural effusion     Sinus tachycardia     Vitamin D deficiency     last assessed 16; resolved 17     Outcome Measures 21               5x sit to stand 15 23 seconds no UE               30 second sit to stand 8 no UE               TUG 14 23 sec               Gait Speed   84 m/s               2 minute walk test 200 feet               Gary 39/56               mCTSIB FTEO- 30 sec  FTEC- 30 sec   FTEO- 30 sec FTEC-7 5 sec               2 minute walk teest 150 feet                 Manuals                                                                 Neuro Re-Ed                                                                                                        Ther Ex                                                                                                                     Ther Activity                                       Gait Training                                       Modalities

## 2021-05-17 NOTE — PROGRESS NOTES
Daily Note     Today's date: 2021  Patient name: Klaudia Covarrubias  : 1965  MRN: [id number]  Referring provider: BJ Deras  Dx:   Encounter Diagnosis     ICD-10-CM    1  Parkinson's disease ([facility] )  G20                   Subjective: Pt was 12 minutes late to session today  Reports he is tired today  Objective:   -Pt completed cube pattern activities to improve sustained attention, [facility] for carry over with ADLs and handwriting, and visual perceptual skills  Requires ~20 VCs for attention and places pieces with 50% accuracy  Noted to be both internally and externally distracted throughout     -Pt retrieved squigs from door at various heights with pincer grasp R UE to remove squigs by designed colors with 8 VCs for attention, 3 corrections 2* selecting incorrect color item  Focus on [facility], sustained attention, working memory, and R UE strengthening     -Pt completed matching 6 different colored marbles into coordinating containers by color using pincer grasp R UE for carry over with self care tasks and hand writing  Requires 5 VCs for attention to task, correctly places 25% of marbles upon initial attempt, 90% following 1 VC  Assessment: Pt tolerated today's OT session fair  He continues to be limited by impaired sustained attention, cognitive deficits, and fine motor deficits  He would continue to benefit from OT to maximize functioning and decrease caregiver burden  Plan: Continue per plan of care        Precautions:   Past Medical History:   Diagnosis Date    Abnormal weight gain     last assessed 16; resolved 17    Acute [facility]congestive [facility]) [facility] )     Anxiety     Dementia ([facility] )     Depression     Disease of thyroid gland     Down's syndrome     Fatigue     last assessed 17; resolved 17    GERD (gastroesophageal reflux disease)     Gluten free diet     H/O urinary retention     Hernia of abdominal cavity     Hyperlipidemia     Hypogonadism in male     Impulse control disorder     OCD (obsessive compulsive disorder)     Orthostatic hypotension     [facility]     [facility] disease ([facility] )     resolved 2/23/17    Pleural effusion     Sinus tachycardia     Vitamin D deficiency     last assessed 9/1/16; resolved 6/1/17     Outcome Measures 2/8/21               5x sit to stand 15 23 seconds no UE               30 second sit to stand 8 no UE               TUG 14 23 sec               Gait Speed   84 m/s               2 minute walk test 200 feet               Gary 39/56               mCTSIB FTEO- 30 sec  FTEC- 30 sec   FTEO- 30 sec FTEC-7 5 sec               2 minute walk teest 150 feet                 Manuals                                                                 Neuro Re-Ed                                                                                                        Ther Ex                                                                                                                     Ther Activity                                       Gait Training                                       Modalities

## 2021-05-17 NOTE — PROGRESS NOTES
Daily Note  IE: 2021  Progress Note-    (POC: 2021)    Today's date: 2021  Patient name: Nir Berg  : 1965  MRN: [id number]  Referring provider: BJ Chavez  Dx:   Encounter Diagnosis     ICD-10-CM    1  [facility]'s [facility] ([facility] )  G20                   Subjective: Patient reports from OT stating he is doing good today  Objective: See treatment diary below    NMR:  - Scavenger hunt for weighted objects w/ room scannin set, 8 reps (2-4# wts)  - Step tap onto PT called out balance pod: 10 reps  - Hand tap onto PT called out balance pod: 10 reps  - Picking up balance pods (holding up to 3 at a time): 2 sets  - STS: 5 reps  - Shooting volleyball into bucket (focus on overhead reach): 15 reps    Manual:  - STM: R quads/patellar tendon      Assessment: Patient able to tolerate treatment session fair today  Patient with tendency to perseverate on time of conclusion of session today  He noted R knee pain with STS today that improved following STM to R quadriceps and patellar tendon  Patient able to perform overhead reach, however with difficulty maintaining  He will continue to benefit from skilled outpatient PT in order to maximize his function and improve is balance necessary for mobility and maximal daily function  Plan: Continue per plan of care        Precautions:   Past Medical History:   Diagnosis Date    Abnormal weight gain     last assessed 16; resolved 17    Acute [facility]congestive [facility]) [facility] )     Anxiety     Dementia ([facility] )     Depression     Disease of thyroid gland     Down's syndrome     Fatigue     last assessed 17; resolved 17    GERD (gastroesophageal reflux disease)     Gluten free diet     H/O urinary retention     Hernia of abdominal cavity     Hyperlipidemia     Hypogonadism in male     Impulse control disorder     OCD (obsessive compulsive disorder)     Orthostatic hypotension     Parkinson disease ([facility] )     Parkinson disease ([facility] )     resolved 2/23/17    Pleural effusion     Sinus tachycardia     Vitamin D deficiency     last assessed 9/1/16; resolved 6/1/17     Outcome Measures 2/8/21               5x sit to stand 15 23 seconds no UE               30 second sit to stand 8 no UE               TUG 14 23 sec               Gait Speed   84 m/s               2 minute walk test 200 feet               Gary 39/56               mCTSIB FTEO- 30 sec  FTEC- 30 sec   FTEO- 30 sec FTEC-7 5 sec               2 minute walk teest 150 feet                 Manuals                                                                 Neuro Re-Ed                                                                                                        Ther Ex                                                                                                                     Ther Activity                                       Gait Training                                       Modalities

## 2021-05-19 NOTE — PROGRESS NOTES
Daily Note     Today's date: 2021  Patient name: Cata Harris  : 1965  MRN: [id number]  Referring provider: BJ Pedroza  Dx:   Encounter Diagnosis     ICD-10-CM    1  Parkinson's disease ([facility] )  Rola Aldridge        Start Time: 930  Stop Time: 8787  Total time in clinic (min): 45 minutes    Subjective: "My knee hurt"  Pt c/o R knee pain while seated in chair  Pt does not recall any injury  Objective:     -Attempted pipe puzzle activity with simple 3 piece design to improve FMC, improve visual perception, and inc sustained attention  Pt is able to effectively put pieces together and pull them apart, but requires persistent cues to select the correct pieces to match model     -Cube design activity in order to improve [facility] and sustained attention with MAX VCs for attention, mod VCs for problem solving  Noted to be internally distracted by sneezing, oral fixations, and externally distracted by music in gym (door closed throughout session)  Pt cleaned up activity with use of yellow pinch pin with tripod grasp to promote increased fine motor strength for carry over for writing name  Again requires max VCs for sustained attention and 6 repetition of directions  Noted to switch hands after ~5 repetitions, limited by fatigue     -Completed cone bowling activity to promote improved dynamic balance for carry over with functional activities, increase UE strength, and sustained attention  Initially trialed in stance, but transitioned to seated 2* c/o R knee pain  Able to correctly ID colors of bean bags 100%, number of cones knocked down ~75%  Only requires 2 VCs for attention     -Attempts large beading activity while seated, however significantly distracted and very self limiting throughout    Continues to demonstrate learned helplessness, and at times doing things to avoid participation (taking out hearing aides and asking therapist to put them back in, requesting water, (?pretending) to sneeze >20x  Able to thread 2 beads with max cues for attention  Assessment: Pt tolerated OT treatment session fair this AM   Very distracted throughout, both internally and externally  Demonstrating improved [address], and continues to be limited by cognitive deficits  Plan: Continue per plan of care  Precautions:   Past Medical History:   Diagnosis Date    Abnormal weight gain     last assessed 4/18/16; resolved 1/25/17    Acute [facility]congestive [facility]) ([facility] )     Anxiety     [facility] )     Depression     Disease of thyroid gland     Down's syndrome     Fatigue     last assessed 2/23/17; resolved 6/1/17    GERD (gastroesophageal reflux disease)     Gluten free diet     H/O urinary retention     Hernia of abdominal cavity     Hyperlipidemia     Hypogonadism in male     Impulse control disorder     OCD (obsessive compulsive disorder)     Orthostatic hypotension     Parkinson disease (HCC)     Parkinson disease ([facility] )     resolved 2/23/17    Pleural effusion     Sinus tachycardia     Vitamin D deficiency     last assessed 9/1/16; resolved 6/1/17     Outcome Measures 2/8/21               5x sit to stand 15 23 seconds no UE               30 second sit to stand 8 no UE               TUG 14 23 sec               Gait Speed   84 m/s               2 minute walk test 200 feet               Gary 39/56               mCTSIB FTEO- 30 sec  FTEC- 30 sec   FTEO- 30 sec FTEC-7 5 sec               2 minute walk teest 150 feet

## 2021-05-19 NOTE — PROGRESS NOTES
Assessment/Plan:  Patient opted out of oral and topical anti fungal medications at this time, all onychomycotic dystrophic nails debrided using sterile Nipper and electrical arleth to patient tolerance, Betadine applied, patient educated on daily foot hygiene for the management of fungal infection  Bilateral ingrown toenail, aseptic resection of the offending nail border in a slant back fashion using sterile Nipper distal to the eponychium, patient reported improvement and relief, topical antibiotic applied with a Band-Aid, patient educated and daily dressing changes and the use of wide toe box shoes, patient to report to the office if condition recur  Macerated tissue debrided, patient educated and daily foot hygiene, patient educated and changing socks frequently and the use of OTC antifungals product,       Diagnoses and all orders for this visit:    Onychomycosis    Tinea pedis of both feet    Pain in both feet    Ingrowing nail          Subjective:      Patient ID: Suleman Seen is a 63 y o  male  13-year-old male resident of [facility], special needs, accompanied by his aide, presents to the office for painful hallucal toenails, patient has chronic history of ingrown toenails, patient is unable to self treat due to behavioral and mental conditions, currently denies constitutional symptoms currently denies trauma      The following portions of the patient's history were reviewed and updated as appropriate: allergies, current medications, past family history, past medical history, past social history, past surgical history and problem list     Review of Systems   Constitutional: Negative  Respiratory: Negative  Cardiovascular: Negative  Musculoskeletal: Negative  Skin: Negative                  Historical Information   Past Medical History:   Diagnosis Date    Abnormal weight gain     last assessed 4/18/16; resolved 1/25/17    Acute CH[facility]congestive [facility]) ([facility]     Anxiety     Dementia ([facility] )     Depression     Disease of thyroid gland     Down's syndrome     Fatigue     last assessed 2/23/17; resolved 6/1/17    GERD (gastroesophageal reflux disease)     Gluten free diet     H/O urinary retention     Hernia of abdominal cavity     Hyperlipidemia     Hypogonadism in male     Impulse control disorder     OCD (obsessive compulsive disorder)     Orthostatic hypotension     Parkinson disease (HCC)     [facility] )     resolved 2/23/17    Pleural effusion     Sinus tachycardia     Vitamin D deficiency     last assessed 9/1/16; resolved 6/1/17     Past Surgical History:   Procedure Laterality Date    COLONOSCOPY      ESOPHAGOGASTRODUODENOSCOPY      with biopsy    PERICARDIOCENTESIS  4/29/2016          Social History   Social History     Substance and Sexual Activity   Alcohol Use Never    Frequency: Never     Social History     Substance and Sexual Activity   Drug Use Never     Social History     Tobacco Use   Smoking Status Never Smoker   Smokeless Tobacco Never Used     Family History:   Family History   Problem Relation Age of Onset    No Known Problems Mother     Parkinsonism Father         symtomatic    No Known Problems Brother     No Known Problems Sister     No Known Problems Maternal Grandmother     No Known Problems Maternal Grandfather     No Known Problems Paternal Grandmother     No Known Problems Paternal Grandfather     No Known Problems Brother        Meds/Allergies   all medications and allergies reviewed  Allergies   Allergen Reactions    Advil [Ibuprofen]     Gluten Meal - Food Allergy     Other      SEASONAL       Objective:      /76   Pulse 76   Resp 17   Ht 5' 2" (1 575 m)   Wt 75 8 kg (167 lb)   BMI 30 54 kg/m²          Physical Exam  Constitutional:       General: He is not in acute distress  Appearance: He is well-developed  He is not ill-appearing, toxic-appearing or diaphoretic     HENT:      Head: Normocephalic and atraumatic  Cardiovascular:      Pulses: Normal pulses  Dorsalis pedis pulses are 2+ on the right side and 2+ on the left side  Posterior tibial pulses are 2+ on the right side and 2+ on the left side  Comments: Palpable pedal pulse, CFT is less than 3 seconds, temperature gradient within normal limits, pedal hair present, no trophic skin changes  Pulmonary:      Effort: No respiratory distress  Musculoskeletal: Normal range of motion  Comments: No pain on palpation on range of motion of ankle joint subtalar joint metatarsal joint tarsal joints bilateral foot    Hallux limitus noted bilateral foot   Feet:      Right foot:      Protective Sensation: 10 sites tested  10 sites sensed  Skin integrity: No ulcer, blister, skin breakdown or erythema  Left foot:      Protective Sensation: 10 sites tested  10 sites sensed  Skin integrity: No ulcer, blister, skin breakdown or erythema  Skin:     Capillary Refill: Capillary refill takes 2 to 3 seconds  Coloration: Skin is not pale  Comments: Incurvated onychocryptosis noted to the lateral nail border bilateral hallux, thickened and dystrophic with subungual debris nails to all digits painful on firm palpation, pinch callus noted bilateral foot, hyperkeratosis lesions plantar 2nd metatarsal head bilateral     Diffuse scaly skin eruption bilateral foot mild with the erythematous base   Neurological:      Mental Status: He is alert and oriented to person, place, and time  Comments:  muscle power 5/5, protective sensations intact, no focal motor deficit        Foot Exam    Right Foot/Ankle     Inspection and Palpation  Skin Exam: no blister, no maceration, no ulcer and no erythema     Neurovascular  Dorsalis pedis: 2+  Posterior tibial: 2+      Left Foot/Ankle      Inspection and Palpation  Skin Exam: no blister, no maceration, no ulcer and no erythema     Neurovascular  Dorsalis pedis: 2+  Posterior tibial: 2+

## 2021-05-19 NOTE — PROGRESS NOTES
Daily Note  IE: 2021  Progress Note-    (POC: 2021)    Today's date: 2021  Patient name: Barbra Maharaj  : 1965  MRN: [id number]  Referring provider: BJ Babcock  Dx:   Encounter Diagnosis     ICD-10-CM    1  Parkinson's disease ([facility] )  G20                   Subjective: Patient reports from OT today with complaints of R knee pain and B/L foot pain with new orthotics  Objective: See treatment diary below    TA:  - Static standing assessment w/o orthotics: toe out/hip ER, B/L medial arch collapse  - Static standing assessment w/ orthotics: toe out/hip ER, improved medial arch support  - Gait assessment w/o orthotics: toe out/hip ER, no B/L foot pain reported, occasional R knee pain reported  - Gait assessment w/ orthotics: toe out/hip ER, B/L foot pain reported, occasional R knee pain reported  - Patient/caregiver education: see assessment    NMR:  - Ambulation w/ rolling/kicking large PBall      Assessment: Patient able to tolerate treatment session fair today  Evaluated static standing and ambulation with and without orthotics as patient complained of B/L foot pain when donned  Significant improvements in patient's medial arch support when wearing orthotics and was educated on importance of wearing them and how they need to be worn consistently to allow some time to wear them in  Discussed with caregiver about slowly increasing wear time to avoid B/L foot pain and they were in good verbal understanding  He will continue to benefit from skilled outpatient PT in order to maximize his function and improve is balance necessary for mobility and maximal daily function  Plan: Continue per plan of care        Precautions:   Past Medical History:   Diagnosis Date    Abnormal weight gain     last assessed 16; resolved 17    Acute CHF (congestive heart failure) (HCC)     Anxiety     Dementia (HCC)     Depression     Disease of thyroid gland     Down's syndrome  Fatigue     last assessed 2/23/17; resolved 6/1/17    GERD (gastroesophageal reflux disease)     Gluten free diet     H/O urinary retention     Hernia of abdominal cavity     Hyperlipidemia     Hypogonadism in male     Impulse control disorder     OCD (obsessive compulsive disorder)     Orthostatic hypotension     [facility]     Parkinson disease ([facility] )     resolved 2/23/17    Pleural effusion     Sinus tachycardia     Vitamin D deficiency     last assessed 9/1/16; resolved 6/1/17     Outcome Measures 2/8/21               5x sit to stand 15 23 seconds no UE               30 second sit to stand 8 no UE               TUG 14 23 sec               Gait Speed   84 m/s               2 minute walk test 200 feet               Gary 39/56               mCTSIB FTEO- 30 sec  FTEC- 30 sec   FTEO- 30 sec FTEC-7 5 sec               2 minute walk teest 150 feet                 Manuals                                                                 Neuro Re-Ed                                                                                                        Ther Ex                                                                                                                     Ther Activity                                       Gait Training                                       Modalities

## 2021-05-24 NOTE — PROGRESS NOTES
Daily Note     Today's date: 2021  Patient name: Bipin Skelton  : 1965  MRN: [id number]  Referring provider: BJ Palm  Dx:   Encounter Diagnosis     ICD-10-CM    1  Parkinson's disease ([facility] )  G20    2  MCI (mild cognitive impairment)  G31 84        Start Time: 1100  Stop Time: 1145  Total time in clinic (min): 45 minutes    Subjective: "Are we done yet?"      Objective:     Pt required restroom break  Completed independently for sequencing toilet hygiene and hand washing  Pt completed marble board design  Pt required min VCs to count how many of each color was required working on sustained attention  Pt placed marbles in board using tongs to work on fine motor strength  Pt required mod A to transfer pieces using tongs  Pt able to maintain grasp on marbles x1 second using tongs each trial      Pt completed screw board design using screw  to work on bilateral skills, dexterity and in hand manipulation  Worked on sustained attention through providing 1 step directions  Completed instructions with mod VCs      Assessment: Pt tolerated OT treatment session fair  Pt demonstrated improved attention with external noises outside room with closed door  Pt demonstrating need for redirection throughout session due to decreased sustained attention  Pt would benefit from continued OT services  Plan: Continue per plan of care        Precautions:   Past Medical History:   Diagnosis Date    Abnormal weight gain     last assessed 16; resolved 17    Acute CHF (congestive heart failure) ([facility] )     Anxiety     Dementia ([facility] )     Depression     Disease of thyroid gland     Down's syndrome     Fatigue     last assessed 17; resolved 17    GERD (gastroesophageal reflux disease)     Gluten free diet     H/O urinary retention     Hernia of abdominal cavity     Hyperlipidemia     Hypogonadism in male     Impulse control disorder     OCD (obsessive compulsive disorder)  Orthostatic hypotension     Parkinson disease (HCC)     Parkinson disease ([facility] [address] )     resolved 2/23/17    Pleural effusion     Sinus tachycardia     Vitamin D deficiency     last assessed 9/1/16; resolved 6/1/17     Outcome Measures 2/8/21               5x sit to stand 15 23 seconds no UE               30 second sit to stand 8 no UE               TUG 14 23 sec               Gait Speed   84 m/s               2 minute walk test 200 feet               Gary 39/56               mCTSIB FTEO- 30 sec  FTEC- 30 sec   FTEO- 30 sec FTEC-7 5 sec               2 minute walk teest 150 feet

## 2021-05-24 NOTE — PROGRESS NOTES
Daily Note  IE: 2021  Progress Note-    (POC: 2021)    Today's date: 2021  Patient name: Mario Barclay  : 1965  MRN: [id number]  Referring provider: BJ May  Dx:   Encounter Diagnosis     ICD-10-CM    1  Parkinson's disease ([facility] [address] )  G20                   Subjective: Patient with no new complaints  Objective: See treatment diary below    NMR:  - Scavenger hunt for cones w/ room scanning and obstacle negotiation - 7 cones  - Step tap onto PT called out color balance pods  - Picking up balance pods (holding up to 3 at a time): 2 sets  - Forward over 6" hurdles, 1 UE support: 6x down/back  - Step ups 6" step while holding onto volleyball  - STS with ball toss: 5 reps  - Forward through agility ladder with colored dots for external cue to increase step length      Assessment: Frequent cueing to remain on task  Fair carryover of external cues on floor to increase step length  No LOB when picking up items off floor  He will continue to benefit from skilled outpatient PT in order to maximize his function and improve is balance necessary for mobility and maximal daily function  Plan: Continue per plan of care        Precautions:   Past Medical History:   Diagnosis Date    Abnormal weight gain     last assessed 16; resolved 17    Acute CHF (congestive heart failure) ([facility] [address] )     Anxiety     [facility]     Depression     Disease of thyroid gland     Down's syndrome     Fatigue     last assessed 17; resolved 17    GERD (gastroesophageal reflux disease)     Gluten free diet     H/O urinary retention     Hernia of abdominal cavity     Hyperlipidemia     Hypogonadism in male     Impulse control disorder     OCD (obsessive compulsive disorder)     Orthostatic hypotension     Parkinson disease (HCC)     Parkinson disease ([facility] [address] )     resolved 17    Pleural effusion     Sinus tachycardia     Vitamin D deficiency     last assessed 16; resolved 6/1/17     Outcome Measures 2/8/21               5x sit to stand 15 23 seconds no UE               30 second sit to stand 8 no UE               TUG 14 23 sec               Gait Speed   84 m/s               2 minute walk test 200 feet               Gary 39/56               mCTSIB FTEO- 30 sec  FTEC- 30 sec   FTEO- 30 sec FTEC-7 5 sec               2 minute walk teest 150 feet

## 2021-05-26 NOTE — PROGRESS NOTES
Progress Note  IE- 2021  New POC-     Today's date: 2021  Patient name: Kristina Velázquez  : 1965  MRN: [id number]  Referring provider: BJ Zimmer  Dx:   Encounter Diagnosis     ICD-10-CM    1  Parkinson's disease ([facility] [address] )  G20                   Subjective: Pt arrives w/ caregiver, denies any changes  Objective: See treatment diary below    Goals  STG 30 days- Progressing toward all goals  1  Patient will improve static balance with feet together Eyes Closed Firm surface to 30 seconds indicating reduction in fall risk (NOT MET)  2  Patient will display 2 3  second improvement with overall score of less than 12 seconds  with TUG test or lower with noted improvement being Minimal Detectable Change pre current research standards with fall risk  (NOT MET)  3  Patient will achieve 45/56 NUNEZ score with minimal improve by 6 points or more demonstrating Minimal Detectable change per current research standards for this objective test which assess fall risk  (NOT MET)  4  Patient will increase gait speed to > 0 70 m/s with 10 Meter Walk test, demonstrating Minimal Detectable change per current research standards for this objective test which assess fall risk  (NOT MET)  5  Patient will perform  5 x sit to stand test with overall reduction by seconds to less than 13 seconds indicating improvement with functional endurance (NOT MET)  6  Patient will increase ABC score to 60% or higher to reduce fall risk and improve balance confidence  (NOT MET)    LT days- Progressing toward all goals  1  Patient will score low risk for falls with 3/4 fall risk measures   2  Patient will be able to perform floor transfer without physical assistance   3  Patient will be able to carry objects without loss of balance  4  Patient will be able to ambulate outdoors without any loss of balance (NOT MET)  5   Patient will increase ABC score to 80% confidence to reduce fall risk and improve balance confidence  6  Patient will increase gait speed to 1 1 m/s or higher to reduce fall risk (NOT MET)    New Goals- 4-  Long Term Goals (12 weeks):  - Patient will be independent in a comprehensive home exercise program  - Patient will improve gait speed by 0 59 ft/sec from 2 92 ft/sec to 3 51 ft/sec to improve safety with community ambulation - NOT MET  - Patient will be able to demonstrate HT in gait without veering- NOT MET  - Patient will report 50% reduction in near falls in order to improve safety with functional tasks and reduce his risk for falls   - Patient will report going on walks at least 3 days per week to promote independence and improved cardiovascular endurance  - Patient will be able to ascend/descend stairs reciprocally without UE assist to promote independence and safety with ADLs  - Patient will report 50% reduction in near falls when ambulating on uneven terrain        Assessment:  Patient tolerated treatment fairly today  Patient required frequent motivation and verbal cueing to stay on task with testing  He showed improvement with his 5xSTS time, reducing time by 3 seconds  Patient TUG time increased due to reduced walking speed and distractions of gym while performing  He was unable to maintain EC conditions of mCTSIB, with frequent verbal cueing  Due to patient bathroom break, he was unable to complete all testing  Patient and caregiver to be instructed with HEP next visit, with potential discharge  Plan: Continue per plan of care         Start Plan of care date: 4/26/21  End plan of care date: 6/21/21  Precautions:   Past Medical History:   Diagnosis Date    Abnormal weight gain     last assessed 4/18/16; resolved 1/25/17    Acute [facility]congestive [facility] failure) [facility] [address] )     Anxiety     Dementia ([facility] [facility] )     Depression     Disease of thyroid gland     Down's syndrome     Fatigue     last assessed 2/23/17; resolved 6/1/17    GERD (gastroesophageal reflux disease)     Gluten free diet     H/O urinary retention     Hernia of abdominal cavity     Hyperlipidemia     Hypogonadism in male     Impulse control disorder     OCD (obsessive compulsive disorder)     Orthostatic hypotension     Parkinson disease (HCC)     Parkinson disease ([facility] )     resolved 2/23/17    Pleural effusion     Sinus tachycardia     Vitamin D deficiency     last assessed 9/1/16; resolved 6/1/17     Outcome Measures 2/8/21  3/22/2021    4/26/21  5/26/2021       5x sit to stand 15 23 seconds no UE  14 15 seconds, no UE    trial 1: 29 6 sec  26 38 sec       30 second sit to stand 8 no UE  9 reps no UE    5 reps  5 reps       TUG 14 23 sec  14 02 seconds    16 26 sec  20 53 sec       Gait Speed   84 m/s   86 m/s    0 55 m/sec         2 minute walk test 200 feet  350 feet    NT  250 ft       Gary 39/56  41/56    37/56         mCTSIB FTEO- 30 sec  FTEC- 30 sec   FTEO- 30 sec FTEC-7 5 sec  FTEO- 30 sec  FTEC- 30 sec   FTEO- 30 sec FTEC-10 sec    FTEO- 27  FTEC- 27  FTEO- 30  FTEC- unable to test  FTEO- 30 sec  FTEC- unable to test  FTEO w/ foam- 30 sec  FTEC w/ foam- unable to test       2 minute walk teest 150 feet 200 feet  NT        Plan  Referral necessary: Yes  Planned modality interventions: biofeedback, cryotherapy, electrical stimulation/Russian stimulation, TENS and thermotherapy: hydrocollator packs  Planned therapy interventions: abdominal trunk stabilization, IADL retraining, joint mobilization, activity modification, ADL retraining, manual therapy, massage, ADL training, balance, balance/weight bearing training, motor coordination training, muscle pump exercises, neuromuscular re-education, body mechanics training, breathing training, patient education, compression, coordination, strengthening, stretching, fine motor coordination training, therapeutic activities, postural training, canalith repositioning, therapeutic exercise, therapeutic training, transfer training, flexibility, functional ROM exercises, gait training, graded activity, graded exercise, graded motor and home exercise program  Frequency: 2x week  Duration in weeks: 12  Plan of Care beginning date: 4/26/2021  Plan of Care expiration date: 7/19/2021      Manuals                                                                 Neuro Re-Ed                                                                                                        Ther Ex                                                                                                                     Ther Activity                                       Gait Training                                       Modalities

## 2021-05-26 NOTE — PROGRESS NOTES
Daily Note     Today's date: 2021  Patient name: Kristina Velázquez  : 1965  MRN: [id number]  Referring provider: BJ Zimmer  Dx:   Encounter Diagnosis     ICD-10-CM    1  Parkinson's disease ([address] )  G20    2  MCI (mild cognitive impairment)  G31 84        Start Time: 0847  Stop Time: 09  Total time in clinic (min): 43 minutes    Subjective: "I played basketball in high school"      Objective:     -Stacking jenga blocks by color with focus on , working memory, and [address]  Completed in semi-modal environment to challenge sustained attention  Requires assist to select correct colors 50%, assist with orientation of the first piece in each level  Requires 4 VCs for attention       -Removed ping pong balls from top of squigs and toss into container with R UE with focus on [address]  No dropping of items, successfully throws balls into container ~75%  Completes without cues for attn    -Removes squigs from table with wrist weights donned with focus on [address], hand strength, UE strength and dynamic reaching  Requires 2 cues for attention    -Pattern blocks with 1 star level design with 2lb wrist weights with focus , FMC, and UE strengthening  Requires cues for sequencing and initiation of task  Pt is able to ID colors required, requires assist to count number of each needed       -Completed peargrams to retrieve letters in his name and place in proper order  Requires 2 cues for attention, 2 cues for problem solving and order of letters  Use of visual model throughout  1 mistake in first name requiring cues to correct, able to complete last name without errors  Assessment: Tolerated treatment well  Patient would benefit from continued OT with continued focus on [address], sustained attention,   Noted to demonstrate improved attention in a semi modal environment today in comparison to previous sessions  Plan: Continue per plan of care        Precautions:   Past Medical History:   Diagnosis Date    Abnormal weight gain     last assessed 4/18/16; resolved 1/25/17    Acute CHF (congestive heart failure) ([facility] [facility] )     Anxiety     Dementia ([facility] )     Depression     Disease of thyroid gland     Down's syndrome     Fatigue     last assessed 2/23/17; resolved 6/1/17    GERD (gastroesophageal reflux disease)     Gluten free diet     H/O urinary retention     Hernia of abdominal cavity     Hyperlipidemia     Hypogonadism in male     Impulse control disorder     OCD (obsessive compulsive disorder)     Orthostatic hypotension     Parkinson disease (HCC)     Parkinson disease ([facility] )     resolved 2/23/17    Pleural effusion     Sinus tachycardia     Vitamin D deficiency     last assessed 9/1/16; resolved 6/1/17     Outcome Measures 2/8/21               5x sit to stand 15 23 seconds no UE               30 second sit to stand 8 no UE               TUG 14 23 sec               Gait Speed   84 m/s               2 minute walk test 200 feet               Gary 39/56               mCTSIB FTEO- 30 sec  FTEC- 30 sec   FTEO- 30 sec FTEC-7 5 sec               2 minute walk teest 150 feet

## 2021-06-02 NOTE — PROGRESS NOTES
Occupational Therapy Evaluation    Today's date: 2021  Patient name: Antoine Hunter  : 1965  MRN: [id number]  Referring provider: BJ Manuel  Dx: No diagnosis found      Subjective: "Why doesn't Nicolasa Sparland drive me anymore?"    Start Plan of care date: 21  End plan of care date: 21  Precautions:   Past Medical History:   Diagnosis Date    Abnormal weight gain     last assessed 16; resolved 17    Acute CH[facility]congestive heart failure) ([facility] [facility] )     Anxiety     Dementia ([facility] )     Depression     Disease of thyroid gland     Down's syndrome     Fatigue     last assessed 17; resolved 17    GERD (gastroesophageal reflux disease)     Gluten free diet     H/O urinary retention     Hernia of abdominal cavity     Hyperlipidemia     Hypogonadism in male     Impulse control disorder     OCD (obsessive compulsive disorder)     Orthostatic hypotension     [facility])     Parkinson disease ([facility] )     resolved 17    Pleural effusion     Sinus tachycardia     Vitamin D deficiency     last assessed 16; resolved 17   Plan  Referral necessary: Yes  Planned modality interventions: biofeedback, cryotherapy, electrical stimulation/Russian stimulation, TENS and thermotherapy: hydrocollator packs  Planned therapy interventions: abdominal trunk stabilization, IADL retraining, joint mobilization, activity modification, ADL retraining, manual therapy, massage, ADL training, balance, balance/weight bearing training, motor coordination training, muscle pump exercises, neuromuscular re-education, body mechanics training, breathing training, patient education, compression, coordination, strengthening, stretching, fine motor coordination training, therapeutic activities, postural training, canalith repositioning, therapeutic exercise, therapeutic training, transfer training, flexibility, functional ROM exercises, gait training, graded activity, graded exercise, graded motor and home exercise program  Frequency: 1x week  Duration in weeks: 12     MOCA(MOCA-B)  Executive function 0/1  Immediate recall 1st trial 0/5 2nd trial 0/5  Fluence 0/2  Orientation 5/6  Calculation 0/3  Abstraction 0/3  Delayed recall 0/5  visuoperception 0/3 (n:3)  Naming 4/4  Attention 0/3     9/30 indicative of neurological defecits     LILA hand strength   RUE  16 lbs LUE 12Lbs   Normal Age related strength 101 lbs indicative of decreased  strength      9 hole peg test:              LUE: 1 min 19 seconds              RUE: 1 minute and 19 seconds              Patients scores cannot be compared to norms due to directions being non-standardized due to decreased sustained attention and understanding of multi-step directions; pt demonstrating decreased command following during task       MMT:              At least 3/5 in all planes of movement  Strength beyond ROM unable to be tested due to difficulty understanding directions  Assessment  Pt's caregiver Allison Terry reports pt is able to write his name with more ease  [place] Terry is newer to [facility], and is unable to ID if pt has been using visual model to assist with name writing  Pt demonstrating improvements with FMC, R hand strength  However, he continues to demonstrate difficulty with UE strength, sustained attention and functional memory  Pt continues to require A with ADL routine (mostly VCs), working memory, attention  Pt is currently demonstrating the following occupational deficits: decreased  strength, fine motor skills, sustained attention, memory, divided attention and UE strength  Pt recommended to continue OT services 1x/wk for 45 minutes to address aforementioned deficits impacting his occupational performance for 8-12 weeks to maintain functioning and prevent further decline related to Parkinson's Disease    Gisell Taveras (2013): a [facility] decision that sought to clarify that Medicare will in fact cover skilled therapy services to maintain a patients current condition or prevent/slow further deterioration  Pt completed toileting and hand washing with [place] during session  Also trialed writing his name with visual model, 13/14 letters written, omits second I in last name  All other letters in the correct order  Goals    STG  1  Pt will increase hand strength by  3lbs (20) to complete IADLs etiolating performance  within 4 weeks NOT MET  2  Pt will be independent with HEP within 4 weeks  NOT MET  3  Pt will increase sustained attention to follow one step commands with 1-2 verbal cues to complete to decrease care giver burden to complete ADL routine within 4 weeks  NOT MET  4  Pt will improve delayed recall from 0/5  To approximately 20% 1/5 to improve ADL / IADL performance within 4 weeks NOT MET    LTG   Pt will increase hand strength by 8 lbs (20) to improve performance in ADL/ IADL activities within 8-12 weeks   Pt will increase sustained attention to follow one step commands with independence to decrease care giver burden to complete ADL routine within 8-12 weeks   Pt will improve delayed recall from 0-5 to approximately 2/5 to improve ADL/IADL performance within 8-12 weeks      NEW GOALS: AS OF 4/28/2021     Pt will improve immediate recall from 1/5  to 2/5 to improve ADL / IADL performance within 4 weeks NOT MET  Pt will increase sustained attention to perform trail making part A and complete with 6 VCs in 4 weeks   NOT MET         Precautions:   Hypotension, Anxiety, tachycardia, WALK OUT TO GROUP HOME STAFF

## 2021-06-02 NOTE — LETTER
2021    Kamron [address] [address]    Patient: Sanju Reese   YOB: 1965   Date of Visit: 2021     Encounter Diagnosis     ICD-10-CM    1  Parkinson's disease ([facility] [address] )  G20    2  MCI (mild cognitive impairment)  G31 84        Dear Dr Vicki Dumas: Thank you for your recent referral of Sanju Reese  Please review the attached evaluation summary from Rj's recent visit  Please verify that you agree with the plan of care by signing the attached order  If you have any questions or concerns, please do not hesitate to call  I sincerely appreciate the opportunity to share in the care of one of your patients and hope to have another opportunity to work with you in the near future  Sincerely,    Denise Holman OT      Referring Provider:     I certify that I have read the below Plan of Care and certify the need for these services furnished under this plan of treatment while under my care  BJ Miller  [address] [address]  [place]        Occupational Therapy Evaluation    Today's date: 2021  Patient name: Sanju Reese  : 1965  MRN: [id number]  Referring provider: BJ Tripp  Dx: No diagnosis found      Subjective: "Why doesn't Emir Fleeting drive me anymore?"    Start Plan of care date: 21  End plan of care date: 21  Precautions:   Past Medical History:   Diagnosis Date    Abnormal weight gain     last assessed 16; resolved 17    Acute CHF (congestive heart failure) ([facility] [address] )     Anxiety     Dementia ([facility] )     Depression     Disease of thyroid gland     Down's syndrome     Fatigue     last assessed 17; resolved 17    GERD (gastroesophageal reflux disease)     Gluten free diet     H/O urinary retention     Hernia of abdominal cavity     Hyperlipidemia     Hypogonadism in male     Impulse control disorder     OCD (obsessive compulsive disorder)     Orthostatic hypotension     Parkinson disease (HCC)     Parkinson disease ([facility] [address] )     resolved 2/23/17    Pleural effusion     Sinus tachycardia     Vitamin D deficiency     last assessed 9/1/16; resolved 6/1/17   Plan  Referral necessary: Yes  Planned modality interventions: biofeedback, cryotherapy, electrical stimulation/Russian stimulation, TENS and thermotherapy: hydrocollator packs  Planned therapy interventions: abdominal trunk stabilization, IADL retraining, joint mobilization, activity modification, ADL retraining, manual therapy, massage, ADL training, balance, balance/weight bearing training, motor coordination training, muscle pump exercises, neuromuscular re-education, body mechanics training, breathing training, patient education, compression, coordination, strengthening, stretching, fine motor coordination training, therapeutic activities, postural training, canalith repositioning, therapeutic exercise, therapeutic training, transfer training, flexibility, functional ROM exercises, gait training, graded activity, graded exercise, graded motor and home exercise program  Frequency: 1x week  Duration in weeks: 12     MOCA(MOCA-B)  Executive function 0/1  Immediate recall 1st trial 0/5 2nd trial 0/5  Fluence 0/2  Orientation 5/6  Calculation 0/3  Abstraction 0/3  Delayed recall 0/5  visuoperception 0/3 (n:3)  Naming 4/4  Attention 0/3     9/30 indicative of neurological defecits     LILA hand strength   RUE  16 lbs LUE 12Lbs   Normal Age related strength 101 lbs indicative of decreased  strength      9 hole peg test:              LUE: 1 min 19 seconds              RUE: 1 minute and 19 seconds              Patients scores cannot be compared to norms due to directions being non-standardized due to decreased sustained attention and understanding of multi-step directions; pt demonstrating decreased command following during task       MMT:              At least 3/5 in all planes of movement  Strength beyond ROM unable to be tested due to difficulty understanding directions  Assessment  Pt's caregiver Cassie Storm reports pt is able to write his name with more ease  Cassie Storm is newer to [facility], and is unable to ID if pt has been using visual model to assist with name writing  Pt demonstrating improvements with FMC, R hand strength  However, he continues to demonstrate difficulty with UE strength, sustained attention and functional memory  Pt continues to require A with ADL routine (mostly VCs), working memory, attention  Pt is currently demonstrating the following occupational deficits: decreased  strength, fine motor skills, sustained attention, memory, divided attention and UE strength  Pt recommended to continue OT services 1x/wk for 45 minutes to address aforementioned deficits impacting his occupational performance for 8-12 weeks to maintain functioning and prevent further decline related to Parkinson's Disease  Gisell Taveras (2013): a [facility] decision that sought to clarify that Medicare will in fact cover skilled therapy services to maintain a patients current condition or prevent/slow further deterioration  Pt completed toileting and hand washing with [place] during session  Also trialed writing his name with visual model, 13/14 letters written, omits second I in last name  All other letters in the correct order  Goals    STG  1  Pt will increase hand strength by  3lbs (20) to complete IADLs etiolating performance  within 4 weeks NOT MET  2  Pt will be independent with HEP within 4 weeks  NOT MET  3  Pt will increase sustained attention to follow one step commands with 1-2 verbal cues to complete to decrease care giver burden to complete ADL routine within 4 weeks  NOT MET  4   Pt will improve delayed recall from 0/5  To approximately 20% 1/5 to improve ADL / IADL performance within 4 weeks NOT MET    LTG   Pt will increase hand strength by 8 lbs (20) to improve performance in ADL/ IADL activities within 8-12 weeks   Pt will increase sustained attention to follow one step commands with independence to decrease care giver burden to complete ADL routine within 8-12 weeks   Pt will improve delayed recall from 0-5 to approximately 2/5 to improve ADL/IADL performance within 8-12 weeks      NEW GOALS: AS OF 4/28/2021     Pt will improve immediate recall from 1/5  to 2/5 to improve ADL / IADL performance within 4 weeks NOT MET  Pt will increase sustained attention to perform trail making part A and complete with 6 VCs in 4 weeks   NOT MET         Precautions:   Hypotension, Anxiety, tachycardia, WALK OUT TO GROUP HOME STAFF

## 2021-06-02 NOTE — PROGRESS NOTES
Daily Note/Discharge Note  IE: 2021  Progress Note-    (POC: 2021)    Today's date: 2021  Patient name: Scott Quiroz  : 1965  MRN: [id number]  Referring provider: BJ Horan  Dx:   Encounter Diagnosis     ICD-10-CM    1  Parkinson's disease ([facility] [address] )  G20                   Subjective: Patient reports for treatment from OT      Objective: See treatment diary below    NMR:  - Scavenger hunt for cones w/ room scanning and obstacle negotiation - 8 cones  - Ball toss: 20x, 5# med ball  - FWD/BWD walking to color dots  - Ambulation w/ foam beam: 5 laps  - Picking up/replacing med balls: 4 med balls        Assessment: Patient tolerated treatment fairly  Patient unable to meet his therapy goals due to reduced carryover and being highly distracted during sessions  Patient requires constant verbal and visual cueing throughout treatment  Due to neurodegenerative disease, patient was not progressing with objective measures  Due to limited patient improvements he will be discharged from PT services  Discussed with caregiver that patient may return therapy when needed or if symptoms worsen  Goals  STG 30 days- Progressing toward all goals  1  Patient will improve static balance with feet together Eyes Closed Firm surface to 30 seconds indicating reduction in fall risk (NOT MET)  2  Patient will display 2 3  second improvement with overall score of less than 12 seconds  with TUG test or lower with noted improvement being Minimal Detectable Change pre current research standards with fall risk  (NOT MET)  3  Patient will achieve 45/56 NUNEZ score with minimal improve by 6 points or more demonstrating Minimal Detectable change per current research standards for this objective test which assess fall risk  (NOT MET)  4   Patient will increase gait speed to > 0 70 m/s with 10 Meter Walk test, demonstrating Minimal Detectable change per current research standards for this objective test which assess fall risk  (NOT MET)  5  Patient will perform  5 x sit to stand test with overall reduction by seconds to less than 13 seconds indicating improvement with functional endurance (NOT MET)  6  Patient will increase ABC score to 60% or higher to reduce fall risk and improve balance confidence  (NOT MET)    LT days- Progressing toward all goals  1  Patient will score low risk for falls with 3/4 fall risk measures   2  Patient will be able to perform floor transfer without physical assistance   3  Patient will be able to carry objects without loss of balance- MET  4  Patient will be able to ambulate outdoors without any loss of balance (NOT MET)  5  Patient will increase ABC score to 80% confidence to reduce fall risk and improve balance confidence    6  Patient will increase gait speed to 1 1 m/s or higher to reduce fall risk (NOT MET)    New Goals- 2021  Long Term Goals (12 weeks):  - Patient will be independent in a comprehensive home exercise program- NOT MET  - Patient will improve gait speed by 0 59 ft/sec from 2 92 ft/sec to 3 51 ft/sec to improve safety with community ambulation - NOT MET  - Patient will be able to demonstrate HT in gait without veering- NOT MET  - Patient will report 50% reduction in near falls in order to improve safety with functional tasks and reduce his risk for falls- NOT MET  - Patient will report going on walks at least 3 days per week to promote independence and improved cardiovascular endurance- NOT MET  - Patient will be able to ascend/descend stairs reciprocally without UE assist to promote independence and safety with ADLs- NOT MET  - Patient will report 50% reduction in near falls when ambulating on uneven terrain- NOT MET      Plan: Discharge     Precautions:   Past Medical History:   Diagnosis Date    Abnormal weight gain     last assessed 16; resolved 17    [facility])     Anxiety     Dementia ([facility] [address] )     Depression     Disease of thyroid gland     Down's syndrome     Fatigue     last assessed 2/23/17; resolved 6/1/17    GERD (gastroesophageal reflux disease)     Gluten free diet     H/O urinary retention     Hernia of abdominal cavity     Hyperlipidemia     Hypogonadism in male     Impulse control disorder     OCD (obsessive compulsive disorder)     Orthostatic hypotension     [facility])     Parkinson disease ([facility] [address] [facility]     resolved 2/23/17    Pleural effusion     Sinus tachycardia     Vitamin D deficiency     last assessed 9/1/16; resolved 6/1/17       Outcome Measures 2/8/21  3/22/2021    4/26/21  5/26/2021       5x sit to stand 15 23 seconds no UE  14 15 seconds, no UE    trial 1: 29 6 sec  26 38 sec       30 second sit to stand 8 no UE  9 reps no UE    5 reps  5 reps       TUG 14 23 sec  14 02 seconds    16 26 sec  20 53 sec       Gait Speed   84 m/s   86 m/s    0 55 m/sec         2 minute walk test 200 feet  350 feet    NT  250 ft       Gary 39/56  41/56    37/56         mCTSIB FTEO- 30 sec  FTEC- 30 sec   FTEO- 30 sec FTEC-7 5 sec  FTEO- 30 sec  FTEC- 30 sec   FTEO- 30 sec FTEC-10 sec    FTEO- 30  FTEC- 30  FTEO- 30  FTEC- unable to test  FTEO- 30 sec  FTEC- unable to test  FTEO w/ foam- 30 sec  FTEC w/ foam- unable to test       2 minute walk teest 150 feet 200 feet  NT

## 2021-06-03 NOTE — PROGRESS NOTES
Daily Note     Today's date: 6/3/2021  Patient name: Bin Bruno  : 1965  MRN: [id number]  Referring provider: BJ Mendenhall  Dx:   Encounter Diagnosis     ICD-10-CM    1  Parkinson's disease ([facility] [address] )  G20    2  MCI (mild cognitive impairment)  G31 84                   Subjective: "I have to use the bathroom"      Objective: performed [facility] and sustained attention task of beads and pattern cards required min VCs for initiation    perfomred x 2 step commands with up to min VCs for recall of while taking off squigz focusing on hand strength  Assessment: Tolerated treatment well  Patient would benefit from continued OT      Plan: Continue per plan of care  Precautions:   Past Medical History:   Diagnosis Date    Abnormal weight gain     last assessed 16; resolved 17    Acute CHF (congestive heart failure) ([facility] [address] )     Anxiety     Dementia ([facility] )     Depression     Disease of thyroid gland     Down's syndrome     Fatigue     last assessed 17; resolved 17    GERD (gastroesophageal reflux disease)     Gluten free diet     H/O urinary retention     Hernia of abdominal cavity     Hyperlipidemia     Hypogonadism in male     Impulse control disorder     OCD (obsessive compulsive disorder)     Orthostatic hypotension     Parkinson disease (HCC)     Parkinson disease ([facility] [address] )     resolved 17    Pleural effusion     Sinus tachycardia     Vitamin D deficiency     last assessed 16; resolved 17       Outcome Measures 2/8/21  3/22/2021    4/26/21  2021       5x sit to stand 15 23 seconds no UE  14 15 seconds, no UE    trial 1: 29 6 sec  26 38 sec       30 second sit to stand 8 no UE  9 reps no UE    5 reps  5 reps       TUG 14 23 sec  14 02 seconds    16 26 sec  20 53 sec       Gait Speed   84 m/s   86 m/s    0 55 m/sec         2 minute walk test 200 feet  350 feet    NT  250 ft       Gary 39/56  41/56    37/56         mCTSIB FTEO- 30 sec  FTEC- 30 sec FTEO- 30 sec FTEC-7 5 sec  FTEO- 30 sec  FTEC- 30 sec   FTEO- 30 sec FTEC-10 sec    FTEO- 30  FTEC- 30  FTEO- 30  FTEC- unable to test  FTEO- 30 sec  FTEC- unable to test  FTEO w/ foam- 30 sec  FTEC w/ foam- unable to test       2 minute walk teest 150 feet 200 feet  NT

## 2021-06-09 NOTE — PROGRESS NOTES
Daily Note     Today's date: 2021  Patient name: Kristina Velázquez  : 1965  MRN: [id number]  Referring provider: BJ Zimmer  Dx:   Encounter Diagnosis     ICD-10-CM    1  Parkinson's disease ([facility] [address] )  G20    2  MCI (mild cognitive impairment)  G31 84        Start Time: 8237  Stop Time: 0929  Total time in clinic (min): 46 minutes    Subjective: "it's chilly in here"    Objective:     -Perfection seated in chair with focus on sustained attention in semi modal environment,  and [facility]  Upgraded activity to multimodal environment with music playing and door to therapy gym open  Requires assist with ~20% piece placement and 2 cues for attention     -Toileting with distant supervision for safety  Washes hands with supervision     -Timed perfection with 2lb wrist weights with focus on UE strengthening, [facility], sustained and divided attention     -Medium sized clip chain with focus on [facility], sustained attention  Requires 2 cues for problem solving, 4 cues for attn  Assessment: Tolerated treatment well today  Increased attention with preferred music playing, with pt frequently singing along but continuing to actively participate in therapy activities  Noted to spend prolonged time in the bathroom again during today's session  Will speak with group home staff about potentially pushing his OT time slot back to avoid disruption of sessions  Pt is to continue to benefit from OT to maximize functioning and prevent functional decline related to PD  Plan: Continue per plan of care        Precautions:   Past Medical History:   Diagnosis Date    Abnormal weight gain     last assessed 16; resolved 17    Acute CHF (congestive heart failure) ([facility] )     Anxiety     Dementia ([facility] )     Depression     Disease of thyroid gland     Down's syndrome     Fatigue     last assessed 17; resolved 17    GERD (gastroesophageal reflux disease)     Gluten free diet     H/O urinary retention     Hernia of abdominal cavity     Hyperlipidemia     Hypogonadism in male     Impulse control disorder     OCD (obsessive compulsive disorder)     Orthostatic hypotension     Parkinson disease (HCC)     Parkinson disease ([facility] [address] )     resolved 2/23/17    Pleural effusion     Sinus tachycardia     Vitamin D deficiency     last assessed 9/1/16; resolved 6/1/17       Outcome Measures 2/8/21  3/22/2021    4/26/21  5/26/2021       5x sit to stand 15 23 seconds no UE  14 15 seconds, no UE    trial 1: 29 6 sec  26 38 sec       30 second sit to stand 8 no UE  9 reps no UE    5 reps  5 reps       TUG 14 23 sec  14 02 seconds    16 26 sec  20 53 sec       Gait Speed   84 m/s   86 m/s    0 55 m/sec         2 minute walk test 200 feet  350 feet    NT  250 ft       Gary 39/56  41/56    37/56         mCTSIB FTEO- 30 sec  FTEC- 30 sec   FTEO- 30 sec FTEC-7 5 sec  FTEO- 30 sec  FTEC- 30 sec   FTEO- 30 sec FTEC-10 sec    FTEO- 30  FTEC- 30  FTEO- 30  FTEC- unable to test  FTEO- 30 sec  FTEC- unable to test  FTEO w/ foam- 30 sec  FTEC w/ foam- unable to test       2 minute walk teest 150 feet 200 feet  NT

## 2021-06-16 NOTE — PROGRESS NOTES
Daily Note     Today's date: 2021  Patient name: Jan Jiménez  : 1965  MRN: [id number]  Referring provider: BJ Solorio  Dx:   Encounter Diagnosis     ICD-10-CM    1  Parkinson's disease ([facility] [address] )  Antoinette Holleyashley        Start Time:   Stop Time: 930  Total time in clinic (min): 46 minutes    Subjective: Per group home staff, pt has gone to the bathroom prior to session, did not need to go  They feel the frequent bathroom trips during therapy are purely behavioral     Objective:  -Amplified reaching to b/l sides to retrieve marbles and place into animal container with marble sized openings  Focus on [facility], amplified mvmts with b/l UEs, sustained attention in semimodal environment  Requires 5 cues for attention and mod cues to not move containers of marbles closer     -Number and shape puzzle with focus on sustained attention, [facility], and   Pt self limiting during activity, requires mod VCs for attention  With encouragement, able to correctly place ~80% of pieces  Attempted marble  puzzle, however downgraded to organizing marbles into coordinating colored containers 2* only ~10% successful placement on marble board with max cues  Requires 5 cues for attention during sorting activity, but able to correctly complete ~75% without cues  Assessment: Tolerated treatment fair today, primarily limited by self limiting behaviors  Patient would benefit from continued OT to maximize functioning and prevent functional decline related to PD  Plan: Continue per plan of care        Precautions:   Past Medical History:   Diagnosis Date    Abnormal weight gain     last assessed 16; resolved 17    Acute CHF (congestive heart failure) ([facility] [address] [facility]     Anxiety     Dementia ([facility] )     Depression     Disease of thyroid gland     Down's syndrome     Fatigue     last assessed 17; resolved 17    GERD (gastroesophageal reflux disease)     Gluten free diet     H/O urinary retention     Hernia of abdominal cavity     Hyperlipidemia     Hypogonadism in male     Impulse control disorder     OCD (obsessive compulsive disorder)     Orthostatic hypotension     Parkinson disease ([facility])     Parkinson disease ([facility] [address] )     resolved 2/23/17    Pleural effusion     Sinus tachycardia     Vitamin D deficiency     last assessed 9/1/16; resolved 6/1/17

## 2021-06-17 NOTE — PROGRESS NOTES
Assessment/Plan:    1  Medication monitoring encounter  -     divalproex sodium (DEPAKOTE) 500 mg EC tablet; Take 1 tablet (500 mg total) by mouth daily 8pm  -     OLANZapine (ZyPREXA) 2 5 mg tablet; Take 1 tablet (2 5 mg total) by mouth daily 8 pm    2  Major depressive disorder, remission status unspecified, unspecified whether recurrent  -     divalproex sodium (DEPAKOTE) 500 mg EC tablet; Take 1 tablet (500 mg total) by mouth daily 8pm  -     OLANZapine (ZyPREXA) 2 5 mg tablet; Take 1 tablet (2 5 mg total) by mouth daily 8 pm  -     Ambulatory referral to Psychiatry; Future    3  Mixed obsessional thoughts and acts  -     divalproex sodium (DEPAKOTE) 500 mg EC tablet; Take 1 tablet (500 mg total) by mouth daily 8pm  -     OLANZapine (ZyPREXA) 2 5 mg tablet; Take 1 tablet (2 5 mg total) by mouth daily 8 pm  -     Ambulatory referral to Psychiatry; Future    4  Intermittent explosive disorder  -     divalproex sodium (DEPAKOTE) 500 mg EC tablet; Take 1 tablet (500 mg total) by mouth daily 8pm  -     OLANZapine (ZyPREXA) 2 5 mg tablet; Take 1 tablet (2 5 mg total) by mouth daily 8 pm  -     Ambulatory referral to Psychiatry; Future    I will fill psychiatric medications on a month to month basis  I do not have the expertise to adjust dosing in his special population  Stephanie Harris asserts that they are actively searching for new psychiatrist      BMI Counseling: Body mass index is 33 9 kg/m²  The BMI is above normal  Nutrition recommendations include decreasing portion sizes, moderation in carbohydrate intake and increasing intake of lean protein  Exercise recommendations include exercising 3-5 times per week  There are no Patient Instructions on file for this visit  No follow-ups on file  Subjective:      Patient ID: Antoine Hunter is a 63 y o  male      Chief Complaint   Patient presents with    Follow-up     medication check        Roland Passer brought by , Stephanie Harris  He needs refills for psych meds until nursing able to find new psychiatrist to management meds  His previous psych is no longer in practice  Edwsophia Valverde has been on same meds for some time  His behaviors are stable per caregiver  Drug levels are ok  The following portions of the patient's history were reviewed and updated as appropriate: allergies, current medications, past family history, past medical history, past social history, past surgical history and problem list     Review of Systems   Unable to perform ROS: Psychiatric disorder         Current Outpatient Medications   Medication Sig Dispense Refill    Aspirin Low Dose 81 MG chewable tablet TAKE 1 TABLET BY MOUTH EVERY MORNING (8AM) 30 tablet 4    atorvastatin (LIPITOR) 80 mg tablet TAKE 1 TABLET BY MOUTH IN THE EVENING (8PM) 30 tablet 4    Calcium Carbonate-Vitamin D 600-400 MG-UNIT per tablet Take 1 tablet by mouth 2 (two) times a day At 8 am and 5 pm 60 tablet 11    carbidopa-levodopa (SINEMET)  mg per tablet Take 1/2 tablet 2 times a day, 1/2 hour before meals   30 tablet 2    cholecalciferol (VITAMIN D3) 1,000 units tablet Take 1 tablet (1,000 Units total) by mouth 2 (two) times a day At 8am and 8 pm 60 tablet 11    D-Mannose 500 MG CAPS Take 2 capsules by mouth 2 (two) times a day      divalproex sodium (DEPAKOTE) 500 mg EC tablet Take 1 tablet (500 mg total) by mouth daily 8pm 30 tablet 1    docusate sodium (COLACE) 100 mg capsule Take one capsule at 8am 30 capsule 11    esomeprazole (NexIUM) 40 MG capsule Take one capsule daily 30 minutes prior to breakfast 30 capsule 11    lanolin-mineral oil (ARMAAN) LOTN Apply topically 2 (two) times a day Apply to hands daily at 8 am and 8 pm 1 Bottle 11    levothyroxine 75 mcg tablet TAKE 1 TABLET BY MOUTH IN THE EVENING (8PM) 30 tablet 4    loratadine (CLARITIN) 10 mg tablet TAKE 1 TABLET BY MOUTH EVERY MORNING (8AM) AS NEEDED FOR ALLERGIES -MAX=1TAB/DAY 30 tablet 4    midodrine (PROAMATINE) 2 5 mg tablet Take 1 tablet (2 5 mg total) by mouth 2 (two) times a day as needed (If systolic blood pressure less than 100 and patient is symptomatic) 60 tablet 2    multivitamin-minerals (CENTRUM ADULTS) tablet Take 1 tablet by mouth daily At 8am 30 tablet 11    neomycin-bacitracin-polymyxin (NEOSPORIN) 5-400-5,000 ointment Apply topically 3 (three) times a day Apply to urethra for lubrication 28 3 g 0    OLANZapine (ZyPREXA) 2 5 mg tablet Take 1 tablet (2 5 mg total) by mouth daily 8 pm 30 tablet 1    ondansetron (ZOFRAN-ODT) 4 mg disintegrating tablet Take 1 tablet (4 mg total) by mouth every 8 (eight) hours as needed for nausea or vomiting D/c on 4/18/21 20 tablet 0    polyethylene glycol (GLYCOLAX) 17 GM/SCOOP powder Take 17 grams in 8 oz of water by mouth daily at 8 pm 527 g 11    tamsulosin (FLOMAX) 0 4 mg Take 0 4 mg by mouth Two tablet @ 8 am 0 8 mg daily      testosterone (ANDROGEL) 1 62 % TD gel pump Apply 3 pumps at 8 AM to shoulders/chest daily 75 g 3    erythromycin (ILOTYCIN) ophthalmic ointment Administer 0 5 inches into the left eye every 12 (twelve) hours At 8 am and 8 pm  D/c on 5/27/20 (Patient not taking: Reported on 6/17/2021) 3 5 g 0    lidocaine (URO-JET) 2 % urethral/mucosal gel Insert 1 application into the urethra daily as needed (urethral pain 2/2 catheter) (Patient not taking: Reported on 6/17/2021)  0     No current facility-administered medications for this visit  Objective:    /70 (BP Location: Left arm, Patient Position: Sitting, Cuff Size: Large)   Pulse 68   Temp 98 2 °F (36 8 °C)   Resp 18   Ht 4' 10" (1 473 m)   Wt 73 6 kg (162 lb 3 2 oz)   SpO2 100%   BMI 33 90 kg/m²        Physical Exam  Vitals and nursing note reviewed  Constitutional:       General: He is not in acute distress  Appearance: Normal appearance  Neurological:      General: No focal deficit present  Mental Status: He is alert     Psychiatric:         Mood and Affect: Mood normal  Kd Felix, [address] [place] [address]

## 2021-06-22 NOTE — TELEPHONE ENCOUNTER
Behavorial Health Outpatient Intake Questions    Referred by: [address]     Please advised interviewee that they need to answer all questions truthfully to allow for best care and any misrepresentations of information may affect their ability to be seen at this clinic   => Was this discussed? Yes     Behavorial Health Outpatient Intake History -     Presenting Problem (in patient's words): Patient is diagnosed with: Dementia, Depression, Organic Anxiety and Mood Disorder. Patient's psychiatrist retired and he needs continued medication management. Patient is currently stable and his PCP is filling medications until he can be seen. Are there any developmental disabilities? ? If yes, can they speak to you on the phone? If they are too limited to speak to you on phone, refer out Yes Downsyndrome and Moderate Intellectual Disability and Parkinsons     Are you taking any psychiatric medications? Yes    => If yes, who prescribes? If yes, are they injectable medications? Depakote 500mg and Olanzapine 2.5mg    Does the patient have a language barrier or hearing impairment? Yes Hearing Aides     Have you been treated at [facility] by a therapist or a doctor in the past? If yes, who? No    Has the patient been hospitalized for mental health? No   If yes, how long ago was last hospitalization and where was it? Do you actively use alcohol or marijuana or illegal substances? If yes, what and how much - refer out to Drug and alcohol treatment if use is excessive or daily use of illegal substances No concerns of substance abuse are reported. Do you have a community treatment team or ? Yes - [facility]     Legal History-     Does the patient have any history of arrests, MCC/halfway time, or DUIs? No  If Yes-  1) What types of charges? 2) When were they last incarcerated? 3) Are they currently on parole or probation? Minor Child-    Who has custody of the child?      Is there a custody agreement? If there is a custody agreement remind parent that they must bring a copy to the first appt or they will not be seen. Intake Team, please check with provider before scheduling if flags come up such as:  - complex case  - legal history (other than DUI)  - communication barrier concerns are present  - if, in your judgment, this needs further review    ACCEPTED as a patient Yes  => Appointment Date: Wednesday, August 11th at 10:00am with Dr. Jonathon Small? No    Name of Insurance Co: Medicare A and B  Insurance ID# [employer] #  If ins is primary or secondary [facility]: [id number]  If patient is a minor, parents information such as Name, D. O.B of guarantor.

## 2021-06-23 NOTE — PROGRESS NOTES
Daily Note     Today's date: 2021  Patient name: Klaudia Covarrubias  : 1965  MRN: [id number]  Referring provider: BJ Deras  Dx:   Encounter Diagnosis     ICD-10-CM    1  Parkinson's disease ([facility] )  G20    2  MCI (mild cognitive impairment)  G31 84        Start Time: 2935  Stop Time: 0930  Total time in clinic (min): 47 minutes    Subjective: "I need to go to the bathroom"      Objective:    -Resistive pinch pin removal from vertical surface in stance with increased time  Able to effectively pinch open all pins with inc time  Focus on [facility] and strength for carry over with ADLs     -Squig removal in stance on blue foam and 2lb wrist weights with focus on dynamic reaching outside SUNDAY, improving dynamic standing balance, FMC, hand strength       -Toileting with distant supervision  Per group home staff, toileting requests during all OT sessions is behavioral  Attempted to redirect pt, reminding pt group home staff brought him to the bathroom 20 min ago, however 5 min later pt reporting it is a bathroom emergency  -Perfection with 2lb wrist weights with focus on [facility], pincer strength,   Requires 5 cues for attention  Assessment: Tolerated treatment fair  Demonstrating improved hand and fine motor strength, but sessions continue to be limited by toileting routine  Unclear if toileting is behavioral   Patient would benefit from continued OT to maximize independence and prevent functional        Plan: Continue per plan of care        Precautions:   Past Medical History:     Diagnosis Date    Abnormal weight gain     last assessed 16; resolved 17    Acute CHF (congestive heart failure) ([facility] )     Anxiety     [facility]     Depression     Disease of thyroid gland     Down's syndrome     Fatigue     last assessed 17; resolved 17    GERD (gastroesophageal reflux disease)     Gluten free diet     H/O urinary retention     Hernia of abdominal cavity     Hyperlipidemia     Hypogonadism in male     Impulse control disorder     OCD (obsessive compulsive disorder)     Orthostatic hypotension     Parkinson disease ([facility])     Parkinson disease ([facility] )     resolved 2/23/17    Pleural effusion     Sinus tachycardia     Vitamin D deficiency     last assessed 9/1/16; resolved 6/1/17

## 2021-06-30 NOTE — PROGRESS NOTES
Daily Note     Today's date: 2021  Patient name: Bin Bruno  : 1965  MRN: [id number]  Referring provider: BJ Mendenhall  Dx:   Encounter Diagnosis     ICD-10-CM    1  MCI (mild cognitive impairment)  G31 84    2  Parkinson's disease ([facility] )  Arielamiles Corbin        Start Time: 46  Stop Time: 09  Total time in clinic (min): 44 minutes    Subjective: "These creep me out"- referring to water beads      Objective:   -Foam letter retrieval from basin of water beads and placement into letter board  Focus on sustained attention in a semi modal environment, [address],   -ISpy small items with focus on , [address] and sustained attention  Requires max cues to ID 2 matching items to items on card  Attempted matching items by color, again requires max cues and activity terminated    -Pipeline puzzle  Initially trialing assembling, however minimal participation and activity downgraded to disassembling with focus on [address], hand strength, sustained attention  Requires mod cues for attention to task     -Large bead threading with focus on , bimanual coordination, [address]  Requires mod cues for appropriate color bead selection and only provided with correct shaped beads  Assessment: Tolerated treatment fair  Patient would benefit from continued OT      Plan: Continue per plan of care        Precautions:   Past Medical History:     Diagnosis Date    Abnormal weight gain     last assessed 16; resolved 17    Acute CHF (congestive heart failure) ([facility] )     Anxiety     [facility]     Depression     Disease of thyroid gland     Down's syndrome     Fatigue     last assessed 17; resolved 17    GERD (gastroesophageal reflux disease)     Gluten free diet     H/O urinary retention     Hernia of abdominal cavity     Hyperlipidemia     Hypogonadism in male     Impulse control disorder     OCD (obsessive compulsive disorder)     Orthostatic hypotension     Parkinson disease ([facility] )     Parkinson disease ([facility] )     resolved 2/23/17    Pleural effusion     Sinus tachycardia     Vitamin D deficiency     last assessed 9/1/16; resolved 6/1/17

## 2021-07-07 NOTE — PROGRESS NOTES
Daily Note     Today's date: 2021  Patient name: Deanne Traore  : 1965  MRN: [id number]  Referring provider: BJ Taylor  Dx:   Encounter Diagnosis     ICD-10-CM    1  MCI (mild cognitive impairment)  G31 84    2  Parkinson's disease ([facility] )  Aguilazeenat Aguialr        Start Time: 200  Stop Time: 0928  Total time in clinic (min): 41 minutes    Subjective: "It's hot out'      Objective:     -Clock puzzle with focus on sustained attn in semi modal environment, , [address]  Requires min cues for problem solving, but appears somewhat self limiting    -Magnet tangram activity with focus on sustained attn, , [address]  Requires mod VCs for problem solving, and benefits from choice of 2-3 shapes of same color to correctly select pieces     -Timed squig removal from window with focus on [address], sustained attention in semi modal environment with inc visual stimuli  Improved performance and motivation with timed trials      Assessment: Tolerated treatment fair  Continues to appear self limiting during sessions, but decreased bathroom trips with cues at start of session (caregiver also brings pt to the bathroom prior to session)  Patient would benefit from continued OT       Plan: Continue per plan of care        Precautions:   Past Medical History:     Diagnosis Date    Abnormal weight gain     last assessed 16; resolved 17    Acute CHF (congestive heart failure) ([facility] )     Anxiety     [facility]     Depression     Disease of thyroid gland     Down's syndrome     Fatigue     last assessed 17; resolved 17    GERD (gastroesophageal reflux disease)     Gluten free diet     H/O urinary retention     Hernia of abdominal cavity     Hyperlipidemia     Hypogonadism in male     Impulse control disorder     OCD (obsessive compulsive disorder)     Orthostatic hypotension     Parkinson disease ([facility] )     Parkinson disease ([facility] )     resolved 17    Pleural effusion     Sinus tachycardia  Vitamin D deficiency     last assessed 9/1/16; resolved 6/1/17

## 2021-08-04 NOTE — PROGRESS NOTES
Occupational Therapy Re-Evaluation     Today's date: 2021  Patient name: Amy Mcallister  : 1965  MRN: [id number]  Referring provider: BJ Humphries  Dx: Parkinson's Disease, MCI     Subjective: "Today"     Start Plan of care date: 21  End plan of care date: 21  Precautions:        Past Medical History:   Diagnosis Date    Abnormal weight gain       last assessed 16; resolved 17    Acute CHF (congestive heart failure) ([facility] )      Anxiety      [facility]      Depression      Disease of thyroid gland      Down's syndrome      Fatigue       last assessed 17; resolved 17    GERD (gastroesophageal reflux disease)      Gluten free diet      H/O urinary retention      Hernia of abdominal cavity      Hyperlipidemia      Hypogonadism in male      Impulse control disorder      OCD (obsessive compulsive disorder)      Orthostatic hypotension      Parkinson disease (HCC)      Parkinson disease ([facility] )       resolved 17    Pleural effusion      Sinus tachycardia      Vitamin D deficiency       last assessed 16; resolved 17   Plan  Discharge OT at this time    Please re-turn for re-evaluation in 3 months      MOCA(MOCA-B)  Executive function 0/1  Immediate recall 1st trial 1/5 2nd trial 0/5  Fluence 0/2  Orientation 4/6  Calculation 0/3  Abstraction 0/3  Delayed recall 0/5  visuoperception 1/3 (n:4)  Naming 3/4  Attention 0/3      indicative of neurological defecits     LILA hand strength   RUE   25lbs LUE 17Lbs   Normal Age related strength 101 lbs indicative of decreased  strength      9 hole peg test:              LUE: 1 min 16 seconds              RUE: 1 minute and 14 seconds              Patients scores cannot be compared to norms due to directions being non-standardized due to decreased sustained attention and understanding of multi-step directions; pt demonstrating decreased command following during task       MMT:              At least 3/5 in all planes of movement  Strength beyond ROM unable to be tested due to difficulty understanding directions       Pt completed writing first and last name with visual model, completes with 14/14 letters written  Also trialed writing his name without visual model, 13/14 letters written, omits second I in last name  All other letters in the correct order    Assessment  Pt is seen for OT re-evaluation 8/4/21  Caregiver Minor Rincon reports that pt has been noted to have improved [address] and hand strength that enables him to participate in IADLs, but progressive memory decline  Pt noted to have very mildly improved cognition on MOCA-B, improved FMC, and hand strength  He demonstrates ability to write his name accurately with use of visual model  Pt met 2 of his STG/LTGs  Progress limited by 3 week pt/caregiver initiated break from OT 2* pt's summer camp, as well as the progressive nature of PD  Recommend d/c from OP OT at this time, with 3 month follow-up  Recommendations and progress discussed with caregiver, and she acknowledges understanding  Recommend continued participation with HEP with supervision           Goals    STG  1  Pt will increase hand strength by  3lbs (20) to complete IADLs etiolating performance  within 4 weeks ACHIEVED  2  Pt will be independent with HEP within 4 weeks  NOT MET  3  Pt will increase sustained attention to follow one step commands with 1-2 verbal cues to complete to decrease care giver burden to complete ADL routine within 4 weeks  NOT MET  4   Pt will improve delayed recall from 0/5  To approximately 20% 1/5 to improve ADL / IADL performance within 4 weeks NOT MET    LTG   Pt will increase hand strength by 8 lbs (20) to improve performance in ADL/ IADL activities within 8-12 weeks PROGRESSED  Pt will increase sustained attention to follow one step commands with independence to decrease care giver burden to complete ADL routine within 8-12 weeks NOT MET  Pt will improve delayed recall from 0-5 to approximately 2/5 to improve ADL/IADL performance within 8-12 weeks NOT MET     NEW GOALS: AS OF 4/28/2021     Pt will improve immediate recall from 1/5  to 2/5 to improve ADL / IADL performance within 4 weeks NOT MET  Pt will increase sustained attention to perform trail making part A and complete with 6 VCs in 4 weeks  NOT MET        Precautions:   Hypotension, Anxiety, tachycardia, WALK OUT TO GROUP HOME STAFF    Warren Cushing, MOT, OTR/L, CSRS

## 2021-08-11 NOTE — BH TREATMENT PLAN
TREATMENT PLAN (Medication Management Only)        [facility]    Name and Date of Birth:  Britney Jackson 61 y o  1965  Date of Treatment Plan: August 11, 2021  Diagnosis/Diagnoses:    1  Major depressive disorder, remission status unspecified, unspecified whether recurrent    2  Mixed obsessional thoughts and acts    3  Intermittent explosive disorder    4  Down syndrome      Strengths/Personal Resources for Self-Care: supportive family  Area/Areas of need (in own words): anxiety symptoms, attention and concentration problems, lack of motivation  1  Long Term Goal: continue improvement in depression  Target Date:3 months - 11/11/2021  Person/Persons responsible for completion of goal: Breonna Mars  2  Short Term Objective (s) - How will we reach this goal?:   A  Provider new recommended medication/dosage changes and/or continue medication(s): continue current medications as prescribed  B  N/A   C  N/A  Target Date:3 months - 11/11/2021  Person/Persons Responsible for Completion of Goal: Breonna Mars  Progress Towards Goals: continuing treatment  Treatment Modality: medication management every 3 months  Review due 180 days from date of this plan: 3 months - 11/11/2021  Expected length of service: ongoing treatment  My Physician/PA/NP and I have developed this plan together and I agree to work on the goals and objectives  I understand the treatment goals that were developed for my treatment

## 2021-08-11 NOTE — PSYCH
Reason for visit:   Chief Complaint   Patient presents with   [facility]   This note was not shared with the patient due to reasonable likelihood of causing patient harm    HPI     Hay Wolf is a 61 y o  male with a history of down's , dementia, parkinsons, depression, impulse control who presents for psychiatric evaluation due to the detention of his previous prescribed  He was accompanied by his  who was able to provide answers to my questions  Iris Weems   Has very garbled speech, speaks with monosyllabic sounds,  But was very pleasant and as cooperative as he could have been  He was casually dressed, and according to his , he is obsessed with clothes but with an OCD component, as hwears one outfit for an entire weer  He was described as vering quiet, very much to himself, and able to perform simple tasks such as taking out the trash  Lately he has been neglectful of the trash and he has also been perceived to being less active  He has been attending abilities and lately he is not interested in working on any tasks  Although we were able to get is that Moreno Villegas works with papers and of   Some of his obsessive ruminations also involve repeated questions within a very short span of time   stated that in some cases he may ask the same question 20 times within a very brief period of time  Some of this problem may be due to poor short-term memory as he is giving indications that he forgets things very very quickly or that he is not able to encode them properly  There is a history of physical aggression but is not present today  Most recently he has had to share the bathroom with the resident who lives in the had adjacent room  As a way of expressing, perhaps, his displeasure, he walked into her room last night naked and gave her back the wash towel she has left behind in their bathroom  Years ago he was diagnosed with Parkinson's disease    During this visit I was not able to observe any involuntary movements in hands legs or face  Some anxiety has been reported, especially when his brother is expected to call and that has not occurred  Patient was living with both parents until his mother   He stayed with his father for a couple of years until his father met someone and 1 day, upon his return from [facility], he was put in a car and taken to this residential home  He has 2 other brothers who shows a concern, especially 1 who calls him every week and sometimes takes him out to dinner  Primary complaints include: anxiety, feeling depressed and poor concentration  Onset of symptoms was at birth several years ago with rapidly worsening course since that time  Psychosocial Stressors: physiology             Review Of Systems:     Mood ARIEL   Behavior Normal    Thought Content ARIEL   General Decreased Functioning   Personality Character Deficiency   Other Psych Symptoms Normal   Constitutional As Noted in HPI   ENT As Noted in HPI   Cardiovascular As Noted in HPI   Respiratory As Noted in HPI   Gastrointestinal As Noted in HPI   Genitourinary As Noted in HPI   Musculoskeletal As Noted in HPI   Integumentary As Noted in HPI   Neurological As Noted in HPI   Endocrine Normal    Other Symptoms Normal        Past Psychiatric History:      Past Inpatient Psychiatric Treatment:   None   Past Outpatient Psychiatric Treatment:    Group home  Past Suicide Attempts:    no  Past Violent Behavior:    yes  Past Psychiatric Medication Trials:    several    Family Psychiatric History:   Family History   Problem Relation Age of Onset    No Known Problems Mother     Parkinsonism Father         symtomatic    No Known Problems Brother     No Known Problems Sister     No Known Problems Maternal Grandmother     No Known Problems Maternal Grandfather     No Known Problems Paternal Grandmother     No Known Problems Paternal Grandfather     No Known Problems Brother        Social History:    Education: not in the records  Learning Disabilities: mental retardation  Marital history: single  Living arrangement, social support: The patient lives in a group home under the supervision of [facility]  Occupational History: unemployed  Functioning Relationships: good support system  Other Pertinent History: None     Social History     Substance and Sexual Activity   Drug Use Never       Traumatic History:       Abuse: none  Other Traumatic Events: n/a    The following portions of the patient's history were reviewed and updated as appropriate: allergies, current medications, past family history, past medical history, past social history, past surgical history and problem list      Social History     Socioeconomic History    Marital status: Single     Spouse name: Not on file    Number of children: Not on file    Years of education: Not on file    Highest education level: Not on file   Occupational History    Not on file   Tobacco Use    Smoking status: Never Smoker    Smokeless tobacco: Never Used   Vaping Use    Vaping Use: Never used   Substance and Sexual Activity    Alcohol use: Never    Drug use: Never    Sexual activity: Not on file   Other Topics Concern    Not on file   Social History Narrative    Caffeine use      Social Determinants of Health     Financial Resource Strain:     Difficulty of Paying Living Expenses:    Food Insecurity:     Worried About Running Out of Food in the Last Year:     [address] in the Last Year:    Transportation Needs:     Lack of Transportation (Medical):      Lack of Transportation (Non-Medical):    Physical Activity:     Days of Exercise per Week:     Minutes of Exercise per Session:    Stress:     Feeling of Stress :    Social Connections:     Frequency of Communication with Friends and Family:     Frequency of Social Gatherings with Friends and Family:     Attends Mu-ism Services:     Active Member of Clubs or Organizations:     Attends Club or Organization Meetings:     Marital Status:    Intimate Partner Violence:     Fear of Current or Ex-Partner:     Emotionally Abused:     Physically Abused:     Sexually Abused:      Social History     Social History Narrative    Caffeine use        Mental status:  Appearance calm and cooperative    Mood mood appropriate   Affect affect was constricted   Speech dysarthric and garbled   Thought Processes ARIEL   Hallucinations no hallucinations present    Thought Content ARIEL   Abnormal Thoughts no homicidal thoughts    Orientation  oriented to person   Remote Memory short term memory impaired and long term memory impaired   Attention Span concentration impaired   Intellect Not Formally Assessed   Insight Poor insight    Judgement judgment was impaired   Muscle Strength Normal gait    Language [address] 65 of Knowledge ARIEL   Pain none   Pain Scale 0         Laboratory Results: No results found for this or any previous visit  Assessment/Plan:      Diagnoses and all orders for this visit:    Major depressive disorder, remission status unspecified, unspecified whether recurrent  -     Ambulatory referral to Psychiatry  -     Valproic acid level, total; Future    Mixed obsessional thoughts and acts  -     Ambulatory referral to Psychiatry    Intermittent explosive disorder  -     Ambulatory referral to Psychiatry  -     Valproic acid level, total; Future    Down syndrome          Treatment Recommendations- Risks Benefits         Immediate Medical/Psychiatric/Psychotherapy Treatments and Any Precautions: I noted that his last valporic acid level was drawn on 12/20, and I am thereby ording one today in order to assess his Depakotte dose  I suggested an increase of his D3 to 3x daily      Risks, Benefits And Possible Side Effects Of Medications:  Risks, benefits, and possible side effects of medications explained to patient and patient verbalizes understanding    Controlled Medication Discussion: No records found for controlled prescriptions according to [address] [place] [address] Monitoring Program

## 2021-08-13 NOTE — PROGRESS NOTES
Patient ID: Darien Price is a 61 y o  male  Assessment/Plan:     Diagnoses and all orders for this visit:    Parkinson's disease ([address] )  -     carbidopa-levodopa (SINEMET)  mg per tablet; Take 1 tablet by mouth 2 (two) times a day before meals Full tablet 1/2hour before breakfast and dinner    Hyperlipidemia, unspecified hyperlipidemia type  -     Lipid panel; Future  -     atorvastatin (LIPITOR) 80 mg tablet; Take 1 tablet (80 mg total) by mouth daily    Padilla padilla disease  -     Lipid panel; Future  -     atorvastatin (LIPITOR) 80 mg tablet; Take 1 tablet (80 mg total) by mouth daily    MCI (mild cognitive impairment)  -     donepezil (ARICEPT) 5 mg tablet; Take 1 tablet (5 mg total) by mouth daily at bedtime       Increase Sinemet to 1 full tablet before his breakfast and dinner  Continue with baby aspirin 81mg daily  Continue with Lipitor 80mg daily  Lipid profile in Dec   Will start on donepezil 5mg daily at bedtime  Follow up with Neurology office in 4 months    Subjective/HPI: Darien Price is a 58-year-old male with Down syndrome who follows with outpatient neurology for medical management of PD, moyamoya disease and mild cognitive impairments  Patient is on baby aspirin and Lipitor for medical management of Adolfo Longest, carbidopa levodopa half a tablet before meals 2 times a day  Patient recommended for PT OT for mobility and strengthening/PD and recommendations for patient to remain active and stimulate his mind, options for toileting hygiene, hydration and nutrition  MCI  Pt presents with care giver, Kel Arizmendi  She reports that Elliot Martin has had an increase in his mentation changes over the past 1-2 weeks  He has had no events or changes that may have triggered any further decline  According to Elliot Martin:  He is in his work program and puts papers in folders, 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 and then gets to go home at 230pm   He does like work    He reports that he is doing ok at home, He is eating well and able to provide his own ADLs  Pt reports that he toilets himself and feels that he does well with this  patient does indicate he had 1 episode of incontinence stool where he could not get into the bathroom due to the locked door  Patient denies any incontinence of urine  According to pt's care giver : The patient has had an increase in incontinence of urine ,He does recall one incident of urine incontinence after being reminded  He is able to participate most of his ADLs, they do assist him when they know he has had a bowel movement and may need better cleaning  He does go to work in his work program     He is able to answer simple questions about where he lives, his  and what state we are in, can not answer where he is right now  Pt seems to be having an increase in his progression of Dementia  Will start on Donepezil to see if we can have some reversing of his deficit or slow progression  PD  Pt has completed his therapy sessions and will be followed up with in a few mnoths  He does continue to do his ADLs independently with exception to toileting   No issues with regards to constipation  Patient's ambulation is unchanged, gait is relatively steady without devices  He walks with wink feet, according to his caregiver no noticeable forward bend however upon watching him ambulate in to the exam room he has a slight forward bend with a shuffling type gait  Patient does have a soft voice however this does not appear to have changed recently  He is able to write with large letters  He continues to have a tremor which caregiver thinks has increased since his last visit  Patient taking a half a tablet of Sinemet before min meals 2 times a day, will increase to full tablet 1/2 hour before breakfast and dinner  Gerardo Ray  Pt continues with his BASA therapy and statin at 80mg  Last LDL in May was 98 and remained on his high dose lipitor    Will recheck in Dec  If continues to decrease can lower his Lipitor to 60mg, will avoid tapering too low due to increased levels on lower dosing  Patient will follow-up in the outpatient neurology office in 4 months        The following portions of the patient's history were reviewed and updated as appropriate: allergies, current medications, past family history, past medical history, past social history, past surgical history and problem list         Past Medical History:   Diagnosis Date    Abnormal weight gain     last assessed 4/18/16; resolved 1/25/17    [facility]     Anxiety     Dementia ([facility] )     Depression     Disease of thyroid gland     Down's syndrome     Fatigue     last assessed 2/23/17; resolved 6/1/17    GERD (gastroesophageal reflux disease)     Gluten free diet     H/O urinary retention     Hernia of abdominal cavity     Hyperlipidemia     Hypogonadism in male     Impulse control disorder     OCD (obsessive compulsive disorder)     Orthostatic hypotension     Parkinson disease (HCC)     Parkinson disease [facility]     resolved 2/23/17    Pleural effusion     Sinus tachycardia     Vitamin D deficiency     last assessed 9/1/16; resolved 6/1/17       Past Surgical History:   Procedure Laterality Date    COLONOSCOPY      ESOPHAGOGASTRODUODENOSCOPY      with biopsy    PERICARDIOCENTESIS  4/29/2016            Social History     Socioeconomic History    Marital status: Single     Spouse name: None    Number of children: None    Years of education: None    Highest education level: None   Occupational History    None   Tobacco Use    Smoking status: Never Smoker    Smokeless tobacco: Never Used   Vaping Use    Vaping Use: Never used   Substance and Sexual Activity    Alcohol use: Never    Drug use: Never    Sexual activity: None   Other Topics Concern    None   Social History Narrative    Caffeine use      Social Determinants of Health     Financial Resource Strain:    [facility] Difficulty of Paying Living Expenses:    Food Insecurity:     Worried About Running Out of Food in the Last Year:     [address] in the Last Year:    Transportation Needs:     Lack of Transportation (Medical):      Lack of Transportation (Non-Medical):    Physical Activity:     Days of Exercise per Week:     Minutes of Exercise per Session:    Stress:     Feeling of Stress :    Social Connections:     Frequency of Communication with Friends and Family:     Frequency of Social Gatherings with Friends and Family:     Attends Shinto Services:     Active Member of Clubs or Organizations:     Attends Club or Organization Meetings:     Marital Status:    Intimate Partner Violence:     Fear of Current or Ex-Partner:     Emotionally Abused:     Physically Abused:     Sexually Abused:        Family History   Problem Relation Age of Onset    No Known Problems Mother     Parkinsonism Father         symtomatic    No Known Problems Brother     No Known Problems Sister     No Known Problems Maternal Grandmother     No Known Problems Maternal Grandfather     No Known Problems Paternal Grandmother     No Known Problems Paternal Grandfather     No Known Problems Brother          Current Outpatient Medications:     Aspirin Low Dose 81 MG chewable tablet, TAKE 1 TABLET BY MOUTH EVERY MORNING (8AM), Disp: 30 tablet, Rfl: 4    atorvastatin (LIPITOR) 80 mg tablet, Take 1 tablet (80 mg total) by mouth daily, Disp: 30 tablet, Rfl: 4    Calcium Carbonate-Vitamin D 600-400 MG-UNIT per tablet, Take 1 tablet by mouth 2 (two) times a day At 8 am and 5 pm, Disp: 60 tablet, Rfl: 11    carbidopa-levodopa (SINEMET)  mg per tablet, Take 1 tablet by mouth 2 (two) times a day before meals Full tablet 1/2hour before breakfast and dinner, Disp: 60 tablet, Rfl: 3    cholecalciferol (VITAMIN D3) 1,000 units tablet, Take 1 tablet (1,000 Units total) by mouth 2 (two) times a day At 8am and 8 pm, Disp: 60 tablet, Rfl: 11    D-Mannose 500 MG CAPS, Take 2 capsules by mouth 2 (two) times a day, Disp: , Rfl:     divalproex sodium (DEPAKOTE) 500 mg EC tablet, Take 1 tablet (500 mg total) by mouth daily 8pm, Disp: 30 tablet, Rfl: 1    docusate sodium (COLACE) 100 mg capsule, Take one capsule at 8am, Disp: 30 capsule, Rfl: 11    esomeprazole (NexIUM) 40 MG capsule, Take one capsule daily 30 minutes prior to breakfast, Disp: 30 capsule, Rfl: 11    lanolin-mineral oil (ARMAAN) LOTN, Apply topically 2 (two) times a day Apply to hands daily at 8 am and 8 pm, Disp: 1 Bottle, Rfl: 11    levothyroxine 75 mcg tablet, TAKE 1 TABLET BY MOUTH IN THE EVENING (8PM), Disp: 30 tablet, Rfl: 4    loratadine (CLARITIN) 10 mg tablet, TAKE 1 TABLET BY MOUTH EVERY MORNING (8AM) AS NEEDED FOR ALLERGIES -MAX=1TAB/DAY (Patient taking differently: as needed ), Disp: 30 tablet, Rfl: 4    midodrine (PROAMATINE) 2 5 mg tablet, Take 1 tablet (2 5 mg total) by mouth 2 (two) times a day as needed (If systolic blood pressure less than 100 and patient is symptomatic), Disp: 60 tablet, Rfl: 2    multivitamin-minerals (CENTRUM ADULTS) tablet, Take 1 tablet by mouth daily At 8am, Disp: 30 tablet, Rfl: 11    neomycin-bacitracin-polymyxin (NEOSPORIN) 5-400-5,000 ointment, Apply topically 3 (three) times a day Apply to urethra for lubrication (Patient taking differently: Apply topically as needed Apply to urethra for lubrication), Disp: 28 3 g, Rfl: 0    OLANZapine (ZyPREXA) 2 5 mg tablet, Take 1 tablet (2 5 mg total) by mouth daily 8 pm, Disp: 30 tablet, Rfl: 1    donepezil (ARICEPT) 5 mg tablet, Take 1 tablet (5 mg total) by mouth daily at bedtime, Disp: 30 tablet, Rfl: 3    erythromycin (ILOTYCIN) ophthalmic ointment, Administer 0 5 inches into the left eye every 12 (twelve) hours At 8 am and 8 pm  D/c on 5/27/20 (Patient not taking: Reported on 6/17/2021), Disp: 3 5 g, Rfl: 0    lidocaine (URO-JET) 2 % urethral/mucosal gel, Insert 1 application into the urethra daily as needed (urethral pain 2/2 catheter) (Patient not taking: Reported on 6/17/2021), Disp: , Rfl: 0    ondansetron (ZOFRAN-ODT) 4 mg disintegrating tablet, Take 1 tablet (4 mg total) by mouth every 8 (eight) hours as needed for nausea or vomiting D/c on 4/18/21 (Patient not taking: Reported on 8/13/2021), Disp: 20 tablet, Rfl: 0    polyethylene glycol (GLYCOLAX) 17 GM/SCOOP powder, Take 17 grams in 8 oz of water by mouth daily at 8 pm (Patient not taking: Reported on 8/13/2021), Disp: 527 g, Rfl: 11    tamsulosin (FLOMAX) 0 4 mg, Take 0 4 mg by mouth Two tablet @ 8 am 0 8 mg daily, Disp: , Rfl:     testosterone (ANDROGEL) 1 62 % TD gel pump, Apply 3 pumps at 8 AM to shoulders/chest daily (Patient not taking: Reported on 8/13/2021), Disp: 75 g, Rfl: 3    Allergies   Allergen Reactions    Advil [Ibuprofen]     Gluten Meal - Food Allergy     Other      SEASONAL        Blood pressure 101/62, pulse 80, temperature 98 7 °F (37 1 °C), height 4' 10" (1 473 m), weight 72 1 kg (159 lb)  Objective:    Blood pressure 101/62, pulse 80, temperature 98 7 °F (37 1 °C), height 4' 10" (1 473 m), weight 72 1 kg (159 lb)  Physical Exam  Vitals reviewed  Constitutional:       Appearance: He is well-developed  HENT:      Head: Normocephalic  Right Ear: Hearing normal       Left Ear: Hearing normal       Nose: Nose normal       Mouth/Throat:      Mouth: Mucous membranes are moist    Eyes:      General: Lids are normal       Extraocular Movements: Extraocular movements intact  Conjunctiva/sclera: Conjunctivae normal       Pupils: Pupils are equal, round, and reactive to light  Cardiovascular:      Rate and Rhythm: Normal rate  Pulmonary:      Effort: Pulmonary effort is normal  No respiratory distress  Abdominal:      Palpations: Abdomen is soft  Tenderness: There is no abdominal tenderness  Musculoskeletal:         General: Normal range of motion        Cervical back: Normal range of motion  Skin:     General: Skin is warm and dry  Neurological:      Mental Status: He is alert  Coordination: Romberg sign negative  Deep Tendon Reflexes: Reflexes are normal and symmetric  Psychiatric:         Speech: Speech normal          Behavior: Behavior normal          Thought Content: Thought content normal          Judgment: Judgment normal          Neurological Exam  Mental Status  Alert  Oriented only to person and place  Orientation: Patient with Down syndrome, mild dementia/MCI  Recent and remote memory are intact  Speech is normal  Language is fluent with no aphasia  Attention and concentration: Exam on this is limited although it does appear as though patient has a decrease in concentration within conversation activities       Cranial Nerves  CN II: Visual acuity is normal  Visual fields full to confrontation  CN III, IV, VI: Extraocular movements intact bilaterally  Normal lids and orbits bilaterally  Pupils equal round and reactive to light bilaterally  CN V: Facial sensation is normal   CN VII: Full and symmetric facial movement  CN VIII: Hearing is normal   Right: Hearing is normal   Left: Hearing is normal   CN IX, X: Palate elevates symmetrically  Normal gag reflex  CN XI: Shoulder shrug strength is normal   CN XII: Tongue midline without atrophy or fasciculations  Motor  Normal muscle bulk throughout  Normal muscle tone  No abnormal involuntary movements   No tremoring noted the room this exam                                              Right                     Left  Supraspinatus                       4                          4  Deltoid                                   4                          4   Biceps                                   4                          4   Wrist flexor                            4                          4   Wrist extensor                       4                          4   Iliopsoas                               4 4   Quadriceps                           4                          4   Hamstring                             4                          4   Motor exam is limited due to patient's inability to participate in his exam     Sensory  Light touch is normal in upper and lower extremities  Temperature is normal in upper and lower extremities  Vibration is normal in upper and lower extremities  Proprioception is normal in upper and lower extremities  Reflexes  Deep tendon reflexes are 2+ and symmetric in all four extremities with downgoing toes bilaterally  Right pathological reflexes: Sully's absent  Left pathological reflexes: Sully's absent  Coordination  Right: Finger-to-nose normal   Left: Finger-to-nose normal     Limited due to patient's inability to participate in exam, unable to follow instructions  Gait  Casual gait: Wide stance  Reduced stride length  Shuffling gait  Reduced right arm swing  Reduced left arm swing  Toe walking abnormality: Heel walking abnormality: Tandem gait abnormality: Romberg is absent  Unable to rise from chair without using arms  Wide stance with wing doubt feet   stride reduced with slight shuffling   deferred heel toe walking and tandem gait, inability to follow instructions  ROS:    Review of Systems   Constitutional: Negative  Negative for appetite change and fever  HENT: Negative  Negative for hearing loss, tinnitus, trouble swallowing and voice change  Eyes: Negative  Negative for photophobia and pain  Respiratory: Negative  Negative for shortness of breath  Cardiovascular: Negative  Negative for palpitations  Gastrointestinal: Negative  Negative for nausea and vomiting  Endocrine: Negative  Negative for cold intolerance  Genitourinary: Negative  Negative for dysuria, frequency and urgency  Urinating on self/not wiping bowel movements correctly   Musculoskeletal: Negative  Negative for myalgias and neck pain     Skin: Negative  Negative for rash  Neurological: Positive for tremors (about the same as before)  Negative for dizziness, seizures, syncope, facial asymmetry, speech difficulty, weakness, light-headedness, numbness and headaches  Memory decline-has been awhile  Past week it seems a little worse   Hematological: Negative  Does not bruise/bleed easily  Psychiatric/Behavioral: Negative  Negative for confusion, hallucinations and sleep disturbance        ROS reviewed and discussed with patient and his caregiver, Rolan Sandifer

## 2021-08-13 NOTE — PATIENT INSTRUCTIONS
Increase Sinemet to 1 full tablet before his breakfast and dinner  Continue with baby aspirin 81mg daily  Continue with Lipitor 80mg daily  Lipid profile in Dec   Will start on donepezil 5mg daily at bedtime  Follow up with Neurology office in 4 months

## 2021-09-09 NOTE — PSYCH
PROGRESS NOTE        [facility]      Name and Date of Birth:  Jacquelyn Groves 61 y o  1965    Date of Visit: 09/09/21    SUBJECTIVE:    Cherlynn Lefort was seen today for follow-up and medication management accompanied by staff from his residential facility  In he denied any symptoms and claims that he was doing fine  Staff report confirms that there are no behavior problems, he is very pleasant and cooperative most of the times, and is able to take care of most of his ADLs  I explained the results of the Depakote level which was only 1st 42 1  I also notice that throughout 2020 his levels were much lower 32 and 38  Today I am adding him Depakote 125 mg daily to his protocol and will retest in a couple of months  Patient is doing well in the program, sleeps well appetite is good and the only symptom reported by the staff was that he seems to be more forgetful lately  No other new symptoms or side effects were reported      He denies suicidal ideation, intent or plan at present, has no suicidal ideation, intent or plan at present  He denies any auditory hallucinations and visual hallucinations, denies any other delusional thinking, denies any delusional thinking  He denies any side effects from medications    HPI ROS Appetite Changes and Sleep: normal appetite, normal sleep    Review Of Systems:      Constitutional As noted in HPI   ENT As noted in HPI   Cardiovascular As noted in HPI   Respiratory As noted in HPI   Gastrointestinal As noted in HPI   Genitourinary As noted in HPI   Musculoskeletal As noted in HPI   Integumentary As noted in HPI   Neurological As noted in HPI   Endocrine As noted in HPI   Other Symptoms Negative and None       Laboratory Results: No results found for this or any previous visit      Substance Abuse History:    Social History     Substance and Sexual Activity   Drug Use Never       Family Psychiatric History:     Family History   Problem Relation Age of Onset    No Known Problems Mother     Parkinsonism Father         symtomatic    No Known Problems Brother     No Known Problems Sister     No Known Problems Maternal Grandmother     No Known Problems Maternal Grandfather     No Known Problems Paternal Grandmother     No Known Problems Paternal Grandfather     No Known Problems Brother        The following portions of the patient's history were reviewed and updated as appropriate: past family history, past medical history, past social history, past surgical history and problem list     Social History     Socioeconomic History    Marital status: Single     Spouse name: Not on file    Number of children: Not on file    Years of education: Not on file    Highest education level: Not on file   Occupational History    Not on file   Tobacco Use    Smoking status: Never Smoker    Smokeless tobacco: Never Used   Vaping Use    Vaping Use: Never used   Substance and Sexual Activity    Alcohol use: Never    Drug use: Never    Sexual activity: Not on file   Other Topics Concern    Not on file   Social History Narrative    Caffeine use      Social Determinants of Health     Financial Resource Strain:     Difficulty of Paying Living Expenses:    Food Insecurity:     Worried About Running Out of Food in the Last Year:     Ran Out of Food in the Last Year:    Transportation Needs:     Lack of Transportation (Medical):      Lack of Transportation (Non-Medical):    Physical Activity:     Days of Exercise per Week:     Minutes of Exercise per Session:    Stress:     Feeling of Stress :    Social Connections:     Frequency of Communication with Friends and Family:     Frequency of Social Gatherings with Friends and Family:     Attends Denominational Services:     Active Member of Clubs or Organizations:     Attends Club or Organization Meetings:     Marital Status:    Intimate Partner Violence:     Fear of Current or Ex-Partner:     Emotionally Abused:     Physically Abused:     Sexually Abused:      Social History     Social History Narrative    Caffeine use         Social History     Tobacco History     Smoking Status  Never Smoker    Smokeless Tobacco Use  Never Used          Alcohol History     Alcohol Use Status  Never          Drug Use     Drug Use Status  Never          Sexual Activity     Sexually Active  Not Asked          Activities of Daily Living    Not Asked                     OBJECTIVE:     Mental Status Evaluation:    Appearance age appropriate, casually dressed   Behavior pleasant, cooperative   Speech normal volume, normal pitch   Mood Appropriate   Affect F&A   Thought Processes logical   Associations intact associations   Thought Content normal   Perceptual Disturbances: none   Abnormal Thoughts  Risk Potential Suicidal ideation - None  Homicidal ideation - None  Potential for aggression - No   Orientation oriented to person, place, time/date and situation   Memory recent and remote memory grossly intact   Cosciousness alert and awake   Attention Span attention span and concentration are age appropriate   Intellect Appears to be of Average Intelligence   Insight age appropriate    Judgement good    Muscle Strength and  Gait muscle strength and tone were normal   Language Intact   Fund of Knowledge Intact   Pain none   Pain Scale 0       Assessment/Plan:       Diagnoses and all orders for this visit:    Bipolar 2 disorder (HCC)  -     divalproex sodium (DEPAKOTE) 125 mg EC tablet; Take 1 tablet (125 mg total) by mouth every 8 (eight) hours    MCI (mild cognitive impairment)          Treatment Recommendations/Precautions:    Continue current medications:    Risks/Benefits      Risks, Benefits And Possible Side Effects Of Medications:    Risks, benefits, and possible side effects of medications explained to patient and patient verbalizes understanding and agreement for treatment      Controlled Medication Discussion:         Psychotherapy Provided:     Individual psychotherapy provided: I spent 20 min with this patient today

## 2021-09-09 NOTE — BH TREATMENT PLAN
TREATMENT PLAN (Medication Management Only)        [facility]    Name and Date of Birth:  Pamela Sampson 61 y o  1965  Date of Treatment Plan: September 9, 2021  Diagnosis/Diagnoses:    1  Bipolar 2 disorder ([address] )    2  MCI (mild cognitive impairment)      Strengths/Personal Resources for Self-Care: taking medications as prescribed, motivation for treatment  Area/Areas of need (in own words): mood instability  1  Long Term Goal: continue improvement in mood stability  Target Date:3 months - 12/9/2021  Person/Persons responsible for completion of goal: Skye Carbon  2  Short Term Objective (s) - How will we reach this goal?:   A  Provider new recommended medication/dosage changes and/or continue medication(s): continue current medications as prescribed  B  N/A   C  N/A  Target Date:3 months - 12/9/2021  Person/Persons Responsible for Completion of Goal: Skye Jack  Progress Towards Goals: continuing treatment  Treatment Modality: medication management every 3 months  Review due 180 days from date of this plan: 3 months - 12/9/2021  Expected length of service: ongoing treatment  My Physician/PA/NP and I have developed this plan together and I agree to work on the goals and objectives  I understand the treatment goals that were developed for my treatment

## 2021-09-13 NOTE — PROGRESS NOTES
Assessment/Plan:    Hemorrhagic blister and hyperkeratotic pinch callus debrided to patient tolerance, educated the patient and aide about the proper shoe gear and the use of wide toe box, and possible orthotics management, patient reported relief, patient will monitor signs of infection, and return to the office needed  Patient opted out of o a ral and topical anti fungal medications at this time, all onychomycotic dystrophic nails debrided using sterile Nipper and electrical arleth to patient tolerance, Betadine applied, patient educated on daily foot hygiene for the management of fungal infection  Bilateral ingrown toenail, aseptic resection of the offending nail border in a slant back fashion using sterile Nipper distal to the eponychium, patient reported improvement and relief, topical antibiotic applied with a Band-Aid, patient educated and daily dressing changes and the use of wide toe box shoes, patient to report to the office if condition recur  Macerated tissue debrided, patient educated and daily foot hygiene, patient educated and changing socks frequently and the use of OTC antifungals product,       Diagnoses and all orders for this visit:    Onychomycosis    Ingrowing nail    Tinea pedis of both feet    Pain in both feet    Blister of foot, unspecified laterality, initial encounter          Subjective:      Patient ID: Estephanie Ugalde is a 61 y o  male      59-year-old male resident of assisting living facility, special needs, accompanied by his aide, presents to the office for painful hallucal toenails, patient has chronic history of ingrown toenails, patient is unable to self treat due to behavioral and mental conditions, currently denies constitutional symptoms currently denies trauma  Patient report pain to bilateral big toes, with hard skin lesion, and discoloration, specially to the right hallux      The following portions of the patient's history were reviewed and updated as appropriate: allergies, current medications, past family history, past medical history, past social history, past surgical history and problem list     Review of Systems   Constitutional: Negative  Respiratory: Negative  Cardiovascular: Negative  Musculoskeletal: Negative  Skin: Negative                  Historical Information   Past Medical History:   Diagnosis Date    Abnormal weight gain     last assessed 4/18/16; resolved 1/25/17    Acute [facility] (congestive heart failure) [facility] )     Anxiety     Dementia ([facility] )     Depression     Disease of thyroid gland     Down's syndrome     Fatigue     last assessed 2/23/17; resolved 6/1/17    GERD (gastroesophageal reflux disease)     Gluten free diet     H/O urinary retention     Hernia of abdominal cavity     Hyperlipidemia     Hypogonadism in male     Impulse control disorder     OCD (obsessive compulsive disorder)     Orthostatic hypotension     [facility]     Parkinson disease ([facility] )     resolved 2/23/17    Pleural effusion     Sinus tachycardia     Vitamin D deficiency     last assessed 9/1/16; resolved 6/1/17     Past Surgical History:   Procedure Laterality Date    COLONOSCOPY      ESOPHAGOGASTRODUODENOSCOPY      with biopsy    PERICARDIOCENTESIS  4/29/2016          Social History   Social History     Substance and Sexual Activity   Alcohol Use Never     Social History     Substance and Sexual Activity   Drug Use Never     Social History     Tobacco Use   Smoking Status Never Smoker   Smokeless Tobacco Never Used     Family History:   Family History   Problem Relation Age of Onset    No Known Problems Mother     Parkinsonism Father         symtomatic    No Known Problems Brother     No Known Problems Sister     No Known Problems Maternal Grandmother     No Known Problems Maternal Grandfather     No Known Problems Paternal Grandmother     No Known Problems Paternal Grandfather     No Known Problems Brother        Meds/Allergies all medications and allergies reviewed  Allergies   Allergen Reactions    Advil [Ibuprofen]     Gluten Meal - Food Allergy     Other      SEASONAL       Objective:      Resp 17   Ht 4' 10" (1 473 m)   Wt 71 2 kg (157 lb)   BMI 32 81 kg/m²          Physical Exam  Constitutional:       General: He is not in acute distress  Appearance: He is well-developed  He is not ill-appearing, toxic-appearing or diaphoretic  HENT:      Head: Normocephalic and atraumatic  Cardiovascular:      Pulses: Normal pulses  Dorsalis pedis pulses are 2+ on the right side and 2+ on the left side  Posterior tibial pulses are 2+ on the right side and 2+ on the left side  Comments: Palpable pedal pulse, CFT is less than 3 seconds, temperature gradient within normal limits, pedal hair present, no trophic skin changes  Pulmonary:      Effort: No respiratory distress  Musculoskeletal:         General: Normal range of motion  Comments: No pain on palpation on range of motion of ankle joint subtalar joint metatarsal joint tarsal joints bilateral foot    Hallux limitus noted bilateral foot   Feet:      Right foot:      Protective Sensation: 10 sites tested  10 sites sensed  Skin integrity: No ulcer, blister, skin breakdown or erythema  Left foot:      Protective Sensation: 10 sites tested  10 sites sensed  Skin integrity: No ulcer, blister, skin breakdown or erythema  Skin:     Capillary Refill: Capillary refill takes 2 to 3 seconds  Coloration: Skin is not pale        Comments: Incurvated onychocryptosis noted to the lateral nail border bilateral hallux, thickened and dystrophic with subungual debris nails to all digits painful on firm palpation, pinch callus noted bilateral foot, hyperkeratosis lesions plantar 2nd metatarsal head bilateral     Dry hemorrhagic blister noted covering pinch callus on the right hallux, no erythema no edema, hyperkeratotic cap, dry blood noted upon debridement, no open lesion the    Diffuse scaly skin eruption bilateral foot mild with the erythematous base   Neurological:      Mental Status: He is alert and oriented to person, place, and time  Comments:  muscle power 5/5, protective sensations intact, no focal motor deficit        Foot Exam    Right Foot/Ankle     Inspection and Palpation  Skin Exam: no blister, no maceration, no ulcer and no erythema     Neurovascular  Dorsalis pedis: 2+  Posterior tibial: 2+      Left Foot/Ankle      Inspection and Palpation  Skin Exam: no blister, no maceration, no ulcer and no erythema     Neurovascular  Dorsalis pedis: 2+  Posterior tibial: 2+

## 2021-09-13 NOTE — PROGRESS NOTES
Assessment/Plan:  Patient opted out of oral and topical anti fungal medications at this time, all onychomycotic dystrophic nails debrided using sterile Nipper and electrical arleth to patient tolerance, Betadine applied, patient educated on daily foot hygiene for the management of fungal infection  Bilateral ingrown toenail, aseptic resection of the offending nail border in a slant back fashion using sterile Nipper distal to the eponychium, patient reported improvement and relief, topical antibiotic applied with a Band-Aid, patient educated and daily dressing changes and the use of wide toe box shoes, patient to report to the office if condition recur  Macerated tissue debrided, patient educated and daily foot hygiene, patient educated and changing socks frequently and the use of OTC antifungals product,       Diagnoses and all orders for this visit:    Ingrowing nail    Onychomycosis    Tinea pedis of both feet    Pain in both feet    Limitation due to disability          Subjective:      Patient ID: Adelina Mckenzie is a 61 y o  male  48-year-old male resident of [facility] living [facility], special needs, accompanied by his aide, presents to the office for painful hallucal toenails, patient has chronic history of ingrown toenails, patient is unable to self treat due to behavioral and mental conditions, currently denies constitutional symptoms currently denies trauma      The following portions of the patient's history were reviewed and updated as appropriate: allergies, current medications, past family history, past medical history, past social history, past surgical history and problem list     Review of Systems   Constitutional: Negative  Respiratory: Negative  Cardiovascular: Negative  Musculoskeletal: Negative  Skin: Negative                  Historical Information   Past Medical History:   Diagnosis Date    Abnormal weight gain     last assessed 4/18/16; resolved 1/25/17    Acute [facility]estive [facility] failure) ([facility] )     Anxiety     Dementia ([facility] )     Depression     Disease of thyroid gland     Down's syndrome     Fatigue     last assessed 2/23/17; resolved 6/1/17    GERD (gastroesophageal reflux disease)     Gluten free diet     H/O urinary retention     Hernia of abdominal cavity     Hyperlipidemia     Hypogonadism in male     Impulse control disorder     OCD (obsessive compulsive disorder)     Orthostatic hypotension     Parkinson [facility])     Parkinson disease ([facility] )     resolved 2/23/17    Pleural effusion     Sinus tachycardia     Vitamin D deficiency     last assessed 9/1/16; resolved 6/1/17     Past Surgical History:   Procedure Laterality Date    COLONOSCOPY      ESOPHAGOGASTRODUODENOSCOPY      with biopsy    PERICARDIOCENTESIS  4/29/2016          Social History   Social History     Substance and Sexual Activity   Alcohol Use Never     Social History     Substance and Sexual Activity   Drug Use Never     Social History     Tobacco Use   Smoking Status Never Smoker   Smokeless Tobacco Never Used     Family History:   Family History   Problem Relation Age of Onset    No Known Problems Mother     Parkinsonism Father         symtomatic    No Known Problems Brother     No Known Problems Sister     No Known Problems Maternal Grandmother     No Known Problems Maternal Grandfather     No Known Problems Paternal Grandmother     No Known Problems Paternal Grandfather     No Known Problems Brother        Meds/Allergies   all medications and allergies reviewed  Allergies   Allergen Reactions    Advil [Ibuprofen]     Gluten Meal - Food Allergy     Other      SEASONAL       Objective:      Ht 4' 10" (1 473 m)   Wt 72 1 kg (159 lb)   BMI 33 23 kg/m²          Physical Exam  Constitutional:       General: He is not in acute distress  Appearance: He is well-developed  He is not ill-appearing, toxic-appearing or diaphoretic     HENT:      Head: Normocephalic and atraumatic  Cardiovascular:      Pulses: Normal pulses  Dorsalis pedis pulses are 2+ on the right side and 2+ on the left side  Posterior tibial pulses are 2+ on the right side and 2+ on the left side  Comments: Palpable pedal pulse, CFT is less than 3 seconds, temperature gradient within normal limits, pedal hair present, no trophic skin changes  Pulmonary:      Effort: No respiratory distress  Musculoskeletal:         General: Normal range of motion  Comments: No pain on palpation on range of motion of ankle joint subtalar joint metatarsal joint tarsal joints bilateral foot    Hallux limitus noted bilateral foot   Feet:      Right foot:      Protective Sensation: 10 sites tested  10 sites sensed  Skin integrity: No ulcer, blister, skin breakdown or erythema  Left foot:      Protective Sensation: 10 sites tested  10 sites sensed  Skin integrity: No ulcer, blister, skin breakdown or erythema  Skin:     Capillary Refill: Capillary refill takes 2 to 3 seconds  Coloration: Skin is not pale  Comments: Incurvated onychocryptosis noted to the lateral nail border bilateral hallux, thickened and dystrophic with subungual debris nails to all digits painful on firm palpation, pinch callus noted bilateral foot, hyperkeratosis lesions plantar 2nd metatarsal head bilateral     Diffuse scaly skin eruption bilateral foot mild with the erythematous base   Neurological:      Mental Status: He is alert and oriented to person, place, and time  Comments:  muscle power 5/5, protective sensations intact, no focal motor deficit        Foot Exam    Right Foot/Ankle     Inspection and Palpation  Skin Exam: no blister, no maceration, no ulcer and no erythema     Neurovascular  Dorsalis pedis: 2+  Posterior tibial: 2+      Left Foot/Ankle      Inspection and Palpation  Skin Exam: no blister, no maceration, no ulcer and no erythema     Neurovascular  Dorsalis pedis: 2+  Posterior tibial: 2+

## 2021-09-15 PROBLEM — R41.3 MEMORY CHANGE: Status: ACTIVE | Noted: 2021-01-01

## 2021-09-15 PROBLEM — Z12.5 ENCOUNTER FOR SCREENING FOR MALIGNANT NEOPLASM OF PROSTATE: Status: ACTIVE | Noted: 2021-01-01

## 2021-09-15 NOTE — PROGRESS NOTES
Baron Aceves 61 y o  male MRN: [id number]    Encounter: [id number]      Assessment/Plan     1  Hypothyroidism  -continue current dose of levothyroxine  -check TSH, free T4     2  Hypogonadism  Currently not on any testosterone replacement  -check total, free testosterone, PSA, CBC     3  Osteoporosis  -continue calcium, vitamin-D supplementation   -check BMP, vitamin-D   -DEXA scan 02/2021     4  Cognitive decline -following up with Neurology    CC:  Hypothyroidism, osteoporosis, hypogonadism    History of Present Illness     HPI:  Baron Aceves is a 61 y o  male who is here for a follow-up of hypothyroidism, hypogonadism, osteoporosis  Has history of Down syndrome, Parkinson's    Last seen in 03/2021   Accompanied by caretaker Tarsha Mckeon     Reports cognitive decline which has been gradually progressing  Tends to fluctuate  No other changes    Hypothyroidism, on levothyroxine 75 mcg orally daily   Compliant, takes on an empty stomach at night 8 pm , 3 Hrs after dinner/ calcium     appetite is good   Less active, Has been walking, lost 6 lbs since 03/2021   No diarrhea/constipation   Denies heat or cold intolerance  Sleeps well     Hypogonadism-off testosterone since January 2020 when he was admitted to the hospital for urinary retention  Recently now concerns for urinary retention  Is not taking Flomax   Possibly some fatigue    Osteoporosis  Last DEXA scan 02/2020   Taking vitamin-D3 1000 IU orally daily, calcium-vitamin-D 600-400 mg twice a day, multivitamin   No balance issues, no false   No back pain/bone pain   No fractures     All other systems were reviewed and are negative         Review of Systems    Historical Information   Past Medical History:   Diagnosis Date    Abnormal weight gain     last assessed 4/18/16; resolved 1/25/17    [facility]     Anxiety     Dementia (HCC)     Depression     Disease of thyroid gland     Down's syndrome     Fatigue     last assessed 2/23/17; resolved 6/1/17    GERD (gastroesophageal reflux disease)     Gluten free diet     H/O urinary retention     Hernia of abdominal cavity     Hyperlipidemia     Hypogonadism in male     Impulse control disorder     OCD (obsessive compulsive disorder)     Orthostatic hypotension     Parkinson disease (HCC)     Parkinson disease ([facility] [address] [facility]     resolved 2/23/17    Pleural effusion     Sinus tachycardia     Vitamin D deficiency     last assessed 9/1/16; resolved 6/1/17     Past Surgical History:   Procedure Laterality Date    COLONOSCOPY      ESOPHAGOGASTRODUODENOSCOPY      with biopsy    PERICARDIOCENTESIS  4/29/2016          Social History   Social History     Substance and Sexual Activity   Alcohol Use Never     Social History     Substance and Sexual Activity   Drug Use Never     Social History     Tobacco Use   Smoking Status Never Smoker   Smokeless Tobacco Never Used     Family History:   Family History   Problem Relation Age of Onset    No Known Problems Mother     Parkinsonism Father         symtomatic    No Known Problems Brother     No Known Problems Sister     No Known Problems Maternal Grandmother     No Known Problems Maternal Grandfather     No Known Problems Paternal Grandmother     No Known Problems Paternal Grandfather     No Known Problems Brother        Meds/Allergies   Current Outpatient Medications   Medication Sig Dispense Refill    Aspirin Low Dose 81 MG chewable tablet TAKE 1 TABLET BY MOUTH EVERY MORNING (8AM) 30 tablet 4    atorvastatin (LIPITOR) 80 mg tablet Take 1 tablet (80 mg total) by mouth daily 30 tablet 4    Calcium Carbonate-Vitamin D 600-400 MG-UNIT per tablet Take 1 tablet by mouth 2 (two) times a day At 8 am and 5 pm 60 tablet 11    carbidopa-levodopa (SINEMET)  mg per tablet Take 1 tablet by mouth 2 (two) times a day before meals Full tablet 1/2hour before breakfast and dinner 60 tablet 3    cholecalciferol (VITAMIN D3) 1,000 units tablet Take 1 tablet (1,000 Units total) by mouth 2 (two) times a day At 8am and 8 pm 60 tablet 11    D-Mannose 500 MG CAPS Take 2 capsules by mouth 2 (two) times a day      divalproex sodium (DEPAKOTE) 125 mg EC tablet Take 1 tablet (125 mg total) by mouth every 8 (eight) hours 30 tablet 5    divalproex sodium (DEPAKOTE) 500 mg EC tablet Take 1 tablet (500 mg total) by mouth daily 8pm 30 tablet 1    docusate sodium (COLACE) 100 mg capsule Take one capsule at 8am 30 capsule 11    donepezil (ARICEPT) 5 mg tablet Take 1 tablet (5 mg total) by mouth daily at bedtime 30 tablet 3    esomeprazole (NexIUM) 40 MG capsule Take one capsule daily 30 minutes prior to breakfast 30 capsule 11    lanolin-mineral oil (ARMAAN) LOTN Apply topically 2 (two) times a day Apply to hands daily at 8 am and 8 pm 1 Bottle 11    levothyroxine 75 mcg tablet TAKE 1 TABLET BY MOUTH IN THE EVENING (8PM) 30 tablet 4    loratadine (CLARITIN) 10 mg tablet TAKE 1 TABLET BY MOUTH EVERY MORNING (8AM) AS NEEDED FOR ALLERGIES -MAX=1TAB/DAY (Patient taking differently: as needed ) 30 tablet 4    midodrine (PROAMATINE) 2 5 mg tablet Take 1 tablet (2 5 mg total) by mouth 2 (two) times a day as needed (If systolic blood pressure less than 100 and patient is symptomatic) 60 tablet 2    multivitamin-minerals (CENTRUM ADULTS) tablet Take 1 tablet by mouth daily At 8am 30 tablet 11    neomycin-bacitracin-polymyxin (NEOSPORIN) 5-400-5,000 ointment Apply topically 3 (three) times a day Apply to urethra for lubrication (Patient taking differently: Apply topically as needed Apply to urethra for lubrication) 28 3 g 0    OLANZapine (ZyPREXA) 2 5 mg tablet Take 1 tablet (2 5 mg total) by mouth daily 8 pm 30 tablet 1    erythromycin (ILOTYCIN) ophthalmic ointment Administer 0 5 inches into the left eye every 12 (twelve) hours At 8 am and 8 pm  D/c on 5/27/20 (Patient not taking: Reported on 6/17/2021) 3 5 g 0    lidocaine (URO-JET) 2 % urethral/mucosal gel Insert 1 application into the urethra daily as needed (urethral pain 2/2 catheter) (Patient not taking: Reported on 6/17/2021)  0    ondansetron (ZOFRAN-ODT) 4 mg disintegrating tablet Take 1 tablet (4 mg total) by mouth every 8 (eight) hours as needed for nausea or vomiting D/c on 4/18/21 (Patient not taking: Reported on 8/13/2021) 20 tablet 0    polyethylene glycol (GLYCOLAX) 17 GM/SCOOP powder Take 17 grams in 8 oz of water by mouth daily at 8 pm (Patient not taking: Reported on 8/13/2021) 527 g 11    tamsulosin (FLOMAX) 0 4 mg Take 0 4 mg by mouth Two tablet @ 8 am 0 8 mg daily (Patient not taking: Reported on 9/15/2021)      testosterone (ANDROGEL) 1 62 % TD gel pump Apply 3 pumps at 8 AM to shoulders/chest daily (Patient not taking: Reported on 8/13/2021) 75 g 3     No current facility-administered medications for this visit  Allergies   Allergen Reactions    Advil [Ibuprofen]     Gluten Meal - Food Allergy     Other      SEASONAL       Objective   Vitals: Blood pressure 120/70, pulse 69, temperature (!) 97 °F (36 1 °C), height 4' 10" (1 473 m), weight 72 6 kg (160 lb)  Physical Exam  Constitutional:       General: He is not in acute distress  Appearance: He is well-developed  He is not diaphoretic  HENT:      Head: Normocephalic and atraumatic  Eyes:      Conjunctiva/sclera: Conjunctivae normal       Pupils: Pupils are equal, round, and reactive to light  Cardiovascular:      Rate and Rhythm: Normal rate and regular rhythm  Heart sounds: Normal heart sounds  No murmur heard  Pulmonary:      Effort: Pulmonary effort is normal  No respiratory distress  Breath sounds: Normal breath sounds  No wheezing  Abdominal:      General: There is no distension  Palpations: Abdomen is soft  Tenderness: There is no abdominal tenderness  There is no guarding  Musculoskeletal:         General: No tenderness  Cervical back: Normal range of motion and neck supple  Right lower leg: No edema  Left lower leg: No edema  Skin:     General: Skin is warm and dry  Findings: No erythema or rash  Neurological:      Mental Status: He is alert and oriented to person, place, and time  Psychiatric:         Behavior: Behavior normal          Thought Content: Thought content normal          The history was obtained from the review of the chart, patient, caretaker  Lab Results:   Lab Results   Component Value Date/Time    TSH 3RD GENERATON 2 120 05/05/2021 07:32 AM    TSH 3RD GENERATON 2 577 12/30/2020 08:11 AM    Free T4 0 98 05/05/2021 07:32 AM    Free T4 0 91 12/30/2020 08:11 AM     Lab Results   Component Value Date    WBC 5 03 12/30/2020    HGB 14 3 12/30/2020    HCT 44 3 12/30/2020     (H) 12/30/2020     12/30/2020     Lab Results   Component Value Date    CREATININE 0 84 05/05/2021    BUN 6 05/05/2021     (L) 11/20/2015    K 4 2 05/05/2021     05/05/2021    CO2 31 05/05/2021     No results found for: HGBA1C      Imaging Studies:       I have personally reviewed pertinent reports  Portions of the record may have been created with voice recognition software  Occasional wrong word or "sound a like" substitutions may have occurred due to the inherent limitations of voice recognition software  Read the chart carefully and recognize, using context, where substitutions have occurred

## 2021-09-23 NOTE — TELEPHONE ENCOUNTER
----- Message from Cleo Zimmer MD sent at 9/23/2021  3:57 PM EDT -----  Please call inform patient of results Total testosterone within normal limits, free testosterone low Thyroid function test, vitamin-D within normal limits

## 2021-12-27 NOTE — TELEPHONE ENCOUNTER
Group home called stating pt has had loose stools for the last 5 months  He has bms 3-4 times a day  Does he need to continue taking a laxative? Please advise

## 2022-01-01 ENCOUNTER — APPOINTMENT (EMERGENCY)
Dept: RADIOLOGY | Facility: HOSPITAL | Age: 57
DRG: 064 | End: 2022-01-01
Payer: MEDICARE

## 2022-01-01 ENCOUNTER — HOSPITAL ENCOUNTER (INPATIENT)
Facility: HOSPITAL | Age: 57
LOS: 1 days | DRG: 064 | End: 2022-01-04
Attending: EMERGENCY MEDICINE | Admitting: ANESTHESIOLOGY
Payer: MEDICARE

## 2022-01-01 VITALS
HEIGHT: 60 IN | SYSTOLIC BLOOD PRESSURE: 50 MMHG | OXYGEN SATURATION: 87 % | TEMPERATURE: 95 F | DIASTOLIC BLOOD PRESSURE: 29 MMHG | BODY MASS INDEX: 31.38 KG/M2 | HEART RATE: 89 BPM | WEIGHT: 159.83 LBS

## 2022-01-01 DIAGNOSIS — J96.90 RESPIRATORY FAILURE (HCC): ICD-10-CM

## 2022-01-01 DIAGNOSIS — I61.9 ICH (INTRACEREBRAL HEMORRHAGE) (HCC): Primary | ICD-10-CM

## 2022-01-01 LAB
ALBUMIN SERPL BCP-MCNC: 3 G/DL (ref 3.5–5)
ALP SERPL-CCNC: 71 U/L (ref 46–116)
ALT SERPL W P-5'-P-CCNC: 19 U/L (ref 12–78)
ANION GAP SERPL CALCULATED.3IONS-SCNC: 8 MMOL/L (ref 4–13)
APTT PPP: 27 SECONDS (ref 23–37)
ARTERIAL PATENCY WRIST A: YES
AST SERPL W P-5'-P-CCNC: 38 U/L (ref 5–45)
ATRIAL RATE: 50 BPM
BACTERIA UR QL AUTO: NORMAL /HPF
BASE EXCESS BLDA CALC-SCNC: -2.2 MMOL/L
BASOPHILS # BLD AUTO: 0.05 THOUSANDS/ΜL (ref 0–0.1)
BASOPHILS NFR BLD AUTO: 1 % (ref 0–1)
BILIRUB SERPL-MCNC: 0.43 MG/DL (ref 0.2–1)
BILIRUB UR QL STRIP: NEGATIVE
BODY TEMPERATURE: 96 DEGREES FEHRENHEIT
BUN SERPL-MCNC: 10 MG/DL (ref 5–25)
CALCIUM ALBUM COR SERPL-MCNC: 8.9 MG/DL (ref 8.3–10.1)
CALCIUM SERPL-MCNC: 8.1 MG/DL (ref 8.3–10.1)
CHLORIDE SERPL-SCNC: 98 MMOL/L (ref 100–108)
CLARITY UR: CLEAR
CO2 SERPL-SCNC: 26 MMOL/L (ref 21–32)
COLOR UR: ABNORMAL
CREAT SERPL-MCNC: 0.96 MG/DL (ref 0.6–1.3)
EOSINOPHIL # BLD AUTO: 0.04 THOUSAND/ΜL (ref 0–0.61)
EOSINOPHIL NFR BLD AUTO: 1 % (ref 0–6)
ERYTHROCYTE [DISTWIDTH] IN BLOOD BY AUTOMATED COUNT: 14.4 % (ref 11.6–15.1)
FLUAV RNA RESP QL NAA+PROBE: NEGATIVE
FLUBV RNA RESP QL NAA+PROBE: NEGATIVE
GFR SERPL CREATININE-BSD FRML MDRD: 87 ML/MIN/1.73SQ M
GLUCOSE SERPL-MCNC: 159 MG/DL (ref 65–140)
GLUCOSE UR STRIP-MCNC: NEGATIVE MG/DL
HCO3 BLDA-SCNC: 22.3 MMOL/L (ref 22–28)
HCT VFR BLD AUTO: 40.7 % (ref 36.5–49.3)
HGB BLD-MCNC: 13.8 G/DL (ref 12–17)
HGB UR QL STRIP.AUTO: NEGATIVE
HOROWITZ INDEX BLDA+IHG-RTO: 70 MM[HG]
I-TIME: 1
IMM GRANULOCYTES # BLD AUTO: 0.02 THOUSAND/UL (ref 0–0.2)
IMM GRANULOCYTES NFR BLD AUTO: 0 % (ref 0–2)
INR PPP: 1.11 (ref 0.84–1.19)
KETONES UR STRIP-MCNC: NEGATIVE MG/DL
LEUKOCYTE ESTERASE UR QL STRIP: ABNORMAL
LIPASE SERPL-CCNC: 92 U/L (ref 73–393)
LYMPHOCYTES # BLD AUTO: 2.72 THOUSANDS/ΜL (ref 0.6–4.47)
LYMPHOCYTES NFR BLD AUTO: 36 % (ref 14–44)
MCH RBC QN AUTO: 33.7 PG (ref 26.8–34.3)
MCHC RBC AUTO-ENTMCNC: 33.9 G/DL (ref 31.4–37.4)
MCV RBC AUTO: 100 FL (ref 82–98)
MONOCYTES # BLD AUTO: 0.47 THOUSAND/ΜL (ref 0.17–1.22)
MONOCYTES NFR BLD AUTO: 6 % (ref 4–12)
NEUTROPHILS # BLD AUTO: 4.17 THOUSANDS/ΜL (ref 1.85–7.62)
NEUTS SEG NFR BLD AUTO: 56 % (ref 43–75)
NITRITE UR QL STRIP: POSITIVE
NON-SQ EPI CELLS URNS QL MICRO: NORMAL /HPF
NRBC BLD AUTO-RTO: 0 /100 WBCS
O2 CT BLDA-SCNC: 18.3 ML/DL (ref 16–23)
OXYHGB MFR BLDA: 97.7 % (ref 94–97)
P AXIS: 43 DEGREES
PCO2 BLDA: 37.5 MM HG (ref 36–44)
PCO2 TEMP ADJ BLDA: 35.3 MM HG (ref 36–44)
PEEP RESPIRATORY: 6 CM[H2O]
PH BLD: 7.41 [PH] (ref 7.35–7.45)
PH BLDA: 7.39 [PH] (ref 7.35–7.45)
PH UR STRIP.AUTO: 7 [PH]
PLATELET # BLD AUTO: 308 THOUSANDS/UL (ref 149–390)
PMV BLD AUTO: 11 FL (ref 8.9–12.7)
PO2 BLD: 108 MM HG (ref 75–129)
PO2 BLDA: 116.4 MM HG (ref 75–129)
POTASSIUM SERPL-SCNC: 4 MMOL/L (ref 3.5–5.3)
PR INTERVAL: 148 MS
PROT SERPL-MCNC: 7.2 G/DL (ref 6.4–8.2)
PROT UR STRIP-MCNC: NEGATIVE MG/DL
PROTHROMBIN TIME: 14 SECONDS (ref 11.6–14.5)
QRS AXIS: -2 DEGREES
QRSD INTERVAL: 108 MS
QT INTERVAL: 498 MS
QTC INTERVAL: 454 MS
RBC # BLD AUTO: 4.09 MILLION/UL (ref 3.88–5.62)
RBC #/AREA URNS AUTO: NORMAL /HPF
RSV RNA RESP QL NAA+PROBE: NEGATIVE
SARS-COV-2 RNA RESP QL NAA+PROBE: NEGATIVE
SODIUM SERPL-SCNC: 132 MMOL/L (ref 136–145)
SP GR UR STRIP.AUTO: 1.01 (ref 1–1.03)
SPECIMEN SOURCE: ABNORMAL
T WAVE AXIS: 32 DEGREES
UROBILINOGEN UR QL STRIP.AUTO: 0.2 E.U./DL
VENT AC: 16
VENT- AC: AC
VENTRICULAR RATE: 50 BPM
VT SETTING VENT: 350 ML
WBC # BLD AUTO: 7.47 THOUSAND/UL (ref 4.31–10.16)
WBC #/AREA URNS AUTO: NORMAL /HPF

## 2022-01-01 PROCEDURE — 36600 WITHDRAWAL OF ARTERIAL BLOOD: CPT

## 2022-01-01 PROCEDURE — 5A1935Z RESPIRATORY VENTILATION, LESS THAN 24 CONSECUTIVE HOURS: ICD-10-PCS | Performed by: EMERGENCY MEDICINE

## 2022-01-01 PROCEDURE — 31500 INSERT EMERGENCY AIRWAY: CPT

## 2022-01-01 PROCEDURE — G1004 CDSM NDSC: HCPCS

## 2022-01-01 PROCEDURE — 80053 COMPREHEN METABOLIC PANEL: CPT | Performed by: EMERGENCY MEDICINE

## 2022-01-01 PROCEDURE — 0241U HB NFCT DS VIR RESP RNA 4 TRGT: CPT | Performed by: NURSE PRACTITIONER

## 2022-01-01 PROCEDURE — NC001 PR NO CHARGE: Performed by: NURSE PRACTITIONER

## 2022-01-01 PROCEDURE — 85025 COMPLETE CBC W/AUTO DIFF WBC: CPT | Performed by: EMERGENCY MEDICINE

## 2022-01-01 PROCEDURE — 96374 THER/PROPH/DIAG INJ IV PUSH: CPT

## 2022-01-01 PROCEDURE — 94760 N-INVAS EAR/PLS OXIMETRY 1: CPT

## 2022-01-01 PROCEDURE — 94003 VENT MGMT INPAT SUBQ DAY: CPT

## 2022-01-01 PROCEDURE — 93005 ELECTROCARDIOGRAM TRACING: CPT

## 2022-01-01 PROCEDURE — 74177 CT ABD & PELVIS W/CONTRAST: CPT

## 2022-01-01 PROCEDURE — 87081 CULTURE SCREEN ONLY: CPT | Performed by: ANESTHESIOLOGY

## 2022-01-01 PROCEDURE — 99291 CRITICAL CARE FIRST HOUR: CPT | Performed by: EMERGENCY MEDICINE

## 2022-01-01 PROCEDURE — 93010 ELECTROCARDIOGRAM REPORT: CPT | Performed by: INTERNAL MEDICINE

## 2022-01-01 PROCEDURE — 36415 COLL VENOUS BLD VENIPUNCTURE: CPT | Performed by: EMERGENCY MEDICINE

## 2022-01-01 PROCEDURE — 99285 EMERGENCY DEPT VISIT HI MDM: CPT

## 2022-01-01 PROCEDURE — 72125 CT NECK SPINE W/O DYE: CPT

## 2022-01-01 PROCEDURE — 82805 BLOOD GASES W/O2 SATURATION: CPT | Performed by: EMERGENCY MEDICINE

## 2022-01-01 PROCEDURE — 99291 CRITICAL CARE FIRST HOUR: CPT | Performed by: PSYCHIATRY & NEUROLOGY

## 2022-01-01 PROCEDURE — 70496 CT ANGIOGRAPHY HEAD: CPT

## 2022-01-01 PROCEDURE — 96361 HYDRATE IV INFUSION ADD-ON: CPT

## 2022-01-01 PROCEDURE — 83690 ASSAY OF LIPASE: CPT | Performed by: EMERGENCY MEDICINE

## 2022-01-01 PROCEDURE — 0BH17EZ INSERTION OF ENDOTRACHEAL AIRWAY INTO TRACHEA, VIA NATURAL OR ARTIFICIAL OPENING: ICD-10-PCS | Performed by: EMERGENCY MEDICINE

## 2022-01-01 PROCEDURE — 71045 X-RAY EXAM CHEST 1 VIEW: CPT

## 2022-01-01 PROCEDURE — 94002 VENT MGMT INPAT INIT DAY: CPT

## 2022-01-01 PROCEDURE — 85610 PROTHROMBIN TIME: CPT | Performed by: EMERGENCY MEDICINE

## 2022-01-01 PROCEDURE — 31500 INSERT EMERGENCY AIRWAY: CPT | Performed by: EMERGENCY MEDICINE

## 2022-01-01 PROCEDURE — 99232 SBSQ HOSP IP/OBS MODERATE 35: CPT | Performed by: PSYCHIATRY & NEUROLOGY

## 2022-01-01 PROCEDURE — 99291 CRITICAL CARE FIRST HOUR: CPT | Performed by: NURSE PRACTITIONER

## 2022-01-01 PROCEDURE — 70498 CT ANGIOGRAPHY NECK: CPT

## 2022-01-01 PROCEDURE — 85730 THROMBOPLASTIN TIME PARTIAL: CPT | Performed by: EMERGENCY MEDICINE

## 2022-01-01 PROCEDURE — 81001 URINALYSIS AUTO W/SCOPE: CPT | Performed by: EMERGENCY MEDICINE

## 2022-01-01 RX ORDER — ACETAMINOPHEN 160 MG/5ML
650 SUSPENSION, ORAL (FINAL DOSE FORM) ORAL EVERY 6 HOURS PRN
Status: DISCONTINUED | OUTPATIENT
Start: 2022-01-01 | End: 2022-01-01

## 2022-01-01 RX ORDER — GLYCOPYRROLATE 0.2 MG/ML
0.1 INJECTION INTRAMUSCULAR; INTRAVENOUS EVERY 4 HOURS PRN
Status: DISCONTINUED | OUTPATIENT
Start: 2022-01-01 | End: 2022-01-05 | Stop reason: HOSPADM

## 2022-01-01 RX ORDER — LORAZEPAM 2 MG/ML
1 INJECTION INTRAMUSCULAR
Status: DISCONTINUED | OUTPATIENT
Start: 2022-01-01 | End: 2022-01-05 | Stop reason: HOSPADM

## 2022-01-01 RX ORDER — SODIUM CHLORIDE, SODIUM GLUCONATE, SODIUM ACETATE, POTASSIUM CHLORIDE, MAGNESIUM CHLORIDE, SODIUM PHOSPHATE, DIBASIC, AND POTASSIUM PHOSPHATE .53; .5; .37; .037; .03; .012; .00082 G/100ML; G/100ML; G/100ML; G/100ML; G/100ML; G/100ML; G/100ML
100 INJECTION, SOLUTION INTRAVENOUS CONTINUOUS
Status: DISCONTINUED | OUTPATIENT
Start: 2022-01-01 | End: 2022-01-01

## 2022-01-01 RX ORDER — CHLORHEXIDINE GLUCONATE 0.12 MG/ML
15 RINSE ORAL EVERY 12 HOURS SCHEDULED
Status: DISCONTINUED | OUTPATIENT
Start: 2022-01-01 | End: 2022-01-01

## 2022-01-01 RX ORDER — HALOPERIDOL 5 MG/ML
0.5 INJECTION INTRAMUSCULAR EVERY 2 HOUR PRN
Status: DISCONTINUED | OUTPATIENT
Start: 2022-01-01 | End: 2022-01-05 | Stop reason: HOSPADM

## 2022-01-01 RX ORDER — ONDANSETRON 2 MG/ML
4 INJECTION INTRAMUSCULAR; INTRAVENOUS ONCE
Status: COMPLETED | OUTPATIENT
Start: 2022-01-01 | End: 2022-01-01

## 2022-01-01 RX ORDER — LEVOTHYROXINE SODIUM 0.07 MG/1
75 TABLET ORAL
Status: DISCONTINUED | OUTPATIENT
Start: 2022-01-01 | End: 2022-01-01

## 2022-01-01 RX ORDER — SODIUM CHLORIDE 9 MG/ML
125 INJECTION, SOLUTION INTRAVENOUS CONTINUOUS
Status: DISCONTINUED | OUTPATIENT
Start: 2022-01-01 | End: 2022-01-01

## 2022-01-01 RX ADMIN — SODIUM CHLORIDE 500 ML: 0.9 INJECTION, SOLUTION INTRAVENOUS at 19:00

## 2022-01-01 RX ADMIN — SODIUM CHLORIDE, SODIUM GLUCONATE, SODIUM ACETATE, POTASSIUM CHLORIDE, MAGNESIUM CHLORIDE, SODIUM PHOSPHATE, DIBASIC, AND POTASSIUM PHOSPHATE 100 ML/HR: .53; .5; .37; .037; .03; .012; .00082 INJECTION, SOLUTION INTRAVENOUS at 23:37

## 2022-01-01 RX ADMIN — CHLORHEXIDINE GLUCONATE 0.12% ORAL RINSE 15 ML: 1.2 LIQUID ORAL at 02:32

## 2022-01-01 RX ADMIN — SODIUM CHLORIDE 1000 ML: 0.9 INJECTION, SOLUTION INTRAVENOUS at 05:33

## 2022-01-01 RX ADMIN — IOHEXOL 25 ML: 350 INJECTION, SOLUTION INTRAVENOUS at 19:52

## 2022-01-01 RX ADMIN — LEVETIRACETAM 1000 MG: 100 INJECTION, SOLUTION INTRAVENOUS at 20:19

## 2022-01-01 RX ADMIN — SODIUM CHLORIDE 125 ML/HR: 0.9 INJECTION, SOLUTION INTRAVENOUS at 01:37

## 2022-01-01 RX ADMIN — IOHEXOL 85 ML: 350 INJECTION, SOLUTION INTRAVENOUS at 19:48

## 2022-01-01 RX ADMIN — MORPHINE SULFATE 2 MG: 2 INJECTION, SOLUTION INTRAMUSCULAR; INTRAVENOUS at 20:30

## 2022-01-01 RX ADMIN — ONDANSETRON 4 MG: 2 INJECTION INTRAMUSCULAR; INTRAVENOUS at 19:07

## 2022-01-03 PROBLEM — G20.A1 PARKINSON'S DISEASE: Chronic | Status: ACTIVE | Noted: 2017-02-23

## 2022-01-03 PROBLEM — I61.9 INTRAPARENCHYMAL HEMORRHAGE OF BRAIN (HCC): Status: ACTIVE | Noted: 2022-01-01

## 2022-01-03 PROBLEM — G92.8 TOXIC METABOLIC ENCEPHALOPATHY: Status: ACTIVE | Noted: 2022-01-01

## 2022-01-03 PROBLEM — E87.1 HYPONATREMIA: Status: ACTIVE | Noted: 2022-01-01

## 2022-01-03 PROBLEM — J96.01 ACUTE RESPIRATORY FAILURE WITH HYPOXIA (HCC): Status: ACTIVE | Noted: 2022-01-01

## 2022-01-03 PROBLEM — G20 PARKINSON'S DISEASE (HCC): Chronic | Status: ACTIVE | Noted: 2017-02-23

## 2022-01-03 NOTE — TELEPHONE ENCOUNTER
Lincoln Jeronimo called from group home once more, [phone]  Patient is still having soft stools 2 -3 x 's daily  Neurologist recommended decreasing or stopping donepezil med  Please advise

## 2022-01-03 NOTE — TELEPHONE ENCOUNTER
Ok to discontinue stool softener  I need fax number and I will fax script citing same  As for donepezil, I am not prescriber  If prescriber, recommends changing this med order must come from them

## 2022-01-04 PROBLEM — R06.89 ACUTE RESPIRATORY INSUFFICIENCY: Status: ACTIVE | Noted: 2022-01-01

## 2022-01-04 PROBLEM — N13.30 HYDRONEPHROSIS: Chronic | Status: ACTIVE | Noted: 2018-02-01

## 2022-01-04 NOTE — H&P
[place] 44  H&P- Julio Zendejas 1965, 61 y o  male MRN: [id number]  Unit/Bed#: ED CT1 Encounter: [id number]  Primary Care Provider: Tiffany Wolff   Date and time admitted to hospital: 1/3/2022  6:29 PM    * Intraparenchymal hemorrhage of brain [facility])  Assessment & Plan  · Patient last seen normal at group home at 5:00 p m  · At baseline he is high functioning and is able to perform ADLs independently  · At approximately 530 he went back to his room to take a nap and when caregiver went to check on him found him down on the bathroom floor vomiting  · Initial ED exam found him to be nonverbal with left facial droop, left-sided weakness and left-sided neglect  Stroke alert activated  · Acutely at 8:00 p m  he became obtunded and was intubated for airway protection  He was reported to have seizure like activity just prior to intubation and given 1g Keppra  · CT head was performed which showed a large intraparenchymal hemorrhage (4 x 4 x 7 cm) with compression of the venous structures and hydrocephalus, right o left midline shift of 9mm  · Initial neurologic exam post intubation revealed 2 mm nonreactive pupils, no gag no cough reflex, no corneal reflexes is with decerebrate posturing  Patient was over breathing the ventilator  · Neurosurgery consult was obtained, unfortunately based on the very severe imaging findings and poor exam, no surgical intervention could be done that would offer any benefit to patient  · Neurosurgery recommended proceeding towards comfort measures  · [facility] was contacted by ED staff and myself, and on-call rep spoke to patient's brother Reynaldo Campbell at bedside; after deliberation with his other brother who lives in [place], Reynaldo Campbell has decided not to pursue organ donation      · Reynaldo Campbell states that he just wants [place] to be made comfortable, but would like him to remain intubated with no aggressive care measures, in the hopes that his brother from [place] might be able to see him before he passes  His brother in [place] is getting on a flight now in hopes to be here sometime today  · On my exam patient with 1mm nonreactive pupils, nystagmus, decerebrate posturing, no cough/gag/corneal reflexes, continues to overbreath the vent  · Has had period episodes of bradycardia in the 40s and then HR rebounds to 90s, -170, concern for impending herniation  · I explained concern for brain herniation to brother Dillan Delvalle at bedside and expressed that I felt patient may die before his brother from [place] is able to get here  Dillan Delvalle expresses understanding of this  He reiterated that he did not want any aggressive measures (no invasive lines or vasopressors) but would like to continue conservative measures and continued intubation   · Will continue 1g Keppra BID for seizures, but unlikely to offer much benefit  · , will try to push Na higher to decrease cerebral edema with NSS  Na > 140 would be ideal   · Per neurology Mannitol unlikely to offer any benefit   · Of note, patient has hx of moyamoya disease     Acute respiratory insufficiency  Assessment & Plan  · Intubated for airway protection in setting of altered mental status d/t IPH  · Continue mechanical ventilation 16/400/6/50%  · Titrate fio2 for goal O2 sat > 92%  · ABG WNL  · CXR shows haziness in RUL which could represent aspiration pneumonitis   · Chlorhexidine, PPI, HOB > 30 degrees     Hyponatremia  Assessment & Plan  · , NSS started at 125mL/hr, also received 500mL NSS bolus  · Ideally Na  > 140 to decrease cerebral edema, but mannitol and hypertonic saline unlikely to offer much benefit, and family request no invasive measures    Hydronephrosis  Assessment & Plan  · Hx of chronic hydronephrosis, follows with urology as outpatient  · CT ab/pelvis w contrast-Bilateral moderate to marked hydronephrosis with an appearance consistent with chronic UPJ obstructions  The reason for the increase in the severity of hydronephrosis since prior CTs is unclear but may be related to the patient's state of hydration, diuresis and degree of bladder distention  Chronic mild bladder wall thickening, possibly due to chronic cystitis or neurogenic bladder  · Decompression of bladder with patel, monitor urine output  · UA with positive nitrites and 2-4 WBC, culture pending  · No indication for abx at this time    Hypothyroidism  Assessment & Plan  · Continue synthroid    Parkinson's disease ([address] )  Assessment & Plan  · Holding home sinemet and depakote as will not offer any benefit to patient     Down syndrome  Assessment & Plan  · At baseline is highly functioning and able to perform ADLs independently  · Lives in group home     -------------------------------------------------------------------------------------------------------------  Chief Complaint: patient found down vomiting     History of Present Illness   HX and PE limited by: obtunded/intubated  Kyle Farris is a 70-year-old male with PMH of Down syndrome, Parkinson's, HLD, padilla-padilla disease, hypothyroidism, and GERD who resides at a group home and was last seen normal eating dinner at 5:00 p m  At baseline he is high functioning and is able to perform ADLs independently  At approximately 530 he went back to his room to take a nap and when caregiver went to check on him found him down on the bathroom floor vomiting  Initial ED exam found him to be nonverbal, with left facial droop, left-sided weakness and left-sided neglect  Stroke alert activated  Acutely at 8:00 p m  he became obtunded and was intubated for airway protection   CT head was performed which showed a large intraparenchymal hemorrhage (4 x 4 x 7 cm) with compression of the venous structures and hydrocephalus, right to left midline shift of 9mm  Initial neurologic exam revealed 2 mm nonreactive pupils, no gag no cough reflex, no corneal reflexes is with decerebrate posturing  Patient was over breathing the ventilator  Neurosurgery consult was obtained; unfortunately based on the very severe imaging findings and poor neuro exam, it was deemed that no neurosurgical intervention could be done that would offer any benefit to patient  Neurosurgery recommended proceeding towards comfort measures  [facility] was contacted by ED staff and myself, and on-call rep spoke to patient's brother Mariaelena Horner at bedside  After deliberation with his other brother who lives in [place], family has decided not to pursue organ donation  Mariaelena Horner states that he just wants Darryl Lau to be made comfortable, but would like him to remain intubated with no aggressive care measures, in the hopes that his brother from [place] might be able to see him before he passes  His brother in [place] is getting on a flight now in hopes to be here sometime today  Patient will be admitted to ICU as level 2 DNR with conservative treatment measures per family wishes  History obtained from brother and group home caretaker at bedside and chart review  -------------------------------------------------------------------------------------------------------------  Dispo: Admit to Critical Care     Code Status: Level 2 - DNAR: but accepts endotracheal intubation  --------------------------------------------------------------------------------------------------------------  Review of Systems   Unable to perform ROS: Mental status change       Review of systems was unable to be performed secondary to obtunded/intubation    Physical Exam  Constitutional:       Appearance: He is ill-appearing  Interventions: He is intubated  LAVELLE:      Head: Normocephalic and atraumatic  Eyes:      General: Lids are normal       Extraocular Movements:      Right eye: Nystagmus present  Left eye: Nystagmus present        Conjunctiva/sclera: Conjunctivae normal       Comments: Pupils +1 pinpoint   Neck: Trachea: Trachea normal    Cardiovascular:      Rate and Rhythm: Regular rhythm  Bradycardia present  Pulses: Normal pulses  Radial pulses are 2+ on the right side and 2+ on the left side  Dorsalis pedis pulses are 2+ on the right side and 2+ on the left side  Heart sounds: Normal heart sounds, S1 normal and S2 normal    Pulmonary:      Effort: Pulmonary effort is normal  He is intubated  Breath sounds: Normal breath sounds  Abdominal:      General: Bowel sounds are normal       Palpations: Abdomen is soft  Musculoskeletal:      Cervical back: Neck supple  Comments: Decerebrate posturing of UE otherwise no extremity movement noted    Skin:     General: Skin is warm and dry  Capillary Refill: Capillary refill takes less than 2 seconds  Neurological:      GCS: GCS eye subscore is 1  GCS verbal subscore is 1  GCS motor subscore is 1        --------------------------------------------------------------------------------------------------------------  Vitals:   Vitals:    01/04/22 0000 01/04/22 0015 01/04/22 0030 01/04/22 0045   BP: 132/60 130/59 151/68 138/63   BP Location: Left arm Left arm Left arm Left arm   Pulse: 58 56 57 60   Resp: 17 18 18 17   Temp:       TempSrc:       SpO2: 97% 97% 96% 95%     Temp  Min: 96 °F (35 6 °C)  Max: 96 °F (35 6 °C)        There is no height or weight on file to calculate BMI      Laboratory and Diagnostics:  Results from last 7 days   Lab Units 01/03/22  1858   WBC Thousand/uL 7 47   HEMOGLOBIN g/dL 13 8   HEMATOCRIT % 40 7   PLATELETS Thousands/uL 308   NEUTROS PCT % 56   MONOS PCT % 6     Results from last 7 days   Lab Units 01/03/22  2042   SODIUM mmol/L 132*   POTASSIUM mmol/L 4 0   CHLORIDE mmol/L 98*   CO2 mmol/L 26   ANION GAP mmol/L 8   BUN mg/dL 10   CREATININE mg/dL 0 96   CALCIUM mg/dL 8 1*   GLUCOSE RANDOM mg/dL 159*   ALT U/L 19   AST U/L 38   ALK PHOS U/L 71   ALBUMIN g/dL 3 0*   TOTAL BILIRUBIN mg/dL 0 43          Results from last 7 days   Lab Units 01/03/22  2042   INR  1 11   PTT seconds 27              ABG:  Results from last 7 days   Lab Units 01/03/22 2119   PH ART  7 393   PCO2 ART mm Hg 37 5   PO2 ART mm Hg 116 4   HCO3 ART mmol/L 22 3   BASE EXC ART mmol/L -2 2   ABG SOURCE  Brachial, Right     VBG:  Results from last 7 days   Lab Units 01/03/22 2119   ABG SOURCE  Brachial, Right           Micro:        EKG: SB-rate 50  Imaging: CXR- haziness in RUL which could represent aspiration pneumonitis  CT head was performed which showed a large intraparenchymal hemorrhage (4 x 4 x 7 cm) with compression of the venous structures and hydrocephalus, right o left midline shift of 9mm  CT ab/pelvis w contrast: Bilateral moderate to marked hydronephrosis with an appearance consistent with chronic UPJ obstructions  The reason for the increase in the severity of hydronephrosis since prior CTs is unclear but may be related to the patient's state of hydration,   diuresis and degree of bladder distention  Distended bladder  Chronic mild bladder wall thickening, possibly due to chronic cystitis or neurogenic bladder  No evidence of acute abnormality in the abdomen or pelvis  I have personally reviewed pertinent reports     and I have personally reviewed pertinent films in PACS      Historical Information   Past Medical History:   Diagnosis Date    Abnormal weight gain     last assessed 4/18/16; resolved 1/25/17    Acute CHF (congestive [facility] failure) [facility] [address] [facility]     Anxiety     Dementia ([facility]     Depression     Disease of thyroid gland     Down's syndrome     Fatigue     last assessed 2/23/17; resolved 6/1/17    GERD (gastroesophageal reflux disease)     Gluten free diet     H/O urinary retention     Hernia of abdominal cavity     Hyperlipidemia     Hypogonadism in male     Impulse control disorder     OCD (obsessive compulsive disorder)     Orthostatic hypotension     Parkinson disease ([address] )     Parkinson disease ([facility] ) resolved 2/23/17    Pleural effusion     Sinus tachycardia     Vitamin D deficiency     last assessed 9/1/16; resolved 6/1/17     Past Surgical History:   Procedure Laterality Date    COLONOSCOPY      ESOPHAGOGASTRODUODENOSCOPY      with biopsy    PERICARDIOCENTESIS  4/29/2016          Social History   Social History     Substance and Sexual Activity   Alcohol Use Never     Social History     Substance and Sexual Activity   Drug Use Never     Social History     Tobacco Use   Smoking Status Never Smoker   Smokeless Tobacco Never Used     Exercise History: ambulatory  Family History:   Family History   Problem Relation Age of Onset    No Known Problems Mother     Parkinsonism Father         symtomatic    No Known Problems Brother     No Known Problems Sister     No Known Problems Maternal Grandmother     No Known Problems Maternal Grandfather     No Known Problems Paternal Grandmother     No Known Problems Paternal Grandfather     No Known Problems Brother      I have reviewed this patient's family history and commented on sigificant items within the HPI      Medications:  Current Facility-Administered Medications   Medication Dose Route Frequency    acetaminophen (TYLENOL) oral suspension 650 mg  650 mg Oral Q6H PRN    chlorhexidine (PERIDEX) 0 12 % oral rinse 15 mL  15 mL Mouth/Throat Q12H [address]    levETIRAcetam (KEPPRA) 1,000 mg in sodium chloride 0 9 % 100 mL IVPB  1,000 mg Intravenous Q12H [address]    levothyroxine tablet 75 mcg  75 mcg Per NG Tube Early Morning    omeprazole (PRILOSEC) suspension 2 mg/mL  20 mg Oral Daily    sodium chloride 0 9 % infusion  125 mL/hr Intravenous Continuous     Home medications:  Prior to Admission Medications   Prescriptions Last Dose Informant Patient Reported? Taking?    Aspirin Low Dose 81 MG chewable tablet 1/3/2022 at Unknown time Outside Facility (Specify) No Yes   Sig: TAKE 1 TABLET BY MOUTH EVERY MORNING (8AM)   Calcium Carbonate-Vitamin D 600-400 MG-UNIT per tablet 1/3/2022 at Unknown time Outside Facility (Specify) No Yes   Sig: Take 1 tablet by mouth 2 (two) times a day At 8 am and 5 pm   Calcium Carbonate-Vitamin D3 (Calcium + Vitamin D3) 600-400 MG-UNIT TABS 1/3/2022 at Unknown time Outside Facility (Specify) No Yes   Sig: Take 1 tablet by mouth 2 (two) times a day At 8 am and 5 pm   D-Mannose 500 MG CAPS 1/3/2022 at Unknown time Outside Facility ([address] [place] [address]) Yes Yes   Sig: Take 2 capsules by mouth 2 (two) times a day   OLANZapine (ZyPREXA) 2 5 mg tablet 1/2/2022 at Unknown time Outside Facility (Specify) No Yes   Sig: Take 1 tablet (2 5 mg total) by mouth daily 8 pm   atorvastatin (LIPITOR) 80 mg tablet 1/2/2022 at Unknown time Outside Facility (Specify) No Yes   Sig: Take 1 tablet (80 mg total) by mouth daily   carbidopa-levodopa (SINEMET)  mg per tablet 1/3/2022 at Unknown time  No Yes   Sig: Take 1 tablet by mouth 2 (two) times a day before meals Full tablet 1/2hour before breakfast and dinner   cholecalciferol (VITAMIN D3) 1,000 units tablet 1/3/2022 at Unknown time Outside Facility (Specify) No Yes   Sig: Take 1 tablet (1,000 Units total) by mouth 2 (two) times a day At 8 am and 8 pm   divalproex sodium (DEPAKOTE) 125 mg EC tablet 1/2/2022 at Unknown time Outside Facility (Specify) No Yes   Sig: Take 1 tablet (125 mg total) by mouth every 8 (eight) hours   divalproex sodium (DEPAKOTE) 500 mg EC tablet 1/2/2022 at Unknown time  No Yes   Sig: TAKE 1 TABLET BY MOUTH IN THE EVENING (8PM)   docusate sodium (COLACE) 100 mg capsule 1/3/2022 at Unknown time Outside Facility (Specify) No Yes   Sig: Take one capsule at 8am   donepezil (ARICEPT) 10 mg tablet 1/2/2022 at Unknown time  No Yes   Sig: Take 1 tablet (10 mg total) by mouth daily at bedtime   erythromycin (ILOTYCIN) ophthalmic ointment 1/3/2022 at Unknown time Outside Facility (Specify) No Yes   Sig: Administer 0 5 inches into the left eye every 12 (twelve) hours At 8 am and 8 pm  D/c on 5/27/20 esomeprazole (NexIUM) 40 MG capsule 1/3/2022 at Unknown time Outside Facility [facility]) No Yes   Sig: Take one capsule daily 30 minutes prior to breakfast   lanolin-mineral oil (Karen Rockers) Barrington Zach 1/3/2022 at Unknown time Outside Facility [facility]) No Yes   Sig: Apply topically 2 (two) times a day Apply to hands daily at 8 am and 8 pm   levothyroxine 75 mcg tablet 1/2/2022 at Unknown time Outside Facility (Specify) No Yes   Sig: TAKE 1 TABLET BY MOUTH IN THE EVENING (8PM)   lidocaine (URO-JET) 2 % urethral/mucosal gel Not Taking at Unknown time Outside Facility (Specify) No No   Sig: Insert 1 application into the urethra daily as needed (urethral pain 2/2 catheter)   Patient not taking: Reported on 6/17/2021   loratadine (CLARITIN) 10 mg tablet Not Taking at Unknown time Outside Facility (Specify) No No   Sig: TAKE 1 TABLET BY MOUTH EVERY MORNING (8AM) AS NEEDED FOR ALLERGIES -MAX=1TAB/DAY   Patient not taking: Reported on 1/3/2022   midodrine (PROAMATINE) 2 5 mg tablet Unknown at Unknown time Outside Facility (Specify) No No   Sig: Take 1 tablet (2 5 mg total) by mouth 2 (two) times a day as needed (If systolic blood pressure less than 100 and patient is symptomatic)   multivitamin-minerals (CENTRUM ADULTS) tablet 1/2/2022 at Unknown time Outside Facility (Specify) No Yes   Sig: Take 1 tablet by mouth daily At 8am   neomycin-bacitracin-polymyxin (NEOSPORIN) 5-400-5,000 ointment Not Taking at Unknown time Outside Facility (Specify) No No   Sig: Apply topically 3 (three) times a day Apply to urethra for lubrication   Patient not taking: Reported on 12/21/2021    ondansetron (ZOFRAN-ODT) 4 mg disintegrating tablet Not Taking at Unknown time Outside Facility (Specify) No No   Sig: Take 1 tablet (4 mg total) by mouth every 8 (eight) hours as needed for nausea or vomiting D/c on 4/18/21   Patient not taking: Reported on 8/13/2021   polyethylene glycol (GLYCOLAX) 17 GM/SCOOP powder 1/2/2022 at Unknown time Outside Facility (Specify) No Yes   Sig: Take 17 grams in 8 oz of water by mouth daily at 8 pm   tamsulosin (FLOMAX) 0 4 mg Not Taking at Unknown time Outside Facility (Specify) Yes No   Sig: Take 0 4 mg by mouth Two tablet @ 8 am 0 8 mg daily   Patient not taking: Reported on 11/2/2021   testosterone (ANDROGEL) 1 62 % TD gel pump Not Taking at Unknown time Outside Facility (Specify) No No   Sig: Apply 3 pumps at 8 AM to shoulders/chest daily   Patient not taking: Reported on 8/13/2021      Facility-Administered Medications: None     Allergies: Allergies   Allergen Reactions    Advil [Ibuprofen]     Gluten Meal - Food Allergy     Other      SEASONAL       ------------------------------------------------------------------------------------------------------------  Advance Directive and Living Will:      Power of :  brother Chacorta Yasinter  POLST:    ------------------------------------------------------------------------------------------------------------  Anticipated Length of Stay is > 2 midnights    Care Time Delivered:   Upon my evaluation, this patient had a high probability of imminent or life-threatening deterioration due to hemorrhagic stroke, which required my direct attention, intervention, and personal management  I have personally provided 70 minutes (2230 to 0000) of critical care time, exclusive of procedures, teaching, family meetings, and any prior time recorded by providers other than myself  BJ Chatterjee        Portions of the record may have been created with voice recognition software  Occasional wrong word or "sound a like" substitutions may have occurred due to the inherent limitations of voice recognition software    Read the chart carefully and recognize, using context, where substitutions have occurred

## 2022-01-04 NOTE — ASSESSMENT & PLAN NOTE
· Intubated for airway protection in setting of altered mental status d/t IPH  · Continue mechanical ventilation 16/400/6/50%  · Titrate fio2 for goal O2 sat > 92%  · ABG WNL  · CXR shows haziness in RUL which could represent aspiration pneumonitis   · Chlorhexidine, PPI, HOB > 30 degrees

## 2022-01-04 NOTE — ASSESSMENT & PLAN NOTE
· Patient last seen normal at group home at 5:00 p m  · At baseline he is high functioning and is able to perform ADLs independently  · At approximately 530 he went back to his room to take a nap and when caregiver went to check on him found him down on the bathroom floor vomiting  · Initial ED exam found him to be nonverbal with left facial droop, left-sided weakness and left-sided neglect  Stroke alert activated  · Acutely at 8:00 p m  he became obtunded and was intubated for airway protection  He was reported to have seizure like activity just prior to intubation and given 1g Keppra  · CT head was performed which showed a large intraparenchymal hemorrhage (4 x 4 x 7 cm) with compression of the venous structures and hydrocephalus, right o left midline shift of 9mm  · Initial neurologic exam post intubation revealed 2 mm nonreactive pupils, no gag no cough reflex, no corneal reflexes is with decerebrate posturing  Patient was over breathing the ventilator  · Neurosurgery consult was obtained, unfortunately based on the very severe imaging findings and poor exam, no surgical intervention could be done that would offer any benefit to patient  · Neurosurgery recommended proceeding towards comfort measures  · [facility] was contacted by ED staff and myself, and on-call rep spoke to patient's brother Andree Goss at bedside; after deliberation with his other brother who lives in [place], Andree Goss has decided not to pursue organ donation  · Andree Goss states that he just wants Almon Matthew to be made comfortable, but would like him to remain intubated with no aggressive care measures, in the hopes that his brother from [place] might be able to see him before he passes  His brother in [place] is getting on a flight now in hopes to be here sometime today    · On my exam patient with 1mm nonreactive pupils, nystagmus, decerebrate posturing, no cough/gag/corneal reflexes, continues to overbreath the vent  · Has had period episodes of bradycardia in the 40s and then HR rebounds to 90s, -170, concern for impending herniation  · I explained concern for brain herniation to brother Wendy Lanier at bedside and expressed that I felt patient may die before his brother from [place] is able to get here  Wendy Lanier expresses understanding of this  He reiterated that he did not want any aggressive measures (no invasive lines or vasopressors) but would like to continue conservative measures and continued intubation   · Will continue 1g Keppra BID for seizures, but unlikely to offer much benefit  · , will try to push Na higher to decrease cerebral edema with NSS   Na > 140 would be ideal   · Per neurology Mannitol unlikely to offer any benefit   · Of note, patient has hx of moyamoya disease

## 2022-01-04 NOTE — TELEMEDICINE
On-Call Telephone Note    Contacted by Julio Fermin Endy Torres 61 y o  male MRN: [id number]  Unit/Bed#: ED CT1 Encounter: [id number]    Per provider report, patient is a 61year old male with history of Down Syndrome who resides at a Group home and was found disoriented and vomiting evening  The patient was brought to the ER and declined becoming obtunded and unresponsive, requiring intubation  CT head demonstrated a large right frontal IPH  [place] Available past medical history,social history, surgical history, medication list, drug allergies and review of systems were reviewed  BP (!) 171/73 (BP Location: Left arm)   Pulse 68   Temp (!) 96 °F (35 6 °C) (Tympanic)   Resp (!) 23   SpO2 100%      Clinical exam per provider report, pupils 2mm, unreactive  No cough, gag, or corneal reflex, decerebrate posturing  Imaging personally reviewed  CT head shows a large approximately 6x3cm frontal hemorrhage with ventricular extension causing severe obstructive hydrocephalus  The bilateral, third and fourth ventricles are casted  Assessment and Plan  Given the patient's severe imaging findings and very poor exam, I do not believe surgical intervention would offer the patient with any benefit  No surgical intervention being offered  1  Family discussion  2  Proceed towards comfort care measures  3  Please recall neurosurgery with questions    All questions answered  Provider is in agreement with the course of action  A total of 30 minutes was spent discussing the presentation, physical exam and formulating a management plan with the provider

## 2022-01-04 NOTE — QUICK NOTE
Patient with acute drop in SBP to 60s with MAP 40s,  from prior bradycardia, no longer over breathing vent  Called patient's brother Joan Faria and made him aware that patient's death is likely imminent  Joan Faria will be coming in now to be with patient  Will give 1L NSS bolus now to try and temporize patient's hypotension

## 2022-01-04 NOTE — ED NOTES
Caregiver to bedside reports patient is unresponsive and not at all in his normal state  Pt  Suctioned for moderate amount of emesis  Dr Ashleigh Dominguez made aware and to bedside for eval   Pt   Taken with 2 RNs to CT per MD order for immediate CT studies-suctioned during procedure multiple times      Slava Catalan, [place]  01/03/22 6390

## 2022-01-04 NOTE — PROGRESS NOTES
Neurology Consult Follow Up      Nelson Shown is a 61 y o  male  ICU 10/ICU 10    [id number]        Assessment/Recommendations:    1  Lg Rt Frontoparietal IPH  2  Down Syndrome  3  MoyaMoya  4  PD    -Continues on Keppra 1G every 12hrs for seizure prophylaxis  -Vent care and Critical Care management per ICU management    Pt is a 57yo male with Down Syndrome, PD, MoyaMoya and onset of MCI/Dementia who was eating dinner when decided to go to his room and lie down around 1700  Staff at [facility] checked on him 5mins later and found pt on the floor vomiting  Pt was brought to the ED, was awake but non verbal at that time  Pt then developed a facial droop, left sided weakness and left sided neglect  At 8pm, he became obtunded and was intubated  Stroke alert was called 1918, Neurology responded at [facility]  47  CT of Head and CTA was performed  CT with acute Rt parietal/carona radiata hemorrhage [id number] with Rt parietal hemorrhage dissecting into the rt lateral ventricle distending the 3rd and 4th ventricle with moderate hydrocephalus  Rt to Lt midline shift of 9mm  CTA of Head and neck with irregular serpiginous enhancement of the Rt parietal periventricular white matter poss  Compression of the venous structures, poss vasc  malformation  Severe atresia of the proximal anterior MCA with collateral flow M1 consistent with h/o moyamoya  NS consulted and discussed with Neuro MD poss  Transfer to higher level of care, however, with rapid progression of bleed, neuro exam reported by on scene provider with decerebrate posturing with lack of reflexes and non reactive pupils surgical intervention would not reverse any damage that has been done by this event  Discuss use of DDAVP or Mannitol, however, determined that this is not likely to alter the progression of this event  Pt brother was  present and discussed goal of care with medical team   Pt is intubated and will be admitted to ICU   Pt receiving IV Keppra 1G for Seizure prophylaxis  No additional aggressive measures with be provided as this is not going to provide any viable or meaningful recovery  Met with the Brother Rl Hwang and his wife at bedside today, answered his questions at this time  Pt is resting comfortable on full vent support  He will remain on vent support until another brother from [place] is able to arrive later today  Family is aware that given pt progression, it is possible that he may imminently pass while waiting  Family wishes no aggressive measures in that event  Will withdrawal to comfort care once remaining family is present  Chief Complaint:    Subjective:   Pt is intubated and on full vent support  Pt brother Rl Hwang and sister in law present at bedside  Per Rodolfo Calhoun, his other brother is in flight from [place] at this time and will be landing in [place] around [address] and making his way to the hospital after landing  Hoping to be at bedside before withdrawal of care and providing palliative, comfort care  However, in the event of further decline, no extraordinary measures are to be provided at family request   Pt appears to be comfortable, no extensive assessment done in light of his decline and prognosis at this time    Past Medical History:   Diagnosis Date    Abnormal weight gain     last assessed 4/18/16; resolved 1/25/17    Acute CHF (congestive heart failure) ([address] )     Anxiety     Dementia ([address] )     Depression     Disease of thyroid gland     Down's syndrome     Fatigue     last assessed 2/23/17; resolved 6/1/17    GERD (gastroesophageal reflux disease)     Gluten free diet     H/O urinary retention     Hernia of abdominal cavity     Hyperlipidemia     Hypogonadism in male     Impulse control disorder     OCD (obsessive compulsive disorder)     Orthostatic hypotension     Parkinson disease (HCC)     Parkinson disease ([address] )     resolved 2/23/17    Pleural effusion     Sinus tachycardia     Vitamin D deficiency     last assessed 9/1/16; resolved 6/1/17     Social History     Socioeconomic History    Marital status: Single     Spouse name: Not on file    Number of children: Not on file    Years of education: Not on file    Highest education level: Not on file   Occupational History    Not on file   Tobacco Use    Smoking status: Never Smoker    Smokeless tobacco: Never Used   Vaping Use    Vaping Use: Never used   Substance and Sexual Activity    Alcohol use: Never    Drug use: Never    Sexual activity: Not on file   Other Topics Concern    Not on file   Social History Narrative    Caffeine use      Social Determinants of Health     Financial Resource Strain: Not on file   Food Insecurity: Not on file   Transportation Needs: Not on file   Physical Activity: Not on file   Stress: Not on file   Social Connections: Not on file   Intimate Partner Violence: Not on file   Housing Stability: Not on file     Family History   Problem Relation Age of Onset    No Known Problems Mother     Parkinsonism Father         symtomatic    No Known Problems Brother     No Known Problems Sister     No Known Problems Maternal Grandmother     No Known Problems Maternal Grandfather     No Known Problems Paternal Grandmother     No Known Problems Paternal Grandfather     No Known Problems Brother        ROS:  Pt unable to provide subjective feedback    Objective:  BP (!) 59/38   Pulse (!) 117   Temp (!) 96 °F (35 6 °C)   Resp 16   Ht 4' 10" (1 473 m)   Wt 72 5 kg (159 lb 13 3 oz)   SpO2 90%   BMI 33 41 kg/m²     Orientation is ARIEL  Language and Speech: ARIEL  Cranial nerves:   ARIEL- per critical care team, pt with decline in hemodynamics and actively passing   Deferred testing at this time  Motor:  Pronator drift is ARIEL  Muscle exam ARIEL    Sensory: ARIEL  Gait: deferred   Coordination and reflex: deferred as pt is actively passing, family at bedside  Heart: currently off monitor  Lung: assist per vent support  Abd: soft  Skin: dry and intact    Labs:      Lab Results   Component Value Date    WBC 7 47 01/03/2022    HGB 13 8 01/03/2022    HCT 40 7 01/03/2022     (H) 01/03/2022     01/03/2022     No results found for: HGBA1C  Lab Results   Component Value Date    ALT 19 01/03/2022    AST 38 01/03/2022    ALKPHOS 71 01/03/2022     Lab Results   Component Value Date    GLUCOSE 96 11/20/2015    CALCIUM 8 1 (L) 01/03/2022     (L) 11/20/2015    K 4 0 01/03/2022    CO2 26 01/03/2022    CL 98 (L) 01/03/2022    BUN 10 01/03/2022    CREATININE 0 96 01/03/2022         Review of reports and notes reveal:   Independent review of films/reports:  CTA head and neck with and without contrast    Result Date: 1/3/2022  1  Irregular serpiginous enhancement in the right parietal periventricular white matter along the medial aspect of the hematoma may represent compression of the venous structures however vascular malformation not entirely excluded  IR consultation may be helpful  2   Severe atresia of the proximal anterior middle cerebral arteries with evidence of collateral flow surrounding the M1 segments  Findings consistent with reported history of moyamoya  3   No hemodynamically significant stenosis, dissection or occlusion of the carotid or vertebral arteries  Findings communicated with Kassi zaldivar on 1/3/2022 at 8:14 PM  Workstation performed: [id number]     XR chest 1 view portable    Result Date: 1/4/2022  No acute cardiopulmonary disease  Workstation performed: [id number]     CT cervical spine without contrast    Result Date: 1/3/2022  No acute cervical spine fracture or traumatic malalignment  Workstation performed: [id number]     CT stroke alert brain    Result Date: 1/3/2022  1  Acute right parietal and corona radiata hemorrhage measuring 4 x 4 by 7 cm (50 mL)  2   Right parietal hemorrhage dissects into the right lateral ventricle, distending the 3rd and 4th ventricles  Moderate hydrocephalus   3  Right to left midline shift of 9 mm  Findings were directly discussed with Cooper Harvey at 4:05 PM  Workstation performed: [id number]     CT abdomen pelvis with contrast    Result Date: 1/3/2022  1  Bilateral moderate to marked hydronephrosis with an appearance consistent with chronic UPJ obstructions  The reason for the increase in the severity of hydronephrosis since prior CTs is unclear but may be related to the patient's state of hydration,  diuresis and degree of bladder distention  2   Distended bladder  3   Chronic mild bladder wall thickening, possibly due to chronic cystitis or neurogenic bladder  4   No evidence of acute abnormality in the abdomen or pelvis  Workstation performed: [id number]         Thank you for this consult      Total time of encounter: 20 min  More than 50% of the time was used in counseling and/or with pt family re: pt progress and end of life discussion

## 2022-01-04 NOTE — ASSESSMENT & PLAN NOTE
· Hx of chronic hydronephrosis, follows with urology as outpatient  · CT ab/pelvis w contrast-Bilateral moderate to marked hydronephrosis with an appearance consistent with chronic UPJ obstructions  The reason for the increase in the severity of hydronephrosis since prior CTs is unclear but may be related to the patient's state of hydration, diuresis and degree of bladder distention  Chronic mild bladder wall thickening, possibly due to chronic cystitis or neurogenic bladder    · Decompression of bladder with patel, monitor urine output  · UA with positive nitrites and 2-4 WBC, culture pending  · No indication for abx at this time

## 2022-01-04 NOTE — CONSULTS
Imelda Estrada is a 62 yo M with PMH of Down Syndrome, Parkinsonism, TIAs, Padilla padilla disease presents with episode of vomiting at home and progressive weakness  Patient was LSN at 5:30pm when he went in his room for nap  Few minutes later, he was found to be down on floor , vomiting  Initially per ED patient was only non verbal  Later he was noted to have left facial droop, left sided weakness and had left side neglect  Around 8pm, he became obtunded and was intubated  His CT brain shows large IPH with compression of venous structures and hydrocephalus  Patient was intubated without any sedatives  His exam per ED consists of pupils that  are 2mm, non reactive, no cough, gag reflex  He's noted to have decerebrate posturing  Spoke to oncall neurosurgery Dr Nanda Gill at length regarding patient's history and current presentation  Initially we were considering transfer but according to neurosurgery, with current physical exam, EVD is not likely to help and will prolong pain and suffering  I discussed these findings with patient's brother Kianna Mai and that unfortunately we won't be able to offer any neurosurgical intervention that can reverse his condition  Mannitol or DDAVP is not likely to help either  It would be in patient's best interest to consider comfort measures only  Discussed plan with ED staff  CTA head and neck with and without contrast    Result Date: 1/3/2022  1  Irregular serpiginous enhancement in the right parietal periventricular white matter along the medial aspect of the hematoma may represent compression of the venous structures however vascular malformation not entirely excluded  IR consultation may be helpful  2   Severe atresia of the proximal anterior middle cerebral arteries with evidence of collateral flow surrounding the M1 segments  Findings consistent with reported history of moyamoya   3   No hemodynamically significant stenosis, dissection or occlusion of the carotid or vertebral arteries  Findings communicated with Mitch zaldivar on 1/3/2022 at 8:14 PM  Workstation performed: [id number]           *Total time of encounter with critical care time: 60 min       [facility]       TPA Decision: Patient not a TPA candidate  Bleeding risk          Reason for Consult / Principal Problem: stroke   Hx and PE limited by: non verbal   Patient last known well: 5:30pm  Stroke alert called: 7:18pm  Neurology time of arrival: discussed case at 7:19pm

## 2022-01-04 NOTE — ASSESSMENT & PLAN NOTE
· , NSS started at 125mL/hr, also received 500mL NSS bolus  · Ideally Na  > 140 to decrease cerebral edema, but mannitol and hypertonic saline unlikely to offer much benefit, and family request no invasive measures

## 2022-01-05 LAB — MRSA NOSE QL CULT: NORMAL

## 2022-01-05 NOTE — DISCHARGE SUMMARY
Discharge Summary - Maribel Bray 61 y o  male MRN: [id number]    Unit/Bed#: ICU 10 Encounter: [id number] PCP: Tiffany Long    Admission Date:   Admission Orders (From admission, onward)     Ordered        01/03/22 2221  INPATIENT ADMISSION  Once                        Admitting Diagnosis: Respiratory failure ([facility] ) [J96 90]  Vomiting [R11 10]  ICH (intracerebral hemorrhage) ([facility] ) [I61 9]    HPI: Fran Lee is a 62 y/o M with Down syndrome, PD, and Moyamoya disease who was brought in from his group home last night after a caregiver found him vomiting and unresponsive in the bathroom with left sided weakness and facial droop  On arrival to ED, patient was intubated for airway protection  CT head showed large acute right parietal and corona radiata hemorrhage with right to left midline shift, moderate hydrocephalus  Ne[facility]surgery at [facility] was contacted by ED and it was felt that patient was not a candidate for neurosurgical intervention  On exam he had no response to noxious stimuli, no cough, gag, or corneal reflexes though he did overbreathe set ventilator rate  Pupils were equal but fixed  Due to his poor prognosis family changed his code status to level 2 DNR  He was profoundly hypotensive through the day while waiting for his brother to fly in for a final visit  Tonight per discussion with both brothers Ysabel Tilley and Devin, he was transitioned to comfort care  He passed away at 2126  Procedures Performed:   Orders Placed This Encounter   Procedures    Critical Care    ED ECG Documentation Only    Intubation       Summary of Hospital Course: See above    Significant Findings, Care, Treatment and Services Provided: CT head 1/4: 1  Acute right parietal and corona radiata hemorrhage measuring 4 x 4 by 7 cm (50 mL)  2   Right parietal hemorrhage dissects into the right lateral ventricle, distending the 3rd and 4th ventricles  Moderate hydrocephalus   3   Right to left midline shift of 9 mm     Complications: None    Disposition:      Final Diagnosis: Acute intraparenchymal hemorrhage    Medical Problems             Resolved Problems  Date Reviewed: 2022    None                Condition at Time of Death: Critical    Date, Time and Cause of Death    Date of Death: 22  Time of Death:  9:26 PM  Preliminary Cause of Death: Intraparenchymal hemorrhage of brain ([facility] )  Entered by: Frantz LORENZO[KM1 1]     Attribution     KM1 1 Marely Tom [place] 22 23:29          Death Note:    INPATIENT DEATH NOTE  Richie Torres 61 y o  male MRN: [id number]  Unit/Bed#: ICU 10 Encounter: [id number]    Date, Time and Cause of Death    Date of Death: 22  Time of Death:  9:26 PM  Preliminary Cause of Death: Intraparenchymal hemorrhage of brain ([facility] )  Entered by: Frantz LORENZO[KM1 1]     Attribution     KM1 1 Marely [address] [place] 22 23:29           Patient's Information  Pronounced by: Frantz LORENZO  Did the patient's death occur in the ED?: No  Did the patient's death occur in the OR?: No  Did the patient's death occur less than 10 days post-op?: No  Did the patient's death occur within 24 hours of admission?: Yes  Was code status DNR at the time of death?: Yes    PHYSICAL EXAM:  Unresponsive to noxious stimuli, Spontaneous respirations absent, Breath sounds absent, Carotid pulse absent, Heart sounds absent, Pupillary light reflex absent and Corneal blink reflex absent    Medical Examiner notification criteria:  Patient  within 24 hours of arrival to hospital   Medical Examiner's office notified?:  Yes   Medical Examiner accepted case?:  No  Name of Medical Examiner: N/A    Family Notification  Was the family notified?: Yes  Date Notified: 22  Time Notified:   Notified by: Frantz Barrett  Name of Family Notified of Death: Alexandre Goyal   Relationship to Patient: Brother  Family Notification Route:  At bedside  Was the family told to contact a  home?: Yes  Name of  Home[de-identified] [address]    Autopsy Options:  Post-mortem examination declined by next of kin    Primary Service Attending Physician notified?:  yes - Attending:  Carlita Gonzales MD    Physician/Resident responsible for completing Discharge Summary:  Elisa Machado

## 2022-01-05 NOTE — PLAN OF CARE
Problem: RESPIRATORY - ADULT  Goal: Achieves optimal ventilation and oxygenation  Description: INTERVENTIONS:  - Assess for changes in respiratory status  - Assess for changes in mentation and behavior  - Position to facilitate oxygenation and minimize respiratory effort  - Oxygen administered by appropriate delivery if ordered  - Initiate smoking cessation education as indicated  - Encourage broncho-pulmonary hygiene including cough, deep breathe, Incentive Spirometry  - Assess the need for suctioning and aspirate as needed  - Assess and instruct to report SOB or any respiratory difficulty  - Respiratory Therapy support as indicated  Outcome: Progressing     Problem: GENITOURINARY - ADULT  Goal: Maintains or returns to baseline urinary function  Description: INTERVENTIONS:  - Assess urinary function  - Encourage oral fluids to ensure adequate hydration if ordered  - Administer IV fluids as ordered to ensure adequate hydration  - Administer ordered medications as needed  - Offer frequent toileting  - Follow urinary retention protocol if ordered  Outcome: Progressing  Goal: Absence of urinary retention  Description: INTERVENTIONS:  - Assess patients ability to void and empty bladder  - Monitor I/O  - Bladder scan as needed  - Discuss with physician/AP medications to alleviate retention as needed  - Discuss catheterization for long term situations as appropriate  Outcome: Progressing  Goal: Urinary catheter remains patent  Description: INTERVENTIONS:  - Assess patency of urinary catheter  - If patient has a chronic patel, consider changing catheter if non-functioning  - Follow guidelines for intermittent irrigation of non-functioning urinary catheter  Outcome: Progressing

## 2022-01-05 NOTE — DEATH NOTE
INPATIENT DEATH NOTE  Elisa Torres 61 y o  male MRN: [id number]  Unit/Bed#: ICU 10 Encounter: [id number]    Date, Time and Cause of Death    Date of Death: 22  Time of Death:  9:26 PM  Preliminary Cause of Death: Intraparenchymal hemorrhage of brain ([facility] )  Entered by: Nimisha LORENZO[KM1 1]     Attribution     KM1 1 Levon Agueror, 10 [facility] 22 23:29           Patient's Information  Pronounced by: Nimisha LORENZO  Did the patient's death occur in the ED?: No  Did the patient's death occur in the OR?: No  Did the patient's death occur less than 10 days post-op?: No  Did the patient's death occur within 24 hours of admission?: Yes  Was code status DNR at the time of death?: Yes    PHYSICAL EXAM:  Unresponsive to noxious stimuli, Spontaneous respirations absent, Breath sounds absent, Carotid pulse absent, Heart sounds absent, Pupillary light reflex absent and Corneal blink reflex absent    Medical Examiner notification criteria:  Patient  within 24 hours of arrival to hospital   Medical Examiner's office notified?:  Yes   Medical Examiner accepted case?:  No  Name of Medical Examiner: N/A    Family Notification  Was the family notified?: Yes  Date Notified: 22  Time Notified:   Notified by: Nimisha Messer  Name of Family Notified of Death: Rosey Terry   Relationship to Patient: Brother  Family Notification Route:  At bedside  Was the family told to contact a  home?: Yes  Name of  Home[de-identified] [facility]    Autopsy Options:  Post-mortem examination declined by next of kin    Primary Service Attending Physician notified?:  yes - Attending:  Fox Moore MD    Physician/Resident responsible for completing Discharge Summary:  Haydee Schneider

## 2022-08-22 NOTE — PROGRESS NOTES
Progress Note - General Surgery       Patient: Luisa Navarro   : 1965 Sex: male   MRN: [id number]     CSN: [id number]  Unit/Bed#: [address] [place] [address]     SUBJECTIVE:   No complaint      Objective   Vitals: /58 (BP Location: Right arm)   Pulse 84   Temp 97 8 °F (36 6 °C) (Tympanic)   Resp 18   Ht 5' (1 524 m)   Wt 65 kg (143 lb 4 8 oz)   SpO2 97%   BMI 27 99 kg/m²     I/O last 24 hours:   In: 1910 [P O :1910]  Out: 500 [Urine:500]      Physical Exam:   General Alert awake   Normocephalic atraumatic PERRLA  Lungs clear bilaterally  Cardiac normal S1 normal S2  Abdomen soft, flank pain  Extremities no edema      Lab Results: CBC:   Lab Results   Component Value Date    WBC 7 80 2020    HGB 13 3 2020    HCT 39 4 2020    MCV 99 (H) 2020     2020    ADJUSTEDWBC 3 60 (L) 10/03/2016    MCH 33 3 2020    MCHC 33 8 2020    RDW 13 1 2020    MPV 9 8 2020    NRBC 0 2020     CMP:   Lab Results   Component Value Date     (L) 2015    CL 99 (L) 2020    CL 99 2015    CO2 31 2020    CO2 33 (H) 2015    ANIONGAP 7 7 (L) 2015    BUN 5 2020    BUN 10 2015    CREATININE 0 83 2020    CREATININE 0 7 2015    GLUCOSE 96 2015    CALCIUM 7 8 (L) 2020    CALCIUM 8 5 2016    AST 21 2020    ALT 16 2020    ALKPHOS 44 (L) 2020    EGFR 99 2020     Urinalysis:   Lab Results   Component Value Date    COLORU Light Yellow 2020    COLORU Yellow 2017    CLARITYU Clear 2020    CLARITYU Transparent 2017    SPECGRAV <=1 005 2020    SPECGRAV 1 005 2017    PHUR 6 5 2020    PHUR 7 5 2017    PHUR 7 2017    LEUKOCYTESUR Negative 2020    LEUKOCYTESUR neg 2017    NITRITE Positive (A) 2020    NITRITE neg 2017    PROTEINUA neg 2017    GLUCOSEU Negative 2020    KETONESU Negative 2020 KETONESU neg 02/23/2017    BILIRUBINUR Negative 01/16/2020    BILIRUBINUR neg 02/23/2017    BLOODU Negative 01/16/2020    BLOODU neg 02/23/2017     Urine Culture:   Lab Results   Component Value Date    URINECX 80,000-89,000 cfu/ml  01/16/2020     PSA:   Lab Results   Component Value Date    PSA <0 1 09/12/2019    PSA <0 1 07/26/2019    PSA <0 1 11/10/2018    PSA <0 1 06/08/2018    PSA <0 1 07/15/2017    PSA <0 1 10/03/2016    PSA <0 1 04/16/2016         Assessment/ Plan:  Urinary retention  Navarro d/c  Need pvr prior to d/c to group home  D/c back on flomax          Nika Watkins MD Abdominal pain

## 2022-11-08 NOTE — ED NOTES
DC instructions given. Pt mother verbalized understanding.  DC home in stable condition     Mellissa Simpson RN  11/07/22 [id number] Stroke alert called     Concepción Johnson RN  01/03/22 [id number]

## 2024-09-16 NOTE — ASSESSMENT & PLAN NOTE
As per review of medical record patient has been following with Dr Sheila Ray, neurology outpatient every 6 months for mild Parkinson's disease  Supportive care 
Continue Colace and  MiraLax  Monitor 
Continue Colace and  MiraLax, on a daily basis  Monitor 
History of down syndrome  Patient lives in group home  Supportive care 
Patient has a history of GERD  Continue PPI 
Patient has a history of chronic constipation, is currently taking Colace and MiraLax on a daily basis  Will increase patient's Colace to b i d   Monitor bowel movements, may consider adding milk of magnesia if patient does not have BM during the day today  Staff from group [facility] report that he had 1 very small hard BM this morning  Monitor 
Patient has a history of hypothyroidism  Will continue levothyroxine 75 mcg p o  Daily  Last TSH is 3 29 from January 2020 
Patient has a history of orthostatic hypotension  Orthostatics ordered  Midodrine 2 5 mg b i d  P r n     Vital signs as per unit routine  Monitor 
Patient presented to emergency department after awakening this morning with abdominal discomfort  His bladder scan in found to have greater than 700 cc in his bladder  Of note patient had been discharged evening before after being admitted for urinary retention, hydronephrosis, acute kidney injury for which patient had Navarro catheter placed and his Flomax had been increased from 0 4 mg to 0 8 mg, followed by urology  Navarro catheter had been removed day prior to discharge, PVRs were 200-300 cc prior to discharge  Discussed with Urology okay to discharge since PVR less than 400  Navarro catheter reinserted in emergency department, 1000 cc urine returned  Patient is in a group home, they cannot accommodate patient with Navarro catheter  Patient is being admitted for urinary retention, Navarro catheter management  All labs are unremarkable  Discussed with case management 
Recently discharged from hospital with urinary retention  Had successfully underwent a voiding trial but when he returned to the [facility] home developed urinary retention again  Patient return to the ED for evaluation  Bladder scan in the emergency department should greater than 700 mL  A Navarro catheter was inserted with 1000 mL urine return    · Continue Navarro catheter for 1 week then attempt a voiding trial  · Follow-up with urologist in 1 week  · Continue Flomax 0 8 mg daily with dinner (recently re-started)  · Only use Midodrine if patient has systolic blood pressure less than 100 and is symptomatic  · Apply neosporin and lidocaine ointment to urethra for discomfort  · Monitor I & O's  · Minimize use of midodrine as it can cause urinary retention
· Continue PPI
· Continue PPI 
· Continue levothyroxine 75 mcg   · Last TSH is 3 29 from January 2020
· Continue levothyroxine 75 mcg p o  Daily  · Last TSH is 3 29 from January 2020 
· Continue orthostatic BP's  · Midodrine 2 5 mg b i d  P r misti Ojeda · Vital signs as per unit routine  · Monitor 
· Following with Dr Lady Mijares, neurology outpatient every 6 months for mild Parkinson's disease  · Supportive care 
· Following with Dr Valerie Glover, neurology outpatient every 6 months for mild Parkinson's disease  · Supportive care 
· Maintain patel catheter, apply neosporin and lidocaine ointment to urethra for discomfort  · Continue Flomax 0 8 mg daily with dinner  · Monitor I & O's
· Midodrine 2 5 mg BID PRN is SBP < 100s and symptomatic  · Will try to minimize use of midodrine as it can cause urinary retention
· Patient lives in group home  · Supportive care
· Patient lives in group home  · Supportive care 
18

## 2025-06-20 NOTE — ASSESSMENT & PLAN NOTE
Copied from CRM #[id number]. Topic: General Inquiry - Patient Advice  >> Jun 20, 2025  8:47 AM Cindy wrote:  Type:  RX Refill Request    Who Called: Sumti Otto  Refill or New Rx:New Rx  RX Name and Strength:semaglutide (OZEMPIC) 2 mg/dose (8 mg/3 mL) PnIj  How is the patient currently taking it? (ex. 1XDay):every 7 days  Is this a 30 day or 90 day RX:  Preferred Pharmacy with phone number:[facility]/pharmacy #[id number] - [place] [address] AT [facility]   Phone: [phone]  Fax: [phone]  Local or Mail Order:local  Ordering Provider:Teresa  Would the patient rather a call back or a response via liveBooksner? Call back  Best Call Back Number:[phone]  Additional Information: Pt hasn't had the medication since the 14th.  Pt needs PA for Medication. [place] Otto contact #[phone]   Sepsis, POA, now resolved setting of UTI as evidence by will count of 16 24 on admission with initial temperature 101 6° and heart rate of 108 in ED requiring IV fluid bolus and IV ceftriaxone  Patient has improved, completed 3 days of ceftriaxone  Procalcitonin negative  WBC normalized to 7 80  Patient will follow-up with his primary care physician in 1-2 weeks  He will follow-up with his urologist in 2 weeks